# Patient Record
Sex: FEMALE | Race: WHITE | Employment: OTHER | URBAN - METROPOLITAN AREA
[De-identification: names, ages, dates, MRNs, and addresses within clinical notes are randomized per-mention and may not be internally consistent; named-entity substitution may affect disease eponyms.]

---

## 2020-01-01 ENCOUNTER — APPOINTMENT (OUTPATIENT)
Dept: GENERAL RADIOLOGY | Age: 85
DRG: 470 | End: 2020-01-01
Payer: MEDICARE

## 2020-01-01 ENCOUNTER — ANESTHESIA EVENT (OUTPATIENT)
Dept: MEDSURG UNIT | Age: 85
DRG: 470 | End: 2020-01-01
Payer: MEDICARE

## 2020-01-01 ENCOUNTER — ANESTHESIA (OUTPATIENT)
Dept: OPERATING ROOM | Age: 85
DRG: 470 | End: 2020-01-01
Payer: MEDICARE

## 2020-01-01 ENCOUNTER — HOSPITAL ENCOUNTER (INPATIENT)
Age: 85
LOS: 4 days | Discharge: NURSING, REHAB OR LONG-TERM CARE | DRG: 470 | End: 2020-08-02
Attending: EMERGENCY MEDICINE | Admitting: INTERNAL MEDICINE
Payer: MEDICARE

## 2020-01-01 ENCOUNTER — APPOINTMENT (OUTPATIENT)
Dept: CT IMAGING | Age: 85
DRG: 470 | End: 2020-01-01
Payer: MEDICARE

## 2020-01-01 ENCOUNTER — ANESTHESIA (OUTPATIENT)
Dept: MEDSURG UNIT | Age: 85
DRG: 470 | End: 2020-01-01
Payer: MEDICARE

## 2020-01-01 ENCOUNTER — APPOINTMENT (OUTPATIENT)
Dept: CT IMAGING | Age: 85
DRG: 280 | End: 2020-01-01
Payer: MEDICARE

## 2020-01-01 ENCOUNTER — OFFICE VISIT (OUTPATIENT)
Dept: ORTHOPEDIC SURGERY | Age: 85
End: 2020-01-01

## 2020-01-01 ENCOUNTER — TELEPHONE (OUTPATIENT)
Dept: ORTHOPEDIC SURGERY | Age: 85
End: 2020-01-01

## 2020-01-01 ENCOUNTER — ANESTHESIA EVENT (OUTPATIENT)
Dept: OPERATING ROOM | Age: 85
DRG: 470 | End: 2020-01-01
Payer: MEDICARE

## 2020-01-01 ENCOUNTER — HOSPITAL ENCOUNTER (EMERGENCY)
Age: 85
Discharge: HOME | End: 2020-08-03
Payer: MEDICARE

## 2020-01-01 ENCOUNTER — HOSPITAL ENCOUNTER (INPATIENT)
Age: 85
LOS: 5 days | Discharge: NURSING, REHAB OR LONG-TERM CARE | DRG: 280 | End: 2020-12-27
Attending: EMERGENCY MEDICINE | Admitting: INTERNAL MEDICINE
Payer: MEDICARE

## 2020-01-01 ENCOUNTER — APPOINTMENT (OUTPATIENT)
Dept: GENERAL RADIOLOGY | Age: 85
DRG: 280 | End: 2020-01-01
Payer: MEDICARE

## 2020-01-01 VITALS
HEIGHT: 62 IN | WEIGHT: 135 LBS | SYSTOLIC BLOOD PRESSURE: 143 MMHG | RESPIRATION RATE: 16 BRPM | HEART RATE: 72 BPM | BODY MASS INDEX: 24.84 KG/M2 | TEMPERATURE: 98.6 F | DIASTOLIC BLOOD PRESSURE: 56 MMHG | OXYGEN SATURATION: 97 %

## 2020-01-01 VITALS
TEMPERATURE: 98 F | HEIGHT: 62 IN | HEART RATE: 55 BPM | BODY MASS INDEX: 21.18 KG/M2 | OXYGEN SATURATION: 94 % | RESPIRATION RATE: 16 BRPM | DIASTOLIC BLOOD PRESSURE: 55 MMHG | WEIGHT: 115.1 LBS | SYSTOLIC BLOOD PRESSURE: 133 MMHG

## 2020-01-01 VITALS
SYSTOLIC BLOOD PRESSURE: 155 MMHG | WEIGHT: 135 LBS | OXYGEN SATURATION: 96 % | DIASTOLIC BLOOD PRESSURE: 48 MMHG | BODY MASS INDEX: 24.69 KG/M2 | RESPIRATION RATE: 22 BRPM | TEMPERATURE: 98 F | HEART RATE: 73 BPM

## 2020-01-01 VITALS — HEIGHT: 62 IN | WEIGHT: 106 LBS | BODY MASS INDEX: 19.51 KG/M2

## 2020-01-01 VITALS — OXYGEN SATURATION: 98 % | SYSTOLIC BLOOD PRESSURE: 141 MMHG | DIASTOLIC BLOOD PRESSURE: 45 MMHG

## 2020-01-01 VITALS — WEIGHT: 106 LBS | HEIGHT: 62 IN | BODY MASS INDEX: 19.51 KG/M2

## 2020-01-01 LAB
A/G RATIO: 1 (ref 1.1–2.2)
A/G RATIO: 1.4 (ref 1.1–2.2)
A/G RATIO: 1.7 (ref 1.1–2.2)
ABO/RH: NORMAL
ABO/RH: NORMAL
ALBUMIN SERPL-MCNC: 3 G/DL (ref 3.4–5)
ALBUMIN SERPL-MCNC: 3.6 G/DL (ref 3.4–5)
ALBUMIN SERPL-MCNC: 4.2 G/DL (ref 3.4–5)
ALBUMIN SERPL-MCNC: 4.7 G/DL (ref 3.4–5)
ALP BLD-CCNC: 56 U/L (ref 40–129)
ALP BLD-CCNC: 70 U/L (ref 40–129)
ALP BLD-CCNC: 75 U/L (ref 40–129)
ALP BLD-CCNC: 82 U/L (ref 40–129)
ALT SERPL-CCNC: 10 U/L (ref 10–40)
ALT SERPL-CCNC: 7 U/L (ref 10–40)
ALT SERPL-CCNC: 7 U/L (ref 10–40)
ALT SERPL-CCNC: <5 U/L (ref 10–40)
ANION GAP SERPL CALCULATED.3IONS-SCNC: 10 MMOL/L (ref 3–16)
ANION GAP SERPL CALCULATED.3IONS-SCNC: 11 MMOL/L (ref 3–16)
ANION GAP SERPL CALCULATED.3IONS-SCNC: 14 MMOL/L (ref 3–16)
ANION GAP SERPL CALCULATED.3IONS-SCNC: 6 MMOL/L (ref 3–16)
ANION GAP SERPL CALCULATED.3IONS-SCNC: 8 MMOL/L (ref 3–16)
ANION GAP SERPL CALCULATED.3IONS-SCNC: 8 MMOL/L (ref 3–16)
ANION GAP SERPL CALCULATED.3IONS-SCNC: 9 MMOL/L (ref 3–16)
ANION GAP SERPL CALCULATED.3IONS-SCNC: 9 MMOL/L (ref 3–16)
ANTIBODY SCREEN: NORMAL
ANTIBODY SCREEN: NORMAL
APTT: 28.5 SEC (ref 24.2–36.2)
APTT: 36.7 SEC (ref 24.2–36.2)
APTT: 48.4 SEC (ref 24.2–36.2)
AST SERPL-CCNC: 14 U/L (ref 15–37)
AST SERPL-CCNC: 25 U/L (ref 15–37)
AST SERPL-CCNC: 30 U/L (ref 15–37)
AST SERPL-CCNC: 34 U/L (ref 15–37)
BACTERIA: ABNORMAL /HPF
BASOPHILS ABSOLUTE: 0 K/UL (ref 0–0.2)
BASOPHILS ABSOLUTE: 0.1 K/UL (ref 0–0.2)
BASOPHILS RELATIVE PERCENT: 0.2 %
BASOPHILS RELATIVE PERCENT: 0.4 %
BASOPHILS RELATIVE PERCENT: 0.4 %
BASOPHILS RELATIVE PERCENT: 0.5 %
BASOPHILS RELATIVE PERCENT: 0.6 %
BASOPHILS RELATIVE PERCENT: 0.9 %
BASOPHILS RELATIVE PERCENT: 1 %
BILIRUB SERPL-MCNC: 0.4 MG/DL (ref 0–1)
BILIRUB SERPL-MCNC: 0.5 MG/DL (ref 0–1)
BILIRUB SERPL-MCNC: 0.5 MG/DL (ref 0–1)
BILIRUB SERPL-MCNC: 0.6 MG/DL (ref 0–1)
BILIRUBIN DIRECT: <0.2 MG/DL (ref 0–0.3)
BILIRUBIN URINE: NEGATIVE
BILIRUBIN URINE: NEGATIVE
BILIRUBIN, INDIRECT: ABNORMAL MG/DL (ref 0–1)
BLOOD CULTURE, ROUTINE: NORMAL
BLOOD CULTURE, ROUTINE: NORMAL
BLOOD, URINE: ABNORMAL
BLOOD, URINE: ABNORMAL
BUN BLDV-MCNC: 14 MG/DL (ref 7–20)
BUN BLDV-MCNC: 16 MG/DL (ref 7–20)
BUN BLDV-MCNC: 17 MG/DL (ref 7–20)
BUN BLDV-MCNC: 18 MG/DL (ref 7–20)
BUN BLDV-MCNC: 18 MG/DL (ref 7–20)
BUN BLDV-MCNC: 19 MG/DL (ref 7–20)
BUN BLDV-MCNC: 22 MG/DL (ref 7–20)
BUN BLDV-MCNC: 23 MG/DL (ref 7–20)
CALCIUM SERPL-MCNC: 10 MG/DL (ref 8.3–10.6)
CALCIUM SERPL-MCNC: 8 MG/DL (ref 8.3–10.6)
CALCIUM SERPL-MCNC: 8.1 MG/DL (ref 8.3–10.6)
CALCIUM SERPL-MCNC: 8.3 MG/DL (ref 8.3–10.6)
CALCIUM SERPL-MCNC: 8.5 MG/DL (ref 8.3–10.6)
CALCIUM SERPL-MCNC: 8.5 MG/DL (ref 8.3–10.6)
CALCIUM SERPL-MCNC: 8.7 MG/DL (ref 8.3–10.6)
CALCIUM SERPL-MCNC: 8.7 MG/DL (ref 8.3–10.6)
CALCIUM SERPL-MCNC: 8.8 MG/DL (ref 8.3–10.6)
CALCIUM SERPL-MCNC: 9.5 MG/DL (ref 8.3–10.6)
CHLORIDE BLD-SCNC: 100 MMOL/L (ref 99–110)
CHLORIDE BLD-SCNC: 102 MMOL/L (ref 99–110)
CHLORIDE BLD-SCNC: 103 MMOL/L (ref 99–110)
CHLORIDE BLD-SCNC: 103 MMOL/L (ref 99–110)
CHLORIDE BLD-SCNC: 105 MMOL/L (ref 99–110)
CHLORIDE BLD-SCNC: 105 MMOL/L (ref 99–110)
CHLORIDE BLD-SCNC: 106 MMOL/L (ref 99–110)
CHLORIDE BLD-SCNC: 94 MMOL/L (ref 99–110)
CHLORIDE BLD-SCNC: 95 MMOL/L (ref 99–110)
CHLORIDE BLD-SCNC: 98 MMOL/L (ref 99–110)
CLARITY: CLEAR
CLARITY: CLEAR
CO2: 22 MMOL/L (ref 21–32)
CO2: 23 MMOL/L (ref 21–32)
CO2: 24 MMOL/L (ref 21–32)
CO2: 24 MMOL/L (ref 21–32)
CO2: 25 MMOL/L (ref 21–32)
CO2: 25 MMOL/L (ref 21–32)
CO2: 27 MMOL/L (ref 21–32)
CO2: 27 MMOL/L (ref 21–32)
COLOR: YELLOW
COLOR: YELLOW
CREAT SERPL-MCNC: 0.8 MG/DL (ref 0.6–1.2)
CREAT SERPL-MCNC: 0.9 MG/DL (ref 0.6–1.2)
CREAT SERPL-MCNC: 1 MG/DL (ref 0.6–1.2)
CREAT SERPL-MCNC: 1 MG/DL (ref 0.6–1.2)
CREAT SERPL-MCNC: 1.1 MG/DL (ref 0.6–1.2)
CULTURE, BLOOD 2: NORMAL
CULTURE, BLOOD 2: NORMAL
EKG ATRIAL RATE: 104 BPM
EKG ATRIAL RATE: 54 BPM
EKG ATRIAL RATE: 61 BPM
EKG ATRIAL RATE: 77 BPM
EKG ATRIAL RATE: 88 BPM
EKG DIAGNOSIS: NORMAL
EKG P AXIS: 41 DEGREES
EKG P AXIS: 88 DEGREES
EKG P AXIS: 93 DEGREES
EKG P AXIS: 94 DEGREES
EKG P-R INTERVAL: 146 MS
EKG P-R INTERVAL: 150 MS
EKG P-R INTERVAL: 160 MS
EKG P-R INTERVAL: 160 MS
EKG Q-T INTERVAL: 310 MS
EKG Q-T INTERVAL: 342 MS
EKG Q-T INTERVAL: 374 MS
EKG Q-T INTERVAL: 454 MS
EKG Q-T INTERVAL: 510 MS
EKG QRS DURATION: 106 MS
EKG QRS DURATION: 112 MS
EKG QRS DURATION: 118 MS
EKG QRS DURATION: 118 MS
EKG QRS DURATION: 122 MS
EKG QTC CALCULATION (BAZETT): 407 MS
EKG QTC CALCULATION (BAZETT): 452 MS
EKG QTC CALCULATION (BAZETT): 457 MS
EKG QTC CALCULATION (BAZETT): 475 MS
EKG QTC CALCULATION (BAZETT): 483 MS
EKG R AXIS: -50 DEGREES
EKG R AXIS: -51 DEGREES
EKG R AXIS: -55 DEGREES
EKG R AXIS: -56 DEGREES
EKG R AXIS: -61 DEGREES
EKG T AXIS: -61 DEGREES
EKG T AXIS: 114 DEGREES
EKG T AXIS: 42 DEGREES
EKG T AXIS: 84 DEGREES
EKG T AXIS: 84 DEGREES
EKG VENTRICULAR RATE: 104 BPM
EKG VENTRICULAR RATE: 116 BPM
EKG VENTRICULAR RATE: 54 BPM
EKG VENTRICULAR RATE: 61 BPM
EKG VENTRICULAR RATE: 88 BPM
EOSINOPHILS ABSOLUTE: 0 K/UL (ref 0–0.6)
EOSINOPHILS ABSOLUTE: 0.1 K/UL (ref 0–0.6)
EOSINOPHILS RELATIVE PERCENT: 0.1 %
EOSINOPHILS RELATIVE PERCENT: 0.5 %
EOSINOPHILS RELATIVE PERCENT: 0.6 %
EOSINOPHILS RELATIVE PERCENT: 0.7 %
EOSINOPHILS RELATIVE PERCENT: 0.8 %
EOSINOPHILS RELATIVE PERCENT: 1.2 %
EOSINOPHILS RELATIVE PERCENT: 1.4 %
EOSINOPHILS RELATIVE PERCENT: 1.5 %
EOSINOPHILS RELATIVE PERCENT: 1.8 %
EPITHELIAL CELLS, UA: ABNORMAL /HPF (ref 0–5)
ESTIMATED AVERAGE GLUCOSE: 93.9 MG/DL
GFR AFRICAN AMERICAN: 57
GFR AFRICAN AMERICAN: >60
GFR NON-AFRICAN AMERICAN: 47
GFR NON-AFRICAN AMERICAN: 53
GFR NON-AFRICAN AMERICAN: 53
GFR NON-AFRICAN AMERICAN: 59
GFR NON-AFRICAN AMERICAN: >60
GLOBULIN: 2.8 G/DL
GLOBULIN: 3 G/DL
GLOBULIN: 3 G/DL
GLUCOSE BLD-MCNC: 106 MG/DL (ref 70–99)
GLUCOSE BLD-MCNC: 107 MG/DL (ref 70–99)
GLUCOSE BLD-MCNC: 108 MG/DL (ref 70–99)
GLUCOSE BLD-MCNC: 109 MG/DL (ref 70–99)
GLUCOSE BLD-MCNC: 112 MG/DL (ref 70–99)
GLUCOSE BLD-MCNC: 118 MG/DL (ref 70–99)
GLUCOSE BLD-MCNC: 119 MG/DL (ref 70–99)
GLUCOSE BLD-MCNC: 121 MG/DL (ref 70–99)
GLUCOSE BLD-MCNC: 123 MG/DL (ref 70–99)
GLUCOSE BLD-MCNC: 126 MG/DL (ref 70–99)
GLUCOSE BLD-MCNC: 127 MG/DL (ref 70–99)
GLUCOSE BLD-MCNC: 128 MG/DL (ref 70–99)
GLUCOSE BLD-MCNC: 129 MG/DL (ref 70–99)
GLUCOSE BLD-MCNC: 129 MG/DL (ref 70–99)
GLUCOSE BLD-MCNC: 131 MG/DL (ref 70–99)
GLUCOSE BLD-MCNC: 133 MG/DL (ref 70–99)
GLUCOSE BLD-MCNC: 133 MG/DL (ref 70–99)
GLUCOSE BLD-MCNC: 135 MG/DL (ref 70–99)
GLUCOSE BLD-MCNC: 140 MG/DL (ref 70–99)
GLUCOSE BLD-MCNC: 140 MG/DL (ref 70–99)
GLUCOSE BLD-MCNC: 154 MG/DL (ref 70–99)
GLUCOSE BLD-MCNC: 162 MG/DL (ref 70–99)
GLUCOSE BLD-MCNC: 167 MG/DL (ref 70–99)
GLUCOSE BLD-MCNC: 221 MG/DL (ref 70–99)
GLUCOSE BLD-MCNC: 96 MG/DL (ref 70–99)
GLUCOSE BLD-MCNC: 98 MG/DL (ref 70–99)
GLUCOSE BLD-MCNC: 99 MG/DL (ref 70–99)
GLUCOSE BLD-MCNC: 99 MG/DL (ref 70–99)
GLUCOSE URINE: 250 MG/DL
GLUCOSE URINE: NEGATIVE MG/DL
HBA1C MFR BLD: 4.9 %
HCT VFR BLD CALC: 21.4 % (ref 36–48)
HCT VFR BLD CALC: 23.1 % (ref 36–48)
HCT VFR BLD CALC: 23.6 % (ref 36–48)
HCT VFR BLD CALC: 23.7 % (ref 36–48)
HCT VFR BLD CALC: 30.5 % (ref 36–48)
HCT VFR BLD CALC: 30.6 % (ref 36–48)
HCT VFR BLD CALC: 31.4 % (ref 36–48)
HCT VFR BLD CALC: 31.5 % (ref 36–48)
HCT VFR BLD CALC: 32.1 % (ref 36–48)
HCT VFR BLD CALC: 33.3 % (ref 36–48)
HEMOGLOBIN: 10.3 G/DL (ref 12–16)
HEMOGLOBIN: 10.3 G/DL (ref 12–16)
HEMOGLOBIN: 10.4 G/DL (ref 12–16)
HEMOGLOBIN: 10.6 G/DL (ref 12–16)
HEMOGLOBIN: 10.7 G/DL (ref 12–16)
HEMOGLOBIN: 11.1 G/DL (ref 12–16)
HEMOGLOBIN: 7.2 G/DL (ref 12–16)
HEMOGLOBIN: 7.6 G/DL (ref 12–16)
HEMOGLOBIN: 7.9 G/DL (ref 12–16)
HEMOGLOBIN: 7.9 G/DL (ref 12–16)
HYALINE CASTS: ABNORMAL /LPF (ref 0–2)
INR BLD: 1.06 (ref 0.86–1.14)
KETONES, URINE: NEGATIVE MG/DL
KETONES, URINE: NEGATIVE MG/DL
LACTIC ACID, SEPSIS: 2.6 MMOL/L (ref 0.4–1.9)
LACTIC ACID, SEPSIS: 3.1 MMOL/L (ref 0.4–1.9)
LACTIC ACID: 0.8 MMOL/L (ref 0.4–2)
LACTIC ACID: 1 MMOL/L (ref 0.4–2)
LACTIC ACID: 1.5 MMOL/L (ref 0.4–2)
LACTIC ACID: 1.9 MMOL/L (ref 0.4–2)
LEUKOCYTE ESTERASE, URINE: NEGATIVE
LEUKOCYTE ESTERASE, URINE: NEGATIVE
LIPASE: 39 U/L (ref 13–60)
LV EF: 33 %
LVEF MODALITY: NORMAL
LYMPHOCYTES ABSOLUTE: 0.8 K/UL (ref 1–5.1)
LYMPHOCYTES ABSOLUTE: 0.9 K/UL (ref 1–5.1)
LYMPHOCYTES ABSOLUTE: 0.9 K/UL (ref 1–5.1)
LYMPHOCYTES ABSOLUTE: 1 K/UL (ref 1–5.1)
LYMPHOCYTES ABSOLUTE: 1 K/UL (ref 1–5.1)
LYMPHOCYTES ABSOLUTE: 1.1 K/UL (ref 1–5.1)
LYMPHOCYTES ABSOLUTE: 1.2 K/UL (ref 1–5.1)
LYMPHOCYTES ABSOLUTE: 1.3 K/UL (ref 1–5.1)
LYMPHOCYTES ABSOLUTE: 1.6 K/UL (ref 1–5.1)
LYMPHOCYTES RELATIVE PERCENT: 12.2 %
LYMPHOCYTES RELATIVE PERCENT: 12.9 %
LYMPHOCYTES RELATIVE PERCENT: 14.2 %
LYMPHOCYTES RELATIVE PERCENT: 14.8 %
LYMPHOCYTES RELATIVE PERCENT: 15.7 %
LYMPHOCYTES RELATIVE PERCENT: 16.2 %
LYMPHOCYTES RELATIVE PERCENT: 16.6 %
LYMPHOCYTES RELATIVE PERCENT: 20.3 %
LYMPHOCYTES RELATIVE PERCENT: 8.2 %
MCH RBC QN AUTO: 32.2 PG (ref 26–34)
MCH RBC QN AUTO: 32.3 PG (ref 26–34)
MCH RBC QN AUTO: 32.6 PG (ref 26–34)
MCH RBC QN AUTO: 32.9 PG (ref 26–34)
MCH RBC QN AUTO: 32.9 PG (ref 26–34)
MCH RBC QN AUTO: 33.1 PG (ref 26–34)
MCH RBC QN AUTO: 33.1 PG (ref 26–34)
MCH RBC QN AUTO: 33.9 PG (ref 26–34)
MCHC RBC AUTO-ENTMCNC: 32.9 G/DL (ref 31–36)
MCHC RBC AUTO-ENTMCNC: 33.2 G/DL (ref 31–36)
MCHC RBC AUTO-ENTMCNC: 33.3 G/DL (ref 31–36)
MCHC RBC AUTO-ENTMCNC: 33.3 G/DL (ref 31–36)
MCHC RBC AUTO-ENTMCNC: 33.4 G/DL (ref 31–36)
MCHC RBC AUTO-ENTMCNC: 33.4 G/DL (ref 31–36)
MCHC RBC AUTO-ENTMCNC: 33.5 G/DL (ref 31–36)
MCHC RBC AUTO-ENTMCNC: 33.5 G/DL (ref 31–36)
MCHC RBC AUTO-ENTMCNC: 33.6 G/DL (ref 31–36)
MCHC RBC AUTO-ENTMCNC: 33.7 G/DL (ref 31–36)
MCV RBC AUTO: 101.1 FL (ref 80–100)
MCV RBC AUTO: 101.3 FL (ref 80–100)
MCV RBC AUTO: 101.8 FL (ref 80–100)
MCV RBC AUTO: 96.3 FL (ref 80–100)
MCV RBC AUTO: 96.9 FL (ref 80–100)
MCV RBC AUTO: 97.6 FL (ref 80–100)
MCV RBC AUTO: 98.1 FL (ref 80–100)
MCV RBC AUTO: 99.1 FL (ref 80–100)
MCV RBC AUTO: 99.2 FL (ref 80–100)
MCV RBC AUTO: 99.6 FL (ref 80–100)
MICROSCOPIC EXAMINATION: YES
MICROSCOPIC EXAMINATION: YES
MONOCYTES ABSOLUTE: 0.6 K/UL (ref 0–1.3)
MONOCYTES ABSOLUTE: 0.6 K/UL (ref 0–1.3)
MONOCYTES ABSOLUTE: 0.7 K/UL (ref 0–1.3)
MONOCYTES ABSOLUTE: 0.7 K/UL (ref 0–1.3)
MONOCYTES ABSOLUTE: 0.8 K/UL (ref 0–1.3)
MONOCYTES ABSOLUTE: 0.9 K/UL (ref 0–1.3)
MONOCYTES ABSOLUTE: 0.9 K/UL (ref 0–1.3)
MONOCYTES RELATIVE PERCENT: 10.3 %
MONOCYTES RELATIVE PERCENT: 10.7 %
MONOCYTES RELATIVE PERCENT: 11.3 %
MONOCYTES RELATIVE PERCENT: 11.5 %
MONOCYTES RELATIVE PERCENT: 11.7 %
MONOCYTES RELATIVE PERCENT: 11.8 %
MONOCYTES RELATIVE PERCENT: 7.7 %
MONOCYTES RELATIVE PERCENT: 8.3 %
MONOCYTES RELATIVE PERCENT: 8.6 %
MUCUS: ABNORMAL /LPF
NEUTROPHILS ABSOLUTE: 4.1 K/UL (ref 1.7–7.7)
NEUTROPHILS ABSOLUTE: 4.3 K/UL (ref 1.7–7.7)
NEUTROPHILS ABSOLUTE: 4.7 K/UL (ref 1.7–7.7)
NEUTROPHILS ABSOLUTE: 5.1 K/UL (ref 1.7–7.7)
NEUTROPHILS ABSOLUTE: 5.2 K/UL (ref 1.7–7.7)
NEUTROPHILS ABSOLUTE: 5.6 K/UL (ref 1.7–7.7)
NEUTROPHILS ABSOLUTE: 5.7 K/UL (ref 1.7–7.7)
NEUTROPHILS ABSOLUTE: 7.1 K/UL (ref 1.7–7.7)
NEUTROPHILS ABSOLUTE: 9.4 K/UL (ref 1.7–7.7)
NEUTROPHILS RELATIVE PERCENT: 67 %
NEUTROPHILS RELATIVE PERCENT: 70.7 %
NEUTROPHILS RELATIVE PERCENT: 71 %
NEUTROPHILS RELATIVE PERCENT: 72.1 %
NEUTROPHILS RELATIVE PERCENT: 74.2 %
NEUTROPHILS RELATIVE PERCENT: 74.7 %
NEUTROPHILS RELATIVE PERCENT: 75.2 %
NEUTROPHILS RELATIVE PERCENT: 75.5 %
NEUTROPHILS RELATIVE PERCENT: 83 %
NITRITE, URINE: NEGATIVE
NITRITE, URINE: NEGATIVE
PDW BLD-RTO: 13.5 % (ref 12.4–15.4)
PDW BLD-RTO: 13.6 % (ref 12.4–15.4)
PDW BLD-RTO: 13.8 % (ref 12.4–15.4)
PDW BLD-RTO: 13.8 % (ref 12.4–15.4)
PDW BLD-RTO: 14 % (ref 12.4–15.4)
PDW BLD-RTO: 14 % (ref 12.4–15.4)
PDW BLD-RTO: 14.2 % (ref 12.4–15.4)
PDW BLD-RTO: 14.2 % (ref 12.4–15.4)
PERFORMED ON: ABNORMAL
PERFORMED ON: NORMAL
PH UA: 6 (ref 5–8)
PH UA: 8 (ref 5–8)
PLATELET # BLD: 144 K/UL (ref 135–450)
PLATELET # BLD: 154 K/UL (ref 135–450)
PLATELET # BLD: 167 K/UL (ref 135–450)
PLATELET # BLD: 199 K/UL (ref 135–450)
PLATELET # BLD: 213 K/UL (ref 135–450)
PLATELET # BLD: 220 K/UL (ref 135–450)
PLATELET # BLD: 225 K/UL (ref 135–450)
PLATELET # BLD: 260 K/UL (ref 135–450)
PLATELET # BLD: 267 K/UL (ref 135–450)
PLATELET # BLD: 280 K/UL (ref 135–450)
PMV BLD AUTO: 6.7 FL (ref 5–10.5)
PMV BLD AUTO: 6.8 FL (ref 5–10.5)
PMV BLD AUTO: 7 FL (ref 5–10.5)
PMV BLD AUTO: 7 FL (ref 5–10.5)
PMV BLD AUTO: 7.1 FL (ref 5–10.5)
PMV BLD AUTO: 7.1 FL (ref 5–10.5)
PMV BLD AUTO: 7.2 FL (ref 5–10.5)
PMV BLD AUTO: 7.3 FL (ref 5–10.5)
PMV BLD AUTO: 7.5 FL (ref 5–10.5)
PMV BLD AUTO: 7.5 FL (ref 5–10.5)
POTASSIUM REFLEX MAGNESIUM: 3.7 MMOL/L (ref 3.5–5.1)
POTASSIUM REFLEX MAGNESIUM: 3.9 MMOL/L (ref 3.5–5.1)
POTASSIUM REFLEX MAGNESIUM: 4 MMOL/L (ref 3.5–5.1)
POTASSIUM REFLEX MAGNESIUM: 4 MMOL/L (ref 3.5–5.1)
POTASSIUM REFLEX MAGNESIUM: 4.1 MMOL/L (ref 3.5–5.1)
POTASSIUM REFLEX MAGNESIUM: 4.1 MMOL/L (ref 3.5–5.1)
POTASSIUM REFLEX MAGNESIUM: 4.3 MMOL/L (ref 3.5–5.1)
PROCALCITONIN: 0.04 NG/ML (ref 0–0.15)
PROCALCITONIN: 0.06 NG/ML (ref 0–0.15)
PROCALCITONIN: 0.06 NG/ML (ref 0–0.15)
PROTEIN UA: 100 MG/DL
PROTEIN UA: NEGATIVE MG/DL
PROTHROMBIN TIME: 12.3 SEC (ref 10–13.2)
RBC # BLD: 2.12 M/UL (ref 4–5.2)
RBC # BLD: 2.32 M/UL (ref 4–5.2)
RBC # BLD: 2.33 M/UL (ref 4–5.2)
RBC # BLD: 2.39 M/UL (ref 4–5.2)
RBC # BLD: 3.11 M/UL (ref 4–5.2)
RBC # BLD: 3.13 M/UL (ref 4–5.2)
RBC # BLD: 3.16 M/UL (ref 4–5.2)
RBC # BLD: 3.18 M/UL (ref 4–5.2)
RBC # BLD: 3.27 M/UL (ref 4–5.2)
RBC # BLD: 3.44 M/UL (ref 4–5.2)
RBC UA: ABNORMAL /HPF (ref 0–4)
RBC UA: ABNORMAL /HPF (ref 0–4)
SARS-COV-2, NAAT: NOT DETECTED
SARS-COV-2: NOT DETECTED
SODIUM BLD-SCNC: 131 MMOL/L (ref 136–145)
SODIUM BLD-SCNC: 131 MMOL/L (ref 136–145)
SODIUM BLD-SCNC: 133 MMOL/L (ref 136–145)
SODIUM BLD-SCNC: 134 MMOL/L (ref 136–145)
SODIUM BLD-SCNC: 135 MMOL/L (ref 136–145)
SODIUM BLD-SCNC: 137 MMOL/L (ref 136–145)
SODIUM BLD-SCNC: 137 MMOL/L (ref 136–145)
SODIUM BLD-SCNC: 139 MMOL/L (ref 136–145)
SPECIFIC GRAVITY UA: 1.01 (ref 1–1.03)
SPECIFIC GRAVITY UA: 1.02 (ref 1–1.03)
TOTAL CK: 73 U/L (ref 26–192)
TOTAL PROTEIN: 6 G/DL (ref 6.4–8.2)
TOTAL PROTEIN: 6.1 G/DL (ref 6.4–8.2)
TOTAL PROTEIN: 7.2 G/DL (ref 6.4–8.2)
TOTAL PROTEIN: 7.5 G/DL (ref 6.4–8.2)
TROPONIN: 0.31 NG/ML
TROPONIN: 0.41 NG/ML
TROPONIN: 0.49 NG/ML
TROPONIN: 0.52 NG/ML
TROPONIN: <0.01 NG/ML
URINE REFLEX TO CULTURE: ABNORMAL
URINE TYPE: ABNORMAL
URINE TYPE: ABNORMAL
UROBILINOGEN, URINE: 0.2 E.U./DL
UROBILINOGEN, URINE: 0.2 E.U./DL
WBC # BLD: 11.3 K/UL (ref 4–11)
WBC # BLD: 6.1 K/UL (ref 4–11)
WBC # BLD: 6.1 K/UL (ref 4–11)
WBC # BLD: 6.3 K/UL (ref 4–11)
WBC # BLD: 6.8 K/UL (ref 4–11)
WBC # BLD: 7.3 K/UL (ref 4–11)
WBC # BLD: 7.3 K/UL (ref 4–11)
WBC # BLD: 7.5 K/UL (ref 4–11)
WBC # BLD: 7.9 K/UL (ref 4–11)
WBC # BLD: 9.5 K/UL (ref 4–11)
WBC UA: ABNORMAL /HPF (ref 0–5)
WBC UA: ABNORMAL /HPF (ref 0–5)

## 2020-01-01 PROCEDURE — 85025 COMPLETE CBC W/AUTO DIFF WBC: CPT

## 2020-01-01 PROCEDURE — 97161 PT EVAL LOW COMPLEX 20 MIN: CPT

## 2020-01-01 PROCEDURE — 85610 PROTHROMBIN TIME: CPT

## 2020-01-01 PROCEDURE — 99223 1ST HOSP IP/OBS HIGH 75: CPT | Performed by: INTERNAL MEDICINE

## 2020-01-01 PROCEDURE — 6360000002 HC RX W HCPCS: Performed by: ORTHOPAEDIC SURGERY

## 2020-01-01 PROCEDURE — 2580000003 HC RX 258: Performed by: INTERNAL MEDICINE

## 2020-01-01 PROCEDURE — 80048 BASIC METABOLIC PNL TOTAL CA: CPT

## 2020-01-01 PROCEDURE — 2580000003 HC RX 258: Performed by: PHYSICIAN ASSISTANT

## 2020-01-01 PROCEDURE — 86901 BLOOD TYPING SEROLOGIC RH(D): CPT

## 2020-01-01 PROCEDURE — 6370000000 HC RX 637 (ALT 250 FOR IP): Performed by: PHYSICIAN ASSISTANT

## 2020-01-01 PROCEDURE — 99024 POSTOP FOLLOW-UP VISIT: CPT | Performed by: ORTHOPAEDIC SURGERY

## 2020-01-01 PROCEDURE — 6370000000 HC RX 637 (ALT 250 FOR IP): Performed by: INTERNAL MEDICINE

## 2020-01-01 PROCEDURE — 99221 1ST HOSP IP/OBS SF/LOW 40: CPT | Performed by: ORTHOPAEDIC SURGERY

## 2020-01-01 PROCEDURE — 1200000000 HC SEMI PRIVATE

## 2020-01-01 PROCEDURE — 6360000002 HC RX W HCPCS: Performed by: INTERNAL MEDICINE

## 2020-01-01 PROCEDURE — 6360000004 HC RX CONTRAST MEDICATION: Performed by: EMERGENCY MEDICINE

## 2020-01-01 PROCEDURE — 92610 EVALUATE SWALLOWING FUNCTION: CPT

## 2020-01-01 PROCEDURE — 99233 SBSQ HOSP IP/OBS HIGH 50: CPT | Performed by: INTERNAL MEDICINE

## 2020-01-01 PROCEDURE — 99284 EMERGENCY DEPT VISIT MOD MDM: CPT

## 2020-01-01 PROCEDURE — 0SRS0JA REPLACEMENT OF LEFT HIP JOINT, FEMORAL SURFACE WITH SYNTHETIC SUBSTITUTE, UNCEMENTED, OPEN APPROACH: ICD-10-PCS | Performed by: ORTHOPAEDIC SURGERY

## 2020-01-01 PROCEDURE — C9290 INJ, BUPIVACAINE LIPOSOME: HCPCS | Performed by: ORTHOPAEDIC SURGERY

## 2020-01-01 PROCEDURE — 36415 COLL VENOUS BLD VENIPUNCTURE: CPT

## 2020-01-01 PROCEDURE — 82550 ASSAY OF CK (CPK): CPT

## 2020-01-01 PROCEDURE — U0002 COVID-19 LAB TEST NON-CDC: HCPCS

## 2020-01-01 PROCEDURE — 93010 ELECTROCARDIOGRAM REPORT: CPT | Performed by: INTERNAL MEDICINE

## 2020-01-01 PROCEDURE — 3600000005 HC SURGERY LEVEL 5 BASE: Performed by: ORTHOPAEDIC SURGERY

## 2020-01-01 PROCEDURE — 83605 ASSAY OF LACTIC ACID: CPT

## 2020-01-01 PROCEDURE — 96375 TX/PRO/DX INJ NEW DRUG ADDON: CPT

## 2020-01-01 PROCEDURE — 80076 HEPATIC FUNCTION PANEL: CPT

## 2020-01-01 PROCEDURE — 2580000003 HC RX 258: Performed by: EMERGENCY MEDICINE

## 2020-01-01 PROCEDURE — 99285 EMERGENCY DEPT VISIT HI MDM: CPT

## 2020-01-01 PROCEDURE — 2060000000 HC ICU INTERMEDIATE R&B

## 2020-01-01 PROCEDURE — 80053 COMPREHEN METABOLIC PANEL: CPT

## 2020-01-01 PROCEDURE — 6360000002 HC RX W HCPCS: Performed by: HOSPITALIST

## 2020-01-01 PROCEDURE — 97112 NEUROMUSCULAR REEDUCATION: CPT

## 2020-01-01 PROCEDURE — 84145 PROCALCITONIN (PCT): CPT

## 2020-01-01 PROCEDURE — 84484 ASSAY OF TROPONIN QUANT: CPT

## 2020-01-01 PROCEDURE — 97530 THERAPEUTIC ACTIVITIES: CPT

## 2020-01-01 PROCEDURE — 87040 BLOOD CULTURE FOR BACTERIA: CPT

## 2020-01-01 PROCEDURE — 2580000003 HC RX 258: Performed by: ORTHOPAEDIC SURGERY

## 2020-01-01 PROCEDURE — 96376 TX/PRO/DX INJ SAME DRUG ADON: CPT

## 2020-01-01 PROCEDURE — 99232 SBSQ HOSP IP/OBS MODERATE 35: CPT | Performed by: INTERNAL MEDICINE

## 2020-01-01 PROCEDURE — 74176 CT ABD & PELVIS W/O CONTRAST: CPT

## 2020-01-01 PROCEDURE — 2580000003 HC RX 258: Performed by: NURSE ANESTHETIST, CERTIFIED REGISTERED

## 2020-01-01 PROCEDURE — 71045 X-RAY EXAM CHEST 1 VIEW: CPT

## 2020-01-01 PROCEDURE — 81001 URINALYSIS AUTO W/SCOPE: CPT

## 2020-01-01 PROCEDURE — 99238 HOSP IP/OBS DSCHRG MGMT 30/<: CPT | Performed by: NURSE PRACTITIONER

## 2020-01-01 PROCEDURE — 83036 HEMOGLOBIN GLYCOSYLATED A1C: CPT

## 2020-01-01 PROCEDURE — 97116 GAIT TRAINING THERAPY: CPT

## 2020-01-01 PROCEDURE — 3209999900 CT LUMBAR SPINE TRAUMA RECONSTRUCTION

## 2020-01-01 PROCEDURE — 86850 RBC ANTIBODY SCREEN: CPT

## 2020-01-01 PROCEDURE — 86900 BLOOD TYPING SEROLOGIC ABO: CPT

## 2020-01-01 PROCEDURE — 2700000000 HC OXYGEN THERAPY PER DAY

## 2020-01-01 PROCEDURE — 94761 N-INVAS EAR/PLS OXIMETRY MLT: CPT

## 2020-01-01 PROCEDURE — 6360000002 HC RX W HCPCS: Performed by: EMERGENCY MEDICINE

## 2020-01-01 PROCEDURE — 2500000003 HC RX 250 WO HCPCS: Performed by: ORTHOPAEDIC SURGERY

## 2020-01-01 PROCEDURE — 97110 THERAPEUTIC EXERCISES: CPT

## 2020-01-01 PROCEDURE — 96374 THER/PROPH/DIAG INJ IV PUSH: CPT

## 2020-01-01 PROCEDURE — 2580000003 HC RX 258: Performed by: HOSPITALIST

## 2020-01-01 PROCEDURE — 85730 THROMBOPLASTIN TIME PARTIAL: CPT

## 2020-01-01 PROCEDURE — 3600000015 HC SURGERY LEVEL 5 ADDTL 15MIN: Performed by: ORTHOPAEDIC SURGERY

## 2020-01-01 PROCEDURE — 96361 HYDRATE IV INFUSION ADD-ON: CPT

## 2020-01-01 PROCEDURE — U0003 INFECTIOUS AGENT DETECTION BY NUCLEIC ACID (DNA OR RNA); SEVERE ACUTE RESPIRATORY SYNDROME CORONAVIRUS 2 (SARS-COV-2) (CORONAVIRUS DISEASE [COVID-19]), AMPLIFIED PROBE TECHNIQUE, MAKING USE OF HIGH THROUGHPUT TECHNOLOGIES AS DESCRIBED BY CMS-2020-01-R: HCPCS

## 2020-01-01 PROCEDURE — 97535 SELF CARE MNGMENT TRAINING: CPT

## 2020-01-01 PROCEDURE — 70450 CT HEAD/BRAIN W/O DYE: CPT

## 2020-01-01 PROCEDURE — 99232 SBSQ HOSP IP/OBS MODERATE 35: CPT | Performed by: PHYSICIAN ASSISTANT

## 2020-01-01 PROCEDURE — 6360000002 HC RX W HCPCS: Performed by: PHYSICIAN ASSISTANT

## 2020-01-01 PROCEDURE — 97166 OT EVAL MOD COMPLEX 45 MIN: CPT

## 2020-01-01 PROCEDURE — 99238 HOSP IP/OBS DSCHRG MGMT 30/<: CPT | Performed by: INTERNAL MEDICINE

## 2020-01-01 PROCEDURE — 93005 ELECTROCARDIOGRAM TRACING: CPT | Performed by: EMERGENCY MEDICINE

## 2020-01-01 PROCEDURE — 83690 ASSAY OF LIPASE: CPT

## 2020-01-01 PROCEDURE — 70496 CT ANGIOGRAPHY HEAD: CPT

## 2020-01-01 PROCEDURE — 3700000001 HC ADD 15 MINUTES (ANESTHESIA): Performed by: ORTHOPAEDIC SURGERY

## 2020-01-01 PROCEDURE — C1776 JOINT DEVICE (IMPLANTABLE): HCPCS | Performed by: ORTHOPAEDIC SURGERY

## 2020-01-01 PROCEDURE — 93005 ELECTROCARDIOGRAM TRACING: CPT | Performed by: PHYSICIAN ASSISTANT

## 2020-01-01 PROCEDURE — 94150 VITAL CAPACITY TEST: CPT

## 2020-01-01 PROCEDURE — 72125 CT NECK SPINE W/O DYE: CPT

## 2020-01-01 PROCEDURE — 99231 SBSQ HOSP IP/OBS SF/LOW 25: CPT | Performed by: NURSE PRACTITIONER

## 2020-01-01 PROCEDURE — 3209999900 CT THORACIC SPINE TRAUMA RECONSTRUCTION

## 2020-01-01 PROCEDURE — 72170 X-RAY EXAM OF PELVIS: CPT

## 2020-01-01 PROCEDURE — 2500000003 HC RX 250 WO HCPCS: Performed by: NURSE ANESTHETIST, CERTIFIED REGISTERED

## 2020-01-01 PROCEDURE — 97162 PT EVAL MOD COMPLEX 30 MIN: CPT

## 2020-01-01 PROCEDURE — 2709999900 HC NON-CHARGEABLE SUPPLY: Performed by: ORTHOPAEDIC SURGERY

## 2020-01-01 PROCEDURE — 74177 CT ABD & PELVIS W/CONTRAST: CPT

## 2020-01-01 PROCEDURE — 7100000001 HC PACU RECOVERY - ADDTL 15 MIN: Performed by: ORTHOPAEDIC SURGERY

## 2020-01-01 PROCEDURE — 93306 TTE W/DOPPLER COMPLETE: CPT

## 2020-01-01 PROCEDURE — 85027 COMPLETE CBC AUTOMATED: CPT

## 2020-01-01 PROCEDURE — 7100000000 HC PACU RECOVERY - FIRST 15 MIN: Performed by: ORTHOPAEDIC SURGERY

## 2020-01-01 PROCEDURE — 6360000002 HC RX W HCPCS: Performed by: NURSE ANESTHETIST, CERTIFIED REGISTERED

## 2020-01-01 PROCEDURE — 2720000010 HC SURG SUPPLY STERILE: Performed by: ORTHOPAEDIC SURGERY

## 2020-01-01 PROCEDURE — 3700000000 HC ANESTHESIA ATTENDED CARE: Performed by: ORTHOPAEDIC SURGERY

## 2020-01-01 DEVICE — IMPLANTABLE DEVICE: Type: IMPLANTABLE DEVICE | Site: HIP | Status: FUNCTIONAL

## 2020-01-01 DEVICE — BIPOLAR SHELL 45MM OD: Type: IMPLANTABLE DEVICE | Site: HIP | Status: FUNCTIONAL

## 2020-01-01 DEVICE — HEAD FEM DIA28MM -3MM OFFSET FEM HIP CO CHROM TYP 1 PRI MOD: Type: IMPLANTABLE DEVICE | Site: HIP | Status: FUNCTIONAL

## 2020-01-01 DEVICE — BIPOLAR LINER 44/45/46MM OD X 28MM ID: Type: IMPLANTABLE DEVICE | Site: HIP | Status: FUNCTIONAL

## 2020-01-01 RX ORDER — MIDAZOLAM HYDROCHLORIDE 1 MG/ML
INJECTION INTRAMUSCULAR; INTRAVENOUS PRN
Status: DISCONTINUED | OUTPATIENT
Start: 2020-01-01 | End: 2020-01-01 | Stop reason: SDUPTHER

## 2020-01-01 RX ORDER — PROMETHAZINE HYDROCHLORIDE 25 MG/ML
6.25 INJECTION, SOLUTION INTRAMUSCULAR; INTRAVENOUS
Status: DISCONTINUED | OUTPATIENT
Start: 2020-01-01 | End: 2020-01-01 | Stop reason: HOSPADM

## 2020-01-01 RX ORDER — HEPARIN SODIUM 1000 [USP'U]/ML
60 INJECTION, SOLUTION INTRAVENOUS; SUBCUTANEOUS PRN
Status: DISCONTINUED | OUTPATIENT
Start: 2020-01-01 | End: 2020-01-01

## 2020-01-01 RX ORDER — GUAIFENESIN 600 MG/1
600 TABLET, EXTENDED RELEASE ORAL 2 TIMES DAILY
Status: DISCONTINUED | OUTPATIENT
Start: 2020-01-01 | End: 2020-01-01 | Stop reason: HOSPADM

## 2020-01-01 RX ORDER — PROMETHAZINE HYDROCHLORIDE 25 MG/1
12.5 TABLET ORAL EVERY 6 HOURS PRN
Status: DISCONTINUED | OUTPATIENT
Start: 2020-01-01 | End: 2020-01-01 | Stop reason: HOSPADM

## 2020-01-01 RX ORDER — METOPROLOL SUCCINATE 25 MG/1
25 TABLET, EXTENDED RELEASE ORAL DAILY
Status: DISCONTINUED | OUTPATIENT
Start: 2020-01-01 | End: 2020-01-01 | Stop reason: HOSPADM

## 2020-01-01 RX ORDER — METOPROLOL SUCCINATE 25 MG/1
25 TABLET, EXTENDED RELEASE ORAL DAILY
Qty: 30 TABLET | Refills: 3 | Status: ON HOLD | OUTPATIENT
Start: 2020-01-01 | End: 2021-01-01 | Stop reason: HOSPADM

## 2020-01-01 RX ORDER — OMEGA-3-ACID ETHYL ESTERS 1 G/1
2 CAPSULE, LIQUID FILLED ORAL DAILY
Status: DISCONTINUED | OUTPATIENT
Start: 2020-01-01 | End: 2020-01-01 | Stop reason: HOSPADM

## 2020-01-01 RX ORDER — SODIUM CHLORIDE 0.9 % (FLUSH) 0.9 %
10 SYRINGE (ML) INJECTION PRN
Status: DISCONTINUED | OUTPATIENT
Start: 2020-01-01 | End: 2020-01-01 | Stop reason: HOSPADM

## 2020-01-01 RX ORDER — FENTANYL CITRATE 50 UG/ML
50 INJECTION, SOLUTION INTRAMUSCULAR; INTRAVENOUS EVERY 5 MIN PRN
Status: DISCONTINUED | OUTPATIENT
Start: 2020-01-01 | End: 2020-01-01 | Stop reason: HOSPADM

## 2020-01-01 RX ORDER — GUAIFENESIN 600 MG/1
600 TABLET, EXTENDED RELEASE ORAL 2 TIMES DAILY
Qty: 60 TABLET | Refills: 0
Start: 2020-01-01 | End: 2020-01-01

## 2020-01-01 RX ORDER — HYDRALAZINE HYDROCHLORIDE 20 MG/ML
5 INJECTION INTRAMUSCULAR; INTRAVENOUS EVERY 10 MIN PRN
Status: DISCONTINUED | OUTPATIENT
Start: 2020-01-01 | End: 2020-01-01 | Stop reason: HOSPADM

## 2020-01-01 RX ORDER — ONDANSETRON 2 MG/ML
4 INJECTION INTRAMUSCULAR; INTRAVENOUS
Status: DISCONTINUED | OUTPATIENT
Start: 2020-01-01 | End: 2020-01-01 | Stop reason: HOSPADM

## 2020-01-01 RX ORDER — MORPHINE SULFATE 4 MG/ML
4 INJECTION, SOLUTION INTRAMUSCULAR; INTRAVENOUS ONCE
Status: COMPLETED | OUTPATIENT
Start: 2020-01-01 | End: 2020-01-01

## 2020-01-01 RX ORDER — ASPIRIN 81 MG/1
81 TABLET ORAL DAILY
Status: DISCONTINUED | OUTPATIENT
Start: 2020-01-01 | End: 2020-01-01 | Stop reason: HOSPADM

## 2020-01-01 RX ORDER — HYDROCODONE BITARTRATE AND ACETAMINOPHEN 5; 325 MG/1; MG/1
1 TABLET ORAL EVERY 6 HOURS PRN
Qty: 10 TABLET | Refills: 0 | Status: SHIPPED | OUTPATIENT
Start: 2020-01-01 | End: 2020-01-01

## 2020-01-01 RX ORDER — SODIUM CHLORIDE 0.9 % (FLUSH) 0.9 %
10 SYRINGE (ML) INJECTION EVERY 12 HOURS SCHEDULED
Status: DISCONTINUED | OUTPATIENT
Start: 2020-01-01 | End: 2020-01-01 | Stop reason: HOSPADM

## 2020-01-01 RX ORDER — HEPARIN SODIUM 10000 [USP'U]/100ML
12 INJECTION, SOLUTION INTRAVENOUS CONTINUOUS
Status: DISCONTINUED | OUTPATIENT
Start: 2020-01-01 | End: 2020-01-01

## 2020-01-01 RX ORDER — ACETAMINOPHEN 325 MG/1
650 TABLET ORAL EVERY 6 HOURS PRN
Status: DISCONTINUED | OUTPATIENT
Start: 2020-01-01 | End: 2020-01-01 | Stop reason: HOSPADM

## 2020-01-01 RX ORDER — LABETALOL HYDROCHLORIDE 5 MG/ML
5 INJECTION, SOLUTION INTRAVENOUS EVERY 10 MIN PRN
Status: DISCONTINUED | OUTPATIENT
Start: 2020-01-01 | End: 2020-01-01 | Stop reason: HOSPADM

## 2020-01-01 RX ORDER — MAGNESIUM HYDROXIDE 1200 MG/15ML
LIQUID ORAL CONTINUOUS PRN
Status: COMPLETED | OUTPATIENT
Start: 2020-01-01 | End: 2020-01-01

## 2020-01-01 RX ORDER — ACETAMINOPHEN 650 MG/1
650 SUPPOSITORY RECTAL EVERY 6 HOURS PRN
Status: DISCONTINUED | OUTPATIENT
Start: 2020-01-01 | End: 2020-01-01 | Stop reason: HOSPADM

## 2020-01-01 RX ORDER — MORPHINE SULFATE 4 MG/ML
4 INJECTION, SOLUTION INTRAMUSCULAR; INTRAVENOUS EVERY 30 MIN PRN
Status: DISCONTINUED | OUTPATIENT
Start: 2020-01-01 | End: 2020-01-01

## 2020-01-01 RX ORDER — OXYCODONE HYDROCHLORIDE 5 MG/1
10 TABLET ORAL PRN
Status: DISCONTINUED | OUTPATIENT
Start: 2020-01-01 | End: 2020-01-01 | Stop reason: HOSPADM

## 2020-01-01 RX ORDER — POLYETHYLENE GLYCOL 3350 17 G/17G
17 POWDER, FOR SOLUTION ORAL DAILY PRN
Status: DISCONTINUED | OUTPATIENT
Start: 2020-01-01 | End: 2020-01-01 | Stop reason: HOSPADM

## 2020-01-01 RX ORDER — ASPIRIN 81 MG/1
81 TABLET, CHEWABLE ORAL DAILY
Status: DISCONTINUED | OUTPATIENT
Start: 2020-01-01 | End: 2020-01-01 | Stop reason: HOSPADM

## 2020-01-01 RX ORDER — LISINOPRIL 20 MG/1
20 TABLET ORAL DAILY
Status: DISCONTINUED | OUTPATIENT
Start: 2020-01-01 | End: 2020-01-01 | Stop reason: HOSPADM

## 2020-01-01 RX ORDER — HEPARIN SODIUM 10000 [USP'U]/100ML
7.2 INJECTION, SOLUTION INTRAVENOUS CONTINUOUS
Status: DISCONTINUED | OUTPATIENT
Start: 2020-01-01 | End: 2020-01-01

## 2020-01-01 RX ORDER — ATORVASTATIN CALCIUM 40 MG/1
80 TABLET, FILM COATED ORAL NIGHTLY
Status: DISCONTINUED | OUTPATIENT
Start: 2020-01-01 | End: 2020-01-01 | Stop reason: HOSPADM

## 2020-01-01 RX ORDER — SODIUM CHLORIDE 9 MG/ML
INJECTION, SOLUTION INTRAVENOUS CONTINUOUS
Status: DISCONTINUED | OUTPATIENT
Start: 2020-01-01 | End: 2020-01-01

## 2020-01-01 RX ORDER — MORPHINE SULFATE 2 MG/ML
2 INJECTION, SOLUTION INTRAMUSCULAR; INTRAVENOUS EVERY 4 HOURS PRN
Status: DISCONTINUED | OUTPATIENT
Start: 2020-01-01 | End: 2020-01-01 | Stop reason: HOSPADM

## 2020-01-01 RX ORDER — HEPARIN SODIUM 1000 [USP'U]/ML
30 INJECTION, SOLUTION INTRAVENOUS; SUBCUTANEOUS PRN
Status: DISCONTINUED | OUTPATIENT
Start: 2020-01-01 | End: 2020-01-01

## 2020-01-01 RX ORDER — LISINOPRIL 5 MG/1
5 TABLET ORAL DAILY
Qty: 30 TABLET | Refills: 3 | Status: ON HOLD | OUTPATIENT
Start: 2020-01-01 | End: 2021-01-01 | Stop reason: CLARIF

## 2020-01-01 RX ORDER — KETAMINE HYDROCHLORIDE 100 MG/ML
INJECTION, SOLUTION INTRAMUSCULAR; INTRAVENOUS PRN
Status: DISCONTINUED | OUTPATIENT
Start: 2020-01-01 | End: 2020-01-01 | Stop reason: SDUPTHER

## 2020-01-01 RX ORDER — AMOXICILLIN 250 MG
2 CAPSULE ORAL NIGHTLY
Status: ON HOLD | COMMUNITY
End: 2021-01-01 | Stop reason: CLARIF

## 2020-01-01 RX ORDER — ASPIRIN 81 MG/1
81 TABLET, CHEWABLE ORAL DAILY
Qty: 30 TABLET | Refills: 3 | Status: ON HOLD | OUTPATIENT
Start: 2020-01-01 | End: 2021-01-01 | Stop reason: HOSPADM

## 2020-01-01 RX ORDER — ONDANSETRON 2 MG/ML
4 INJECTION INTRAMUSCULAR; INTRAVENOUS EVERY 6 HOURS PRN
Status: DISCONTINUED | OUTPATIENT
Start: 2020-01-01 | End: 2020-01-01 | Stop reason: HOSPADM

## 2020-01-01 RX ORDER — LISINOPRIL 5 MG/1
5 TABLET ORAL DAILY
Status: DISCONTINUED | OUTPATIENT
Start: 2020-01-01 | End: 2020-01-01 | Stop reason: HOSPADM

## 2020-01-01 RX ORDER — HALOPERIDOL 5 MG/ML
2 INJECTION INTRAMUSCULAR ONCE
Status: COMPLETED | OUTPATIENT
Start: 2020-01-01 | End: 2020-01-01

## 2020-01-01 RX ORDER — FENTANYL CITRATE 50 UG/ML
25 INJECTION, SOLUTION INTRAMUSCULAR; INTRAVENOUS EVERY 5 MIN PRN
Status: DISCONTINUED | OUTPATIENT
Start: 2020-01-01 | End: 2020-01-01 | Stop reason: HOSPADM

## 2020-01-01 RX ORDER — M-VIT,TX,IRON,MINS/CALC/FOLIC 27MG-0.4MG
1 TABLET ORAL DAILY
Status: DISCONTINUED | OUTPATIENT
Start: 2020-01-01 | End: 2020-01-01 | Stop reason: HOSPADM

## 2020-01-01 RX ORDER — CLOPIDOGREL BISULFATE 75 MG/1
75 TABLET ORAL DAILY
Status: DISCONTINUED | OUTPATIENT
Start: 2020-01-01 | End: 2020-01-01 | Stop reason: HOSPADM

## 2020-01-01 RX ORDER — 0.9 % SODIUM CHLORIDE 0.9 %
1000 INTRAVENOUS SOLUTION INTRAVENOUS ONCE
Status: COMPLETED | OUTPATIENT
Start: 2020-01-01 | End: 2020-01-01

## 2020-01-01 RX ORDER — ALPRAZOLAM 0.25 MG/1
0.25 TABLET ORAL 3 TIMES DAILY PRN
Status: ON HOLD | COMMUNITY
Start: 2020-01-01 | End: 2020-01-01 | Stop reason: HOSPADM

## 2020-01-01 RX ORDER — NITROGLYCERIN 0.4 MG/1
0.4 TABLET SUBLINGUAL EVERY 5 MIN PRN
Status: DISCONTINUED | OUTPATIENT
Start: 2020-01-01 | End: 2020-01-01 | Stop reason: HOSPADM

## 2020-01-01 RX ORDER — AMLODIPINE BESYLATE 2.5 MG/1
2.5 TABLET ORAL DAILY
Status: DISCONTINUED | OUTPATIENT
Start: 2020-01-01 | End: 2020-01-01 | Stop reason: HOSPADM

## 2020-01-01 RX ORDER — ATORVASTATIN CALCIUM 80 MG/1
80 TABLET, FILM COATED ORAL NIGHTLY
Qty: 30 TABLET | Refills: 3 | Status: SHIPPED | OUTPATIENT
Start: 2020-01-01

## 2020-01-01 RX ORDER — BUPIVACAINE HYDROCHLORIDE 7.5 MG/ML
INJECTION, SOLUTION INTRASPINAL PRN
Status: DISCONTINUED | OUTPATIENT
Start: 2020-01-01 | End: 2020-01-01 | Stop reason: SDUPTHER

## 2020-01-01 RX ORDER — DEXTROSE MONOHYDRATE 25 G/50ML
12.5 INJECTION, SOLUTION INTRAVENOUS PRN
Status: DISCONTINUED | OUTPATIENT
Start: 2020-01-01 | End: 2020-01-01 | Stop reason: HOSPADM

## 2020-01-01 RX ORDER — EZETIMIBE 10 MG/1
10 TABLET ORAL DAILY
Status: DISCONTINUED | OUTPATIENT
Start: 2020-01-01 | End: 2020-01-01 | Stop reason: HOSPADM

## 2020-01-01 RX ORDER — HYDROCODONE BITARTRATE AND ACETAMINOPHEN 5; 325 MG/1; MG/1
1 TABLET ORAL EVERY 6 HOURS PRN
Status: DISCONTINUED | OUTPATIENT
Start: 2020-01-01 | End: 2020-01-01 | Stop reason: HOSPADM

## 2020-01-01 RX ORDER — SODIUM CHLORIDE, SODIUM LACTATE, POTASSIUM CHLORIDE, CALCIUM CHLORIDE 600; 310; 30; 20 MG/100ML; MG/100ML; MG/100ML; MG/100ML
INJECTION, SOLUTION INTRAVENOUS CONTINUOUS PRN
Status: DISCONTINUED | OUTPATIENT
Start: 2020-01-01 | End: 2020-01-01 | Stop reason: SDUPTHER

## 2020-01-01 RX ORDER — NICOTINE POLACRILEX 4 MG
15 LOZENGE BUCCAL PRN
Status: DISCONTINUED | OUTPATIENT
Start: 2020-01-01 | End: 2020-01-01 | Stop reason: HOSPADM

## 2020-01-01 RX ORDER — HEPARIN SODIUM 1000 [USP'U]/ML
60 INJECTION, SOLUTION INTRAVENOUS; SUBCUTANEOUS ONCE
Status: COMPLETED | OUTPATIENT
Start: 2020-01-01 | End: 2020-01-01

## 2020-01-01 RX ORDER — DEXTROSE MONOHYDRATE 50 MG/ML
100 INJECTION, SOLUTION INTRAVENOUS PRN
Status: DISCONTINUED | OUTPATIENT
Start: 2020-01-01 | End: 2020-01-01 | Stop reason: HOSPADM

## 2020-01-01 RX ORDER — MORPHINE SULFATE 4 MG/ML
4 INJECTION, SOLUTION INTRAMUSCULAR; INTRAVENOUS EVERY 30 MIN PRN
Status: COMPLETED | OUTPATIENT
Start: 2020-01-01 | End: 2020-01-01

## 2020-01-01 RX ORDER — MEPERIDINE HYDROCHLORIDE 25 MG/ML
12.5 INJECTION INTRAMUSCULAR; INTRAVENOUS; SUBCUTANEOUS EVERY 5 MIN PRN
Status: DISCONTINUED | OUTPATIENT
Start: 2020-01-01 | End: 2020-01-01 | Stop reason: HOSPADM

## 2020-01-01 RX ORDER — MORPHINE SULFATE 4 MG/ML
4 INJECTION, SOLUTION INTRAMUSCULAR; INTRAVENOUS EVERY 4 HOURS PRN
Status: DISCONTINUED | OUTPATIENT
Start: 2020-01-01 | End: 2020-01-01 | Stop reason: HOSPADM

## 2020-01-01 RX ORDER — PROPOFOL 10 MG/ML
INJECTION, EMULSION INTRAVENOUS PRN
Status: DISCONTINUED | OUTPATIENT
Start: 2020-01-01 | End: 2020-01-01 | Stop reason: SDUPTHER

## 2020-01-01 RX ORDER — OXYCODONE HYDROCHLORIDE 5 MG/1
5 TABLET ORAL PRN
Status: DISCONTINUED | OUTPATIENT
Start: 2020-01-01 | End: 2020-01-01 | Stop reason: HOSPADM

## 2020-01-01 RX ORDER — CLOPIDOGREL BISULFATE 75 MG/1
75 TABLET ORAL DAILY
Qty: 30 TABLET | Refills: 3 | Status: ON HOLD | OUTPATIENT
Start: 2020-01-01 | End: 2021-01-01 | Stop reason: HOSPADM

## 2020-01-01 RX ADMIN — CLOPIDOGREL BISULFATE 75 MG: 75 TABLET ORAL at 09:51

## 2020-01-01 RX ADMIN — IOPAMIDOL 85 ML: 755 INJECTION, SOLUTION INTRAVENOUS at 16:43

## 2020-01-01 RX ADMIN — AMLODIPINE BESYLATE 2.5 MG: 2.5 TABLET ORAL at 21:04

## 2020-01-01 RX ADMIN — METOPROLOL TARTRATE 25 MG: 25 TABLET, FILM COATED ORAL at 20:12

## 2020-01-01 RX ADMIN — VANCOMYCIN HYDROCHLORIDE 750 MG: 1 INJECTION, POWDER, LYOPHILIZED, FOR SOLUTION INTRAVENOUS at 05:53

## 2020-01-01 RX ADMIN — Medication 10 ML: at 08:50

## 2020-01-01 RX ADMIN — Medication 10 ML: at 21:10

## 2020-01-01 RX ADMIN — Medication 10 ML: at 20:53

## 2020-01-01 RX ADMIN — LISINOPRIL 20 MG: 20 TABLET ORAL at 09:26

## 2020-01-01 RX ADMIN — SODIUM CHLORIDE 1000 ML: 9 INJECTION, SOLUTION INTRAVENOUS at 19:49

## 2020-01-01 RX ADMIN — AMLODIPINE BESYLATE 2.5 MG: 2.5 TABLET ORAL at 15:24

## 2020-01-01 RX ADMIN — LISINOPRIL 5 MG: 5 TABLET ORAL at 08:22

## 2020-01-01 RX ADMIN — ENOXAPARIN SODIUM 40 MG: 40 INJECTION SUBCUTANEOUS at 09:53

## 2020-01-01 RX ADMIN — AMLODIPINE BESYLATE 2.5 MG: 2.5 TABLET ORAL at 08:49

## 2020-01-01 RX ADMIN — PROPOFOL 20 MG: 10 INJECTION, EMULSION INTRAVENOUS at 11:36

## 2020-01-01 RX ADMIN — MORPHINE SULFATE 4 MG: 4 INJECTION INTRAVENOUS at 03:41

## 2020-01-01 RX ADMIN — SODIUM CHLORIDE: 9 INJECTION, SOLUTION INTRAVENOUS at 20:52

## 2020-01-01 RX ADMIN — ENOXAPARIN SODIUM 40 MG: 40 INJECTION SUBCUTANEOUS at 08:00

## 2020-01-01 RX ADMIN — METOPROLOL SUCCINATE 25 MG: 25 TABLET, EXTENDED RELEASE ORAL at 08:22

## 2020-01-01 RX ADMIN — MORPHINE SULFATE 4 MG: 4 INJECTION INTRAVENOUS at 10:28

## 2020-01-01 RX ADMIN — Medication 10 ML: at 08:01

## 2020-01-01 RX ADMIN — GUAIFENESIN 600 MG: 600 TABLET, EXTENDED RELEASE ORAL at 09:26

## 2020-01-01 RX ADMIN — CLOPIDOGREL BISULFATE 75 MG: 75 TABLET ORAL at 08:23

## 2020-01-01 RX ADMIN — ASPIRIN 81 MG: 81 TABLET, CHEWABLE ORAL at 08:00

## 2020-01-01 RX ADMIN — METOPROLOL TARTRATE 25 MG: 25 TABLET, FILM COATED ORAL at 09:50

## 2020-01-01 RX ADMIN — KETAMINE HYDROCHLORIDE 50 MG: 100 INJECTION, SOLUTION, CONCENTRATE INTRAMUSCULAR; INTRAVENOUS at 10:32

## 2020-01-01 RX ADMIN — GUAIFENESIN 600 MG: 600 TABLET, EXTENDED RELEASE ORAL at 09:49

## 2020-01-01 RX ADMIN — CEFAZOLIN SODIUM 1 G: 1 INJECTION, POWDER, FOR SOLUTION INTRAMUSCULAR; INTRAVENOUS at 17:38

## 2020-01-01 RX ADMIN — MULTIPLE VITAMINS W/ MINERALS TAB 1 TABLET: TAB at 15:22

## 2020-01-01 RX ADMIN — SODIUM CHLORIDE: 9 INJECTION, SOLUTION INTRAVENOUS at 14:08

## 2020-01-01 RX ADMIN — OMEGA-3-ACID ETHYL ESTERS 2 G: 900 CAPSULE, LIQUID FILLED ORAL at 09:25

## 2020-01-01 RX ADMIN — AMLODIPINE BESYLATE 2.5 MG: 2.5 TABLET ORAL at 09:26

## 2020-01-01 RX ADMIN — ENOXAPARIN SODIUM 40 MG: 40 INJECTION SUBCUTANEOUS at 15:21

## 2020-01-01 RX ADMIN — SODIUM CHLORIDE: 9 INJECTION, SOLUTION INTRAVENOUS at 21:04

## 2020-01-01 RX ADMIN — ACETAMINOPHEN 650 MG: 325 TABLET ORAL at 21:20

## 2020-01-01 RX ADMIN — MORPHINE SULFATE 4 MG: 4 INJECTION INTRAVENOUS at 19:18

## 2020-01-01 RX ADMIN — ASPIRIN 81 MG: 81 TABLET, CHEWABLE ORAL at 09:58

## 2020-01-01 RX ADMIN — CEFAZOLIN SODIUM 1 G: 1 INJECTION, POWDER, FOR SOLUTION INTRAMUSCULAR; INTRAVENOUS at 10:53

## 2020-01-01 RX ADMIN — MULTIPLE VITAMINS W/ MINERALS TAB 1 TABLET: TAB at 09:26

## 2020-01-01 RX ADMIN — Medication 10 ML: at 20:25

## 2020-01-01 RX ADMIN — Medication 10 ML: at 08:23

## 2020-01-01 RX ADMIN — SODIUM CHLORIDE, POTASSIUM CHLORIDE, SODIUM LACTATE AND CALCIUM CHLORIDE: 600; 310; 30; 20 INJECTION, SOLUTION INTRAVENOUS at 12:02

## 2020-01-01 RX ADMIN — ASPIRIN 81 MG: 81 TABLET, CHEWABLE ORAL at 08:22

## 2020-01-01 RX ADMIN — Medication 10 ML: at 21:21

## 2020-01-01 RX ADMIN — ENOXAPARIN SODIUM 40 MG: 40 INJECTION SUBCUTANEOUS at 08:23

## 2020-01-01 RX ADMIN — HALOPERIDOL LACTATE 2 MG: 5 INJECTION, SOLUTION INTRAMUSCULAR at 11:14

## 2020-01-01 RX ADMIN — ATORVASTATIN CALCIUM 80 MG: 40 TABLET, FILM COATED ORAL at 21:10

## 2020-01-01 RX ADMIN — MORPHINE SULFATE 2 MG: 2 INJECTION, SOLUTION INTRAMUSCULAR; INTRAVENOUS at 01:07

## 2020-01-01 RX ADMIN — HEPARIN SODIUM AND DEXTROSE 6.3 ML/HR: 10000; 5 INJECTION INTRAVENOUS at 19:35

## 2020-01-01 RX ADMIN — AMLODIPINE BESYLATE 2.5 MG: 2.5 TABLET ORAL at 09:49

## 2020-01-01 RX ADMIN — OMEGA-3-ACID ETHYL ESTERS 2 G: 900 CAPSULE, LIQUID FILLED ORAL at 08:49

## 2020-01-01 RX ADMIN — PIPERACILLIN SODIUM AND TAZOBACTAM SODIUM 3.38 G: 3; .375 INJECTION, POWDER, LYOPHILIZED, FOR SOLUTION INTRAVENOUS at 01:00

## 2020-01-01 RX ADMIN — GUAIFENESIN 600 MG: 600 TABLET, EXTENDED RELEASE ORAL at 20:52

## 2020-01-01 RX ADMIN — GUAIFENESIN 600 MG: 600 TABLET, EXTENDED RELEASE ORAL at 21:20

## 2020-01-01 RX ADMIN — LISINOPRIL 20 MG: 20 TABLET ORAL at 15:22

## 2020-01-01 RX ADMIN — METOPROLOL TARTRATE 25 MG: 25 TABLET, FILM COATED ORAL at 20:32

## 2020-01-01 RX ADMIN — MORPHINE SULFATE 4 MG: 4 INJECTION INTRAVENOUS at 11:24

## 2020-01-01 RX ADMIN — ACETAMINOPHEN 650 MG: 325 TABLET ORAL at 17:45

## 2020-01-01 RX ADMIN — IOPAMIDOL 75 ML: 755 INJECTION, SOLUTION INTRAVENOUS at 08:37

## 2020-01-01 RX ADMIN — EZETIMIBE 10 MG: 10 TABLET ORAL at 09:49

## 2020-01-01 RX ADMIN — ASPIRIN 81 MG: 81 TABLET, COATED ORAL at 09:49

## 2020-01-01 RX ADMIN — ASPIRIN 81 MG: 81 TABLET, COATED ORAL at 08:49

## 2020-01-01 RX ADMIN — MULTIPLE VITAMINS W/ MINERALS TAB 1 TABLET: TAB at 08:49

## 2020-01-01 RX ADMIN — OMEGA-3-ACID ETHYL ESTERS 2 G: 900 CAPSULE, LIQUID FILLED ORAL at 09:53

## 2020-01-01 RX ADMIN — ENOXAPARIN SODIUM 40 MG: 40 INJECTION SUBCUTANEOUS at 09:58

## 2020-01-01 RX ADMIN — POLYETHYLENE GLYCOL (3350) 17 G: 17 POWDER, FOR SOLUTION ORAL at 09:26

## 2020-01-01 RX ADMIN — Medication 10 ML: at 09:30

## 2020-01-01 RX ADMIN — ACETAMINOPHEN 650 MG: 325 TABLET ORAL at 14:07

## 2020-01-01 RX ADMIN — CLOPIDOGREL BISULFATE 75 MG: 75 TABLET ORAL at 08:00

## 2020-01-01 RX ADMIN — METOPROLOL TARTRATE 25 MG: 25 TABLET, FILM COATED ORAL at 08:00

## 2020-01-01 RX ADMIN — OMEGA-3-ACID ETHYL ESTERS 2 G: 900 CAPSULE, LIQUID FILLED ORAL at 15:23

## 2020-01-01 RX ADMIN — ASPIRIN 81 MG: 81 TABLET, CHEWABLE ORAL at 09:51

## 2020-01-01 RX ADMIN — SODIUM CHLORIDE: 9 INJECTION, SOLUTION INTRAVENOUS at 17:46

## 2020-01-01 RX ADMIN — ATORVASTATIN CALCIUM 80 MG: 40 TABLET, FILM COATED ORAL at 20:24

## 2020-01-01 RX ADMIN — CLOPIDOGREL BISULFATE 75 MG: 75 TABLET ORAL at 09:53

## 2020-01-01 RX ADMIN — Medication 10 ML: at 09:59

## 2020-01-01 RX ADMIN — GUAIFENESIN 600 MG: 600 TABLET, EXTENDED RELEASE ORAL at 08:49

## 2020-01-01 RX ADMIN — ASPIRIN 81 MG: 81 TABLET, COATED ORAL at 09:26

## 2020-01-01 RX ADMIN — SODIUM CHLORIDE 1000 ML: 9 INJECTION, SOLUTION INTRAVENOUS at 07:17

## 2020-01-01 RX ADMIN — ATORVASTATIN CALCIUM 80 MG: 40 TABLET, FILM COATED ORAL at 20:12

## 2020-01-01 RX ADMIN — MULTIPLE VITAMINS W/ MINERALS TAB 1 TABLET: TAB at 09:49

## 2020-01-01 RX ADMIN — MORPHINE SULFATE 4 MG: 4 INJECTION INTRAVENOUS at 07:38

## 2020-01-01 RX ADMIN — METOPROLOL SUCCINATE 25 MG: 25 TABLET, EXTENDED RELEASE ORAL at 09:53

## 2020-01-01 RX ADMIN — PROPOFOL 20 MG: 10 INJECTION, EMULSION INTRAVENOUS at 10:56

## 2020-01-01 RX ADMIN — Medication 10 ML: at 20:32

## 2020-01-01 RX ADMIN — LISINOPRIL 5 MG: 5 TABLET ORAL at 09:53

## 2020-01-01 RX ADMIN — Medication 10 ML: at 21:04

## 2020-01-01 RX ADMIN — LISINOPRIL 5 MG: 5 TABLET ORAL at 15:16

## 2020-01-01 RX ADMIN — ENOXAPARIN SODIUM 40 MG: 40 INJECTION SUBCUTANEOUS at 08:50

## 2020-01-01 RX ADMIN — MORPHINE SULFATE 2 MG: 2 INJECTION, SOLUTION INTRAMUSCULAR; INTRAVENOUS at 12:56

## 2020-01-01 RX ADMIN — METOPROLOL SUCCINATE 25 MG: 25 TABLET, EXTENDED RELEASE ORAL at 09:58

## 2020-01-01 RX ADMIN — MORPHINE SULFATE 4 MG: 4 INJECTION INTRAVENOUS at 08:55

## 2020-01-01 RX ADMIN — SODIUM CHLORIDE: 9 INJECTION, SOLUTION INTRAVENOUS at 05:54

## 2020-01-01 RX ADMIN — CLOPIDOGREL BISULFATE 75 MG: 75 TABLET ORAL at 09:58

## 2020-01-01 RX ADMIN — HEPARIN SODIUM 2710 UNITS: 1000 INJECTION, SOLUTION INTRAVENOUS; SUBCUTANEOUS at 11:46

## 2020-01-01 RX ADMIN — EZETIMIBE 10 MG: 10 TABLET ORAL at 15:22

## 2020-01-01 RX ADMIN — Medication 10 ML: at 09:49

## 2020-01-01 RX ADMIN — ENOXAPARIN SODIUM 40 MG: 40 INJECTION SUBCUTANEOUS at 09:29

## 2020-01-01 RX ADMIN — EZETIMIBE 10 MG: 10 TABLET ORAL at 09:26

## 2020-01-01 RX ADMIN — ASPIRIN 81 MG: 81 TABLET, COATED ORAL at 15:23

## 2020-01-01 RX ADMIN — LISINOPRIL 20 MG: 20 TABLET ORAL at 09:49

## 2020-01-01 RX ADMIN — LISINOPRIL 20 MG: 20 TABLET ORAL at 08:49

## 2020-01-01 RX ADMIN — ATORVASTATIN CALCIUM 80 MG: 40 TABLET, FILM COATED ORAL at 20:05

## 2020-01-01 RX ADMIN — MORPHINE SULFATE 2 MG: 2 INJECTION, SOLUTION INTRAMUSCULAR; INTRAVENOUS at 05:48

## 2020-01-01 RX ADMIN — MIDAZOLAM 2 MG: 1 INJECTION INTRAMUSCULAR; INTRAVENOUS at 10:32

## 2020-01-01 RX ADMIN — Medication 2 G: at 10:25

## 2020-01-01 RX ADMIN — LISINOPRIL 5 MG: 5 TABLET ORAL at 09:58

## 2020-01-01 RX ADMIN — ATORVASTATIN CALCIUM 80 MG: 40 TABLET, FILM COATED ORAL at 20:32

## 2020-01-01 RX ADMIN — SODIUM CHLORIDE: 9 INJECTION, SOLUTION INTRAVENOUS at 18:02

## 2020-01-01 RX ADMIN — CEFAZOLIN SODIUM 1 G: 1 INJECTION, POWDER, FOR SOLUTION INTRAMUSCULAR; INTRAVENOUS at 03:00

## 2020-01-01 RX ADMIN — BUPIVACAINE HYDROCHLORIDE IN DEXTROSE 1.6 ML: 7.5 INJECTION, SOLUTION SUBARACHNOID at 10:44

## 2020-01-01 RX ADMIN — ENOXAPARIN SODIUM 40 MG: 40 INJECTION SUBCUTANEOUS at 09:49

## 2020-01-01 RX ADMIN — HYDROCODONE BITARTRATE AND ACETAMINOPHEN 1 TABLET: 5; 325 TABLET ORAL at 08:49

## 2020-01-01 RX ADMIN — ASPIRIN 81 MG: 81 TABLET, CHEWABLE ORAL at 09:53

## 2020-01-01 RX ADMIN — SODIUM CHLORIDE, POTASSIUM CHLORIDE, SODIUM LACTATE AND CALCIUM CHLORIDE: 600; 310; 30; 20 INJECTION, SOLUTION INTRAVENOUS at 10:32

## 2020-01-01 RX ADMIN — EZETIMIBE 10 MG: 10 TABLET ORAL at 08:49

## 2020-01-01 RX ADMIN — PROPOFOL 20 MG: 10 INJECTION, EMULSION INTRAVENOUS at 11:54

## 2020-01-01 RX ADMIN — Medication 10 ML: at 20:05

## 2020-01-01 RX ADMIN — MORPHINE SULFATE 2 MG: 2 INJECTION, SOLUTION INTRAMUSCULAR; INTRAVENOUS at 20:52

## 2020-01-01 RX ADMIN — PROPOFOL 30 MG: 10 INJECTION, EMULSION INTRAVENOUS at 11:19

## 2020-01-01 RX ADMIN — HEPARIN SODIUM AND DEXTROSE 12 UNITS/KG/HR: 10000; 5 INJECTION INTRAVENOUS at 11:46

## 2020-01-01 ASSESSMENT — PULMONARY FUNCTION TESTS
PIF_VALUE: 0
PIF_VALUE: 1
PIF_VALUE: 0
PIF_VALUE: 2
PIF_VALUE: 0

## 2020-01-01 ASSESSMENT — PAIN DESCRIPTION - ORIENTATION: ORIENTATION: LEFT

## 2020-01-01 ASSESSMENT — PAIN SCALES - GENERAL
PAINLEVEL_OUTOF10: 6
PAINLEVEL_OUTOF10: 10
PAINLEVEL_OUTOF10: 10
PAINLEVEL_OUTOF10: 7
PAINLEVEL_OUTOF10: 0
PAINLEVEL_OUTOF10: 5
PAINLEVEL_OUTOF10: 0
PAINLEVEL_OUTOF10: 5
PAINLEVEL_OUTOF10: 5
PAINLEVEL_OUTOF10: 7
PAINLEVEL_OUTOF10: 3
PAINLEVEL_OUTOF10: 0
PAINLEVEL_OUTOF10: 6
PAINLEVEL_OUTOF10: 5
PAINLEVEL_OUTOF10: 3
PAINLEVEL_OUTOF10: 8
PAINLEVEL_OUTOF10: 5
PAINLEVEL_OUTOF10: 8
PAINLEVEL_OUTOF10: 8
PAINLEVEL_OUTOF10: 0
PAINLEVEL_OUTOF10: 7
PAINLEVEL_OUTOF10: 0

## 2020-01-01 ASSESSMENT — PAIN DESCRIPTION - LOCATION: LOCATION: HIP

## 2020-01-01 ASSESSMENT — ENCOUNTER SYMPTOMS
NAUSEA: 0
WHEEZING: 0
SHORTNESS OF BREATH: 0
DIARRHEA: 0
COUGH: 0
RHINORRHEA: 0
BACK PAIN: 0
PHOTOPHOBIA: 0
ABDOMINAL DISTENTION: 0
VOMITING: 0

## 2020-01-01 ASSESSMENT — PAIN DESCRIPTION - PAIN TYPE
TYPE: ACUTE PAIN
TYPE: ACUTE PAIN

## 2020-01-01 ASSESSMENT — PAIN SCALES - WONG BAKER: WONGBAKER_NUMERICALRESPONSE: 6

## 2020-04-02 ENCOUNTER — HOSPITAL ENCOUNTER (OUTPATIENT)
Age: 85
Setting detail: SPECIMEN
Discharge: HOME | End: 2020-04-02
Payer: MEDICARE

## 2020-04-02 LAB
A/G RATIO: 1.4 (ref 1.1–2.2)
ALBUMIN SERPL-MCNC: 3.7 G/DL (ref 3.4–5)
ALP BLD-CCNC: 66 U/L (ref 40–129)
ALT SERPL-CCNC: 6 U/L (ref 10–40)
ANION GAP SERPL CALCULATED.3IONS-SCNC: 10 MMOL/L (ref 3–16)
AST SERPL-CCNC: 15 U/L (ref 15–37)
BASOPHILS ABSOLUTE: 0 K/UL (ref 0–0.2)
BASOPHILS RELATIVE PERCENT: 0.6 %
BILIRUB SERPL-MCNC: <0.2 MG/DL (ref 0–1)
BUN BLDV-MCNC: 18 MG/DL (ref 7–20)
CALCIUM SERPL-MCNC: 8.7 MG/DL (ref 8.3–10.6)
CHLORIDE BLD-SCNC: 96 MMOL/L (ref 99–110)
CO2: 28 MMOL/L (ref 21–32)
CREAT SERPL-MCNC: 0.9 MG/DL (ref 0.6–1.2)
EOSINOPHILS ABSOLUTE: 0.1 K/UL (ref 0–0.6)
EOSINOPHILS RELATIVE PERCENT: 1 %
FERRITIN: 200 NG/ML (ref 15–150)
GFR AFRICAN AMERICAN: >60
GFR NON-AFRICAN AMERICAN: 59
GLOBULIN: 2.7 G/DL
GLUCOSE BLD-MCNC: 100 MG/DL (ref 70–99)
HCT VFR BLD CALC: 33.2 % (ref 36–48)
HEMOGLOBIN: 11.3 G/DL (ref 12–16)
IMMATURE RETIC FRACT: 0.41 (ref 0.21–0.37)
IRON SATURATION: 26 % (ref 15–50)
IRON: 63 UG/DL (ref 37–145)
LYMPHOCYTES ABSOLUTE: 1.6 K/UL (ref 1–5.1)
LYMPHOCYTES RELATIVE PERCENT: 30.4 %
MCH RBC QN AUTO: 32.2 PG (ref 26–34)
MCHC RBC AUTO-ENTMCNC: 34.1 G/DL (ref 31–36)
MCV RBC AUTO: 94.6 FL (ref 80–100)
MONOCYTES ABSOLUTE: 0.3 K/UL (ref 0–1.3)
MONOCYTES RELATIVE PERCENT: 6.4 %
NEUTROPHILS ABSOLUTE: 3.2 K/UL (ref 1.7–7.7)
NEUTROPHILS RELATIVE PERCENT: 61.6 %
PDW BLD-RTO: 14 % (ref 12.4–15.4)
PLATELET # BLD: 369 K/UL (ref 135–450)
PMV BLD AUTO: 6.9 FL (ref 5–10.5)
POTASSIUM SERPL-SCNC: 4.7 MMOL/L (ref 3.5–5.1)
PREALBUMIN: 19 MG/DL (ref 20–40)
PRO-BNP: 1462 PG/ML (ref 0–449)
RBC # BLD: 3.51 M/UL (ref 4–5.2)
RETICULOCYTE ABSOLUTE COUNT: 0.04 M/UL (ref 0.02–0.1)
RETICULOCYTE COUNT PCT: 0.99 % (ref 0.5–2.18)
SODIUM BLD-SCNC: 134 MMOL/L (ref 136–145)
TOTAL IRON BINDING CAPACITY: 239 UG/DL (ref 260–445)
TOTAL PROTEIN: 6.4 G/DL (ref 6.4–8.2)
WBC # BLD: 5.2 K/UL (ref 4–11)

## 2020-04-02 PROCEDURE — 85025 COMPLETE CBC W/AUTO DIFF WBC: CPT

## 2020-04-02 PROCEDURE — 82728 ASSAY OF FERRITIN: CPT

## 2020-04-02 PROCEDURE — 83880 ASSAY OF NATRIURETIC PEPTIDE: CPT

## 2020-04-02 PROCEDURE — 36415 COLL VENOUS BLD VENIPUNCTURE: CPT

## 2020-04-02 PROCEDURE — 80053 COMPREHEN METABOLIC PANEL: CPT

## 2020-04-02 PROCEDURE — 83550 IRON BINDING TEST: CPT

## 2020-04-02 PROCEDURE — 83540 ASSAY OF IRON: CPT

## 2020-04-02 PROCEDURE — 84134 ASSAY OF PREALBUMIN: CPT

## 2020-04-02 PROCEDURE — 85045 AUTOMATED RETICULOCYTE COUNT: CPT

## 2020-07-29 NOTE — ED NOTES
1801- Perfect serve sent to Dr. Matheus Cordero (Orthopedic)     Call was returned and spoke to Dr. Sabillon Favorite  07/29/20 Micah Johnston  07/29/20 2421

## 2020-07-29 NOTE — ED PROVIDER NOTES
injection Inject 0.4 mLs into the skin daily 8/1/20 9/5/20 Yes RICARDO Lilly   Loratadine (CLARITIN PO) Take by mouth   Yes Historical Provider, MD   ezetimibe (ZETIA) 10 MG tablet Take 10 mg by mouth daily. Yes Historical Provider, MD   amlodipine (NORVASC) 5 MG tablet Take 2.5 mg by mouth daily. Yes Historical Provider, MD   fish oil-omega-3 fatty acids 1000 MG capsule Take 2 g by mouth daily. Yes Historical Provider, MD   Probiotic Product (PROBIOTIC & ACIDOPHILUS EX ST PO) Take by mouth    Historical Provider, MD   aspirin 81 MG tablet Take 81 mg by mouth daily    Historical Provider, MD   Misc Natural Products (OSTEO BI-FLEX JOINT SHIELD PO) Take by mouth    Historical Provider, MD   lisinopril (PRINIVIL;ZESTRIL) 20 MG tablet Take 20 mg by mouth daily. Historical Provider, MD   therapeutic multivitamin-minerals (THERAGRAN-M) tablet Take 1 tablet by mouth daily. Historical Provider, MD       Social history:  reports that she has never smoked. She has never used smokeless tobacco. She reports that she does not drink alcohol or use drugs. Family history:    Family History   Problem Relation Age of Onset    Other Father         \"black Lung\"         ROS  Review of Systems   Constitutional: Negative for chills and fever. HENT: Negative for congestion and rhinorrhea. Eyes: Negative for photophobia and visual disturbance. Respiratory: Negative for cough, shortness of breath and wheezing. Cardiovascular: Negative for chest pain and palpitations. Gastrointestinal: Negative for abdominal distention, diarrhea, nausea and vomiting. Genitourinary: Negative for dysuria and hematuria. Musculoskeletal: Positive for arthralgias. Negative for back pain and neck pain. Skin: Negative for rash and wound. Neurological: Negative for syncope, weakness and headaches. Psychiatric/Behavioral: Negative for agitation and confusion.          Exam  ED Triage Vitals   BP Temp Temp src Pulse Resp SpO2 Height Weight   -- -- -- -- -- -- -- --       Physical Exam  Vitals signs and nursing note reviewed. Constitutional:       General: She is not in acute distress. Appearance: She is well-developed. HENT:      Head: Normocephalic and atraumatic. Comments: No evidence of facial instability or oral pharyngeal trauma. Right Ear: Tympanic membrane normal.      Left Ear: Tympanic membrane normal.      Nose: Nose normal. No congestion. Comments: No septal hematoma  Eyes:      Extraocular Movements: Extraocular movements intact. Pupils: Pupils are equal, round, and reactive to light. Neck:      Musculoskeletal: Normal range of motion and neck supple. Comments: No midline tenderness of the cervical spine. She is placed on cervical collar on arrival.  Cardiovascular:      Rate and Rhythm: Normal rate and regular rhythm. Heart sounds: No murmur. Pulmonary:      Effort: Pulmonary effort is normal.      Breath sounds: Normal breath sounds. Abdominal:      General: There is no distension. Palpations: Abdomen is soft. Tenderness: There is no abdominal tenderness. Musculoskeletal: Normal range of motion. General: No deformity. Comments: No midline tenderness of the spine no step-offs abrasions hematomas or objective evidence of trauma however concern for distracting injury. She has a shortened externally rotated left lower extremity, neurovascularly intact. Decreased range of motion of the left hip secondary to pain. An pelvic binder which is placed by EMS. Skin:     General: Skin is warm. Findings: No rash. Neurological:      Mental Status: She is alert and oriented to person, place, and time. Motor: No abnormal muscle tone. Comments: NIH stroke scale of 1 for expressive aphasia. There is no associated dysarthria.   No cranial nerve deficits appreciated, strength 5 out of 5 all extremities, sensation is intact, normal tablet  DAILY      Last MAR action:  Given - by Ac Casanova on 08/01/20 at [address] [facility]    07/29/20 2045 07/29/20 2022  aspirin EC tablet 81 mg  DAILY      Last MAR action:  Given - by Ac Casanova on 08/01/20 at [address] [facility]    07/29/20 2045 07/29/20 2022  enoxaparin (LOVENOX) injection 40 mg  DAILY      Last MAR action:  Given - by Ac Casanova on 08/01/20 at [address] [place] [address]    07/29/20 2022 07/29/20 2022  acetaminophen (TYLENOL) tablet 650 mg  EVERY 6 HOURS PRN      Last MAR action:  Given - by Mihaela Salmeron on 08/01/20 at [facility]    07/29/20 2022 07/29/20 2022  acetaminophen (TYLENOL) suppository 650 mg  EVERY 6 HOURS PRN      Last MAR action:  See Alternative - by Mihaela Salmeron on 08/01/20 at [address] [facility]    07/29/20 2022 07/29/20 2022  promethazine (PHENERGAN) tablet 12.5 mg  EVERY 6 HOURS PRN      Acknowledged [facility]    07/29/20 2022 07/29/20 2022  ondansetron (ZOFRAN) injection 4 mg  EVERY 6 HOURS PRN      Acknowledged [facility]    07/29/20 2022 07/29/20 2022  sodium chloride flush 0.9 % injection 10 mL  PRN      Acknowledged [facility]    07/29/20 2022 07/29/20 2022  polyethylene glycol (GLYCOLAX) packet 17 g  DAILY PRN      Acknowledged SLA[facility] MCKEON    07/29/20 2022 07/29/20 2022  morphine (PF) injection 2 mg  EVERY 4 HOURS PRN      Last MAR action:  Given - by Ac Casanova on 07/31/20 at [facility]    07/29/20 2022 07/29/20 2022  HYDROcodone-acetaminophen (NORCO) 5-325 MG per tablet 1 tablet  EVERY 6 HOURS PRN      Last MAR action:  Given - by Ac Casanova on 07/31/20 at [address] [place] [address]    07/29/20 1912 07/29/20 1912  morphine sulfate (PF) injection 4 mg  EVERY 4 HOURS PRN      Last MAR action:  Given - by Mihaela Salmeron on 07/30/20 at [address]    07/29/20 1642 07/29/20 1642  iopamidol (ISOVUE-370) 76 % injection 85 mL  IMG ONCE PRN      Last MAR action: Given - by ANGEL CUEVA on 07/29/20 at [address]    07/29/20 1630 07/29/20 1623  0.9 % sodium chloride bolus  ONCE      Last MAR action:  Stopped - by Renea Chaparro on 07/29/20 at [address]          EKG  Normal sinus rhythm rate 60, left axis deviation, left bundle branch block, no diagnostic ischemic changes per scar Bosa criteria, . Somewhat similar to prior EKG 2012 with left axis deviation suspected left-sided fascicular block. Radiology  No results found. Labs  Results for orders placed or performed during the hospital encounter of 07/29/20   Culture, Blood 1    Specimen: Blood   Result Value Ref Range    Blood Culture, Routine       No Growth to date. Any change in status will be called. Culture, Blood 2    Specimen: Blood   Result Value Ref Range    Culture, Blood 2       No Growth to date. Any change in status will be called.    CK   Result Value Ref Range    Total CK 73 26 - 192 U/L   CBC auto differential   Result Value Ref Range    WBC 9.5 4.0 - 11.0 K/uL    RBC 3.27 (L) 4.00 - 5.20 M/uL    Hemoglobin 10.7 (L) 12.0 - 16.0 g/dL    Hematocrit 32.1 (L) 36.0 - 48.0 %    MCV 98.1 80.0 - 100.0 fL    MCH 32.9 26.0 - 34.0 pg    MCHC 33.5 31.0 - 36.0 g/dL    RDW 14.2 12.4 - 15.4 %    Platelets 778 889 - 824 K/uL    MPV 7.0 5.0 - 10.5 fL    Neutrophils % 74.7 %    Lymphocytes % 16.6 %    Monocytes % 7.7 %    Eosinophils % 0.5 %    Basophils % 0.5 %    Neutrophils Absolute 7.1 1.7 - 7.7 K/uL    Lymphocytes Absolute 1.6 1.0 - 5.1 K/uL    Monocytes Absolute 0.7 0.0 - 1.3 K/uL    Eosinophils Absolute 0.0 0.0 - 0.6 K/uL    Basophils Absolute 0.1 0.0 - 0.2 K/uL   Comprehensive Metabolic Panel w/ Reflex to MG   Result Value Ref Range    Sodium 135 (L) 136 - 145 mmol/L    Potassium reflex Magnesium 4.3 3.5 - 5.1 mmol/L    Chloride 94 (L) 99 - 110 mmol/L    CO2 27 21 - 32 mmol/L    Anion Gap 14 3 - 16    Glucose 133 (H) 70 - 99 mg/dL    BUN 23 (H) 7 - 20 mg/dL K/uL    RBC 3.11 (L) 4.00 - 5.20 M/uL    Hemoglobin 10.6 (L) 12.0 - 16.0 g/dL    Hematocrit 31.5 (L) 36.0 - 48.0 %    .1 (H) 80.0 - 100.0 fL    MCH 33.9 26.0 - 34.0 pg    MCHC 33.6 31.0 - 36.0 g/dL    RDW 14.2 12.4 - 15.4 %    Platelets 919 485 - 549 K/uL    MPV 6.8 5.0 - 10.5 fL    Neutrophils % 72.1 %    Lymphocytes % 14.8 %    Monocytes % 11.5 %    Eosinophils % 0.6 %    Basophils % 1.0 %    Neutrophils Absolute 5.2 1.7 - 7.7 K/uL    Lymphocytes Absolute 1.1 1.0 - 5.1 K/uL    Monocytes Absolute 0.8 0.0 - 1.3 K/uL    Eosinophils Absolute 0.0 0.0 - 0.6 K/uL    Basophils Absolute 0.1 0.0 - 0.2 K/uL   Procalcitonin   Result Value Ref Range    Procalcitonin 0.06 0.00 - 0.15 ng/mL   Lactic acid, plasma   Result Value Ref Range    Lactic Acid 1.9 0.4 - 2.0 mmol/L   COVID-19   Result Value Ref Range    SARS-CoV-2, NAAT Not Detected Not Detected   Basic Metabolic Panel w/ Reflex to MG   Result Value Ref Range    Sodium 134 (L) 136 - 145 mmol/L    Potassium reflex Magnesium 3.9 3.5 - 5.1 mmol/L    Chloride 103 99 - 110 mmol/L    CO2 22 21 - 32 mmol/L    Anion Gap 9 3 - 16    Glucose 133 (H) 70 - 99 mg/dL    BUN 14 7 - 20 mg/dL    CREATININE 0.9 0.6 - 1.2 mg/dL    GFR Non- 59 (A) >60    GFR African American >60 >60    Calcium 8.0 (L) 8.3 - 10.6 mg/dL   CBC auto differential   Result Value Ref Range    WBC 6.3 4.0 - 11.0 K/uL    RBC 2.39 (L) 4.00 - 5.20 M/uL    Hemoglobin 7.9 (L) 12.0 - 16.0 g/dL    Hematocrit 23.7 (L) 36.0 - 48.0 %    MCV 99.2 80.0 - 100.0 fL    MCH 33.1 26.0 - 34.0 pg    MCHC 33.4 31.0 - 36.0 g/dL    RDW 13.8 12.4 - 15.4 %    Platelets 566 675 - 846 K/uL    MPV 7.0 5.0 - 10.5 fL    Neutrophils % 74.2 %    Lymphocytes % 12.9 %    Monocytes % 11.8 %    Eosinophils % 0.7 %    Basophils % 0.4 %    Neutrophils Absolute 4.7 1.7 - 7.7 K/uL    Lymphocytes Absolute 0.8 (L) 1.0 - 5.1 K/uL    Monocytes Absolute 0.8 0.0 - 1.3 K/uL    Eosinophils Absolute 0.0 0.0 - 0.6 K/uL    Basophils Absolute 0.0 0.0 - 0.2 K/uL   Lactic acid, plasma   Result Value Ref Range    Lactic Acid 0.8 0.4 - 2.0 mmol/L   Basic Metabolic Panel w/ Reflex to MG   Result Value Ref Range    Sodium 133 (L) 136 - 145 mmol/L    Potassium reflex Magnesium 3.9 3.5 - 5.1 mmol/L    Chloride 105 99 - 110 mmol/L    CO2 22 21 - 32 mmol/L    Anion Gap 6 3 - 16    Glucose 127 (H) 70 - 99 mg/dL    BUN 17 7 - 20 mg/dL    CREATININE 0.8 0.6 - 1.2 mg/dL    GFR Non-African American >60 >60    GFR African American >60 >60    Calcium 8.1 (L) 8.3 - 10.6 mg/dL   CBC auto differential   Result Value Ref Range    WBC 7.5 4.0 - 11.0 K/uL    RBC 2.32 (L) 4.00 - 5.20 M/uL    Hemoglobin 7.9 (L) 12.0 - 16.0 g/dL    Hematocrit 23.6 (L) 36.0 - 48.0 %    .8 (H) 80.0 - 100.0 fL    MCH 33.9 26.0 - 34.0 pg    MCHC 33.3 31.0 - 36.0 g/dL    RDW 13.5 12.4 - 15.4 %    Platelets 255 927 - 591 K/uL    MPV 7.5 5.0 - 10.5 fL    Neutrophils % 75.5 %    Lymphocytes % 12.2 %    Monocytes % 11.3 %    Eosinophils % 0.8 %    Basophils % 0.2 %    Neutrophils Absolute 5.7 1.7 - 7.7 K/uL    Lymphocytes Absolute 0.9 (L) 1.0 - 5.1 K/uL    Monocytes Absolute 0.9 0.0 - 1.3 K/uL    Eosinophils Absolute 0.1 0.0 - 0.6 K/uL    Basophils Absolute 0.0 0.0 - 0.2 K/uL   POCT Glucose   Result Value Ref Range    POC Glucose 129 (H) 70 - 99 mg/dl    Performed on [id number]    EKG 12 Lead   Result Value Ref Range    Ventricular Rate 61 BPM    Atrial Rate 61 BPM    P-R Interval 146 ms    QRS Duration 106 ms    Q-T Interval 454 ms    QTc Calculation (Bazett) 457 ms    P Axis 94 degrees    R Axis -50 degrees    T Axis 84 degrees    Diagnosis       Normal sinus rhythmLeft axis deviationLeft anterior fascicular blockNon-specific intra-ventricular conduction delaySeptal infarct (cited on or before 09-MAY-2008)Nonspecific ST abnormalityAbnormal ECGWhen compared with ECG of 30-JAN-2012 01:24,No significant change was foundConfirmed by RUSSELL SERRATO MD ([id number]) on 7/30/2020 7:50:55 AM   TYPE AND SCREEN Result Value Ref Range    ABO/Rh B POS     Antibody Screen NEG          MDM  22-year-old female who presents for evaluation after a fall. She is a difficult historian. There is concern for possible expressive aphasia on arrival so patient was sent directly to CT scan. She had no other focal deficits on neurological exam.  No acute findings on head CT. Was able to obtain collateral history by patient's grandson who states that she has a typical stutter and will have difficulty speaking if she becomes anxious. This would be consistent with her initial presentation. Given this I am not concerned for CVA. Do not feel that she would be appropriate for TPA at this time. Discussed with  stroke team who agrees with this. Additional history provided by the grandson describes a mechanical fall she stepped in a hole next to the barn. I doubt cardiac etiology. Her imaging is showing that she has a left femoral neck fracture. She is neurovascularly intact. Discussed case with orthopedics will see her tomorrow. Plan to make her n.p.o. otherwise feel she is appropriate for admission at this time. Clinical Impression:  1. Closed displaced fracture of left femoral neck ([address]    2. Left displaced femoral neck fracture (HCC)          Disposition:  Admit to telemetry in stable condition. Blood pressure (!) 123/52, pulse 68, temperature 97.7 °F (36.5 °C), temperature source Oral, resp. rate 16, height 5' 2\" (1.575 m), weight 135 lb (61.2 kg), SpO2 99 %. Patient was given scripts for the following medications. I counseled patient how to take these medications. Current Discharge Medication List      START taking these medications    Details   HYDROcodone-acetaminophen (NORCO) 5-325 MG per tablet Take 1 tablet by mouth every 6 hours as needed for Pain for up to 3 days.   Qty: 10 tablet, Refills: 0    Comments: Reduce doses taken as pain becomes manageable  Associated Diagnoses: Closed displaced fracture of left femoral neck (HCC)      enoxaparin (LOVENOX) 40 MG/0.4ML injection Inject 0.4 mLs into the skin daily  Qty: 14 mL, Refills: 0             Disposition referral (if applicable):  Yael Carroll MD  . [address]  [phone]              Total critical care time is 30-35 minutes, which excludes separately billable procedures and updating family. Time spent is specifically for management of the presenting complaint and symptoms initially, direct bedside care, reevaluation, review of records, and consultation. There was a high probability of clinically significant life-threatening deterioration in the patient's condition, which required my urgent intervention. This chart was generated in part by using Dragon Dictation system and may contain errors related to that system including errors in grammar, punctuation, and spelling, as well as words and phrases that may be inappropriate. If there are any questions or concerns please feel free to contact the dictating provider for clarification.      Mark Santos MD  [address] MD  08/02/20 0393       Mark Santos MD  08/02/20 6801

## 2020-07-29 NOTE — ED NOTES
1806- Perfect serve sent to Dr. Christopher Pyle     Call was returned by Dr. Christopher Pyle and spoke to Dr. Pat Pisano  [facility]29[facility]20 [facility] [facility]  07/29/20 4150

## 2020-07-29 NOTE — ED NOTES
Code stroke called 1627  1627- CT stroke phone   36-  stroke paged  1631-  returned the call and spoke to Dr. Chua [facility]  07/29/20 1095 [address]  07/29/20 9369

## 2020-07-30 PROBLEM — D47.2 MGUS (MONOCLONAL GAMMOPATHY OF UNKNOWN SIGNIFICANCE): Status: ACTIVE | Noted: 2020-01-01

## 2020-07-30 PROBLEM — S72.002A CLOSED DISPLACED FRACTURE OF LEFT FEMORAL NECK (HCC): Status: ACTIVE | Noted: 2020-01-01

## 2020-07-30 PROBLEM — K21.9 GASTROESOPHAGEAL REFLUX DISEASE WITHOUT ESOPHAGITIS: Status: ACTIVE | Noted: 2019-04-09

## 2020-07-30 PROBLEM — F41.1 GAD (GENERALIZED ANXIETY DISORDER): Status: ACTIVE | Noted: 2018-07-02

## 2020-07-30 PROBLEM — E78.2 MIXED HYPERLIPIDEMIA: Status: ACTIVE | Noted: 2020-01-01

## 2020-07-30 PROBLEM — G51.33: Status: ACTIVE | Noted: 2019-07-25

## 2020-07-30 NOTE — ANESTHESIA POSTPROCEDURE EVALUATION
Department of Anesthesiology  Postprocedure Note    Patient: Evy Sparks  MRN: [id number]  YOB: 1934  Date of evaluation: 7/30/2020    Procedure Summary     Date:  07/30/20 Room / Location:  [address] [place] [address] / [facility]    Anesthesia Start:  1032 Anesthesia Stop:  1112    Procedure:  LEFT HIP HEMIARTHROPLASTY (Left Hip) Diagnosis:  (LEFT HIP FRACTURE)    Surgeon:  Krysta Guerra MD Responsible Provider:  Sachi Mckinley MD    Anesthesia Type:  spinal ASA Status:  3 - Emergent        Anesthesia Type: spinal    Ozzie Phase I: Ozzie Score: 10    Ozzie Phase II:      Last vitals: Reviewed and per EMR flowsheets.      Anesthesia Post Evaluation   Anesthetic Problems: no   Cardiovascular System Stable: yes  Respiratory Function: Airway Patent yes  ETT no  Ventilator no  Level of consciousness: awake, alert and oriented  Post-op pain: adequate analgesia  Hydration Adequate: yes  Nausea/Vomiting:no  Other Issues:     Linn Leyden, MD

## 2020-07-30 NOTE — ANESTHESIA PRE PROCEDURE
EC tablet 81 mg  81 mg Oral Daily    acetaminophen (TYLENOL) tablet 650 mg  650 mg Oral Q6H PRN            acetaminophen (TYLENOL) suppository 650 mg  650 mg Rectal Q6H PRN    polyethylene glycol (GLYCOLAX) packet 17 g  17 g Oral Daily PRN    promethazine (PHENERGAN) tablet 12.5 mg  12.5 mg Oral Q6H PRN            ondansetron (ZOFRAN) injection 4 mg  4 mg Intravenous Q6H PRN    enoxaparin (LOVENOX) injection 40 mg  40 mg Subcutaneous Daily    morphine (PF) injection 2 mg  2 mg Intravenous Q4H PRN    HYDROcodone-acetaminophen (NORCO) 5-325 MG   1 tablet Oral Q6H PRN    morphine sulfate (PF) injection 4 mg  4 mg Intravenous Q4H PRN      Allergies:           Quinidine Hives    Aminoglycosides         Pt unsure    Levofloxacin         unknown    Neomycin         unknown    Simvastatin         Unknown to pt      Problem List:     Pneumonia J18.9      Past Medical History:     Facial twitching      Hair loss       wears wig- unknown cause    Hyperlipidemia      Hypertension      Seasonal allergies        Past Surgical History:     COLONOSCOPY        COLONOSCOPY   6/22/15     diverticulosis    OVARY REMOVAL          Social History:    Tobacco Use    Smoking status: Never Smoker    Smokeless tobacco: Never Used   Substance Use Topics    Alcohol use: No      Vital Signs (Current):            BP: 172/52  Pulse: 51    Resp: 18  SpO2: 97    Temp: 98.5 °F (36.9 °C)    Height: 5' 2\" (1.575 m)  (07/29/20)  Weight: 135 lb (61.2 kg)  (07/29/20)    BMI: 24.7                    07/29/20 2015 07/30/20 0430 07/30/20 0810   BP: (!) 185/75 (!) 166/70 (!) 172/52   Pulse: 65 60 51   Resp: 20 18 18   Temp: 97.4 °F (36.3 °C) 97.5 °F (36.4 °C) 98.5 °F (36.9 °C)   TempSrc: Oral Oral Oral   SpO2: 97% 97% 97%               BP Readings from Last 3 Encounters:   07/30/20 (!) 172/52   10/31/16 109/41   06/22/15 (!) 147/39     NPO Status: >8 hrs                         BMI:       Wt Readings from Last 3 Encounters:

## 2020-07-30 NOTE — BRIEF OP NOTE
Brief Postoperative Note      Patient: Latha Lerma  YOB: 1934  MRN: [id number]    Date of Procedure: 7/30/2020    Pre-Op Diagnosis: LEFT HIP FRACTURE    Post-Op Diagnosis: Same       Procedure(s):  LEFT HIP HEMIARTHROPLASTY    Surgeon(s):  Susanna Jacobs MD    Assistant:  Surgical Assistant: Gilbert Dobbins    Anesthesia: General    Estimated Blood Loss (mL): 030     Complications: None    Specimens:   * No specimens in log *    Implants:  Implant Name Type Inv. Item Serial No.  Lot No. LRB No. Used Action   DUP USE [id number] IMPL HIP 28MM HENRRY COCR MOD HD   3MM NK Hip DUP USE [id number] IMPL HIP 28MM HENRRY COCR MOD HD   3MM NK  [employer] [id number] Left 1 Implanted   IMPL HIP TAPERLOC MICROP LAT FMRL 11.0M Hip IMPL HIP TAPERLOC MICROP LAT FMRL 11.0M  [employer] [id number] Left 1 Implanted   IMPL HIP FEM SHLL MULTIPOLAR BIPLR 45MM Hip [address] 45MM  Mario High [id number] Left 1 Implanted   IMPL HIP FEM LINER BIPLR [id number] Hip IMPL HIP FEM LINER BIPLR [id number]  [employer] [id number] Left 1 Implanted         Drains: * No LDAs found *    Findings: successful hemiarthroplasty of displaced femoral neck fracture. Excellent stability throughout full ROM.      Electronically signed by Susanna Jacobs MD on 7/30/2020 at 12:24 PM

## 2020-07-30 NOTE — PLAN OF CARE
Department of Orthopedic Surgery  Physician Assistant   Pre- Rounding Consult Note/Plan of Care Note      Reason for Consult:  Left hip pain  Date of Service: 7/30/2020 9:35 AM    HISTORY OF PRESENT ILLNESS:                The patient is a 80 y.o. female who presents with above chief complaint. Pt fell last night and landed on her left side. Had immediate hip pain and unable to get up or walk after. No prior hip injuries. No n/t in the leg. No other complaints. Past Medical History:        Diagnosis Date    Facial twitching     Hair loss     wears wig- unknown cause    Hyperlipidemia     Hypertension     Seasonal allergies      Past Surgical History:        Procedure Laterality Date    COLONOSCOPY      COLONOSCOPY  6/22/15    diverticulosis    OVARY REMOVAL           Medications Prior to Admission:   Prior to Admission medications    Medication Sig Start Date End Date Taking? Authorizing Provider   ALPRAZolam (XANAX) 0.25 MG tablet Take 0.25 mg by mouth 3 times daily as needed. 6/30/20 7/30/20 Yes Historical Provider, MD   Loratadine (CLARITIN PO) Take by mouth   Yes Historical Provider, MD   ezetimibe (ZETIA) 10 MG tablet Take 10 mg by mouth daily. Yes Historical Provider, MD   amlodipine (NORVASC) 5 MG tablet Take 2.5 mg by mouth daily. Yes Historical Provider, MD   fish oil-omega-3 fatty acids 1000 MG capsule Take 2 g by mouth daily. Yes Historical Provider, MD   Probiotic Product (PROBIOTIC & ACIDOPHILUS EX ST PO) Take by mouth    Historical Provider, MD   aspirin 81 MG tablet Take 81 mg by mouth daily    Historical Provider, MD   Misc Natural Products (OSTEO BI-FLEX JOINT SHIELD PO) Take by mouth    Historical Provider, MD   lisinopril (PRINIVIL;ZESTRIL) 20 MG tablet Take 20 mg by mouth daily. Historical Provider, MD   therapeutic multivitamin-minerals (THERAGRAN-M) tablet Take 1 tablet by mouth daily.       Historical Provider, MD       Allergies:  Quinidine; Aminoglycosides; Levofloxacin; Neomycin; and Simvastatin    Social History:    Tobacco:  reports that she has never smoked. She has never used smokeless tobacco.   Alcohol:  reports no history of alcohol use. Family History:       Problem Relation Age of Onset    Other Father         \"black Lung\"       PHYSICAL EXAM:    MUSCULOSKELETAL:  LEFT HIP:  redness absent  warmth absent  swelling present  tenderness present and noted lateral hip. Leg shortened and externally rotated. Sensation intact to touch. Good distal pulses. No open wounds. range of motion limited due to pain. Moving foot and ankle. DATA:    Admission weight: 135 lb (61.2 kg)  5' 2\" (157.5 cm)  VITALS:  BP (!) 172/52   Pulse 51   Temp 98.5 °F (36.9 °C) (Oral)   Resp 18   Ht 5' 2\" (1.575 m)   Wt 135 lb (61.2 kg)   SpO2 97%   BMI 24.69 kg/m²   CBC:   Recent Labs     07/29/20  1622 07/30/20  0530   WBC 9.5 7.3   HGB 10.7* 10.6*    199     BMP:    Recent Labs     07/29/20  1622 07/30/20  0530   * 137   K 4.3 4.1   CL 94* 102   CO2 27 25   BUN 23* 16   CREATININE 0.9 0.9   GLUCOSE 133* 126*     INR: No results for input(s): INR in the last 72 hours. Radiology:   CT THORACIC SPINE TRAUMA RECONSTRUCTION   Final Result   No evidence of traumatic injury to the thoracic spine. Osteopenia with mild   osteoarthritic changes. CT LUMBAR SPINE TRAUMA RECONSTRUCTION   Final Result   No acute fracture or subluxation of the lumbar spine. Scoliosis and moderate multilevel spondylosis. CT CHEST ABDOMEN PELVIS WO CONTRAST   Final Result   Chest-      Mild subsegmental atelectasis is noted. Small zone of airway nodules in the right lower lobe. This may represent   bronchopneumonia or chronic aspiration pneumonitis. Abdomen pelvis-      No acute intra-abdominal injury is identified. Mild prominence of the intrahepatic biliary ducts, of uncertain significance.    Correlation with liver function tests is suggested. Acute fracture of the left femoral neck. XR PELVIS (1-2 VIEWS)   Final Result   Impacted fracture involving the subcapital portion of the left femoral neck. CT Cervical Spine WO Contrast   Final Result   1. No acute arterial abnormality or hemodynamically significant arterial   stenosis in the head or neck. 2. No acute fracture or traumatic malalignment of the cervical spine. CTA HEAD NECK W CONTRAST   Final Result   1. No acute arterial abnormality or hemodynamically significant arterial   stenosis in the head or neck. 2. No acute fracture or traumatic malalignment of the cervical spine. CT Head WO Contrast   Final Result   Addendum 1 of 1   ADDENDUM:   Findings were discussed with Yuliana Ortiz NP at 4:51 pm on 7/29/2020. Final      XR CHEST PORTABLE   Final Result   No acute cardiopulmonary disease. Calcification projecting over the inferior aspect of the right glenohumeral   joint. This may be related to remote (osseous Bankart) injury or calcium   deposition. Acute injury is another possibility. If there is focal   tenderness, shoulder x-ray series is suggested. IMPRESSION/RECOMMENDATIONS:    Assessment: left hip femoral neck fracture    Plan:  1) Plan for surgical fixation today. Will need left hemiarthroplasty. Will obtain consent. Has been medically optimized and cleared for surgery. Continue pain control. Keep NPO. Ancef ordered for preop ABX.       Full consult to follow    Laury Clifton

## 2020-07-30 NOTE — PROGRESS NOTES
Admission questions and assessment complete; see flow sheet. Scheduled medications administered; See MAR. IV infusing without difficulty. Pt c/o Left hip pain 7/10 PRN morphine given in ER. Pt denies any needs at this time. Pt educated on use of call light and to call out with needs, verbalized understanding, bed in low locked position for pt safety. Bed alarm on.

## 2020-07-30 NOTE — ANESTHESIA PROCEDURE NOTES
Spinal Block    Patient location during procedure: OR  Reason for block: procedure for pain, primary anesthetic and at surgeon's request  Staffing  Anesthesiologist: Shyam Terry MD  Resident/CRNA: Lilian Payne, APRN - CRNA  Preanesthetic Checklist  Completed: patient identified, site marked, surgical consent, pre-op evaluation, timeout performed, IV checked, risks and benefits discussed, monitors and equipment checked, anesthesia consent given, oxygen available and patient being monitored  Spinal Block  Patient position: left lateral decubitus  Prep: ChloraPrep  Patient monitoring: cardiac monitor, continuous pulse ox, continuous capnometry and frequent blood pressure checks  Approach: right paramedian  Location: L3/L4  Provider prep: mask and sterile gloves  Local infiltration: lidocaine  Agent: bupivacaine  Dose: 1.6  Dose: 1.6  Needle  Needle type: Quincke   Needle gauge: 22 G  Needle length: 3.5 in  Assessment  Swirl obtained: Yes  CSF: clear  Attempts: 2  Hemodynamics: stable  Additional Notes  Unable to place midline. Placed paramedian without difficulty. Pt tolerated well.

## 2020-07-30 NOTE — CONSULTS
Department of Orthopedic Surgery  Attending   Consult Note        Reason for Consult:  L fem neck fx  Requesting Physician: Amena Fair MD  Date of Service: 7/30/2020 10:30 AM    CHIEF COMPLAINT:  As Above    History Obtained From:  patient    HISTORY OF PRESENT ILLNESS:                The patient is a 80 y.o. female who presents with above chief complaint. L  Displaced femoral neck fracture after fall. Endorses L leg pain, exquisite pain with log roll. Unable to bear weight lle. Past Medical History:        Diagnosis Date    Facial twitching     Hair loss     wears wig- unknown cause    Hyperlipidemia     Hypertension     Seasonal allergies      Past Surgical History:        Procedure Laterality Date    COLONOSCOPY      COLONOSCOPY  6/22/15    diverticulosis    OVARY REMOVAL           Medications Prior to Admission:   Prior to Admission medications    Medication Sig Start Date End Date Taking? Authorizing Provider   ALPRAZolam (XANAX) 0.25 MG tablet Take 0.25 mg by mouth 3 times daily as needed. 6/30/20 7/30/20 Yes Historical Provider, MD   Loratadine (CLARITIN PO) Take by mouth   Yes Historical Provider, MD   ezetimibe (ZETIA) 10 MG tablet Take 10 mg by mouth daily. Yes Historical Provider, MD   amlodipine (NORVASC) 5 MG tablet Take 2.5 mg by mouth daily. Yes Historical Provider, MD   fish oil-omega-3 fatty acids 1000 MG capsule Take 2 g by mouth daily. Yes Historical Provider, MD   Probiotic Product (PROBIOTIC & ACIDOPHILUS EX ST PO) Take by mouth    Historical Provider, MD   aspirin 81 MG tablet Take 81 mg by mouth daily    Historical Provider, MD   Misc Natural Products (OSTEO BI-FLEX JOINT SHIELD PO) Take by mouth    Historical Provider, MD   lisinopril (PRINIVIL;ZESTRIL) 20 MG tablet Take 20 mg by mouth daily. Historical Provider, MD   therapeutic multivitamin-minerals (THERAGRAN-M) tablet Take 1 tablet by mouth daily.       Historical Provider, MD       Allergies: Quinidine; Aminoglycosides; Levofloxacin; Neomycin; and Simvastatin    Social History:    Tobacco:  reports that she has never smoked. She has never used smokeless tobacco.   Alcohol:  reports no history of alcohol use. Illicit Drug: No  Family History:       Problem Relation Age of Onset    Other Father         \"black Lung\"       REVIEW OF SYSTEMS:    CONSTITUTIONAL:  negative  MUSCULOSKELETAL:  positive for  pain  All other systems reviewed and negative    PHYSICAL EXAM:    awake, alert, cooperative, no apparent distress, and appears stated age  MUSCULOSKELETAL:      LLE warm,. Well perfused. No gross sensory deficits. Shortened ex rotated. Foot wwp. DATA:    CBC:   Recent Labs     07/29/20  1622 07/30/20  0530   WBC 9.5 7.3   HGB 10.7* 10.6*    199     BMP:    Recent Labs     07/29/20  1622 07/30/20  0530   * 137   K 4.3 4.1   CL 94* 102   CO2 27 25   BUN 23* 16   CREATININE 0.9 0.9   GLUCOSE 133* 126*     INR: No results for input(s): INR in the last 72 hours. Radiology:   CT THORACIC SPINE TRAUMA RECONSTRUCTION   Final Result   No evidence of traumatic injury to the thoracic spine. Osteopenia with mild   osteoarthritic changes. CT LUMBAR SPINE TRAUMA RECONSTRUCTION   Final Result   No acute fracture or subluxation of the lumbar spine. Scoliosis and moderate multilevel spondylosis. CT CHEST ABDOMEN PELVIS WO CONTRAST   Final Result   Chest-      Mild subsegmental atelectasis is noted. Small zone of airway nodules in the right lower lobe. This may represent   bronchopneumonia or chronic aspiration pneumonitis. Abdomen pelvis-      No acute intra-abdominal injury is identified. Mild prominence of the intrahepatic biliary ducts, of uncertain significance. Correlation with liver function tests is suggested. Acute fracture of the left femoral neck.          XR PELVIS (1-2 VIEWS)   Final Result   Impacted fracture involving the subcapital portion of the left femoral neck. CT Cervical Spine WO Contrast   Final Result   1. No acute arterial abnormality or hemodynamically significant arterial   stenosis in the head or neck. 2. No acute fracture or traumatic malalignment of the cervical spine. CTA HEAD NECK W CONTRAST   Final Result   1. No acute arterial abnormality or hemodynamically significant arterial   stenosis in the head or neck. 2. No acute fracture or traumatic malalignment of the cervical spine. CT Head WO Contrast   Final Result   Addendum 1 of 1   ADDENDUM:   Findings were discussed with Osvaldo Choi NP at 4:51 pm on 7/29/2020. Final      XR CHEST PORTABLE   Final Result   No acute cardiopulmonary disease. Calcification projecting over the inferior aspect of the right glenohumeral   joint. This may be related to remote (osseous Bankart) injury or calcium   deposition. Acute injury is another possibility. If there is focal   tenderness, shoulder x-ray series is suggested. IMPRESSION/RECOMMENDATIONS:    Assessment: 81 yo F with L femoral neck fx    Plan:  1) medical clearance obtained  2) plan for L hip hemiarthroplasty  3) will need 30 days dvt ppx post op    RBA discussed with patient, including risk of post-op dislocation, infection, foot drop, leg length discrepancy, need for further procedures, failure to relieve all pain. Patient understood these risks and voiced understanding. Elderly hip fractures pose a near 100 percent mortality rate when treated non-operatively, so I strongly recommend above operative treatment and patient agrees despite there being a signficant mir-operative mortality rate with surgical treatment as well. Plan for WBAT with posterior hip precautions post-operatively. Thank you for the opportunity to consult on this patient.     Pablo

## 2020-07-30 NOTE — PLAN OF CARE
Problem: Falls - Risk of:  Goal: Will remain free from falls  Description: Will remain free from falls  7/30/2020 1401 by Norbert Cobb RN  Outcome: Ongoing     Problem: Falls - Risk of:  Goal: Absence of physical injury  Description: Absence of physical injury  7/30/2020 1401 by Norbert Cobb RN  Outcome: Ongoing     Problem: Infection:  Goal: Will remain free from infection  Description: Will remain free from infection  7/30/2020 1401 by Norbert Cobb RN  Outcome: Ongoing     Problem: Safety:  Goal: Free from accidental physical injury  Description: Free from accidental physical injury  7/30/2020 1401 by Norbert Cobb RN  Outcome: Ongoing     Problem: Safety:  Goal: Free from intentional harm  Description: Free from intentional harm  7/30/2020 1401 by Norbert Cobb RN  Outcome: Ongoing     Problem: Safety:  Goal: Ability to remain free from injury will improve  Description: Ability to remain free from injury will improve  7/30/2020 1401 by Norbert Cobb RN  Outcome: Ongoing     Problem: Daily Care:  Goal: Daily care needs are met  Description: Daily care needs are met  7/30/2020 1401 by Norbert Cobb RN  Outcome: Ongoing     Problem: Pain:  Goal: Patient's pain/discomfort is manageable  Description: Patient's pain/discomfort is manageable  7/30/2020 1401 by Norbert Cobb RN  Outcome: Ongoing     Problem: Skin Integrity:  Goal: Skin integrity will stabilize  Description: Skin integrity will stabilize  7/30/2020 1401 by Norbert Cobb RN  Outcome: Ongoing     Problem: Discharge Planning:  Goal: Patients continuum of care needs are met  Description: Patients continuum of care needs are met  7/30/2020 1401 by Norbert Cobb RN  Outcome: Ongoing     Problem:  Activity:  Goal: Ability to ambulate will improve  Description: Ability to ambulate will improve  7/30/2020 1401 by Norbert Cobb RN  Outcome: Ongoing     Problem: Health Behavior:  Goal: Identification of resources available to assist in meeting health care needs will improve  Description: Identification of resources available to assist in meeting health care needs will improve  7/30/2020 1401 by Juan C Obando RN  Outcome: Ongoing     Problem: Nutritional:  Goal: Ability to attain and maintain optimal nutritional status will improve  Description: Ability to attain and maintain optimal nutritional status will improve  7/30/2020 1401 by Juan C Obando RN  Outcome: Ongoing     Problem:  Activity:  Goal: Ability to perform activities at highest level will improve  Description: Ability to perform activities at highest level will improve  7/30/2020 1401 by Juan C Obando RN  Outcome: Ongoing

## 2020-07-30 NOTE — CARE COORDINATION
Case Management Assessment  Initial Evaluation    Date/Time of Evaluation: 7/30/2020 11:42 AM  Assessment Completed by: Nae Stiles    Patient Name: Du Mcgill  YOB: 1934  Diagnosis: Left displaced femoral neck fracture ([address] Oskar Hernandez  Left displaced femoral neck fracture ([address] Oskar Hernandez  Date / Time: 7/29/2020  4:03 PM  Admission status/Date: IP 07/29/2020  Chart Reviewed: Yes      Patient Interviewed: Yes   Family Interviewed:  No      Hospitalization in the last 30 days:  No    Current PCP: Delilah Banegas MD    Financial  Medicare  Precert required for SNF : NO       3 night stay required - WAIVED    ADLS  Support Systems: Family Members  Transportation: grandson    Meal Preparation: self    Housing  Home Environment: ranch w/ adult grandson  Steps: 2 steps  Plans to Return to Present Housing: Yes  Other Identified Issues: FARIHA    Antonio Vitale 78  Currently active with [address] : No  Type of Home Care Services: Nursing Services, PT, OT  Passport/Waiver : No  :                      Phone Number:    Passport/Waiver Services: NA          Durable Medical Equipment   DME Provider:   Equipment: Walker_X__Cane___RTS___ BSC___Shower Chair___  02__ at ____Liter(s)---Uses________HHN___ CPAP___ BiPap___ Hospital Bed___W/C____Other________      Has Home O2 in place on admit:  No - 100% RA      Community Service Affiliation  Dialysis:  No    · Name:  · Location  · Dialysis Schedule:  · Phone:   · Fax: Outpatient PT/OT: No    Cancer Center: No     CHF Clinic: No     Pulmonary Rehab: No  Pain Clinic: No  Community Mental Health: No    Wound Clinic: No     COVID SCREENING: Not Detected - rapid done 7/30  Chart reviewed. Met with pt at bedside and explained the role of the CM. The Plan for Transition of Care is related to the following treatment goals: to return home. The Patient was provided with a choice of provider and agrees   with the discharge plan.  [x] Yes [] No    Explained Case Management role/services.

## 2020-07-30 NOTE — PROGRESS NOTES
Progress Note    Admit Date:  7/29/2020    Subjective:  Ms. Laisha Webster seen up in bed reports pain controlled. Frequent TICs with neck spasms and facial spasms noted      Objective:   Patient Vitals for the past 4 hrs:   BP Temp Temp src Pulse Resp SpO2   07/30/20 0810 (!) 172/52 98.5 °F (36.9 °C) Oral 51 18 97 %            Intake/Output Summary (Last 24 hours) at 7/30/2020 0952  Last data filed at 7/30/2020 0947  Gross per 24 hour   Intake --   Output 1200 ml   Net -1200 ml       Physical Exam:        General: eldelry thin female,  Awake, alert and oriented. Appears to be not in any distress  Mucous Membranes:  Pink , anicteric  Neck: No JVD, no carotid bruit, no thyromegaly  Chest:  Clear to auscultation bilaterally, no added sounds  Cardiovascular:  RRR S1S2 heard, no murmurs or gallops  Abdomen:  Soft, undistended, non tender, no organomegaly, BS present  Extremities: left hip pain with palpation . frequent facial spasms noted  No edema or cyanosis.  Distal pulses well felt  Neurological : grossly normal        Scheduled Meds:   guaiFENesin  600 mg Oral BID    ceFAZolin  2 g Intravenous On Call to OR    ezetimibe  10 mg Oral Daily    lisinopril  20 mg Oral Daily    amLODIPine  2.5 mg Oral Daily    omega-3 acid ethyl esters  2 g Oral Daily    therapeutic multivitamin-minerals  1 tablet Oral Daily    aspirin  81 mg Oral Daily    sodium chloride flush  10 mL Intravenous 2 times per day    enoxaparin  40 mg Subcutaneous Daily       Continuous Infusions:   sodium chloride 125 mL/hr at 07/30/20 0554       PRN Meds:  sodium chloride flush, acetaminophen **OR** acetaminophen, polyethylene glycol, promethazine **OR** ondansetron, morphine, HYDROcodone 5 mg - acetaminophen, morphine      Data:  CBC:   Recent Labs     07/29/20  1622 07/30/20  0530   WBC 9.5 7.3   HGB 10.7* 10.6*   HCT 32.1* 31.5*   MCV 98.1 101.1*    199     BMP:   Recent Labs     07/29/20  1622 07/30/20  0530   * 137   K 4.3 4.1 CL 94* 102   CO2 27 25   BUN 23* 16   CREATININE 0.9 0.9     LIVER PROFILE:   Recent Labs     07/29/20  1622   AST 25   ALT 10   BILITOT 0.5   ALKPHOS 75     CULTURES    Blood cx x2: pending     RADIOLOGY    CT THORACIC SPINE TRAUMA RECONSTRUCTION   Final Result   No evidence of traumatic injury to the thoracic spine. Osteopenia with mild   osteoarthritic changes. CT LUMBAR SPINE TRAUMA RECONSTRUCTION   Final Result   No acute fracture or subluxation of the lumbar spine. Scoliosis and moderate multilevel spondylosis. CT CHEST ABDOMEN PELVIS WO CONTRAST   Final Result   Chest-      Mild subsegmental atelectasis is noted. Small zone of airway nodules in the right lower lobe. This may represent   bronchopneumonia or chronic aspiration pneumonitis. Abdomen pelvis-      No acute intra-abdominal injury is identified. Mild prominence of the intrahepatic biliary ducts, of uncertain significance. Correlation with liver function tests is suggested. Acute fracture of the left femoral neck. XR PELVIS (1-2 VIEWS)   Final Result   Impacted fracture involving the subcapital portion of the left femoral neck. CT Cervical Spine WO Contrast   Final Result   1. No acute arterial abnormality or hemodynamically significant arterial   stenosis in the head or neck. 2. No acute fracture or traumatic malalignment of the cervical spine. CTA HEAD NECK W CONTRAST   Final Result   1. No acute arterial abnormality or hemodynamically significant arterial   stenosis in the head or neck. 2. No acute fracture or traumatic malalignment of the cervical spine. CT Head WO Contrast   Final Result   Addendum 1 of 1   ADDENDUM:   Findings were discussed with Marcelino Houser NP at 4:51 pm on 7/29/2020. Final      XR CHEST PORTABLE   Final Result   No acute cardiopulmonary disease. Calcification projecting over the inferior aspect of the right glenohumeral   joint. This may be related to remote (osseous Bankart) injury or calcium   deposition. Acute injury is another possibility. If there is focal   tenderness, shoulder x-ray series is suggested.            Assessment/Plan:    Fall  Left Femoral Neck Fracture  - CT head/lumbar/thoracic/cervical spine: neg  - CTA head/neck: non-acute  - XR pelvis: impacted fx involving subcapital portion of left femoral neck  - Admit to Med Surg  - NPO, IVF, PRN pain meds, IS  - ortho consult - OR today for surgery    Abnormal CT chest  - small zone of airway nodules in RLL representing bronchopneumonia or chronic aspiration pneumonitis  - PCT normal , recently treated at [address] for pna  - no abx for now, add IS and mucinex     Lactic Acidosis - Resolved  - 3.1  - trend, repeat 1.9    Recently treated for Head Lice  - completed treatment last week  - environmental precautions    CKD Stage III  - Cr 0.9  - stable    COPD  - no AE  - cont Mucinex    HTN  - uncontrolled  - cont Norvasc, Lisinopril  - monitor     Aortic Insufficiency  Mitral Regurgitation  - f/w Dr. Alex Mulligan    MGUS  - f/w Oncology at [facility]    Normocytic Anemia  - Hgb 10.7  - baseline: ~10.5-11  - stable    HLD  - cont Zetia, Lovaza    DVT Prophylaxis: Lovenox  Diet: Diet NPO, After Midnight  Code Status: Full Code     Yocasta Blake MD 7/30/2020 1:57 PM;

## 2020-07-30 NOTE — PLAN OF CARE
Problem: Falls - Risk of:  Goal: Will remain free from falls  Description: Will remain free from falls  Outcome: Ongoing  Goal: Absence of physical injury  Description: Absence of physical injury  Outcome: Ongoing     Problem: Infection:  Goal: Will remain free from infection  Description: Will remain free from infection  Outcome: Ongoing     Problem: Safety:  Goal: Free from accidental physical injury  Description: Free from accidental physical injury  Outcome: Ongoing  Goal: Free from intentional harm  Description: Free from intentional harm  Outcome: Ongoing  Goal: Ability to remain free from injury will improve  Description: Ability to remain free from injury will improve  Outcome: Ongoing     Problem: Daily Care:  Goal: Daily care needs are met  Description: Daily care needs are met  Outcome: Ongoing     Problem: Pain:  Goal: Patient's pain/discomfort is manageable  Description: Patient's pain/discomfort is manageable  Outcome: Ongoing     Problem: Skin Integrity:  Goal: Skin integrity will stabilize  Description: Skin integrity will stabilize  Outcome: Ongoing     Problem: Discharge Planning:  Goal: Patients continuum of care needs are met  Description: Patients continuum of care needs are met  Outcome: Ongoing     Problem:  Activity:  Goal: Ability to ambulate will improve  Description: Ability to ambulate will improve  Outcome: Ongoing  Goal: Ability to perform activities at highest level will improve  Description: Ability to perform activities at highest level will improve  Outcome: Ongoing     Problem: Health Behavior:  Goal: Identification of resources available to assist in meeting health care needs will improve  Description: Identification of resources available to assist in meeting health care needs will improve  Outcome: Ongoing     Problem: Nutritional:  Goal: Ability to attain and maintain optimal nutritional status will improve  Description: Ability to attain and maintain optimal nutritional

## 2020-07-30 NOTE — CONSULTS
Pharmacy Note  Vancomycin Consult    Diana Adams is a 80 y.o. female started on Vancomycin for HCAP; consult received from Dr. John Cintron to manage therapy. Also receiving the following antibiotics: zosyn. Patient Active Problem List   Diagnosis    Pneumonia       Allergies:  Quinidine; Aminoglycosides; Levofloxacin; Neomycin; and Simvastatin     Temp max:     Recent Labs     07/29/20  1622   BUN 23*       Recent Labs     07/29/20  1622   CREATININE 0.9       Recent Labs     07/29/20  1622   WBC 9.5       No intake or output data in the 24 hours ending 07/30/20 0110    Culture Date      Source                       Results      Ht Readings from Last 1 Encounters:   07/29/20 5' 2\" (1.575 m)        Wt Readings from Last 1 Encounters:   07/29/20 135 lb (61.2 kg)         Body mass index is 24.69 kg/m². Estimated Creatinine Clearance: 39 mL/min (based on SCr of 0.9 mg/dL). Goal Trough Level: 15-20 mcg/mL    Assessment/Plan:  Please start vancomycin 750 mg ivpb q24h at 0200 on 7-30-20. Please check vancomycin trough 7-2-20 at 0130. Thank you for the consult. Will continue to follow. [address]. Ph.  7/30/2020 1:12 AM

## 2020-07-30 NOTE — PROGRESS NOTES
Handoff report and transfer of care given at bedside to [facility]. Patient in stable condition, denies needs/concerns at this time. Call light within reach.

## 2020-07-30 NOTE — H&P
Historical Provider, MD   fish oil-omega-3 fatty acids 1000 MG capsule Take 2 g by mouth daily. Historical Provider, MD   therapeutic multivitamin-minerals (THERAGRAN-M) tablet Take 1 tablet by mouth daily. Historical Provider, MD       Allergies:  Quinidine; Aminoglycosides; Levofloxacin; Neomycin; and Simvastatin    Social History:           TOBACCO:   reports that she has never smoked. She has never used smokeless tobacco.  ETOH:   reports no history of alcohol use. Family History:               Problem Relation Age of Onset    Other Father         \"black Lung\"       REVIEW OF SYSTEMS:   Pertinent positives as noted in the HPI. All other systems reviewed and negative. PHYSICAL EXAM PERFORMED:    BP (!) 185/75   Pulse 65   Temp 97.4 °F (36.3 °C) (Oral)   Resp 20   Ht 5' 2\" (1.575 m)   Wt 135 lb (61.2 kg)   SpO2 97%   BMI 24.69 kg/m²     General appearance:  No apparent distress, appears stated age and cooperative. HEENT:  Normal cephalic, atraumatic without obvious deformity. Pupils equal, round, and reactive to light. Extra ocular muscles intact. Conjunctivae/corneas clear. Neck: Supple, with full range of motion. No jugular venous distention. Trachea midline. Respiratory:  Normal respiratory effort. Clear to auscultation, bilaterally without Rales/Wheezes/Rhonchi. Cardiovascular:  Regular rate and rhythm with normal S1/S2 without murmurs, rubs or gallops. Abdomen: Soft, non-tender, non-distended with normal bowel sounds. Musculoskeletal:  No clubbing, cyanosis or edema bilaterally. Full range of motion without deformity. Skin: Skin color, texture, turgor normal.  No rashes or lesions. Neurologic:  Neurovascularly intact without any focal sensory/motor deficits.  Cranial nerves: II-XII intact, grossly non-focal.  Psychiatric:  Alert   Capillary Refill: Brisk,< 3 seconds   Peripheral Pulses: +2 palpable, equal bilaterally       Labs:     Recent Labs     07/29/20  1622   WBC 9.5 HGB 10.7*   HCT 32.1*        Recent Labs     07/29/20  1622   *   K 4.3   CL 94*   CO2 27   BUN 23*   CREATININE 0.9   CALCIUM 10.0     Recent Labs     07/29/20  1622   AST 25   ALT 10   BILITOT 0.5   ALKPHOS 75     No results for input(s): INR in the last 72 hours. Recent Labs     07/29/20  1622   CKTOTAL 73   TROPONINI <0.01       Urinalysis:      Lab Results   Component Value Date    NITRU Negative 07/29/2020    WBCUA None seen 07/29/2020    RBCUA 11-20 07/29/2020    BLOODU TRACE-INTACT 07/29/2020    SPECGRAV 1.010 07/29/2020    GLUCOSEU Negative 07/29/2020       Radiology:          CT THORACIC SPINE TRAUMA RECONSTRUCTION   Final Result   No evidence of traumatic injury to the thoracic spine. Osteopenia with mild   osteoarthritic changes. CT LUMBAR SPINE TRAUMA RECONSTRUCTION   Final Result   No acute fracture or subluxation of the lumbar spine. Scoliosis and moderate multilevel spondylosis. CT CHEST ABDOMEN PELVIS WO CONTRAST   Final Result   Chest-      Mild subsegmental atelectasis is noted. Small zone of airway nodules in the right lower lobe. This may represent   bronchopneumonia or chronic aspiration pneumonitis. Abdomen pelvis-      No acute intra-abdominal injury is identified. Mild prominence of the intrahepatic biliary ducts, of uncertain significance. Correlation with liver function tests is suggested. Acute fracture of the left femoral neck. XR PELVIS (1-2 VIEWS)   Final Result   Impacted fracture involving the subcapital portion of the left femoral neck. CT Cervical Spine WO Contrast   Final Result   1. No acute arterial abnormality or hemodynamically significant arterial   stenosis in the head or neck. 2. No acute fracture or traumatic malalignment of the cervical spine. CTA HEAD NECK W CONTRAST   Final Result   1.  No acute arterial abnormality or hemodynamically significant arterial   stenosis in the head or neck.   2. No acute fracture or traumatic malalignment of the cervical spine. CT Head WO Contrast   Final Result   Addendum 1 of 1   ADDENDUM:   Findings were discussed with Renuka Lay NP at 4:51 pm on 7/29/2020. Final      XR CHEST PORTABLE   Final Result   No acute cardiopulmonary disease. Calcification projecting over the inferior aspect of the right glenohumeral   joint. This may be related to remote (osseous Bankart) injury or calcium   deposition. Acute injury is another possibility. If there is focal   tenderness, shoulder x-ray series is suggested. ASSESSMENT:    There are no active hospital problems to display for this patient. PLAN:    1) HCAP  - IV abx coverage  - lactic acid trending up, check procalcitonin  - blood cultures    2) Hip fracture  - Ortho consulted, managing infxn prior to OR    3) AMS  - poss toxic vs secondary to pain/rx      DVT Prophylaxis: lovenox  Diet: Diet NPO, After Midnight  Code Status: Full Thien Montoya MD    Thank you Antonio Chakraborty MD for the opportunity to be involved in this patient's care. If you have any questions or concerns please feel free to contact me at [phone].

## 2020-07-30 NOTE — PROGRESS NOTES
Spoke with grandson about prescriptions that pt is taking. Updated med list. And advised about visiting policies.

## 2020-07-31 NOTE — PROGRESS NOTES
4 Eyes Skin Assessment     The patient is being assess for                    Xavier score <18    I agree that 2 RN's have performed a thorough Head to Toe Skin Assessment on the patient. ALL assessment sites listed below have been assessed. Areas assessed by both nurses:   [x]   Head, Face, and Ears   [x]   Shoulders, Back, and Chest, Abdomen  [x]   Arms, Elbows, and Hands   [x]   Coccyx, Sacrum, and Ischium  []   Legs, Feet, and Heels        Left hip surgical dressing, scattered bruising. **SHARE this note so that the co-signing nurse is able to place an eSignature**    Co-signer eSignature: {Esignature:[id number]}    Does the Patient have Skin Breakdown?   No          Xavier Prevention initiated:  Yes   Wound Care Orders initiated:  No      WOC nurse consulted for Pressure Injury (Stage 3,4, Unstageable, DTI, NWPT, Complex wounds)and New or Established Ostomies:  No      Primary Nurse eSignature: Electronically signed by Rafael Hammans, RN on 7/30/20 at 9:29 PM EDT

## 2020-07-31 NOTE — PROGRESS NOTES
Inpatient Occupational Therapy  Evaluation and Treatment    Unit: [facility]  Date:  7/31/2020  Patient Name:    Jeri Aldrich  Admitting diagnosis:  Left displaced femoral neck fracture ([facility] [address] [S72.002A]  Left displaced femoral neck fracture ([facility] [address] Gino Haddad  Admit Date:  7/29/2020  Precautions/Restrictions/WB Status/ Lines/ Wounds/ Oxygen: Fall risk, Bed/chair alarm, Lines -IV and Hamilton catheter and WB Restrictions (WBAT), posterior hip precautions  S/p L hip hemiarthroplasty for femoral neck fx (730/20). Posterior hip precautions (no flexion >90 deg, no IR, no hip adduction past midline). WBAT. Adduction pillow at all times when in bed. Treatment Time:  5316-8082  Treatment Number: 1     Billable Treatment Time: 65 minutes   Total Treatment Time:   75  minutes    Patient Goals for Therapy:  \" Go to rehab. \"      Discharge Recommendations: SNF  DME needs for discharge: defer to facility       Therapy recommendations for staff:   Assist of 1 with use of rolling walker (RW) for all transfers to/from BSC/chair    History of Present Illness: 80 y. o. female presents from home where she apparently fell earlier today. Patient relates a history of tripping on uneven ground when walking through grass, an account different from documented per ER. She is currently disoriented, awake, alert, responsive verbally and able to follow commands. Concern initially was for poss CVA, with neg work up in [place] [address]. Patient does complain of sob, cough, denies chest pain, fever, chills. Denies difficulty swallowing, n/v/abd pain.  States she's been isolating at home since recent admission to [facility] for pna, sepsis. Pt has Pneumonia; Closed displaced fracture of left femoral neck (HCC); MGUS (monoclonal gammopathy of unknown significance); Benign essential hypertension; Aortic insufficiency; Clonic hemifacial spasm, bilateral; GEORGES (generalized anxiety disorder);  Gastroesophageal reflux disease without esophagitis    Home Health S4 Level Recommendation:  NA  AM-PAC Score:  14    Preadmission Environment    Pt. Lives with family (40 yo grandson - works part-time)              Has additional family that might be able to help  Home environment:        one story home with basement  Steps to enter first floor: 2 steps to enter and hand rail unilateral  Steps to basement:  Full flight of 12-13 and hand rail: bilateral  Bathroom: tub/shower unit, grab bars and standard height toilet              Sponges bathes at sink, sits in shower chair  Equipment owned: rollator and reacher   Pt sleeps in flat bed     Preadmission Status:  Pt. Able to drive: No - nieces drives  Pt Fully independent with ADLs: Yes  Pt. Required assistance from family for: Cleaning and Cooking               Heats up frozen meals              Laundry is down the 2 steps (with railing) to enter the house, pt does her own laundry  Pt. independent for transfers and gait and walked with No Device - uses Rollator intermittently in home  History of falls          Yes - reason for admission, additional falls prior     Pain   No (at rest, \"just nausea\")  Location: L hip/LE during transfer  Rating: moderate /10  Pain Medicine Status: No request made     Cognition    A&O x4   Able to follow 2 step commands     Subjective  Patient lying supine in bed with no family present. Pt agreeable to this OT eval & tx. Upper Extremity ROM:    WFL,  pt able to perform all bed mobility, transfers, and gait without ROM limitation.     Upper Extremity Strength:    BUE strength impaired but not formally assessed w/ MMT    Balance  Sitting:                      Good - ; SBA  Comments: 10 minutes at EOB with unilateral and bilateral UE support, pt nauseous which did not resolve with seated rest     Standing:       Fair +; CGA  Comments: 2 minutes with B UE support on RW, mild sway which improved with time    Bed mobility:    Supine to Sit:                      Max A  and 2 persons with HOB elevated  Sit to Supine:                      Not Tested  Rolling:                     Not Tested  Scooting in sitting: Max A at EOB and in chair  Scooting in supine:  Not Tested     Transfer Training                Sit to stand: Mod A with cues for posterior hip precautions  Stand to sit:              CGA with cues fo  Bed to Chair:                  Bed to [facility]:  Not Tested    Dressing:      UE:   Not Tested  LE:    Not Tested    Bathing:    UE:  Not Tested  LE:  Not Tested    Eating:   Not Tested    Toileting:  Not Tested    Activity Tolerance   Pt completed therapy session with Pain noted with WB during transfer  Supine (at rest) SpO2: 97% on RA  HR: 70 bpm  BP: 124/54  Sitting EOB SpO2: 96%  HR:  71 bpm  BP: 130/54    Positioning Needs:   Up in chair, call light and needs in reach. Exercise / Activities Initiated:   N/A    Patient/Family Education:   Role of OT  Recommendations for DC  Safe RW use/hand placement    Assessment of Deficits: Pt seen for Occupational therapy evaluation in acute care setting. Pt demonstrated decreased Activity tolerance, ADLs, Balance , Bed mobility, Safety Awareness, Strength, Transfers and Cognition. Pt functioning below baseline and will likely benefit from skilled occupational therapy services to maximize safety and independence. Goal(s) : To be met in 3 Visits:  1). Bed to toilet/BSC: Min A with RW    To be met in 5 Visits:  1). Supine to/from Sit:  Min A  2). Upper Body Bathing:   Independent  3). Lower Body Bathing: Mod A  4). Upper Body Dressing:  Independent  5). Lower Body Dressing:  Min A  With AE  6). Pt to demonstrate UE exs x 15 reps with minimal cues    Rehabilitation Potential:  Good for goals listed above. Strengths for achieving goals include: Pt motivated, PLOF and Pt cooperative  Barriers to achieving goals include:  Pain and Weakness     Plan:   To be seen 3-5 x/wk while in acute care setting for therapeutic exercises, bed mobility, transfers, dressing, bathing, family/patient education, ADL/IADL retraining, energy conservation training.      Odalis Oconnor MS, OTR/L  #[id number]            If patient discharges from this facility prior to next visit, this note will serve as the Discharge Summary

## 2020-07-31 NOTE — CARE COORDINATION
INTERDISCIPLINARY PLAN OF CARE CONFERENCE    Date/Time: 7/31/2020 2:57 PM  Completed by: Nae Jones, Case Management      Patient Name:  Rudolph Rutherford  YOB: 1934  Admitting Diagnosis: Left displaced femoral neck fracture ([address] Yarelis Gupta  Left displaced femoral neck fracture ([address] Yarelis Gupta     Admit Date/Time:  7/29/2020  4:03 PM    Chart reviewed. Interdisciplinary team met to discuss patient progress and discharge plans. Disciplines included Case Management, Nursing, and Dietitian. Current Status:ongoing    PT/OT recommendation:n/a    Anticipated Discharge Date: tbd  Expected D/C Disposition:  Home  Confirmed plan with patient and/or family Yes  Discharge Plan Comments: Reviewed chart. Role of discharge planner explained and patient verbalized understanding. Pt states that he lives with his grandson. Pt is aware that PT/OT recommends SNF. Pt was given a SNF list and pt chose [facility]. CM called Jalyn Sears at [facility] and faxed her the referral. She will let CM know if able to accept. Covid neg as of 7/30/2020. +HENS, +eCOC    3 Medicare Midnight rule is not valid during Covid times    Addendum 7/31/2020 1515: Per Mitzi Lakisha at [facility], she can accept pt at [facility], +bed. The Plan for Transition of Care is related to the following treatment goals: home    The Patient and/or patient representative pt was provided with a choice of provider and agrees   with the discharge plan. [x] Yes [] No    Freedom of choice list was provided with basic dialogue that supports the patient's individualized plan of care/goals, treatment preferences and shares the quality data associated with the providers.  [x] Yes [] No    Home O2 in place on admit: No  Pt informed of need to bring portable home O2 tank on day of discharge for nursing to connect prior to leaving:  Not Indicated  Verbalized agreement/Understanding:  Not Indicated

## 2020-07-31 NOTE — PROGRESS NOTES
Handoff report and transfer of care given at bedside to [facility]. Patient in stable condition, denies needs/concerns at this time. Call light within reach.

## 2020-07-31 NOTE — PROGRESS NOTES
Inpatient Physical Therapy Evaluation and Treatment    Unit: [facility]  Date:  7/31/2020  Patient Name:    Rian Johnson  Admitting diagnosis:  Left displaced femoral neck fracture ([facility] [address] [S72.002A]  Left displaced femoral neck fracture ([facility] [address] Delano Samson  Admit Date:  7/29/2020  Precautions/Restrictions/WB Status/ Lines/ Wounds/ Oxygen: Fall risk, Bed/chair alarm, Lines -IV and Hamilton catheter and WB Restrictions (WBAT), posterior hip precautions    S/p L hip hemiarthroplasty for femoral neck fx (730/20). Posterior hip precautions (no flexion >90 deg, no IR, no hip adduction past midline). WBAT. Adduction pillow at all times when in bed. Treatment Time:  13:25-14:40  Treatment Number:  1   Timed Code Treatment Minutes: 65 minutes  Total Treatment Minutes:  75  minutes    Patient Goals for Therapy: none stated          Discharge Recommendations: SNF  DME needs for discharge: defer to facility       Therapy recommendation for EMS Transport: can transport by wheelchair    Therapy recommendations for staff:   Assist of 1 with use of rolling walker (RW) and gait belt for all transfers to/from BSC/chair with posterior hip precautions    History of Present Illness: 80 y.o. female presents from home where she apparently fell earlier today. Patient relates a history of tripping on uneven ground when walking through grass, an account different from documented per ER. She is currently disoriented, awake, alert, responsive verbally and able to follow commands. Concern initially was for poss CVA, with neg work up in [place] [id number]. Patient does complain of sob, cough, denies chest pain, fever, chills. Denies difficulty swallowing, n/v/abd pain. States she's been isolating at home since recent admission to [facility] for pna, sepsis. PMH: facial twitching, HTN, HDL    Home Health S4 Level Recommendation:  NA  AM-PAC Mobility Score    AM-PAC Inpatient Mobility Raw Score : 14       Preadmission Environment    Pt.  Lives with family (40 yo grandson - works part-time)    Has additional family that might be able to help  Home environment:  one story home with basement  Steps to enter first floor: 2 steps to enter and hand rail unilateral  Steps to basement: Full flight of 12-13 and hand rail: bilateral  Bathroom: tub/shower unit, grab bars and standard height toilet    Sponges bathes at sink, sits in shower chair  Equipment owned: rollator and reacher   Pt sleeps in flat bed    Preadmission Status:  Pt. Able to drive: No - nieces drives  Pt Fully independent with ADLs: Yes  Pt. Required assistance from family for: Cleaning and Cooking    Heats up frozen meals   Laundry is down the 2 steps (with railing) to enter the house, pt does her own laundry  Pt. independent for transfers and gait and walked with No Device - uses Rollator intermittently in home  History of falls Yes - reason for admission, additional falls prior    Pain   No (at rest, \"just nausea\"), yes with mobility  Location: L LE  Rating: moderate /10  Pain Medicine Status: No request made    Cognition    A&O x4   Able to follow 2 step commands    Subjective  Patient lying supine in bed with no family present. Pt agreeable to this PT eval & tx. Upper Extremity ROM/Strength  Please see OT evaluation.       Lower Extremity ROM / Strength   AROM WFL: No  ROM limitations: lacking ~8 deg knee extension bilaterally, knee flexion ~ 60 deg on L LE (limited by pain)    Strength Assessment (measured on a 0-5 scale):  R LE   Quad   4   Ant Tib  4+   Hamstring 4   Iliopsoas Not tested  L LE  Quad   Deferred    Ant Tib  Deferred    Hamstring Deferred    Iliopsoas Deferred     Lower Extremity Sensation    WFL    Lower Extremity Proprioception:   WFL    Coordination and Tone  WFL    Balance  Sitting:  Good - ; SBA  Comments: 10 minutes at EOB with unilateral and bilateral UE support, pt nauseous which did not resolve with seated rest    Standing: Fair +; CGA  Comments: 2 minutes with B UE support on RW, mild sway which improved with time    Bed Mobility   Supine to Sit:    Max A  and 2 persons with HOB elevated  Sit to Supine:   Not Tested  Rolling:   Not Tested  Scooting in sitting: Max A at EOB and in chair  Scooting in supine:  Not Tested    Transfer Training     Sit to stand: Mod A with cues for posterior hip precautions  Stand to sit:   CGA with cues for hand placement and posterior hip precautions  Bed to Chair:   Min A  with use of gait belt and rolling walker (RW), cues for sequencing    Gait gait completed as indicated below  Distance:      3 ft  Deviations (firm surface/linoleum):  decreased tasneem, step to pattern, antalgic pattern, decreased step length on right and decreased stance time on left  Assistive Device Used:    gait belt and rolling walker (RW)  Level of Assist:    Min A   Comment: cues for walker management, posterior hip precautions with turning, and sequencing of step-to pattern    Stair Training deferred, pt unsafe/ not appropriate to complete stairs at this time    Activity Tolerance   Pt completed therapy session with Nausea noted with all activity and at rest. Aromatherapy patch given but this made it worse. Pt has emesis bag and wash cloth  Pain noted with L LE weight bearing. Supine (at rest) SpO2: 97% on RA  HR: 70 bpm  BP: 124/54  Sitting EOB SpO2: 96%  HR:  71 bpm  BP: 130/54    Positioning Needs   Pt up in chair, alarm set, positioned in proper neutral alignment and pressure relief provided. Call light provided and all needs within reach   Pt declined ice stating it would make her too cold    Exercises Initiated  all completed bilaterally unless indicated  Ankle Pumps x 10 reps  Glut sets x 10 reps  LAQ x 10 reps   Pt given HEP handout and went through it on her own again at end of session. Other  None.      Patient/Family Education   Pt educated on role of inpatient PT, POC, importance of continued activity, DC recommendations, safety awareness, transfer techniques, pacing activity, HEP and calling for assist with mobility. Educated on posterior hip precautions - handout given. Assessment  Pt seen for Physical Therapy evaluation in acute care setting. Pt demonstrated decreased Activity tolerance, Balance, ROM, Safety and Strength as well as decreased independence with Ambulation, Bed Mobility  and Transfers. Recommending SNF upon discharge as patient functioning well below baseline, demonstrates good rehab potential and unable to return home due to limited or no family support, inability to negotiate stairs to enter home/bedroom/bathroom, burden of care beyond caregiver ability and home environment not conducive to patient recovery. Goals : To be met in 3 visits:  1). Independent with LE Ex x 10 reps    To be met in 6 visits:  1). Supine to/from sit: CGA  2). Sit to/from stand: CGA  3). Bed to chair: CGA without cues for posterior hip precautions  4). Gait: Ambulate 50 ft.  with  CGA and use of rolling walker (RW)  5). Tolerate B LE exercises 3 sets of 10-15 reps  6). Ascend/descend 2 steps with Min A  with use of hand rail unilateral and rolling walker (RW)    Rehabilitation Potential: Good  Strengths for achieving goals include:   Pt motivated, PLOF, Family Support and Pt cooperative   Barriers to achieving goals include:    Pain and Weakness    Plan    To be seen 7x / week  while in acute care setting for therapeutic exercises, bed mobility, transfers, progressive gait training, balance training, and family/patient education. Signature: Rossy Moreno PT, DPT #[id number]    If patient discharges from this facility prior to next visit, this note will serve as the Discharge Summary.

## 2020-07-31 NOTE — PROGRESS NOTES
Department of Orthopedic Surgery  Attending   Progress Note    Subjective:       Systemic or Specific Complaints:No Complaints    Objective:     Patient Vitals for the past 24 hrs:   BP Temp Temp src Pulse Resp SpO2   07/31/20 0816 139/63 98.7 °F (37.1 °C) Oral 87 18 97 %   07/31/20 0330 (!) 146/60 98.5 °F (36.9 °C) Oral 68 16 96 %   07/30/20 2030 (!) 140/58 97.1 °F (36.2 °C) Oral 65 16 96 %   07/30/20 1830 -- 98.5 °F (36.9 °C) Oral -- -- --   07/30/20 1709 (!) 140/56 100.3 °F (37.9 °C) Oral 87 16 93 %   07/30/20 1623 (!) 167/63 98.7 °F (37.1 °C) Oral 73 16 94 %   07/30/20 1514 (!) 146/63 99.4 °F (37.4 °C) Oral 69 18 95 %   07/30/20 1338 (!) 124/52 97.9 °F (36.6 °C) Oral 65 18 91 %   07/30/20 1250 (!) 140/42 -- -- 53 19 98 %   07/30/20 1245 (!) 142/38 -- -- 53 22 97 %   07/30/20 1240 (!) 158/141 -- -- 54 24 94 %   07/30/20 1237 (!) 147/79 97.5 °F (36.4 °C) Temporal 57 16 96 %       General: alert, appears stated age and cooperative   Wound: Dressing CDI, spot shadowing proxmially   Motion: Painless Range of Motion in affected extremity   DVT Exam: No evidence of DVT seen on physical exam.     Additional exam: NVI to foot. Data Review  CBC:   Lab Results   Component Value Date    WBC 6.3 07/31/2020    RBC 2.39 07/31/2020    HGB 7.9 07/31/2020    HCT 23.7 07/31/2020     07/31/2020       Renal:   Lab Results   Component Value Date     07/31/2020    K 3.9 07/31/2020     07/31/2020    CO2 22 07/31/2020    BUN 14 07/31/2020    CREATININE 0.9 07/31/2020    GLUCOSE 133 07/31/2020    CALCIUM 8.0 07/31/2020            Assessment:      left hemiarthroplasty 7/30 for displaced femoral neck fracture. Plan:      1:  WBAT, PT/OT needs to mobilize today  2:  Continue Deep venous thrombosis prophylaxis for 5 weeks  3:  Continue Pain Control  4:  DC roberts prior to completion of post-operative antibiotics  5:  Reinforce posterior hip precautions  6:  Abduction pillow at all times when in bed  7:   Follow up 2 weeks  8: Post-op abx    [facility]

## 2020-07-31 NOTE — PLAN OF CARE
Problem: Falls - Risk of:  Goal: Will remain free from falls  Description: Will remain free from falls  7/31/2020 0410 by Azra Martines RN  Outcome: Ongoing  7/30/2020 1504 by Shamar Hendricks RN  Outcome: Ongoing  Goal: Absence of physical injury  Description: Absence of physical injury  7/31/2020 0410 by Azra Martines RN  Outcome: Ongoing  7/30/2020 1504 by Shamar Hendricks RN  Outcome: Ongoing     Problem: Infection:  Goal: Will remain free from infection  Description: Will remain free from infection  7/31/2020 0410 by Azra Martines RN  Outcome: Ongoing  7/30/2020 1504 by Shamar Hendricks RN  Outcome: Ongoing     Problem: Safety:  Goal: Free from accidental physical injury  Description: Free from accidental physical injury  7/31/2020 0410 by Azra Martines RN  Outcome: Ongoing  7/30/2020 1504 by Shamar Hendricks RN  Outcome: Ongoing  Goal: Free from intentional harm  Description: Free from intentional harm  7/31/2020 0410 by Azra Martines RN  Outcome: Ongoing  7/30/2020 1504 by Shamar Hendricks RN  Outcome: Ongoing  Goal: Ability to remain free from injury will improve  Description: Ability to remain free from injury will improve  7/31/2020 0410 by Azra Martines RN  Outcome: Ongoing  7/30/2020 1504 by Shamar Hendricks RN  Outcome: Ongoing     Problem: Daily Care:  Goal: Daily care needs are met  Description: Daily care needs are met  Outcome: Ongoing     Problem: Pain:  Goal: Patient's pain/discomfort is manageable  Description: Patient's pain/discomfort is manageable  Outcome: Ongoing     Problem: Skin Integrity:  Goal: Skin integrity will stabilize  Description: Skin integrity will stabilize  Outcome: Ongoing     Problem: Discharge Planning:  Goal: Patients continuum of care needs are met  Description: Patients continuum of care needs are met  Outcome: Ongoing     Problem:  Activity:  Goal: Ability to ambulate will improve  Description: Ability to ambulate will improve  Outcome: Ongoing  Goal: Ability to perform activities at highest level will improve  Description: Ability to perform activities at highest level will improve  Outcome: Ongoing     Problem: Health Behavior:  Goal: Identification of resources available to assist in meeting health care needs will improve  Description: Identification of resources available to assist in meeting health care needs will improve  Outcome: Ongoing     Problem: Nutritional:  Goal: Ability to attain and maintain optimal nutritional status will improve  Description: Ability to attain and maintain optimal nutritional status will improve  Outcome: Ongoing     Problem: Physical Regulation:  Goal: Will remain free from infection  Description: Will remain free from infection  7/31/2020 0410 by Stephan Solis RN  Outcome: Ongoing  7/30/2020 1504 by Ross Mcintosh RN  Outcome: Ongoing  Goal: Postoperative complications will be avoided or minimized  Description: Postoperative complications will be avoided or minimized  Outcome: Ongoing  Goal: Diagnostic test results will improve  Description: Diagnostic test results will improve  Outcome: Ongoing     Problem: Respiratory:  Goal: Ability to maintain adequate ventilation will improve  Description: Ability to maintain adequate ventilation will improve  Outcome: Ongoing     Problem: Self-Care:  Goal: Ability to meet self-care needs will improve  Description: Ability to meet self-care needs will improve  Outcome: Ongoing     Problem: Self-Concept:  Goal: Ability to maintain and perform role responsibilities to the fullest extent possible will improve  Description: Ability to maintain and perform role responsibilities to the fullest extent possible will improve  Outcome: Ongoing  Goal: Verbalizations of decreased anxiety will increase  Description: Verbalizations of decreased anxiety will increase  Outcome: Ongoing     Problem: Sensory:  Goal: Pain level will decrease  Description: Pain level will decrease  Outcome: Ongoing     Problem: Skin Integrity:  Goal: Demonstration of wound healing without infection will improve  Description: Demonstration of wound healing without infection will improve  Outcome: Ongoing  Goal: Risk for impaired skin integrity will decrease  Description: Risk for impaired skin integrity will decrease  Outcome: Ongoing  Goal: Will show no infection signs and symptoms  Description: Will show no infection signs and symptoms  Outcome: Ongoing  Goal: Absence of new skin breakdown  Description: Absence of new skin breakdown  Outcome: Ongoing     Problem: Tissue Perfusion:  Goal: Peripheral tissue perfusion will improve  Description: Peripheral tissue perfusion will improve  Outcome: Ongoing  Goal: Risk of venous thrombosis will decrease  Description: Risk of venous thrombosis will decrease  Outcome: Ongoing     Problem: Urinary Elimination:  Goal: Ability to reestablish a normal urinary elimination pattern will improve - after catheter removal  Description: Ability to reestablish a normal urinary elimination pattern will improve  Outcome: Ongoing

## 2020-07-31 NOTE — PROGRESS NOTES
swallow function, identify signs and symptoms of aspiration and make recommendations regarding safe dietary consistencies, effective compensatory strategies, and safe eating environment. Impression  RN approved entry to room. Pt was pleasant and upright in bed upon entry to room with c/o of pain. RR 28, O2 97-95%. RR dropped to 24 after PO trials, O2 remained consistent across PO trials. Pt accepted all PO trials. Minor throat clear was noted with jello. No s/s of penetration/aspiration noted with other PO trials. Prolonged mastication of regular solid but efficient. Pt tolerated thin liquids via cup and straw. Minor audible gulp was observed with thin liquids via straw, suspect rapid spillover BOT. Recommend regular/thin liquids, pt instructed to choose softer foods. ST follow-up recommended for diet tolerance monitoring. Due to CT of chest findings if aspiration is a concern, consider MBS. Dysphagia Diagnosis: Swallow function appears grossly intact  Dysphagia Outcome Severity Scale: Level 6: Within functional limits/Modified independence     Treatment Plan  Requires SLP Intervention: Yes  Duration/Frequency of Treatment: 1-2x total  D/C Recommendations: To be determined     Recommended Diet and Intervention  Diet Solids Recommendation: Regular(Pt instructed to choose softer foods when needed)  Liquid Consistency Recommendation: Thin  Recommended Form of Meds: Whole with water     Therapeutic Interventions: Therapeutic PO trials with SLP; Diet tolerance monitoring;Patient/Family education     Compensatory Swallowing Strategies  Compensatory Swallowing Strategies: Eat/Feed slowly;Upright as possible for all oral intake;Remain upright for 30-45 minutes after meals;Small bites/sips    Treatment/Goals  Short-term Goals  Timeframe for Short-term Goals: 2 days  Goal 1: Pt will recall education with 80% accuracy, no cues  Long-term Goals  Timeframe for Long-term Goals: 3 days  Goal 1: Pt will tolerate LRD w/o s/s of penetration/aspiration. Dysphagia Goals: The patient will tolerate recommended diet without observed clinical signs of aspiration; The patient will tolerate thin liquids without signs and symptoms of aspiration 10/10 via cup. ;The patient will tolerate regular consistency solids 10/10. General  Chart Reviewed: Yes  Comments: Admitted with lt hip fracture. Subjective  Subjective: Pt was upright in bed upon entry to room. Pt was pleasant and had just finished breakfast. No difficulty was reported with eating during breakfast  Behavior/Cognition: Alert; Cooperative;Pleasant mood  Respiratory Status: Room air  O2 Device: None (Room air)    Pain Level: 5    Vision/Hearing     Oral Motor Deficits  Oral/Motor  Oral Motor: Within functional limits    Oral Phase Dysfunction  Oral Phase  Oral Phase: Exceptions  Oral Phase Dysfunction  Impaired Mastication: Reg Solid(Slowed mastication with willi cracker but efficient)     Indicators of Pharyngeal Phase Dysfunction   Pharyngeal Phase  Pharyngeal Phase: WNL    Prognosis  Prognosis  Prognosis for safe diet advancement: excellent  Individuals consulted  Consulted and agree with results and recommendations: Patient;RN    Education  Patient Education: Pt educated to remain upright during meal times and 30 minutes after. Pt instructed to choose softer foods annd take small bites/sips.   Patient Education Response: Verbalizes understanding;Demonstrated understanding     Therapy Time  SLP Individual Minutes  Time In: 0840  Time Out: 0900  Minutes: 20; dysphagia jayleen Walton, Speech Therapy     MARLEY Gresham  Speech-language pathologist  [phone]      Demi Walton  7/31/2020 9:51 AM

## 2020-07-31 NOTE — OP NOTE
Operative Note      Patient: Althea Levine  YOB: 1934  MRN: [id number]    Date of Procedure: 7/30/2020    Pre-Op Diagnosis: LEFT HIP FRACTURE    Post-Op Diagnosis: Left displaced femoral neck fracture  Displaced subcapital femoral neck fracture left       Procedure(s):  LEFT HIP HEMIARTHROPLASTY -open treatment left femoral neck fracture CPT 41748      Surgeon(s):  Khurram Price MD    Assistant:   Surgical Assistant: Posey Eisenmenger    Antibiotics: Cefazolin    Anesthesia: Spinal    Estimated Blood Loss (mL): 457     Complications: None    Specimens:   * No specimens in log *    Implants:  Implant Name Type Inv. Item Serial No.  Lot No. LRB No. Used Action   DUP USE [id number] IMPL HIP 28MM HENRRY COCR MOD HD   3MM NK Hip DUP USE [id number] IMPL HIP 28MM HENRRY COCR MOD HD   3MM NK  [employer] [id number] Left 1 Implanted   IMPL HIP TAPERLOC MICROP LAT FMRL 11.0M Hip IMPL HIP TAPERLOC MICROP LAT FMRL 11.0M  [employer] [id number] Left 1 Implanted   IMPL HIP FEM SHLL MULTIPOLAR BIPLR 45MM Hip IMPL HIP FEM SHLL MULTIPOLAR BIPLR 45MM  [employer] [id number] Left 1 Implanted   IMPL HIP FEM LINER BIPLR [id number] Hip IMPL HIP FEM LINER BIPLR [id number]  [employer] [id number] Left 1 Implanted         Drains:   Urethral Catheter Non-latex (Active)   Output (mL) 350 mL 07/31/20 0453       Findings: Successful hemiarthroplasty for left displaced subcapital femoral neck fracture    Indications for procedure: This is an 70-year-old female who sustained a left displaced subcapital femoral neck fracture from a ground-level fall. This fracture has high rate of failure with open reduction internal fixations arthroplasty was indicated for treatment. Even the patient's age and activity level she was appropriate for hemiarthroplasty as opposed to total hip arthroplasty. Risk benefits and alternatives to these were discussed with the patient and she elected to proceed.   Understands that nonoperative management of this has a which was identified. Irrigation and suction was used to clean the fracture hematoma. I then measured and made an appropriate femoral neck cut for the prosthesis and for better visualization of the femoral head to remove it. This cut was completed the femoral head was removed in total using a pointed Hohmann. Femoral head was measured and was found to be 45 mm in diameter. 44-45 and 46 mm femoral heads were trialed. The 45 mm head was selected. I then turned my attention back to the femoral side and placing femoral elevator under the neck cut used a  and canal finder to start my preparation of the femur. I then sequentially broached the femur making sure to lateralize appropriately. I sequentially broached up to an 11 size stem which was nice and stable. The positioning of it matched her native version. I then trialed with a -3 offset bipolar head. This had excellent stability appropriate tension and clinically had the correct length. Satisfied with the stability of this I then removed all of the trial components. I then copiously irrigated the wound. I then placed a size 11 Taperloc press-fit stem having judged that the bone quality was appropriate for a press-fit prosthesis. Once this was appropriately seated and I was satisfied that the version matched her native version I placed a 45 mm trial head onto this. It was carefully reduced and the stability and motion was found to once again be excellent allowing for nearly 80 degrees of internal rotation with the knee flexed to 90 degrees. It was also stable to external rotation. Leg lengths were appropriate. I decided to use this prosthesis is my final.  Remove the trial prosthesis head and once again copiously irrigated and dried the Evlyn Mihaela taper thoroughly. I then placed the final head component of it which was a 28 inner diameter 28-3 head and 45 shell.   Once I had used an impactor to appropriately seat the head and tested it to make sure it would not pull off I reduced it once again and found the stability to be excellent like with the trial.  I then again copiously irrigated and turn my attention to closure. The short external rotators as well as the posterior hip capsule was reapproximated to the greater trochanter using a #5 Ethibond. This was passed through drill holes through the posterior greater trochanter and tied tightly with excellent reapproximation of the posterior capsule sure external rotators. I once again irrigated. The knee was then was placed on a Wiseman stand with the hip in abduction. The tensor fascia floresita was closed with #5 Ethibond interrupted figure-of-eight sutures distally and proximally with a running Locking suture. I irrigated 1 last time and then turned my attention to closure skin. Gloria's fascia was closed with buried interrupted 0 Vicryl sutures. And was closed appearing up to 2-0 Vicryl and the skin was closed with staples. A sterile dressing was applied and the patient was transferred carefully transported back to her hospital bed with an abduction pillow between her legs. Patient was transported back to PACU in stable condition. Postoperative plan: The patient will be weightbearing as tolerated but with posterior hip precautions. She should wear an abduction pillow at all times on bed. this needs to be done for at least the first 6 weeks. She will get 24 hours of antibiotics and 5 weeks of DVT prophylaxis. She will be mobilized with PT and OT. She will be discharged when deemed appropriate.   I will see her back in follow-up for 2 weeks as well as follow her in the hospital.      Electronically signed by Sunny Holguin MD on 7/31/2020 at 8:45 AM

## 2020-07-31 NOTE — DISCHARGE INSTR - COC
Continuity of Care Form    Patient Name: Светлана Dobbins   :  1934  MRN:  [id number]    Admit date:  2020  Discharge date:  2020    Code Status Order: Full Code   Advance Directives:   [facility] Documentation     Date/Time Healthcare Directive Type of Healthcare Directive Copy in [facility] [address] Agent's Name Healthcare Agent's Phone Number    20 1000  No, patient does not have an advance directive for healthcare treatment -- -- -- -- --    20  No, patient does not have an advance directive for healthcare treatment -- -- -- -- --          Admitting Physician:  Riley Denson MD  PCP: Craig Flynn MD    Discharging Nurse:  [facility] Unit/Room#: 0366/3651-61  [address] [facility] Unit Phone Number: [phone]    Emergency Contact:   Extended Emergency Contact Information  Primary Emergency Contact: Dionna Choi  Address: [address] [place] [address] Phone: [phone]  Mobile Phone: [phone]  Relation: Grandchild  Secondary Emergency Contact: [facility] Phone: [phone]  Work Phone: [phone]  Mobile Phone: [phone]  Relation: Child    Past Surgical History:  Past Surgical History:   Procedure Laterality Date    COLONOSCOPY      COLONOSCOPY  6/22/15    diverticulosis    HIP FRACTURE SURGERY Left     LEFT HIP HEMIARTHROPLASTY     HIP SURGERY Left 2020    LEFT HIP HEMIARTHROPLASTY performed by Boni Obando MD at [address] [place] [address]         Immunization History:   Immunization History   Administered Date(s) Administered    Pneumococcal Polysaccharide ([id number]) 2012       Active Problems:  Patient Active Problem List   Diagnosis Code    Pneumonia J18.9    Closed displaced fracture of left femoral neck ([address] S72.002A    MGUS (monoclonal gammopathy of unknown significance) D47.2    Benign essential hypertension I10    Aortic insufficiency I35.1    Clonic hemifacial spasm, 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    GEORGES (generalized anxiety disorder) F41.1    Gastroesophageal reflux disease without esophagitis K21.9    Mixed hyperlipidemia E78.2       Isolation/Infection:   Isolation          No Isolation        Patient Infection Status     Infection Onset Added Last Indicated Last Indicated By Review Planned Expiration Resolved Resolved By    None active    Resolved    COVID-19 Rule Out 07/30/20 07/30/20 07/30/20 COVID-19 (Ordered)   07/30/20 Rule-Out Test Resulted          Nurse Assessment:  Last Vital Signs: /63   Pulse 87   Temp 98.7 °F (37.1 °C) (Oral)   Resp 18   Ht 5' 2\" (1.575 m)   Wt 135 lb (61.2 kg)   SpO2 97%   BMI 24.69 kg/m²     Last documented pain score (0-10 scale): Pain Level: 3  Last Weight:   Wt Readings from Last 1 Encounters:   07/29/20 135 lb (61.2 kg)     Mental Status:  oriented, just slow to respond to questions    IV Access:  - None    Nursing Mobility/ADLs:  Walking   Assisted  Transfer  Assisted  Bathing  Assisted  Dressing  Assisted  Toileting  Assisted  Feeding  Assisted  Med Admin  Assisted  Med Delivery   whole    Wound Care Documentation and Therapy:        Elimination:  Continence:   · Bowel: Yes  · Bladder: Yes  Urinary Catheter: None   Colostomy/Ileostomy/Ileal Conduit: No       Date of Last BM: prior to hospitalization, patient with active bowel sounds, soft abdomen, passing gas and was provided with prune juice and glycolax    Intake/Output Summary (Last 24 hours) at 7/31/2020 1512  Last data filed at 7/31/2020 1348  Gross per 24 hour   Intake 720 ml   Output 1500 ml   Net -780 ml     I/O last 3 completed shifts: In: 5 [P.O.:720]  Out: 1500 [Urine:1500]    Safety Concerns:      At Risk for Falls, posterior hip precautions    Impairments/Disabilities:      StatusPost Left Hemiarthroplasty    Nutrition Therapy:  Current Nutrition Therapy:   - Oral Diet: General    Routes of Feeding: Oral  Liquids: Thin Liquids  Daily Fluid Restriction: no  Last Modified Barium Swallow with Video (Video Swallowing Test): not done    Treatments at the Time of Hospital Discharge:   Respiratory Treatments: none  Oxygen Therapy:  is not on home oxygen therapy. Ventilator:    - No ventilator support    Rehab Therapies: Physical Therapy, Occupational Therapy and SN  Weight Bearing Status/Restrictions: No weight bearing restirctions  Other Medical Equipment (for information only, NOT a DME order):  walker  Other Treatments: abductor pillow when in bed    Patient's personal belongings (please select all that are sent with patient):  Glasses    RN SIGNATURE:  Electronically signed by Baldemar Jordan RN on 8/2/20 at 12:10 PM EDT    CASE MANAGEMENT/SOCIAL WORK SECTION    Inpatient Status Date: 7/29/2020    Readmission Risk Assessment Score:  Readmission Risk              Risk of Unplanned Readmission:        13           Discharging to Facility/ Agency   Name: [facility]  Address: [address] [place] [address]  Phone: [phone]  Fax: [phone]        / signature: Electronically signed by Rosenda Ramirez RN on 8/2/20 at 1:06 PM EDT    PHYSICIAN SECTION    Prognosis: Good    Condition at Discharge: Stable    Rehab Potential (if transferring to Rehab): Good    Recommended Labs or Other Treatments After Discharge:  Cbc in 2 weeks       Physician Certification: I certify the above information and transfer of Allegra Mullen  is necessary for the continuing treatment of the diagnosis listed and that she requires [facility] for less 30 days.      Update Admission H&P: No change in H&P    PHYSICIAN SIGNATURE:  Electronically signed by Betty Castanon MD on 8/2/20 at 11:53 AM EDT

## 2020-07-31 NOTE — PLAN OF CARE
Problem: Nutrition  Intervention: Swallowing evaluation  Note: Bedside swallow evaluation completed this date. Shyam Abbasi M.S. [id number] [facility]  Speech-language pathologist  [id number]      Intervention: Aspiration precautions  Note: Bedside swallow evaluation completed this date.     Shyam Abbasi M.S. [id number] [facility]  Speech-language pathologist  [id number]

## 2020-07-31 NOTE — PLAN OF CARE
Problem: Falls - Risk of:  Goal: Will remain free from falls  Description: Will remain free from falls  7/31/2020 1303 by Luann Uribe RN  Outcome: Ongoing     Problem: Falls - Risk of:  Goal: Absence of physical injury  Description: Absence of physical injury  7/31/2020 1303 by Luann Uribe RN  Outcome: Ongoing     Problem: Infection:  Goal: Will remain free from infection  Description: Will remain free from infection  7/31/2020 1303 by Luann Uribe RN  Outcome: Ongoing     Problem: Safety:  Goal: Free from accidental physical injury  Description: Free from accidental physical injury  7/31/2020 1303 by Luann Uribe RN  Outcome: Ongoing     Problem: Safety:  Goal: Free from intentional harm  Description: Free from intentional harm  7/31/2020 1303 by Luann Uribe RN  Outcome: Ongoing     Problem: Safety:  Goal: Ability to remain free from injury will improve  Description: Ability to remain free from injury will improve  7/31/2020 1303 by Luann Uribe RN  Outcome: Ongoing     Problem: Daily Care:  Goal: Daily care needs are met  Description: Daily care needs are met  7/31/2020 1303 by Luann Uribe RN  Outcome: Ongoing     Problem: Pain:  Goal: Patient's pain/discomfort is manageable  Description: Patient's pain/discomfort is manageable  7/31/2020 1303 by Luann Uribe RN  Outcome: Ongoing     Problem: Pain:  Goal: Pain level will decrease  Description: Pain level will decrease  7/31/2020 1303 by Luann Uribe RN  Outcome: Ongoing     Problem: Pain:  Goal: Control of acute pain  Description: Control of acute pain  Outcome: Ongoing     Problem: Skin Integrity:  Goal: Skin integrity will stabilize  Description: Skin integrity will stabilize  7/31/2020 1303 by Luann Uribe RN  Outcome: Ongoing

## 2020-07-31 NOTE — PROGRESS NOTES
Progress Note    Admit Date:  7/29/2020    Fall with left femur fracture   POD left hemiarthroplasty    Subjective:  Ms. Mine Franklin seen up in bed , feels much better today  Pain better     Objective:   No data found. Intake/Output Summary (Last 24 hours) at 7/31/2020 1348  Last data filed at 7/31/2020 1027  Gross per 24 hour   Intake 480 ml   Output 1150 ml   Net -670 ml       Physical Exam:        General: eldelry thin female,  Awake, alert and oriented. Appears to be not in any distress  Mucous Membranes:  Pink , anicteric  Neck: No JVD, no carotid bruit, no thyromegaly  Chest:  Clear to auscultation bilaterally, no added sounds  Cardiovascular:  RRR S1S2 heard, no murmurs or gallops  Abdomen:  Soft, undistended, non tender, no organomegaly, BS present  Extremities: left hip pain noted with dressing dry  . frequent facial spasms noted  No edema or cyanosis.  Distal pulses well felt  Neurological : grossly normal        Scheduled Meds:   guaiFENesin  600 mg Oral BID    ezetimibe  10 mg Oral Daily    lisinopril  20 mg Oral Daily    amLODIPine  2.5 mg Oral Daily    omega-3 acid ethyl esters  2 g Oral Daily    therapeutic multivitamin-minerals  1 tablet Oral Daily    aspirin  81 mg Oral Daily    sodium chloride flush  10 mL Intravenous 2 times per day    enoxaparin  40 mg Subcutaneous Daily       Continuous Infusions:   sodium chloride 50 mL/hr at 07/31/20 0848       PRN Meds:  sodium chloride flush, acetaminophen **OR** acetaminophen, polyethylene glycol, promethazine **OR** ondansetron, morphine, HYDROcodone 5 mg - acetaminophen, morphine      Data:  CBC:   Recent Labs     07/29/20  1622 07/30/20  0530 07/31/20  0530   WBC 9.5 7.3 6.3   HGB 10.7* 10.6* 7.9*   HCT 32.1* 31.5* 23.7*   MCV 98.1 101.1* 99.2    199 154     BMP:   Recent Labs     07/29/20  1622 07/30/20  0530 07/31/20  0530   * 137 134*   K 4.3 4.1 3.9   CL 94* 102 103   CO2 27 25 22   BUN 23* 16 14   CREATININE 0.9 0.9 0.9 LIVER PROFILE:   Recent Labs     07/29/20  1622   AST 25   ALT 10   BILITOT 0.5   ALKPHOS 75     CULTURES    Blood cx x2: pending  covid neg     RADIOLOGY    XR PELVIS (1-2 VIEWS)   Final Result   Status post hemiarthroplasty of left hip as described above. CT THORACIC SPINE TRAUMA RECONSTRUCTION   Final Result   No evidence of traumatic injury to the thoracic spine. Osteopenia with mild   osteoarthritic changes. CT LUMBAR SPINE TRAUMA RECONSTRUCTION   Final Result   No acute fracture or subluxation of the lumbar spine. Scoliosis and moderate multilevel spondylosis. CT CHEST ABDOMEN PELVIS WO CONTRAST   Final Result   Chest-      Mild subsegmental atelectasis is noted. Small zone of airway nodules in the right lower lobe. This may represent   bronchopneumonia or chronic aspiration pneumonitis. Abdomen pelvis-      No acute intra-abdominal injury is identified. Mild prominence of the intrahepatic biliary ducts, of uncertain significance. Correlation with liver function tests is suggested. Acute fracture of the left femoral neck. XR PELVIS (1-2 VIEWS)   Final Result   Impacted fracture involving the subcapital portion of the left femoral neck. CT Cervical Spine WO Contrast   Final Result   1. No acute arterial abnormality or hemodynamically significant arterial   stenosis in the head or neck. 2. No acute fracture or traumatic malalignment of the cervical spine. CTA HEAD NECK W CONTRAST   Final Result   1. No acute arterial abnormality or hemodynamically significant arterial   stenosis in the head or neck. 2. No acute fracture or traumatic malalignment of the cervical spine. CT Head WO Contrast   Final Result   Addendum 1 of 1   ADDENDUM:   Findings were discussed with Kay Krishnamurthy NP at 4:51 pm on 7/29/2020. Final      XR CHEST PORTABLE   Final Result   No acute cardiopulmonary disease.       Calcification projecting

## 2020-08-01 NOTE — PROGRESS NOTES
Progress Note    Admit Date:  7/29/2020    Fall with left femur fracture   POD 2 left hemiarthroplasty    Subjective:  Ms. Mcclendon Certain seen up in bed, feels ok today . Worked with PT. Pain controlled      Objective:   Patient Vitals for the past 4 hrs:   BP Temp Temp src Pulse Resp SpO2   08/01/20 0800 (!) 128/59 100.5 °F (38.1 °C) Oral 69 16 96 %            Intake/Output Summary (Last 24 hours) at 8/1/2020 1037  Last data filed at 8/1/2020 3978  Gross per 24 hour   Intake 420 ml   Output 650 ml   Net -230 ml       Physical Exam:    General: eldelry thin female,  Awake, alert and oriented. Appears to be not in any distress  Mucous Membranes:  Pink , anicteric  Neck: No JVD, no carotid bruit, no thyromegaly  Chest:  Clear to auscultation bilaterally, no added sounds  Cardiovascular:  RRR S1S2 heard, no murmurs or gallops  Abdomen:  Soft, undistended, non tender, no organomegaly, BS present  Extremities: left hip pain noted with dressing dry  . frequent facial spasms noted  No edema or cyanosis.  Distal pulses well felt  Neurological : grossly normal    Scheduled Meds:   guaiFENesin  600 mg Oral BID    ezetimibe  10 mg Oral Daily    lisinopril  20 mg Oral Daily    amLODIPine  2.5 mg Oral Daily    omega-3 acid ethyl esters  2 g Oral Daily    therapeutic multivitamin-minerals  1 tablet Oral Daily    aspirin  81 mg Oral Daily    sodium chloride flush  10 mL Intravenous 2 times per day    enoxaparin  40 mg Subcutaneous Daily       Continuous Infusions:   sodium chloride 50 mL/hr at 07/31/20 0848       PRN Meds:  sodium chloride flush, acetaminophen **OR** acetaminophen, polyethylene glycol, promethazine **OR** ondansetron, morphine, HYDROcodone 5 mg - acetaminophen, morphine      Data:  CBC:   Recent Labs     07/30/20  0530 07/31/20  0530 08/01/20  0607   WBC 7.3 6.3 7.5   HGB 10.6* 7.9* 7.9*   HCT 31.5* 23.7* 23.6*   .1* 99.2 101.8*    154 144     BMP:   Recent Labs     07/30/20  0530 07/31/20  0530 08/01/20  0607    134* 133*   K 4.1 3.9 3.9    103 105   CO2 25 22 22   BUN 16 14 17   CREATININE 0.9 0.9 0.8     LIVER PROFILE:   Recent Labs     07/29/20  1622   AST 25   ALT 10   BILITOT 0.5   ALKPHOS 75     CULTURES    Blood cx x2: pending  covid neg     RADIOLOGY    XR PELVIS (1-2 VIEWS)   Final Result   Status post hemiarthroplasty of left hip as described above. CT THORACIC SPINE TRAUMA RECONSTRUCTION   Final Result   No evidence of traumatic injury to the thoracic spine. Osteopenia with mild   osteoarthritic changes. CT LUMBAR SPINE TRAUMA RECONSTRUCTION   Final Result   No acute fracture or subluxation of the lumbar spine. Scoliosis and moderate multilevel spondylosis. CT CHEST ABDOMEN PELVIS WO CONTRAST   Final Result   Chest-      Mild subsegmental atelectasis is noted. Small zone of airway nodules in the right lower lobe. This may represent   bronchopneumonia or chronic aspiration pneumonitis. Abdomen pelvis-      No acute intra-abdominal injury is identified. Mild prominence of the intrahepatic biliary ducts, of uncertain significance. Correlation with liver function tests is suggested. Acute fracture of the left femoral neck. XR PELVIS (1-2 VIEWS)   Final Result   Impacted fracture involving the subcapital portion of the left femoral neck. CT Cervical Spine WO Contrast   Final Result   1. No acute arterial abnormality or hemodynamically significant arterial   stenosis in the head or neck. 2. No acute fracture or traumatic malalignment of the cervical spine. CTA HEAD NECK W CONTRAST   Final Result   1. No acute arterial abnormality or hemodynamically significant arterial   stenosis in the head or neck. 2. No acute fracture or traumatic malalignment of the cervical spine.          CT Head WO Contrast   Final Result   Addendum 1 of 1   ADDENDUM:   Findings were discussed with Caleb Turner NP at 4:51 pm on 7/29/2020. Final      XR CHEST PORTABLE   Final Result   No acute cardiopulmonary disease. Calcification projecting over the inferior aspect of the right glenohumeral   joint. This may be related to remote (osseous Bankart) injury or calcium   deposition. Acute injury is another possibility. If there is focal   tenderness, shoulder x-ray series is suggested.            Assessment/Plan:-    Fall  Left Femoral Neck Fracture  - CT head/lumbar/thoracic/cervical spine: neg  - CTA head/neck: non-acute  - XR pelvis: impacted fx involving subcapital portion of left femoral neck  - Admitted to Med Surg  - POD 2 left chloé arthoplasty   - pain control with morphine and prn oxycodone  - dvt prophylaxis     Fevers  - Tmax 100.5 x 2, likely 2/2 surgery  - blood cx x2: NGTD, UA neg  - IS as below, PRN tylenol    Abnormal CT chest  - small zone of airway nodules in RLL representing bronchopneumonia or chronic aspiration pneumonitis  - PCT normal , recently treated at [address] for pna  - no abx for now, added IS and mucinex     Lactic Acidosis - Resolved  - 3.1  - trend, repeat 1.9    Recently treated for Head Lice  - completed treatment last week  - environmental precautions    COPD  - no AE  - cont Mucinex    HTN  - controlled  - cont Norvasc, Lisinopril  - monitor     Aortic Insufficiency  Mitral Regurgitation  - f/w Dr. Izabela Lovell    MGUS  - f/w Oncology at [facility]    Normocytic Anemia, with acute drop   - Hgb 10.7  - baseline: ~10.5-11  - acute blood loss anemia with long bone fracture and surgery expected -> 7.9 today - stable    HLD  - cont Zetia, Lovaza    Myofacial spasms   - chronic , more prominent when anxious     DVT Prophylaxis: Lovenox  Diet: DIET GENERAL;  Code Status: Full Code    Plan for EGS    Dispo: likely d/c home tomorrow    Abril Douglas MD 8/1/2020 12:45 PM

## 2020-08-01 NOTE — PLAN OF CARE
Problem: Falls - Risk of:  Goal: Will remain free from falls  Description: Will remain free from falls  8/1/2020 0454 by Rafael Hammans, RN  Outcome: Ongoing  8/1/2020 0454 by Rafael Hammans, RN  Outcome: Ongoing  Goal: Absence of physical injury  Description: Absence of physical injury  8/1/2020 0454 by Rafael Hammans, RN  Outcome: Ongoing  8/1/2020 0454 by Rafael Hammans, RN  Outcome: Ongoing     Problem: Infection:  Goal: Will remain free from infection  Description: Will remain free from infection  8/1/2020 0454 by Rafael Hammans, RN  Outcome: Ongoing  8/1/2020 0454 by Rafael Hammans, RN  Outcome: Ongoing     Problem: Safety:  Goal: Free from accidental physical injury  Description: Free from accidental physical injury  8/1/2020 0454 by Rafael Hammans, RN  Outcome: Ongoing  8/1/2020 0454 by Rafael Hammans, RN  Outcome: Ongoing  Goal: Free from intentional harm  Description: Free from intentional harm  8/1/2020 0454 by Rafael Hammans, RN  Outcome: Ongoing  8/1/2020 0454 by Rafael Hammans, RN  Outcome: Ongoing  Goal: Ability to remain free from injury will improve  Description: Ability to remain free from injury will improve  8/1/2020 0454 by Rafael Hammans, RN  Outcome: Ongoing  8/1/2020 0454 by Rafael Hammans, RN  Outcome: Ongoing     Problem: Daily Care:  Goal: Daily care needs are met  Description: Daily care needs are met  8/1/2020 0454 by Rafael Hammans, RN  Outcome: Ongoing  8/1/2020 0454 by Rafael Hammans, RN  Outcome: Ongoing     Problem: Pain:  Description: Pain management should include both nonpharmacologic and pharmacologic interventions.   Goal: Patient's pain/discomfort is manageable  Description: Patient's pain/discomfort is manageable  8/1/2020 0454 by Rafael Hammans, RN  Outcome: Ongoing  8/1/2020 0454 by Rafael Hammans, RN  Outcome: Ongoing  Goal: Pain level will decrease  Description: Pain level will decrease  8/1/2020 0454 by Rafael Hammans, RN  Outcome: Ongoing  8/1/2020 0454 by Rafael Hammans, RN  Outcome: Ongoing  Goal: from infection  Description: Will remain free from infection  8/1/2020 0454 by Mey Zheng RN  Outcome: Ongoing  8/1/2020 0454 by Mey Zheng RN  Outcome: Ongoing  Goal: Postoperative complications will be avoided or minimized  Description: Postoperative complications will be avoided or minimized  8/1/2020 0454 by Mey Zheng RN  Outcome: Ongoing  8/1/2020 0454 by Mey Zheng RN  Outcome: Ongoing  Goal: Diagnostic test results will improve  Description: Diagnostic test results will improve  8/1/2020 0454 by Mey Zheng RN  Outcome: Ongoing  8/1/2020 0454 by Mey Zheng RN  Outcome: Ongoing     Problem: Respiratory:  Goal: Ability to maintain adequate ventilation will improve  Description: Ability to maintain adequate ventilation will improve  8/1/2020 0454 by Mey Zheng RN  Outcome: Ongoing  8/1/2020 0454 by Mey Zheng RN  Outcome: Ongoing     Problem: Self-Care:  Goal: Ability to meet self-care needs will improve  Description: Ability to meet self-care needs will improve  8/1/2020 0454 by Mey Zheng RN  Outcome: Ongoing  8/1/2020 0454 by Mey Zheng RN  Outcome: Ongoing     Problem: Self-Concept:  Goal: Ability to maintain and perform role responsibilities to the fullest extent possible will improve  Description: Ability to maintain and perform role responsibilities to the fullest extent possible will improve  8/1/2020 0454 by Mey Zheng RN  Outcome: Ongoing  8/1/2020 0454 by Mey Zheng RN  Outcome: Ongoing  Goal: Verbalizations of decreased anxiety will increase  Description: Verbalizations of decreased anxiety will increase  8/1/2020 0454 by Mey Zheng RN  Outcome: Ongoing  8/1/2020 0454 by Mey Zheng RN  Outcome: Ongoing     Problem: Sensory:  Goal: Pain level will decrease  Description: Pain level will decrease  8/1/2020 0454 by Mey Zheng RN  Outcome: Ongoing  8/1/2020 0454 by Mey Zheng RN  Outcome: Ongoing     Problem: Skin Integrity:  Goal: Demonstration of wound healing without infection will improve  Description: Demonstration of wound healing without infection will improve  8/1/2020 0454 by Zbigniew Yeh RN  Outcome: Ongoing  8/1/2020 0454 by Zbigniew Yeh RN  Outcome: Ongoing  Goal: Risk for impaired skin integrity will decrease  Description: Risk for impaired skin integrity will decrease  8/1/2020 0454 by Zbigniew Yeh RN  Outcome: Ongoing  8/1/2020 0454 by Zbigniew Yeh RN  Outcome: Ongoing  Goal: Will show no infection signs and symptoms  Description: Will show no infection signs and symptoms  8/1/2020 0454 by Zbigniew Yeh RN  Outcome: Ongoing  8/1/2020 0454 by Zbigniew Yeh RN  Outcome: Ongoing  Goal: Absence of new skin breakdown  Description: Absence of new skin breakdown  8/1/2020 0454 by Zbigniew Yeh RN  Outcome: Ongoing  8/1/2020 0454 by Zbigniew Yeh RN  Outcome: Ongoing     Problem: Tissue Perfusion:  Goal: Peripheral tissue perfusion will improve  Description: Peripheral tissue perfusion will improve  8/1/2020 0454 by Zbigniew Yeh RN  Outcome: Ongoing  8/1/2020 0454 by Zbigniew Yeh RN  Outcome: Ongoing  Goal: Risk of venous thrombosis will decrease  Description: Risk of venous thrombosis will decrease  8/1/2020 0454 by Zbigniew Yeh RN  Outcome: Ongoing  8/1/2020 0454 by Zbigniew Yeh RN  Outcome: Ongoing     Problem: Urinary Elimination:  Goal: Ability to reestablish a normal urinary elimination pattern will improve - after catheter removal  Description: Ability to reestablish a normal urinary elimination pattern will improve  8/1/2020 0454 by Zbigniew Yeh RN  Outcome: Ongoing  8/1/2020 0454 by Zbigniew Yeh RN  Outcome: Ongoing

## 2020-08-01 NOTE — PROGRESS NOTES
Orthopedic Surgery Progress Note    Santi Gentle  8/1/2020    Subjective:     Post-Operative Day # 2 s/p left  hip hemiarthroplasty    Systemic or Specific Complaints: No Complaints and resting in bed. Was up to chair yesterday    Objective:     BP (!) 128/59   Pulse 69   Temp 100.5 °F (38.1 °C) (Oral)   Resp 16   Ht 5' 2\" (1.575 m)   Wt 135 lb (61.2 kg)   SpO2 96%   BMI 24.69 kg/m²     Intake/Output Summary (Last 24 hours) at 8/1/2020 1135  Last data filed at 8/1/2020 8480  Gross per 24 hour   Intake 420 ml   Output 650 ml   Net -230 ml         General: alert, appears stated age and cooperative   Wound: Wound clean and dry no evidence of infection. Extremity: Distal NVI   DVT Exam: No evidence of DVT seen on physical exam.  Negative Carlyle's sign.   No significant calf/ankle edema.     + dp palp, +SILT L4-S1, thigh and calf compartments soft and compressible, motor function intact ehl, df, pf    Data Review  CBC:   Lab Results   Component Value Date    WBC 7.5 08/01/2020    RBC 2.32 08/01/2020    HGB 7.9 08/01/2020    HCT 23.6 08/01/2020     08/01/2020       Assessment:     Post-Operative Day # 2 s/p left hip hemiarthroplasty, doing well     Plan:      1: Continues current post-op course   2:  Continue pain control  3:  Physical therapy as per protocol - WBAT, psoterior hip precautions  4:  Medical management per hospitalist  5: Social work for discharge planning   6: DVT prophylaxis  7: ortho stable for D/C and follow up with Dr. Ladi Solares when pain well controlled and progressing with physical therapy    Guero Griffith

## 2020-08-01 NOTE — PLAN OF CARE
Problem: Falls - Risk of:  Goal: Will remain free from falls  Description: Will remain free from falls  8/1/2020 1243 by Dakota Pena RN  Outcome: Ongoing     Problem: Falls - Risk of:  Goal: Absence of physical injury  Description: Absence of physical injury  8/1/2020 1243 by Dakota Pena RN  Outcome: Ongoing     Problem: Infection:  Goal: Will remain free from infection  Description: Will remain free from infection  8/1/2020 1243 by Dakota Pena RN  Outcome: Ongoing     Problem: Safety:  Goal: Free from accidental physical injury  Description: Free from accidental physical injury  8/1/2020 1243 by Dakota Pena RN  Outcome: Ongoing     Problem: Safety:  Goal: Free from intentional harm  Description: Free from intentional harm  8/1/2020 1243 by Dakota Pena RN  Outcome: Ongoing     Problem: Safety:  Goal: Ability to remain free from injury will improve  Description: Ability to remain free from injury will improve  8/1/2020 1243 by Dakota Pena RN  Outcome: Ongoing     Problem: Daily Care:  Goal: Daily care needs are met  Description: Daily care needs are met  8/1/2020 1243 by Dakota Pena RN  Outcome: Ongoing     Problem: Pain:  Goal: Patient's pain/discomfort is manageable  Description: Patient's pain/discomfort is manageable  8/1/2020 1243 by Dakota Pena RN  Outcome: Ongoing     Problem: Pain:  Goal: Pain level will decrease  Description: Pain level will decrease  8/1/2020 1243 by Dakota Pena RN  Outcome: Ongoing     Problem: Pain:  Goal: Control of acute pain  Description: Control of acute pain  8/1/2020 1243 by Dakota Pena RN  Outcome: Ongoing

## 2020-08-01 NOTE — PROGRESS NOTES
Occupational Therapy Daily Treatment Note    Unit: 2 [place]  Date:  8/1/2020  Patient Name:    Hina Luz  Admitting diagnosis:  Left displaced femoral neck fracture ([facility] [address] [S72.002A]  Left displaced femoral neck fracture ([facility] [address] Karen Burkitt  Admit Date:  7/29/2020  Precautions/Restrictions:  Fall risk, Bed/chair alarm, Lines -IV and Purewick catheter and WB Restrictions (WBAT LLE)  S/p L hip hemiarthroplasty for femoral neck fx (730/20). Posterior hip precautions (no flexion >90 deg, no IR, no hip adduction past midline). WBAT.   Abduction pillow at all times when in bed.       Discharge Recommendations: SNF  DME needs for discharge: defer to facility       Therapy recommendations for staff:   Assist of 1 with use of rolling walker (RW) for all transfers to/from [facility]  to/from chair    AM-PAC Score: AM-PAC Inpatient Daily Activity Raw Score: 15  Home Health S4 Level: NA       Treatment Time:  8525-3494  Treatment number:  2    Timed code treatment minutes:  55 minutes  Total treatment minutes:   55 minutes    History of Present Illness: 80 y. o. female presents from home where she apparently fell earlier today. Patient relates a history of tripping on uneven ground when walking through grass, an account different from documented per ER. She is currently disoriented, awake, alert, responsive verbally and able to follow commands. Concern initially was for poss CVA, with neg work up in [place] [id number]. Patient does complain of sob, cough, denies chest pain, fever, chills. Denies difficulty swallowing, n/v/abd pain.  States she's been isolating at home since recent admission to [facility] for pna, sepsis.   Pt has Pneumonia; Closed displaced fracture of left femoral neck ([facility] [address] MGUS (monoclonal gammopathy of unknown significance); Benign essential hypertension; Aortic insufficiency; Clonic hemifacial spasm, bilateral; GEORGES (generalized anxiety disorder);  Gastroesophageal reflux disease without esophagitis     Subjective: Patient resting in bed and agreeable to therapy. Able to verbalize hip precautions with min cues. Alert and oriented x 4 but confused at times. Reports memory difficulties limit daily activities like cooking. Pain   Yes  Rating: mild  Location:L hip   Pain Medicine Status: No request made      Noted fluid filled area/blister? On L inner thigh, RN notified and in to assess. Bed Mobility:   Supine to Sit:  Mod A   Sit to Supine:  Not Tested  Rolling:           Not Tested  Scooting: Mod A     Transfer Training:   Sit to stand: Mod A  and cues for hand placement  Stand to sit:  Min A  and cues for hand placement  Bed to Chair:  Mod A  and with use of RW, additional time allotted and max cues (verbal and tactile) to use walker safely and sequence through transfer  Bed to [facility]:   Not Tested  Standard toilet:   Not Tested    Activity Tolerance   Pt completed therapy session with Pain noted with LLE movement   Easily fatigued. ADL Training:   Upper body dressing: Not Tested  Upper body bathing:  Not Tested  Lower body dressing:  Not Tested  Lower body bathing:  Not Tested  Toileting:   Not Tested-purewick in place  Grooming/Hygiene:  Not Tested    Therapeutic Exercise: all completed bilaterally unless indicated, reclined in chair  Shoulder flex/ext:  x10   Ankle pumps x20  Bottom squeezes x10    Patient Education:   Role of OT  Recommendations for DC  Energy conservation techniques  Safe RW use/hand placement   Hip precautions, extensive reeducation    Positioning Needs:   Pt reclined in chair, alarm set, positioned in proper neutral alignment and pressure relief provided. Call light provided and all needs within reach    Family Present:  No    Assessment: Making gradual progress toward goals, continue as tolerated. GOALS  To be met in 3 Visits:  1). Bed to toilet/BSC: Min A with RW     To be met in 5 Visits:  1). Supine to/from Sit:             Min A  2).  Upper Body Bathing:

## 2020-08-01 NOTE — PROGRESS NOTES
Shift assessment complete; see flow sheet. Scheduled medications administered; See MAR. IV infusing without difficulty. O2 2 LPM nc in place. Pt denies pain at this time. Pt denies any needs at this time.  Pt educated on use of call light and to call out with needs, verbalized understanding, bed in low locked position for pt safety

## 2020-08-01 NOTE — PROGRESS NOTES
Patient transferred back to bed from chair at this time. Patient tolerated well, no complications. Purewick and brief changed.

## 2020-08-02 NOTE — PROGRESS NOTES
4 Eyes Skin Assessment     The patient is being assess for   Discharge to a Facility    I agree that 2 RN's have performed a thorough Head to Toe Skin Assessment on the patient. ALL assessment sites listed below have been assessed. Areas assessed by both nurses:   [x]   Head, Face, and Ears   [x]   Shoulders, Back, and Chest, Abdomen  [x]   Arms, Elbows, and Hands   [x]   Coccyx, Sacrum, and Ischium  [x]   Legs, Feet, and Heels        Patient has blanchable redness to coccyx, mepilex to bilateral pink blanchable heels. Patient has surgical incision to left hip from hemiarthroplasty. Patient has a fluid-filled blister to the inner left thigh that is open to air. Scattered scabs and bruises to trunk and upper extremities. **SHARE this note so that the co-signing nurse is able to place an eSignature**    Co-signer eSignature: Electronically signed by Maria Eugenia Leija RN on 8/2/20 at 1:42 PM EDT    Does the Patient have Skin Breakdown?   No          Xavier Prevention initiated:  No   Wound Care Orders initiated:  No      [facility] nurse consulted for Pressure Injury (Stage 3,4, Unstageable, DTI, NWPT, Complex wounds)and New or Established Ostomies:  No      Primary Nurse eSignature: Electronically signed by Yobani Mcgill RN on 8/2/20 at 11:56 AM EDT

## 2020-08-02 NOTE — CARE COORDINATION
DISCHARGE ORDER  Date/Time 2020 1:00 PM  Completed by: Ricky Melvin, Case Management    Patient Name: Blanca Collazo    : 1934  Admitting Diagnosis: Left displaced femoral neck fracture ([facility] [address] [S72.002A]  Left displaced femoral neck fracture ([facility] [address] Selam Human      Admit order Date and Status:2020 inpt  (verify MD's last order for status of admission)      Noted discharge order. Confirmed discharge plan  (pt): Yes  with whom_______________  If pt confirmed DC plan does family need to be contacted by CM Yes if yes who__Attempted to call grandson, Meghana Romero, but no answer ([phone])____  Discharge Plan: Reviewed chart. Role of discharge planner explained and patient verbalized understanding. CM attempted to contact pt's grandson, Richie Maldonado ([phone]), but there was not answer. CM informed pt of this,a s well, and she stated that she also tried to call him, without success. Pt does not want CM to call her son, Alex Stout, because he is in the hospital sick. Pt states that there is no one else she wants me to call. Discharge order is noted. Pt is being d/c'd to [facility] today via [place] [id number] ambulance at 1400. Pt states that she does not have a preference in ambulance transport. Pt's O2 sats are 97% on RA. CM faxed MICHA/AVS/HENS to S to fax #: [phone]. CM infomred Sakina with EGS and pt's RN, Mady, when pt was leaving. No further discharge needs needed or noted. Covid neg as of 2020    Reviewed chart. Role of discharge planner explained and patient verbalized understanding. Discharge order is noted. Has Home O2 in place on admit:  No  Informed of need to bring portable home O2 tank on day of discharge for nursing to connect prior to leaving:   Not Indicated  Verbalized agreement/Understanding:   Not Indicated    Discharge timeout done with Lionel Grajeda RN. All discharge needs and concerns addressed.

## 2020-08-02 NOTE — PROGRESS NOTES
08/02/20 0745   Vital Signs   Temp 98.6 °F (37 °C)   Temp Source Oral   Pulse 72   Heart Rate Source Monitor   Resp 16   BP (!) 143/56   BP Location Right upper arm   BP Upper/Lower Upper   Patient Position Semi fowlers   Level of Consciousness 0   MEWS Score 1   Patient Currently in Pain Denies   Pain Assessment   Pain Assessment 0-10   Pain Level 0   Oxygen Therapy   SpO2 97 %   O2 Device None (Room air)     Assessment completed. No signs or symptoms of distress noted. Patient has good pulse, color, and temperature to the left lower extremity. Patient denies needs or pain at this time. Call light within reach, bed alarm on.

## 2020-08-02 NOTE — PLAN OF CARE
Problem: Falls - Risk of:  Goal: Will remain free from falls  Description: Will remain free from falls  Outcome: Ongoing  Goal: Absence of physical injury  Description: Absence of physical injury  Outcome: Ongoing     Problem: Infection:  Goal: Will remain free from infection  Description: Will remain free from infection  Outcome: Ongoing     Problem: Safety:  Goal: Free from accidental physical injury  Description: Free from accidental physical injury  Outcome: Ongoing  Goal: Free from intentional harm  Description: Free from intentional harm  Outcome: Ongoing  Goal: Ability to remain free from injury will improve  Description: Ability to remain free from injury will improve  Outcome: Ongoing     Problem: Daily Care:  Goal: Daily care needs are met  Description: Daily care needs are met  Outcome: Ongoing     Problem: Pain:  Description: Pain management should include both nonpharmacologic and pharmacologic interventions. Goal: Patient's pain/discomfort is manageable  Description: Patient's pain/discomfort is manageable  Outcome: Ongoing  Goal: Pain level will decrease  Description: Pain level will decrease  Outcome: Ongoing  Goal: Control of acute pain  Description: Control of acute pain  Outcome: Ongoing  Goal: Control of chronic pain  Description: Control of chronic pain  Outcome: Ongoing     Problem: Skin Integrity:  Goal: Skin integrity will stabilize  Description: Skin integrity will stabilize  Outcome: Ongoing     Problem: Discharge Planning:  Goal: Patients continuum of care needs are met  Description: Patients continuum of care needs are met  Outcome: Ongoing     Problem:  Activity:  Goal: Ability to ambulate will improve  Description: Ability to ambulate will improve  Outcome: Ongoing  Goal: Ability to perform activities at highest level will improve  Description: Ability to perform activities at highest level will improve  Outcome: Ongoing     Problem: Health Behavior:  Goal: Identification of resources available to assist in meeting health care needs will improve  Description: Identification of resources available to assist in meeting health care needs will improve  Outcome: Ongoing     Problem: Nutritional:  Goal: Ability to attain and maintain optimal nutritional status will improve  Description: Ability to attain and maintain optimal nutritional status will improve  Outcome: Ongoing     Problem: Physical Regulation:  Goal: Will remain free from infection  Description: Will remain free from infection  Outcome: Ongoing  Goal: Postoperative complications will be avoided or minimized  Description: Postoperative complications will be avoided or minimized  Outcome: Ongoing  Goal: Diagnostic test results will improve  Description: Diagnostic test results will improve  Outcome: Ongoing     Problem: Respiratory:  Goal: Ability to maintain adequate ventilation will improve  Description: Ability to maintain adequate ventilation will improve  Outcome: Ongoing     Problem: Self-Care:  Goal: Ability to meet self-care needs will improve  Description: Ability to meet self-care needs will improve  Outcome: Ongoing     Problem: Self-Concept:  Goal: Ability to maintain and perform role responsibilities to the fullest extent possible will improve  Description: Ability to maintain and perform role responsibilities to the fullest extent possible will improve  Outcome: Ongoing  Goal: Verbalizations of decreased anxiety will increase  Description: Verbalizations of decreased anxiety will increase  Outcome: Ongoing     Problem: Sensory:  Goal: Pain level will decrease  Description: Pain level will decrease  Outcome: Ongoing     Problem: Skin Integrity:  Goal: Demonstration of wound healing without infection will improve  Description: Demonstration of wound healing without infection will improve  Outcome: Ongoing  Goal: Risk for impaired skin integrity will decrease  Description: Risk for impaired skin integrity will decrease  Outcome: Ongoing  Goal: Will show no infection signs and symptoms  Description: Will show no infection signs and symptoms  Outcome: Ongoing  Goal: Absence of new skin breakdown  Description: Absence of new skin breakdown  Outcome: Ongoing     Problem: Tissue Perfusion:  Goal: Peripheral tissue perfusion will improve  Description: Peripheral tissue perfusion will improve  Outcome: Ongoing  Goal: Risk of venous thrombosis will decrease  Description: Risk of venous thrombosis will decrease  Outcome: Ongoing     Problem: Urinary Elimination:  Goal: Ability to reestablish a normal urinary elimination pattern will improve - after catheter removal  Description: Ability to reestablish a normal urinary elimination pattern will improve  Outcome: Ongoing

## 2020-08-02 NOTE — PROGRESS NOTES
Patient transported to [facility] in stable condition. Patient went with all of her belongings with ambulance transport. Patient denied needs at time of discharge. IV removed prior to discharge.

## 2020-08-02 NOTE — PROGRESS NOTES
Handoff report and transfer of care given at bedside to [facility]. Patient in stable condition, denies needs/concerns at this time. Call light within reach.

## 2020-08-02 NOTE — PROGRESS NOTES
Orthopedic Surgery Progress Note    Marissa Hess  8/2/2020    Subjective:     Post-Operative Day # 3 s/p left  hip hemiarthroplasty    Systemic or Specific Complaints: No Complaints and resting in bed. Objective:     BP (!) 143/56   Pulse 72   Temp 98.6 °F (37 °C) (Oral)   Resp 16   Ht 5' 2\" (1.575 m)   Wt 135 lb (61.2 kg)   SpO2 97%   BMI 24.69 kg/m²     Intake/Output Summary (Last 24 hours) at 8/2/2020 1104  Last data filed at 8/2/2020 0910  Gross per 24 hour   Intake 360 ml   Output 600 ml   Net -240 ml         General: alert, appears stated age and cooperative   Wound: Wound clean and dry no evidence of infection. Extremity: Distal NVI   DVT Exam: No evidence of DVT seen on physical exam.  Negative Carlyle's sign.   No significant calf/ankle edema.     + dp palp, +SILT L4-S1, thigh and calf compartments soft and compressible, motor function intact ehl, df, pf    Data Review  CBC:   Lab Results   Component Value Date    WBC 6.8 08/02/2020    RBC 2.12 08/02/2020    HGB 7.2 08/02/2020    HCT 21.4 08/02/2020     08/02/2020       Assessment:     Post-Operative Day # 3 s/p left hip hemiarthroplasty, doing well     Plan:      1: Continues current post-op course   2:  Continue pain control  3:  Physical therapy as per protocol - WBAT, psoterior hip precautions  4:  Medical management per hospitalist  5: Social work for discharge planning   6: DVT prophylaxis  7: ortho stable for D/C and follow up with Dr. Shilpa Avalos when pain well controlled and progressing with physical therapy    Bonifacio Cifuentes

## 2020-08-02 NOTE — PROGRESS NOTES
Shift assessment complete; see flow sheet. Scheduled medications administered; See MAR. IV flushed without difficulty. Pt c/o headache pain 3/10 PRN tylenol given at this time. Pt denies any needs at this time.  Pt educated on use of call light and to call out with needs, verbalized understanding, bed in low locked position for pt safety

## 2020-08-02 NOTE — PROGRESS NOTES
Patient provided with prune juice to help with bowel movement. Patient passed gas when writer was at bedside with patient.

## 2020-08-02 NOTE — PLAN OF CARE
Problem: Falls - Risk of:  Goal: Will remain free from falls  Description: Will remain free from falls  8/2/2020 1045 by Yobani Mcgill RN  Outcome: Ongoing  8/2/2020 0400 by Lei Coon RN  Outcome: Ongoing  Goal: Absence of physical injury  Description: Absence of physical injury  8/2/2020 1045 by Yobani Mcgill RN  Outcome: Ongoing  8/2/2020 0400 by Lei Coon RN  Outcome: Ongoing     Problem: Infection:  Goal: Will remain free from infection  Description: Will remain free from infection  8/2/2020 1045 by Yobani Mcgill RN  Outcome: Ongoing  8/2/2020 0400 by Lei Coon RN  Outcome: Ongoing     Problem: Safety:  Goal: Free from accidental physical injury  Description: Free from accidental physical injury  8/2/2020 1045 by Yobani Mcgill RN  Outcome: Ongoing  8/2/2020 0400 by Lei Coon RN  Outcome: Ongoing  Goal: Free from intentional harm  Description: Free from intentional harm  8/2/2020 1045 by Yobani Mcgill RN  Outcome: Ongoing  8/2/2020 0400 by Lei Coon RN  Outcome: Ongoing  Goal: Ability to remain free from injury will improve  Description: Ability to remain free from injury will improve  8/2/2020 1045 by Yobani Mcgill RN  Outcome: Ongoing  8/2/2020 0400 by Lei Coon RN  Outcome: Ongoing     Problem: Daily Care:  Goal: Daily care needs are met  Description: Daily care needs are met  8/2/2020 1045 by Yobani Mcgill RN  Outcome: Ongoing  8/2/2020 0400 by Lei Coon RN  Outcome: Ongoing     Problem: Pain:  Goal: Patient's pain/discomfort is manageable  Description: Patient's pain/discomfort is manageable  8/2/2020 1045 by Yobani Mcgill RN  Outcome: Ongoing  8/2/2020 0400 by Lei Coon RN  Outcome: Ongoing  Goal: Pain level will decrease  Description: Pain level will decrease  8/2/2020 1045 by Yobani Mcgill RN  Outcome: Ongoing  8/2/2020 0400 by Lei Coon RN  Outcome: Ongoing  Goal: Control of acute pain  Description: Regulation:  Goal: Will remain free from infection  Description: Will remain free from infection  8/2/2020 1045 by Iris Sexton RN  Outcome: Ongoing  8/2/2020 0400 by Emilie Patel RN  Outcome: Ongoing  Goal: Postoperative complications will be avoided or minimized  Description: Postoperative complications will be avoided or minimized  8/2/2020 1045 by Iris Sexton RN  Outcome: Ongoing  8/2/2020 0400 by Emilie Patel RN  Outcome: Ongoing  Goal: Diagnostic test results will improve  Description: Diagnostic test results will improve  8/2/2020 1045 by Iris Sexton RN  Outcome: Ongoing  8/2/2020 0400 by Emilie Patel RN  Outcome: Ongoing     Problem: Respiratory:  Goal: Ability to maintain adequate ventilation will improve  Description: Ability to maintain adequate ventilation will improve  8/2/2020 1045 by Iris Sexton RN  Outcome: Ongoing  8/2/2020 0400 by Emilie Patel RN  Outcome: Ongoing     Problem: Self-Care:  Goal: Ability to meet self-care needs will improve  Description: Ability to meet self-care needs will improve  8/2/2020 1045 by Iris Sexton RN  Outcome: Ongoing  8/2/2020 0400 by Emilie Patel RN  Outcome: Ongoing     Problem: Self-Concept:  Goal: Ability to maintain and perform role responsibilities to the fullest extent possible will improve  Description: Ability to maintain and perform role responsibilities to the fullest extent possible will improve  8/2/2020 1045 by Iris Sexton RN  Outcome: Ongoing  8/2/2020 0400 by Emilie Patel RN  Outcome: Ongoing  Goal: Verbalizations of decreased anxiety will increase  Description: Verbalizations of decreased anxiety will increase  8/2/2020 1045 by Iris Sexton RN  Outcome: Ongoing  8/2/2020 0400 by Emilie Patel RN  Outcome: Ongoing     Problem: Sensory:  Goal: Pain level will decrease  Description: Pain level will decrease  8/2/2020 1045 by Iris Sexton RN  Outcome: Ongoing  8/2/2020 0400 by Emanuel Casarez Renetta Miranda RN  Outcome: Ongoing     Problem: Skin Integrity:  Goal: Demonstration of wound healing without infection will improve  Description: Demonstration of wound healing without infection will improve  8/2/2020 1045 by Rolm Fothergill, RN  Outcome: Ongoing  8/2/2020 0400 by Germain Combs RN  Outcome: Ongoing  Goal: Risk for impaired skin integrity will decrease  Description: Risk for impaired skin integrity will decrease  8/2/2020 1045 by Rolm Fothergill, RN  Outcome: Ongoing  8/2/2020 0400 by Germain Combs RN  Outcome: Ongoing  Goal: Will show no infection signs and symptoms  Description: Will show no infection signs and symptoms  8/2/2020 1045 by Rolm Fothergill, RN  Outcome: Ongoing  8/2/2020 0400 by Germain Combs RN  Outcome: Ongoing  Goal: Absence of new skin breakdown  Description: Absence of new skin breakdown  8/2/2020 1045 by Rolm Fothergill, RN  Outcome: Ongoing  8/2/2020 0400 by Germain Combs RN  Outcome: Ongoing     Problem: Tissue Perfusion:  Goal: Peripheral tissue perfusion will improve  Description: Peripheral tissue perfusion will improve  8/2/2020 1045 by Rolm Fothergill, RN  Outcome: Ongoing  8/2/2020 0400 by Germain Combs RN  Outcome: Ongoing  Goal: Risk of venous thrombosis will decrease  Description: Risk of venous thrombosis will decrease  8/2/2020 1045 by Rolm Fothergill, RN  Outcome: Ongoing  8/2/2020 0400 by Germain Combs RN  Outcome: Ongoing     Problem: Urinary Elimination:  Goal: Ability to reestablish a normal urinary elimination pattern will improve - after catheter removal  Description: Ability to reestablish a normal urinary elimination pattern will improve  8/2/2020 1045 by Rolm Fothergill, RN  Outcome: Ongoing  8/2/2020 0400 by Germain Combs RN  Outcome: Ongoing

## 2020-08-02 NOTE — PROGRESS NOTES
Glycolax given at this time due to the patient not having a bowel movement since being here. Patient has active bowel sounds and states she is passing gas, and \"I just feel like I need a little help to get it going\".

## 2020-08-02 NOTE — DISCHARGE SUMMARY
Name:  Anthony Bush  Room:  4437/6652-77  MRN:    [id number]    IM Discharge Summary    Discharging Physician:  Chanel Nunn MD    Admit: 7/29/2020    Discharge:      PCP      Tom Branham MD    Diagnoses and hospital course  this Admission      Fall  Left Femoral Neck Fracture  - CT head/lumbar/thoracic/cervical spine: neg  - CTA head/neck: non-acute  - XR pelvis: impacted fx involving subcapital portion of left femoral neck  - Admitted to Med Surg  - POD 3  left chloé arthoplasty   - pain control with morphine and prn oxycodone  - dvt prophylaxis - doing well   - dc home      Fevers  - Tmax 100.5 x 2, likely 2/2 surgery  - blood cx x2: NGTD, UA neg  - IS as below, PRN tylenol- resolved with no abx     Abnormal CT chest  - small zone of airway nodules in RLL representing bronchopneumonia or chronic aspiration pneumonitis  - PCT normal , recently treated at [address] for pna  - no abx for now, added IS and mucinex      Lactic Acidosis - Resolved  - 3.1  - trend, repeat 1.9     Recently treated for Head Lice  - completed treatment last week  - environmental precautions     COPD  - no AE  - cont Mucinex     HTN  - controlled  - cont Norvasc, Lisinopril  - monitor      Aortic Insufficiency  Mitral Regurgitation  - f/w Dr. Mira Brown     MGUS  - f/w Oncology at [facility]     Normocytic Anemia, with acute drop   - Hgb 10.7  - baseline: ~10.5-11  - acute blood loss anemia with long bone fracture and surgery expected -> 7.92 today - stable   - repeat cbc in 2 weeks to follow for improvement    HLD  - cont Zetia, Lovaza     Myofacial spasms   - chronic , more prominent when anxious      DVT Prophylaxis: Lovenox  Diet: DIET GENERAL;  Code Status: Full Code     Plan for EGS      HPI   80 y.o. female presents from home where she apparently fell earlier today. Patient relates a history of tripping on uneven ground when walking through grass, an account different from documented per ER.  She is currently disoriented, awake, alert, responsive verbally and able to follow commands. Concern initially was for poss CVA, with neg work up in [place] [id number]. Patient does complain of sob, cough, denies chest pain, fever, chills. Denies difficulty swallowing, n/v/abd pain. States she's been isolating at home since recent admission to [facility] for pna, sepsis.        Physical Exam at Discharge:      General: eldelry thin female,  Awake, alert and oriented. Appears to be not in any distress  Mucous Membranes:  Pink , anicteric  Neck: No JVD, no carotid bruit, no thyromegaly  Chest:  Clear to auscultation bilaterally, no added sounds  Cardiovascular:  RRR S1S2 heard, no murmurs or gallops  Abdomen:  Soft, undistended, non tender, no organomegaly, BS present  Extremities: left hip pain noted with dressing dry  . frequent facial spasms on admission but now resolved  Fluid filled blister on left inner thigh noted  No edema or cyanosis. Distal pulses well felt  Neurological : grossly normal       Radiology (Please Review Full Report for Details)       CULTURES     Blood cx x2: pending  covid neg      RADIOLOGY     XR PELVIS (1-2 VIEWS)   Final Result   Status post hemiarthroplasty of left hip as described above.           CT THORACIC SPINE TRAUMA RECONSTRUCTION   Final Result   No evidence of traumatic injury to the thoracic spine. Osteopenia with mild   osteoarthritic changes.           CT LUMBAR SPINE TRAUMA RECONSTRUCTION   Final Result   No acute fracture or subluxation of the lumbar spine.       Scoliosis and moderate multilevel spondylosis.         CT CHEST ABDOMEN PELVIS WO CONTRAST   Final Result   Chest-       Mild subsegmental atelectasis is noted.       Small zone of airway nodules in the right lower lobe. This may represent   bronchopneumonia or chronic aspiration pneumonitis.       Abdomen pelvis-       No acute intra-abdominal injury is identified.       Mild prominence of the intrahepatic biliary ducts, of uncertain significance. Correlation with liver function tests is suggested.       Acute fracture of the left femoral neck.           XR PELVIS (1-2 VIEWS)   Final Result   Impacted fracture involving the subcapital portion of the left femoral neck.           CT Cervical Spine WO Contrast   Final Result   1. No acute arterial abnormality or hemodynamically significant arterial   stenosis in the head or neck. 2. No acute fracture or traumatic malalignment of the cervical spine.           CTA HEAD NECK W CONTRAST   Final Result   1. No acute arterial abnormality or hemodynamically significant arterial   stenosis in the head or neck. 2. No acute fracture or traumatic malalignment of the cervical spine.           CT Head WO Contrast   Final Result   Addendum 1 of 1   ADDENDUM:   Findings were discussed with Osvaldo Choi NP at 4:51 pm on 7/29/2020.           Final       XR CHEST PORTABLE   Final Result   No acute cardiopulmonary disease.       Calcification projecting over the inferior aspect of the right glenohumeral   joint. This may be related to remote (osseous Bankart) injury or calcium   deposition. Acute injury is another possibility. If there is focal   tenderness, shoulder x-ray series is suggested.               Consults:    1. Orthosurgery  2.  PT             Recent Labs     07/31/20  0530 08/01/20  0607 08/02/20  0621   WBC 6.3 7.5 6.8   HGB 7.9* 7.9* 7.2*   HCT 23.7* 23.6* 21.4*   MCV 99.2 101.8* 101.3*    144 167       Recent Labs     07/31/20  0530 08/01/20  0607 08/02/20  0621   * 133* 135*   K 3.9 3.9 4.0    105 103   CO2 22 22 24   BUN 14 17 16   CREATININE 0.9 0.8 0.8       CBC:  Lab Results   Component Value Date    WBC 6.8 08/02/2020    HGB 7.2 08/02/2020    HCT 21.4 08/02/2020    .3 08/02/2020     08/02/2020    NEUTOPHILPCT 75.2 08/02/2020    LYMPHOPCT 14.2 08/02/2020    MONOPCT 8.6 08/02/2020    EOSPCT 1.5 01/28/2012    BASOPCT 0.5 08/02/2020    NEUTROABS 5.1 08/02/2020    LYMPHSABS 1.0 08/02/2020    MONOSABS 0.6 08/02/2020    EOSABS 0.1 08/02/2020    BASOSABS 0.0 08/02/2020     BNP:   Lab Results   Component Value Date     08/02/2020    K 4.0 08/02/2020    CO2 24 08/02/2020    BUN 16 08/02/2020    CREATININE 0.8 08/02/2020    CALCIUM 8.3 08/02/2020                Medication List      START taking these medications    enoxaparin 40 MG/0.4ML injection  Commonly known as:  LOVENOX  Inject 0.4 mLs into the skin daily  Notes to patient:  Enoxaparin (Lovenox®)  Use: prevent blood clots, treat blood clots, to prevent a stroke. Side effects: bruising and irritation around the injection site. guaiFENesin 600 MG extended release tablet  Commonly known as:  MUCINEX  Take 1 tablet by mouth 2 times daily     HYDROcodone-acetaminophen 5-325 MG per tablet  Commonly known as:  NORCO  Take 1 tablet by mouth every 6 hours as needed for Pain for up to 3 days.         CONTINUE taking these medications    amLODIPine 5 MG tablet  Commonly known as:  NORVASC     aspirin 81 MG tablet     CLARITIN PO     ezetimibe 10 MG tablet  Commonly known as:  ZETIA     fish oil-omega-3 fatty acids 1000 MG capsule     lisinopril 20 MG tablet  Commonly known as:  PRINIVIL;ZESTRIL     OSTEO BI-FLEX JOINT SHIELD PO     PROBIOTIC & ACIDOPHILUS EX ST PO     therapeutic multivitamin-minerals tablet        STOP taking these medications    ALPRAZolam 0.25 MG tablet  Commonly known as:  Ilan French           Where to Get Your Medications      You can get these medications from any pharmacy    Bring a paper prescription for each of these medications  · HYDROcodone-acetaminophen 5-325 MG per tablet     Information about where to get these medications is not yet available    Ask your nurse or doctor about these medications  · enoxaparin 40 MG/0.4ML injection  · guaiFENesin 600 MG extended release tablet           Discharge Condition/Location: stable/SNF     Follow Up:    Cbc in 2 weeks    PCP in 1 weeks      Time Spent on discharge is more than 28 minutes in the examination, evaluation, counseling and review of medications and discharge plan.       Betty Castanon MD 8/2/2020 12:55 PM

## 2020-08-03 NOTE — ED NOTES
Report called back to [facility] at [phone]. Report given to pt nurse Yadira Pearce.      Kellee Hopper RN  08/03/20 [id number]

## 2020-08-03 NOTE — ED NOTES
[address] called for transport back to [facility] basic unit     Lisa Pa  08/03/20 1901

## 2020-08-04 NOTE — ED PROVIDER NOTES
[address]  EMERGENCY DEPARTMENT ENCOUNTER        Pt Name: Jeri Aldrich  MRN: [id number]  [place] 1934  Date of evaluation: 8/3/2020  Provider: RICARDO Marquez  PCP: Donna Joseph MD    Evaluation by ROGER. My supervising physician was available for consultation. CHIEF COMPLAINT       Chief Complaint   Patient presents with    Abnormal Lab     Sent from [facility] for abnormal hgb 6.4. Pt is 1 week post-op from hip surgery. Hgb at hospital was 7. No active bleeding. HISTORY OF PRESENT ILLNESS   (Location, Timing/Onset, Context/Setting, Quality, Duration, Modifying Factors, Severity, Associated Signs and Symptoms)  Note limiting factors. Jeri Aldrich is a 80 y.o. female with recent left hip surgery following factor fracture presents the emergency department with concern for anemia. Patient was sent from nursing home with concern for hemoglobin of 6.4. Patient states that other than the pain of her left hip, she does not know any new complaints including weakness, lightheadedness, black or bloody stool, vomiting, chest pain, shortness of breath, fever. States that her stool looks tan. Nursing Notes were all reviewed and agreed with or any disagreements were addressed in the HPI. REVIEW OF SYSTEMS    (2-9 systems for level 4, 10 or more for level 5)     Review of Systems    Positives and Pertinent negatives as per HPI. Except as noted above in the ROS, all other systems were reviewed and negative.        PAST MEDICAL HISTORY     Past Medical History:   Diagnosis Date    Facial twitching     Hair loss     wears wig- unknown cause    Hyperlipidemia     Hypertension     Seasonal allergies          SURGICAL HISTORY     Past Surgical History:   Procedure Laterality Date    COLONOSCOPY      COLONOSCOPY  6/22/15    diverticulosis    HIP FRACTURE SURGERY Left     LEFT HIP HEMIARTHROPLASTY     HIP SURGERY Left 7/30/2020    LEFT HIP HEMIARTHROPLASTY performed by Krysta Guerra MD at [address]       Discharge Medication List as of 8/3/2020  7:16 PM      CONTINUE these medications which have NOT CHANGED    Details   senna-docusate (PERICOLACE) 8.6-50 MG per tablet Take 2 tablets by mouth nightlyHistorical Med      vitamin D (CHOLECALCIFEROL) 25 MCG (1000 UT) TABS tablet Take 1,000 Units by mouth dailyHistorical Med      guaiFENesin (MUCINEX) 600 MG extended release tablet Take 1 tablet by mouth 2 times daily, Disp-60 tablet,R-0NO PRINT      HYDROcodone-acetaminophen (NORCO) 5-325 MG per tablet Take 1 tablet by mouth every 6 hours as needed for Pain for up to 3 days. , Disp-10 tablet,R-0Print      enoxaparin (LOVENOX) 40 MG/0.4ML injection Inject 0.4 mLs into the skin daily, Disp-14 mL,R-0NO PRINT      Loratadine (CLARITIN PO) Take 10 mg by mouth daily Historical Med      ezetimibe (ZETIA) 10 MG tablet Take 10 mg by mouth daily. lisinopril (PRINIVIL;ZESTRIL) 20 MG tablet Take 20 mg by mouth daily. ALLERGIES     Quinidine; Aminoglycosides; Levofloxacin; Neomycin; and Simvastatin    FAMILYHISTORY       Family History   Problem Relation Age of Onset    Other Father         \"black Lung\"          SOCIAL HISTORY       Social History     Tobacco Use    Smoking status: Never Smoker    Smokeless tobacco: Never Used   Substance Use Topics    Alcohol use: No    Drug use: No       SCREENINGS    Shoaib Coma Scale  Eye Opening: Spontaneous  Best Verbal Response: Oriented  Best Motor Response: Obeys commands  Shoaib Coma Scale Score: 15        PHYSICAL EXAM    (up to 7 for level 4, 8 or more for level 5)     ED Triage Vitals [08/03/20 1541]   BP Temp Temp src Pulse Resp SpO2 Height Weight   (!) 153/63 98 °F (36.7 °C) -- 72 14 97 % -- 135 lb (61.2 kg)       Physical Exam  Vitals signs reviewed. Constitutional:       Appearance: She is not diaphoretic.    HENT:      Nose: No congestion or the procedure. Status post hemiarthroplasty of left hip as described above. Xr Pelvis (1-2 Views)    Result Date: 7/29/2020  EXAMINATION: ONE XRAY VIEW OF THE PELVIS 7/29/2020 4:32 pm COMPARISON: None. HISTORY: ORDERING SYSTEM PROVIDED HISTORY: fall TECHNOLOGIST PROVIDED HISTORY: Reason for exam:->fall Reason for Exam: expressive aphasia, fall FINDINGS: The bony pelvis is intact. There is an impacted fracture involving the subcapital portion of the left femoral neck. There is degenerative change of the SI joints and hip joints. The surrounding soft tissues are unremarkable. Impacted fracture involving the subcapital portion of the left femoral neck. Ct Head Wo Contrast    Addendum Date: 7/29/2020    ADDENDUM: Findings were discussed with Mary Morse NP at 4:51 pm on 7/29/2020. Result Date: 7/29/2020  EXAMINATION: CT OF THE HEAD WITHOUT CONTRAST  7/29/2020 4:33 pm TECHNIQUE: CT of the head was performed without the administration of intravenous contrast. Dose modulation, iterative reconstruction, and/or weight based adjustment of the mA/kV was utilized to reduce the radiation dose to as low as reasonably achievable. COMPARISON: 09/12/2009. HISTORY: ORDERING SYSTEM PROVIDED HISTORY: expressive aphasia TECHNOLOGIST PROVIDED HISTORY: Reason for exam:->expressive aphasia Has a \"code stroke\" or \"stroke alert\" been called? ->Yes Reason for Exam: expressive aphasia FINDINGS: BRAIN/VENTRICLES: There is no acute intracranial hemorrhage, mass effect or midline shift. No abnormal extra-axial fluid collection. The gray-white differentiation is maintained without evidence of an acute infarct. There is no evidence of hydrocephalus. Mild decreased attenuation in the periventricular and subcortical white matter consistent with small vessel ischemic change. Intracranial atherosclerotic vascular calcification. ORBITS: The visualized portion of the orbits demonstrate no acute abnormality.  SINUSES: The visualized paranasal sinuses and mastoid air cells demonstrate no acute abnormality. SOFT TISSUES/SKULL:  No acute abnormality of the visualized skull or soft tissues. No acute intracranial abnormality. Ct Cervical Spine Wo Contrast    Result Date: 7/29/2020  EXAMINATION: CTA OF THE HEAD AND NECK WITH CONTRAST; CT OF THE CERVICAL SPINE WITHOUT CONTRAST 7/29/2020 4:34 pm; 7/29/2020 4:35 pm: TECHNIQUE: CTA of the head and neck was performed with the administration of intravenous contrast. Multiplanar reformatted images are provided for review. MIP images are provided for review. Stenosis of the internal carotid arteries measured using NASCET criteria. Dose modulation, iterative reconstruction, and/or weight based adjustment of the mA/kV was utilized to reduce the radiation dose to as low as reasonably achievable.; CT of the cervical spine was performed without the administration of intravenous contrast. Multiplanar reformatted images are provided for review. Dose modulation, iterative reconstruction, and/or weight based adjustment of the mA/kV was utilized to reduce the radiation dose to as low as reasonably achievable. COMPARISON: None. HISTORY: ORDERING SYSTEM PROVIDED HISTORY: expressive aphasia TECHNOLOGIST PROVIDED HISTORY: Reason for exam:->expressive aphasia Reason for Exam: expressive aphasia ; ORDERING SYSTEM PROVIDED HISTORY: fall TECHNOLOGIST PROVIDED HISTORY: Reason for exam:->fall Reason for Exam: expressive aphasia FINDINGS: CTA NECK: AORTIC ARCH/ARCH VESSELS: No dissection or arterial injury. No significant stenosis of the brachiocephalic or subclavian arteries. CAROTID ARTERIES: No dissection, arterial injury, or hemodynamically significant stenosis by NASCET criteria. Less than 25% stenosis of the proximal internal carotid artery secondary to eccentric fibrocalcific plaque. VERTEBRAL ARTERIES: Mild stenosis of the left vertebral artery origin. No right vertebral artery stenosis.   Left vertebral artery dominant. No dissection or pseudoaneurysm. SOFT TISSUES: The lung apices are clear. No cervical or superior mediastinal lymphadenopathy. The larynx and pharynx are unremarkable. No acute abnormality of the salivary and thyroid glands. BONES: No acute osseous abnormality. CTA HEAD: ANTERIOR CIRCULATION: No significant stenosis of the intracranial internal carotid, anterior cerebral, or middle cerebral arteries. No aneurysm. POSTERIOR CIRCULATION: Mild stenosis of the P2 segment right posterior cerebral artery. No significant stenosis of the vertebral, basilar, or left posterior cerebral arteries. No aneurysm. OTHER: No dural venous sinus thrombosis on this non-dedicated study. BRAIN: No mass effect or midline shift. No extra-axial fluid collection. The gray-white differentiation is maintained. CERVICAL SPINE: BONES/ALIGNMENT: There is no acute fracture or suspect osseous lesion. Vertebral body heights are maintained. Alignment is normal. DEGENERATIVE CHANGES: Mild C4-5 and C5-6 and moderate C6-7 degenerative disc disease with posterior disc osteophyte complexes causing mild C4-5 and C5-6 and moderate C6-7 spinal canal stenosis. 1. No acute arterial abnormality or hemodynamically significant arterial stenosis in the head or neck. 2. No acute fracture or traumatic malalignment of the cervical spine. Xr Chest Portable    Result Date: 7/29/2020  EXAMINATION: ONE XRAY VIEW OF THE CHEST 7/29/2020 4:32 pm COMPARISON: 10/04/2014 HISTORY: ORDERING SYSTEM PROVIDED HISTORY: fall TECHNOLOGIST PROVIDED HISTORY: Reason for exam:->fall Reason for Exam: fall FINDINGS: Patient is rotated. The cardiac silhouette is considered within normal limits. Thoracic aorta is tortuous. Pulmonary vessels appear normal in caliber. No pulmonary contusion, rib fracture or pneumothorax is seen. There is chronic elevation of the right hemidiaphragm.  There is a 6 mm calcification projecting over the right shoulder axillary recess. No acute cardiopulmonary disease. Calcification projecting over the inferior aspect of the right glenohumeral joint. This may be related to remote (osseous Bankart) injury or calcium deposition. Acute injury is another possibility. If there is focal tenderness, shoulder x-ray series is suggested. Cta Head Neck W Contrast    Result Date: 7/29/2020  EXAMINATION: CTA OF THE HEAD AND NECK WITH CONTRAST; CT OF THE CERVICAL SPINE WITHOUT CONTRAST 7/29/2020 4:34 pm; 7/29/2020 4:35 pm: TECHNIQUE: CTA of the head and neck was performed with the administration of intravenous contrast. Multiplanar reformatted images are provided for review. MIP images are provided for review. Stenosis of the internal carotid arteries measured using NASCET criteria. Dose modulation, iterative reconstruction, and/or weight based adjustment of the mA/kV was utilized to reduce the radiation dose to as low as reasonably achievable.; CT of the cervical spine was performed without the administration of intravenous contrast. Multiplanar reformatted images are provided for review. Dose modulation, iterative reconstruction, and/or weight based adjustment of the mA/kV was utilized to reduce the radiation dose to as low as reasonably achievable. COMPARISON: None. HISTORY: ORDERING SYSTEM PROVIDED HISTORY: expressive aphasia TECHNOLOGIST PROVIDED HISTORY: Reason for exam:->expressive aphasia Reason for Exam: expressive aphasia ; ORDERING SYSTEM PROVIDED HISTORY: fall TECHNOLOGIST PROVIDED HISTORY: Reason for exam:->fall Reason for Exam: expressive aphasia FINDINGS: CTA NECK: AORTIC ARCH/ARCH VESSELS: No dissection or arterial injury. No significant stenosis of the brachiocephalic or subclavian arteries. CAROTID ARTERIES: No dissection, arterial injury, or hemodynamically significant stenosis by NASCET criteria. Less than 25% stenosis of the proximal internal carotid artery secondary to eccentric fibrocalcific plaque. VERTEBRAL ARTERIES: Mild stenosis of the left vertebral artery origin. No right vertebral artery stenosis. Left vertebral artery dominant. No dissection or pseudoaneurysm. SOFT TISSUES: The lung apices are clear. No cervical or superior mediastinal lymphadenopathy. The larynx and pharynx are unremarkable. No acute abnormality of the salivary and thyroid glands. BONES: No acute osseous abnormality. CTA HEAD: ANTERIOR CIRCULATION: No significant stenosis of the intracranial internal carotid, anterior cerebral, or middle cerebral arteries. No aneurysm. POSTERIOR CIRCULATION: Mild stenosis of the P2 segment right posterior cerebral artery. No significant stenosis of the vertebral, basilar, or left posterior cerebral arteries. No aneurysm. OTHER: No dural venous sinus thrombosis on this non-dedicated study. BRAIN: No mass effect or midline shift. No extra-axial fluid collection. The gray-white differentiation is maintained. CERVICAL SPINE: BONES/ALIGNMENT: There is no acute fracture or suspect osseous lesion. Vertebral body heights are maintained. Alignment is normal. DEGENERATIVE CHANGES: Mild C4-5 and C5-6 and moderate C6-7 degenerative disc disease with posterior disc osteophyte complexes causing mild C4-5 and C5-6 and moderate C6-7 spinal canal stenosis. 1. No acute arterial abnormality or hemodynamically significant arterial stenosis in the head or neck. 2. No acute fracture or traumatic malalignment of the cervical spine. Ct Chest Abdomen Pelvis Wo Contrast    Result Date: 7/29/2020  EXAMINATION: CT OF THE CHEST, ABDOMEN, AND PELVIS WITHOUT CONTRAST 7/29/2020 4:35 pm TECHNIQUE: CT of the chest, abdomen and pelvis was performed without the administration of intravenous contrast. Multiplanar reformatted images are provided for review. Dose modulation, iterative reconstruction, and/or weight based adjustment of the mA/kV was utilized to reduce the radiation dose to as low as reasonably achievable. COMPARISON: None HISTORY: ORDERING SYSTEM PROVIDED HISTORY: fall TECHNOLOGIST PROVIDED HISTORY: Reason for exam:->fall Additional Contrast?->None Reason for Exam: expressive aphasia Acuity: Acute Type of Exam: Initial FINDINGS: Chest: The intravenous contrast is diluted, equilibrium phase. This is a limitation. Mediastinum: Cardiomegaly is noted. There is no pericardial effusion or mediastinal hematoma. Thoracic aorta is mildly tortuous. There is no mediastinal or hilar adenopathy. Lungs/pleura: There is no pneumothorax or pleural effusion identified. Bands of subsegmental atelectasis are noted in the right upper and middle lobes. A small cluster of airway nodules is noted in the right lower lobe. Soft Tissues/Bones: No acute osseous injury is identified. There is moderate arthropathy of the shoulders. A corticated calcification is noted along the inferior humeral head, likely fragmented osteophyte. Abdomen/Pelvis: Organs: The liver, spleen and pancreas enhance in normal fashion. The intrahepatic biliary ducts are mildly prominent. There is no prominence of the common duct. Gallbladder is unremarkable. The adrenal glands are unremarkable. The kidneys enhance symmetrically. There is a 1 cm left renal cyst.  No follow-up is recommended. GI/Bowel: The stomach and small bowel are unremarkable. No focal mural thickening of the colon is appreciated. Pelvis: Urinary bladder is unremarkable. Peritoneum/Retroperitoneum: No free fluid is appreciated. There is no adenopathy. There is moderate aortoiliac calcification, without aneurysm. Bones/Soft Tissues: Multilevel spondylosis of the lumbar spine is noted, associated with mild scoliotic curvature. There is no acute fracture of the left femoral neck. The superficial soft tissues are unremarkable. Chest- Mild subsegmental atelectasis is noted. Small zone of airway nodules in the right lower lobe.   This may represent bronchopneumonia or chronic aspiration pneumonitis. Abdomen pelvis- No acute intra-abdominal injury is identified. Mild prominence of the intrahepatic biliary ducts, of uncertain significance. Correlation with liver function tests is suggested. Acute fracture of the left femoral neck. Ct Lumbar Spine Trauma Reconstruction    Result Date: 7/29/2020  EXAMINATION: CT OF THE LUMBAR SPINE WITHOUT CONTRAST  7/29/2020 TECHNIQUE: CT of the lumbar spine was performed without the administration of intravenous contrast. Multiplanar reformatted images are provided for review. Dose modulation, iterative reconstruction, and/or weight based adjustment of the mA/kV was utilized to reduce the radiation dose to as low as reasonably achievable. COMPARISON: MRI lumbar spine, 09/08/2007 HISTORY: ORDERING SYSTEM PROVIDED HISTORY: FALLING TECHNOLOGIST PROVIDED HISTORY: Reason for exam:->trauma Reason for Exam: expressive aphasia, fall FINDINGS: BONES/ALIGNMENT: The S1 vertebral body is transitional.  Vertebral body height is preserved. There is scoliotic curvature of the lumbar spine, dextroconvexity. DEGENERATIVE CHANGES: Anterolateral spur formation is noted at multiple levels. There is disc space narrowing of L1-2, L2-3 and L5-S1. mild facet arthropathy is noted at multiple levels. There are mild posterior disc bulges, including at L4-5. SOFT TISSUES/RETROPERITONEUM: No hematoma or mass is seen. CT of the abdomen and pelvis is discussed on separate report. No acute fracture or subluxation of the lumbar spine. Scoliosis and moderate multilevel spondylosis. Ct Thoracic Spine Trauma Reconstruction    Result Date: 7/29/2020  EXAMINATION: CT OF THE THORACIC SPINE WITHOUT CONTRAST  7/29/2020 4:47 pm: TECHNIQUE: CT of the thoracic spine was performed without the administration of intravenous contrast. Multiplanar reformatted images are provided for review.  Dose modulation, iterative reconstruction, and/or weight based adjustment of the mA/kV was utilized to reduce the radiation dose to as low as reasonably achievable. COMPARISON: None. HISTORY: ORDERING SYSTEM PROVIDED HISTORY: fall TECHNOLOGIST PROVIDED HISTORY: Reason for exam:->fall Reason for Exam: expressive aphasia, fall FINDINGS: BONES/ALIGNMENT: There is normal alignment of the spine. The vertebral body heights are maintained. No osseous destructive lesion is seen. There is no evidence of acute fracture. DEGENERATIVE CHANGES: Bones are mildly osteopenic. Mild multilevel osteoarthritic changes. Mild S-shaped scoliotic curvature. SOFT TISSUES: No paraspinal mass is seen. No evidence of traumatic injury to the thoracic spine. Osteopenia with mild osteoarthritic changes. PROCEDURES   Unless otherwise noted below, none     Procedures    CRITICAL CARE TIME   N/A    CONSULTS:  None      EMERGENCY DEPARTMENT COURSE and DIFFERENTIAL DIAGNOSIS/MDM:   Vitals:    Vitals:    08/03/20 1803 08/03/20 1833 08/03/20 1903 08/03/20 1933   BP: (!) 162/46 (!) 156/45 (!) 155/57 (!) 155/48   Pulse: 67 72 73 73   Resp: 26 24 24 22   Temp:       SpO2: 97% 98% 95% 96%   Weight:           Patient was given the following medications:  Medications - No data to display        27-year-old female presents emergency on referral from nursing home due to concern for anemia. Labs notable for hemoglobin of 7.6, this is above the transfusion threshold. Patient denies any sources of bleeding, rectal exam does not exhibit melanic stool. I have low concern for GI bleed or other ongoing blood loss. Appropriate for discharge with outpatient follow-up. Instructed to follow-up with primary care provider within 2 days. Instructed to return the emergency room immediately for new or worsening symptoms including but not limited to lightheadedness, numbness, weakness, chest pain, shortness of breath, abdominal pain, fever, nausea, vomiting, black or bloody stool, any other symptoms she is concerned about.   Verbal and written discharge

## 2020-09-25 NOTE — PROGRESS NOTES
Subjective: Patient is here for 6-week post-op after left hip hemiarthroplasty. She is doing well. Continues to work with therapy. Is actually even started to walk without a walker a little bit. There was a little bit of mild groin pain after particularly vigorous therapy but overall is continuing to improve and very satisfied. Objective: Physical exam shows incision continues to be well-healed and she has painless hip range of motion. Imaging: AP pelvis and left hip redemonstrate appropriate postoperative changes consistent with left hip hemiarthroplasty no evidence of dislocation or subsidence of the stem with near symmetric leg lengths. Assessment and plan: 72-year-old female who is 6-week status post left hip hemiarthroplasty. She is doing quite well. She continue to work with therapy does not need further DVT prophylaxis from hip standpoint I like to see her back in 6 weeks time for some repeat x-rays to make sure that she is continue to progress. At that point I will likely just follow her yearly. She will first call with any questions or concerns in the meantime.

## 2020-11-04 NOTE — TELEPHONE ENCOUNTER
I called and tried the patients cell number first that is not in service. Then I called her home number and left a message asking her to check in so we now how she is doing.

## 2020-12-22 PROBLEM — I21.4 NSTEMI (NON-ST ELEVATED MYOCARDIAL INFARCTION) (HCC): Status: ACTIVE | Noted: 2020-01-01

## 2020-12-22 PROBLEM — F10.27 DEMENTIA ASSOCIATED WITH ALCOHOLISM WITH BEHAVIORAL DISTURBANCE (HCC): Status: ACTIVE | Noted: 2020-01-01

## 2020-12-22 NOTE — ED NOTES
[id number]- Call placed to [address] [place] [address] for interventionist.       [id number]- Call was returned by Dr. Laureen Gagnon     Fax sent to Dr. Laureen Gagnon on EKG     Dr. Laureen Gagnon returned the call and stated to send pt to [facility] but not activing a stemi alert    Rolo Patel  12/22/20 [facility]  12/22/20 [facility]  12/22/20 [facility]  12/22/20 1001

## 2020-12-22 NOTE — ED NOTES
Patient removing clothing and cardiac monitor leads, trying to climb out of bed. Dr. Sarah Christianson aware.       Isis Magana, ABHISHEK  12/22/20 [id number]

## 2020-12-22 NOTE — PROGRESS NOTES
Consult called to cardiology spoke to RN [facility]  12/22/2020 @  16:12  Smitha Kelly

## 2020-12-22 NOTE — ED NOTES
Call placed to [facility] for cards again Dr. Mitul Le spoke with Nurse at [facility] [phone]     [phone]- Call placed to transfer center for [facility] per Cards per Dr. Lilliam Liriano     1055- Dr. Mitul Le states to put transfer  On hold.      56- Dr. Lexine Peabody called and spoke to Dr. Mitul Le     1100- Per Dr. Mitul Le that the pt can stay at our hospital. To page Dr. Cole Benavidez for admission at [address]  12/22/20 [address] [facility],[address]  12/22/20 [address] [facility] [address]  12/22/20 1101

## 2020-12-22 NOTE — PROGRESS NOTES
Pharmacy to Manage Heparin Infusion per [facility]    Dx: NSTEMI  Low dose weight based heparin ordered by Dr Maria Eugenia Milan in the ED. HT 62 in Wt 45.2 kg IBW 50.1 kg  Use wt = 45.2 kg  Baseline APTT drawn at 1115 today = 28.5 seconds. Low Dose Heparin Infusion  Per protocol, heparin 2710 units IV bolus followed by heparin drip at 5.4 ml/hr. Draw APTT 6 hours after bolus dose given and drip started. Will adjust to goal APTT = 49-76 seconds. Pharmacy will continue to monitor and adjust as necessary.

## 2020-12-22 NOTE — CARE COORDINATION
Case Management Assessment  Initial Evaluation      Patient Name: Grady Kothari  YOB: 1934  Diagnosis: NSTEMI (non-ST elevated myocardial infarction) [facility]) [I21.4]  Date / Time: 12/22/2020  7:01 AM    Admission status/Date: 12/22/2020 Inpatient   Chart Reviewed: Yes      Patient Interviewed: No pt did not answer bedside phone. ER staff answered and stated pt not able to talk at the moment as she was in restraints.    Family Interviewed:  Yes - Pt's grandson Teofilo Mario at [phone]     Hospitalization in the last 30 days:  No      Health Care Decision Maker :     (CM - must 1st enter selection under Navigator - emergency contact- Health Care Decision Maker Relationship and pick relationship)   Who do you trust or have selected to make healthcare decisions for you      Met with: pt's grandson  Cristofer Godfrey conducted  (bedside/phone): phone call    Current PCP: Dr. Katty Chan with [facility]     Financial  Medicare and [address] required for SNF : N          3 night stay required - Unless waived due to covid-19    ADLS  Support Systems/Care Needs:  family  Transportation: family    Meal Preparation: family    Housing  Living Arrangements: pt lives with her grandson  Steps: 2  Intent for return to present living arrangements: Yes per grandson  Identified Issues: none    Home Care Information  Active with [address] : No Agency:(Services)     Passport/Waiver : No  :                      Phone Number:    Passport/Waiver Services: n/a          Durable Medical Equiptment   DME Provider:   Equipment:   Walker__x_Cane___RTS___ BSC___Shower Chair___Hospital Bed___W/C____Other________  02 at ____Liter(s)---wears(frequency)_______ HHN ___ CPAP___ BiPap___   N/A____      Home O2 Use :  No    If No for home O2---if presently on O2 during hospitalization:  Yes  if yes CM to follow for potential DC O2 need  Informed of need for care provider to bring portable home O2 tank on day of discharge for nursing to connect prior to leaving:   Not Indicated  Verbalized agreement/Understanding:   Not Indicated    Community Service Affiliation  Dialysis:  No    · Agency:  · Location:  · Dialysis Schedule:  · Phone:   · Fax: Other Community Services: n/a    DISCHARGE PLAN: Explained Case Management role/services. Chart review completed. Called and spoke with pt's grandson Cruz Esquivel who completed the assessment. He stated pt is usually independent at home and the plan is for her to return home with him on discharge. She has no services in the community. He stated she had [address] in the past when she was discharged from a SNF and he thinks it was through [facility]. He denied questions or concerns from CM at this time. CM will follow for discharge needs. Please notify CM if needs or concerns arise.

## 2020-12-22 NOTE — PROGRESS NOTES
hospitalist in to see/evaluate patient. Telesitter placed in patient room d/t patient confusion. Patient high fall risk, frequently pulling at lines, tubes, oxygen, clothes. Bilateral soft restraints placed for patient safety. Family informed/updated. Nasopharyngeal COVID swab obtained as patient running a fever. Down to 99.4 axillary.

## 2020-12-22 NOTE — ED NOTES
Spoke with Amol in Pharmacy-waiting on APTT to complete heparin order.       Vera Josue, ABHISHEK  12/22/20 [id number]

## 2020-12-22 NOTE — PLAN OF CARE
NSTEMI- discussed with cardio sunil Hastings for admit to [facility]   Full H&P to follow     Riley Rocha PA-C  12/22/2020 12:03 PM

## 2020-12-22 NOTE — H&P
Hospital Medicine History & Physical      PCP: Francisco Houser MD    Date of Admission: 12/22/2020    Date of Service: Pt seen/examined on 12/22/2020     Chief Complaint:    Chief Complaint   Patient presents with    Altered Mental Status     EMS states pt from home for AMS, lives with family, pt resistive to care and unable to answer questions, hx of dementia, unsure of baseline, 83% on room air, 97% on NRB per EMS         History Of Present Illness: The patient is a 80 y.o. female with hypertension, mitral valve regurgitation, aortic valve insufficiency, hyperlipidemia, MGUS, dementia, iron def anemia who presented to [facility] ED via EMS for AMS. Patient cannot provide any history. I spoke to the patient's grandson who is with the patient prior to being taken to the ER. He states he lives with the patient. He states earlier this morning he found the patient lying on the ground. She was unable to tell him if she fell or hit her head. She was able to stand any helped her walk to the bathroom. She states after she went to the bathroom she was not acting quite \"right\". He states she seemed cold and she was shivering. He states that she was sitting on the couch and staring off into space, not responding to him, and he was concerned that she had stopped breathing. He states he was about to start CPR when she was clearly breathing and responded to him but was confused. He called 911 for transport to the ER. In the emergency department she was confused and could not provide any history. Her ER work-up revealed an elevated troponin to 0.31 and an abnormal EKG with ST changes. Case discussed with cardiology both interventionalist as well as rounding [facility] cardiologist- no plan for emergent cath- treat medically for NSTEMI     initially afebrile, repeat temp 101.7F. Chest x-ray negative, CT abdomen negative, urinalysis not consistent with infection.   Rapid COVID neg     ADmitted for further care     Past Medical History:        Diagnosis Date    Facial twitching     Hair loss     wears wig- unknown cause    Hyperlipidemia     Hypertension     Seasonal allergies        Past Surgical History:        Procedure Laterality Date    COLONOSCOPY      COLONOSCOPY  6/22/15    diverticulosis    HIP FRACTURE SURGERY Left     LEFT HIP HEMIARTHROPLASTY     HIP SURGERY Left 7/30/2020    LEFT HIP HEMIARTHROPLASTY performed by Kristin Ferreira MD at [address] [place] [address]         Medications Prior to Admission:    Prior to Admission medications    Medication Sig Start Date End Date Taking? Authorizing Provider   senna-docusate (PERICOLACE) 8.6-50 MG per tablet Take 2 tablets by mouth nightly    Historical Provider, MD   vitamin D (CHOLECALCIFEROL) 25 MCG (1000 UT) TABS tablet Take 1,000 Units by mouth daily    Historical Provider, MD   Loratadine (CLARITIN PO) Take 10 mg by mouth daily     Historical Provider, MD   ezetimibe (ZETIA) 10 MG tablet Take 10 mg by mouth daily. Historical Provider, MD   lisinopril (PRINIVIL;ZESTRIL) 20 MG tablet Take 20 mg by mouth daily. Historical Provider, MD       Allergies:  Quinidine, Aminoglycosides, Levofloxacin, Neomycin, and Simvastatin    Social History:  The patient currently lives *with her grandson     TOBACCO:   reports that she has never smoked. She has never used smokeless tobacco.  ETOH:   reports no history of alcohol use. Family History:   Positive as follows:        Problem Relation Age of Onset    Other Father         \"black Lung\"       REVIEW OF SYSTEMS:     Could not obtain 2/2 AMS     PHYSICAL EXAM:    BP (!) 147/64   Pulse 88   Temp 101.7 °F (38.7 °C) (Rectal)   Resp 19   Ht 5' 2\" (1.575 m) Comment: Ht 5' 2\" (1.575 m) from previous encounter  Wt 99 lb 10.6 oz (45.2 kg)   SpO2 97%   BMI 18.23 kg/m²     Gen: Elderly female. Awake and alert, confused. Not in distress. Eyes: PERRL. No sclera icterus. No conjunctival injection.    ENT: No discharge. Pharynx clear. Neck: No JVD. Trachea midline. Resp: No accessory muscle use. No crackles. No wheezes. No rhonchi. CV: Regular rate. Regular rhythm. No murmur. No rub. No edema. GI: Non-tender. Non-distended. No masses. No organomegaly. Normal bowel sounds. No hernia. Skin: Warm and dry. No nodule on exposed extremities. No rash on exposed extremities. M/S: No cyanosis. No joint deformity. No clubbing. Neuro: Awake. Moving all extremities   Psych: Oriented to self only. Mildly agitated. CBC:   Recent Labs     12/22/20  0716   WBC 11.3*   HGB 11.1*   HCT 33.3*   MCV 96.9        BMP:   Recent Labs     12/22/20  0716   *   K 3.9   CL 95*   CO2 25   BUN 18   CREATININE 1.0     LIVER PROFILE:   Recent Labs     12/22/20  0716   AST 30   ALT 7*   LIPASE 39.0   BILITOT 0.5   ALKPHOS 82     PT/INR: No results for input(s): PROTIME, INR in the last 72 hours. APTT:   Recent Labs     12/22/20  1115   APTT 28.5     UA:  Recent Labs     12/22/20  0733   COLORU Yellow   PHUR 6.0   WBCUA 0-2   RBCUA 11-20*   MUCUS Rare*   BACTERIA 1+*   CLARITYU Clear   SPECGRAV 1.025   LEUKOCYTESUR Negative   UROBILINOGEN 0.2   BILIRUBINUR Negative   BLOODU MODERATE*   GLUCOSEU 250*          CARDIAC ENZYMES  Recent Labs     12/22/20  0716 12/22/20  0910   TROPONINI 0.31* 0.41*       U/A:    Lab Results   Component Value Date    COLORU Yellow 12/22/2020    WBCUA 0-2 12/22/2020    RBCUA 11-20 12/22/2020    MUCUS Rare 12/22/2020    BACTERIA 1+ 12/22/2020    CLARITYU Clear 12/22/2020    SPECGRAV 1.025 12/22/2020    LEUKOCYTESUR Negative 12/22/2020    BLOODU MODERATE 12/22/2020    GLUCOSEU 250 12/22/2020       ABG  No results found for: NIA8HII, BEART, E0WIGHWT, PHART, THGBART, KWG9RZS, PO2ART, CJE5PHB    CULTURES  Blood: ordered  Rapid covid: neg  COVID PCR: ordered    EKG:     ST seg elevation present.  Concerning for STEMI. NSR with PVC    RADIOLOGY  CT HEAD WO CONTRAST   Final Result   Head:      No acute intracranial abnormality. Abdomen pelvis:      No acute intra-abdominal abnormality         CT ABDOMEN PELVIS W IV CONTRAST Additional Contrast? None   Final Result   Head:      No acute intracranial abnormality. Abdomen pelvis:      No acute intra-abdominal abnormality         XR CHEST PORTABLE   Final Result   No acute process. Pertinent previous results reviewed   Echo 3/2020  Left ventricular systolic function is normal. (LVEF>/=55%)  There is mild concentric left ventricular hypertrophy. Mild - moderate tricuspid regurgitation is present. The Aortic Valve leaflets appear sclerotic. The mitral valve leaflets are mildly thickened in appearance. There is moderate mitral regurgitation. The mitral regurgitant jet is posteriorly directed, which is consistent with  anterior leaflet pathology. MR may be underesimated due to the eccentricity. Consider ARCADIO if clinically  indicated. Overall left ventricular ejection fraction is estimated to be 55-60%. The left ventricular wall motion is normal.  Moderate to severe aortic regurgitation. Would consider a transesophageal echocardiogram if clinically indicated. Diamond Disla have reviewed the chart on [place] and personally interviewed and examined patient, reviewed the data (labs and imaging) and after discussion with my PA formulated the plan. Agree with note with the following edits. HPI:     Patient is an 28-year-old female with history of hypertension mitral valve regurgitation, mild aortic insufficiency, dementia, hyperlipidemia, anemia came to the ED with altered mental status. Unfortunately much history could not be provided for the patient. My physician assistant spoke to the patient's grandson. He apparently lives with the patient. He apparently found the patient lying on the ground. She is unable to tell if she fell or hit her head. She is able to stand and he helped her walk to the bathroom.   She is medically manage with heparin gtt, asa, statin, lopressor BID    #Febrile illness   - unclear etiology  - CXR, CT abd, UA unrevealing  - rapid covid neg, check PCR and keep in droplet +  - check LA, PCT, blood cx  - hold off on abx for now     #Hyperglycemia  - low dose SSI    #Mild dementia  - typically awake, alert, oriented to self and situation    #MGUS  - f/b hem onc     #Mod MR   #Mod-severe AR  - f/b Dr Marizol Vasquez, echo 2020 reviewed above    #HTN  -lopressor started  - home meds need to be reconciled    #HLD  #Chronic iron def anemia     DVT Prophylaxis: Heparin   Diet: DIET CLEAR LIQUID; No Caffeine  Code Status: Limited    Discussed presentation and plan with her son, who stated understanding.   We also discussed code status, she is a limited code  Intubation: YES  Defib/cardioversion: YES  Chest compressions: NO  Resuscitative meds: YES    Yahir Pinzon PA-C  12/22/2020 3:23 PM        [place] 12/22/2020 3:52 PM

## 2020-12-22 NOTE — CONSULTS
Pharmacy - Heparin  Continue low dose weight based heparin drip at 5.4 ml/hr as ordered in the ED. Draw APTT at 1800 tonight. Will adjust to goal APTT = 49-76 seconds. Pharmacy will continue to monitor and adjust as necessary.

## 2020-12-22 NOTE — ED PROVIDER NOTES
Emergency Physician Note  [address] 11 [facility] ED  288 [facility] [place]. [address]  Dept: [phone]  Loc: [phone]  Open Note Time:  7:54 AM EST    Chief Complaint  Altered Mental Status (EMS states pt from home for AMS, lives with family, pt resistive to care and unable to answer questions, hx of dementia, unsure of baseline, 83% on room air, 97% on NRB per EMS)       History of Present Illness  Gypsy Serra is a 80 y.o. female  has a past medical history of Facial twitching, Hair loss, Hyperlipidemia, Hypertension, and Seasonal allergies. who presents to the ED for AMS. Patient has a history of dementia and is a poor historian. However she is able to communicate to me with yes and no answers and specifically denied chest pain however reports abdominal pain, reports lower back pain. She states she has a cough. Unable to assess any further review of systems secondary to dementia. Unless otherwise stated in this report or unable to obtain because of the patient's clinical or mental status as evidenced by the medical record, this patient's positive and negative responses for review of systems, constitutional, psych, eyes, ENT, cardiovascular, respiratory, gastrointestinal, neurological, genitourinary, musculoskeletal, integument systems and systems related to the presenting problem are either stated in the preceding paragraph or were not pertinent or were negative for the symptoms and/or complaints related to the medical problem. I have reviewed the following from the nursing documentation:      Prior to Admission medications    Medication Sig Start Date End Date Taking?  Authorizing Provider   senna-docusate (PERICOLACE) 8.6-50 MG per tablet Take 2 tablets by mouth nightly    Historical Provider, MD   vitamin D (CHOLECALCIFEROL) 25 MCG (1000 UT) TABS tablet Take 1,000 Units by mouth daily    Historical Provider, MD   Loratadine (CLARITIN PO) Take 10 mg by mouth daily     Historical Provider, MD   ezetimibe (ZETIA) 10 MG tablet Take 10 mg by mouth daily. Historical Provider, MD   lisinopril (PRINIVIL;ZESTRIL) 20 MG tablet Take 20 mg by mouth daily. Historical Provider, MD       Allergies as of 12/22/2020 - Review Complete 09/25/2020   Allergen Reaction Noted    Quinidine Hives 01/27/2012    Aminoglycosides  10/31/2016    Levofloxacin  01/27/2012    Neomycin  01/27/2012    Simvastatin  06/18/2015       Past Medical History:   Diagnosis Date    Facial twitching     Hair loss     wears wig- unknown cause    Hyperlipidemia     Hypertension     Seasonal allergies         Surgical History:   Past Surgical History:   Procedure Laterality Date    COLONOSCOPY      COLONOSCOPY  6/22/15    diverticulosis    HIP FRACTURE SURGERY Left     LEFT HIP HEMIARTHROPLASTY     HIP SURGERY Left 7/30/2020    LEFT HIP HEMIARTHROPLASTY performed by Bunny Macdonald MD at [address] [place] [address]          Family History:    Family History   Problem Relation Age of Onset    Other Father         \"black Lung\"       Social History     Socioeconomic History    Marital status:       Spouse name: Not on file    Number of children: Not on file    Years of education: Not on file    Highest education level: Not on file   Occupational History    Not on file   Social Needs    Financial resource strain: Not on file    Food insecurity     Worry: Not on file     Inability: Not on file    Transportation needs     Medical: Not on file     Non-medical: Not on file   Tobacco Use    Smoking status: Never Smoker    Smokeless tobacco: Never Used   Substance and Sexual Activity    Alcohol use: No    Drug use: No    Sexual activity: Not on file   Lifestyle    Physical activity     Days per week: Not on file     Minutes per session: Not on file    Stress: Not on file   Relationships    Social connections     Talks on phone: Not on file     Gets together: Not on file Attends Yarsani service: Not on file     Active member of club or organization: Not on file     Attends meetings of clubs or organizations: Not on file     Relationship status: Not on file    Intimate partner violence     Fear of current or ex partner: Not on file     Emotionally abused: Not on file     Physically abused: Not on file     Forced sexual activity: Not on file   Other Topics Concern    Not on file   Social History Narrative    Not on file       Nursing notes reviewed. ED Triage Vitals   Enc Vitals Group      BP 12/22/20 0713 (!) 136/45      Pulse 12/22/20 0709 114      Resp 12/22/20 0709 19      Temp 12/22/20 0709 97.9 °F (36.6 °C)      Temp Source 12/22/20 0709 Oral      SpO2 12/22/20 0709 98 %      Weight --       Height --       Head Circumference --       Peak Flow --       Pain Score --       Pain Loc --       Pain Edu? --       Excl. in [address]? --        GENERAL:   There is no height or weight on file to calculate BMI. Awake, alert. Appears older than stated age  Well developed, cachectic with no apparent distress. Nontoxic-appearing, ill-appearing. HENT:   Normocephalic, Atraumatic, no lacerations. No ENT exam due to PPE. EYES:   Conjunctiva normal,   Pupils equal round and reactive to light,   Extraocular movements normal.  NECK:  Trachea is midline. No stridor. CHEST:  Regular rate and regular rhythm, no murmurs/rubs/gallops,   normal S1/S2, chest wall non-tender. LUNGS:  No respiratory distress. No abdominal retractions, no sternal retractions. Clear to auscultation bilaterally, no wheezing, no rhochi, no rales  Speaking comfortably in full sentences  ABDOMEN:  Soft, patient flinches when I pressed her right lower quadrant, non-distended. No guarding. No rebound. No costovertebral angle tenderness to palpation. Normal BS, no organomegaly, no abdominal masses  EXTREMITIES:  Moves extremities x4 with purpose.     Normal range of motion, no edema,   no deformity, distal pulses present and equal bilaterally. BACK:  Unable to elicit any specific midline tenderness in the cervical, thoracic, and lumbar spine. No deformities, no step-off. No appreciable ulcer in the sacral region  SKIN:  Warm, dry and intact. Decreased turgor  NEUROLOGIC:  Pleasantly demented. Moving all extremities to command. Alert and oriented to self only  without focal motor deficit or gross sensory deficit. PSYCHIATRIC:  anxious,   Not responding to internal stimuli,  responds appropriately to questions    LABS and DIAGNOSTIC RESULTS  EKG  The Ekg interpreted by me shows  normal sinus rhythm with a rate of 104 and frequent PVCs  Axis is   Normal  QTc is  normal  Intervals and Durations are unremarkable. ST Segments: nonspecific changes and elevation in  v2 and v3  Delta waves, Brugada Syndrome, and Short CT are not present. Prior EKG to compare with was available. No significant changes compared to prior EKG from July 29, 2020, of note patient now has frequent PVCs compared to prior EKG, there does appear to be ST elevations in V2 and V3 but no other continuous leads are indicating a STEMI. ST elevations could also be related to frequent PVCs. EKG2  The Ekg interpreted by me shows  normal sinus rhythm and Frequent PVCs with a rate of 116  Axis is   Left axis deviation  QTc is  within an acceptable range  Intervals and Durations are unremarkable. ST Segments: no acute change  Delta waves, Brugada Syndrome, and Short CT are not present. Prior EKG to compare with was available. No significant changes compared to prior EKG from today    EKG3  The Ekg interpreted by me shows  normal sinus rhythm with a rate of 88, without PVCs  Axis is   Normal  QTc is  within an acceptable range  Intervals and Durations are unremarkable. ST Segments: elevation in  anterior leads  Delta waves, Brugada Syndrome, and Short CT are not present.   Prior EKG to compare with Today, she no longer has PVCs, it appears that the external ST elevations are more pronounced than EKGs from prior to today's visit. RADIOLOGY  X-RAYS:  I have reviewed radiologic plain film image(s). ALL OTHER NON-PLAIN FILM IMAGES SUCH AS CT, ULTRASOUND AND MRI HAVE BEEN READ BY THE RADIOLOGIST. CT HEAD WO CONTRAST   Final Result   Head:      No acute intracranial abnormality. Abdomen pelvis:      No acute intra-abdominal abnormality         CT ABDOMEN PELVIS W IV CONTRAST Additional Contrast? None   Final Result   Head:      No acute intracranial abnormality. Abdomen pelvis:      No acute intra-abdominal abnormality         XR CHEST PORTABLE   Final Result   No acute process.                 LABS  Results for orders placed or performed during the hospital encounter of 12/22/20   CBC Auto Differential   Result Value Ref Range    WBC 11.3 (H) 4.0 - 11.0 K/uL    RBC 3.44 (L) 4.00 - 5.20 M/uL    Hemoglobin 11.1 (L) 12.0 - 16.0 g/dL    Hematocrit 33.3 (L) 36.0 - 48.0 %    MCV 96.9 80.0 - 100.0 fL    MCH 32.3 26.0 - 34.0 pg    MCHC 33.3 31.0 - 36.0 g/dL    RDW 13.6 12.4 - 15.4 %    Platelets 970 888 - 177 K/uL    MPV 6.7 5.0 - 10.5 fL    Neutrophils % 83.0 %    Lymphocytes % 8.2 %    Monocytes % 8.3 %    Eosinophils % 0.1 %    Basophils % 0.4 %    Neutrophils Absolute 9.4 (H) 1.7 - 7.7 K/uL    Lymphocytes Absolute 0.9 (L) 1.0 - 5.1 K/uL    Monocytes Absolute 0.9 0.0 - 1.3 K/uL    Eosinophils Absolute 0.0 0.0 - 0.6 K/uL    Basophils Absolute 0.0 0.0 - 0.2 K/uL   Comprehensive Metabolic Panel w/ Reflex to MG   Result Value Ref Range    Sodium 131 (L) 136 - 145 mmol/L    Potassium reflex Magnesium 3.9 3.5 - 5.1 mmol/L    Chloride 95 (L) 99 - 110 mmol/L    CO2 25 21 - 32 mmol/L    Anion Gap 11 3 - 16    Glucose 221 (H) 70 - 99 mg/dL    BUN 18 7 - 20 mg/dL    CREATININE 1.0 0.6 - 1.2 mg/dL    GFR Non- 53 (A) >60    GFR African American >60 >60    Calcium 9.5 8.3 - 10.6 mg/dL    Total Protein 7.2 6.4 - 8.2 g/dL Alb 4.2 3.4 - 5.0 g/dL    Albumin/Globulin Ratio 1.4 1.1 - 2.2    Total Bilirubin 0.5 0.0 - 1.0 mg/dL    Alkaline Phosphatase 82 40 - 129 U/L    ALT 7 (L) 10 - 40 U/L    AST 30 15 - 37 U/L    Globulin 3.0 g/dL   Lipase   Result Value Ref Range    Lipase 39.0 13.0 - 60.0 U/L   Urinalysis, reflex to microscopic   Result Value Ref Range    Color, UA Yellow Straw/Yellow    Clarity, UA Clear Clear    Glucose, Ur 250 (A) Negative mg/dL    Bilirubin Urine Negative Negative    Ketones, Urine Negative Negative mg/dL    Specific Gravity, UA 1.025 1.005 - 1.030    Blood, Urine MODERATE (A) Negative    pH, UA 6.0 5.0 - 8.0    Protein,  (A) Negative mg/dL    Urobilinogen, Urine 0.2 <2.0 E.U./dL    Nitrite, Urine Negative Negative    Leukocyte Esterase, Urine Negative Negative    Microscopic Examination YES     Urine Type NotGiven    Troponin   Result Value Ref Range    Troponin 0.31 (H) <0.01 ng/mL   Microscopic Urinalysis   Result Value Ref Range    Hyaline Casts, UA 0-2 0 - 2 /LPF    Mucus, UA Rare (A) None Seen /LPF    WBC, UA 0-2 0 - 5 /HPF    RBC, UA 11-20 (A) 0 - 4 /HPF    Epithelial Cells, UA 0-1 0 - 5 /HPF    Bacteria, UA 1+ (A) None Seen /HPF   Troponin   Result Value Ref Range    Troponin 0.41 (H) <0.01 ng/mL   COVID-19   Result Value Ref Range    SARS-CoV-2, NAAT Not Detected Not Detected   APTT   Result Value Ref Range    aPTT 28.5 24.2 - 36.2 sec   EKG 12 Lead   Result Value Ref Range    Ventricular Rate 104 BPM    Atrial Rate 104 BPM    P-R Interval 160 ms    QRS Duration 118 ms    Q-T Interval 310 ms    QTc Calculation (Bazett) 407 ms    P Axis 93 degrees    R Axis -51 degrees    T Axis 42 degrees    Diagnosis       Sinus tachycardia with frequent and consecutive Premature ventricular complexesLeft anterior fascicular blockAnterolateral infarct , possibly acute** ** ACUTE MI / STEMI ** **Abnormal ECGNo previous ECGs available   EKG 12 Lead   Result Value Ref Range    Ventricular Rate 116 BPM    Atrial Rate gave me permission to do so if it is recommended by cardiology. [SG]   [id number] I was able to get a hold of Dr. Brian Laird, cardiology, as both interventionalists are currently in the catheterization labs. He home he had access to his EKG from 837 this morning. I faxed him the new his EKG from 937 to him. Awaiting a phone call. He was not concerned about a STEMI from the EKG that he currently has access to    [SG]   1059 I was able to reach Dr. Saintclair Murdoch, interventional cardiology. We discussed the EKGs and lab work. He feels that the patient not likely to be a catheterization candidate. He then spoke to Dr. Brian Laird and then Dr. Saintclair Murdoch called me back and we have agreed that at this time the patient to be started on a heparin drip and kept here and do medical management.    [SG]      ED Course User Index  [SG] Gerhardt Belling, MD       I wore N95 Envo mask with filter protection, facial shield and gloves when I evaluated the patient. I evaluated the patient in room 08/08    I spoke to the patient's son, indicated that he was the decision making for his mother. At this time he reports that she is DNR however she is not DNI. He was not sure as to what happened this morning because his mother was with his son. He does however say that she \"not right\" for the last several days but he could not give me anything specific. He reports that the patient does have chronic back pain and that she is always complaining of back pain, he reports that the patient does have a stutter and that that is not new for her.]    I then spoke to the patient's grandson who was with the patient this morning. He says that the patient woke herself up this morning and she got up to go the bathroom he believes that she fell, it was not witnessed, the patient was able to tell her grandson that she did hit her head. He states that he got her up and after the toilet. And she \"did a business\".   And then she had an episode in which it looks like the first was second she was not breathing and she was not responding to him. He states that right after that she kept repeating the same word over and over again. I spoke with Rhiannon Srivastava NP. We thoroughly discussed the history, physical exam, laboratory and imaging studies, as well as, emergency department course. Based upon that discussion, we've decided to admit Debbie Dalton for further observation and evaluation of [place] altered mental status and NSTEMI.   As I have deemed necessary from their history, physical, and studies, I have considered and evaluated Debbie Dalton for the following diagnoses:  Differential Diagnosis: Acute Coronary Syndrome, Congestive Heart Failure, Thoracic Dissection, Pericarditis, Pericardial Effusion, Pulmonary Embolism, Pneumonia, Pneumothorax, Ischemic Bowel, Bowel Obstruction, PUD, GERD, Acute Cholecystitis, Pancreatitis, Hepatitis, Colitis,     Hypoxemia/ischemic encephalopathy, hepatic encephalopathy   Seizure or postictal state   Alterations of glucose such as hypoglycemia and hyperglycemia    Alterations in perfusions such as hypotension and hypoperfusion    Alterations in electrolytes such as disturbances in sodium or calcium   Infectious processes such as sepsis from a pneumonia or urinary tract infection    Substance use or withdrawal, especially alcohol and drugs    Medication adverse event or interaction    Vitamin deficiencies such as Wernicke's encephalopathy    CNS lesion, injury, infection (CVA, subdural hematoma, meningitis, encephalitis)    Alterations in hormones such as thyroid or adrenal abnormalities    Alterations in cardiac functioning such as arrhythmia, MI or CHF    Alteration in temperature such as hyperthermia or hypothermia    Dehydration, sleep deprivation   Change in medical regimen    Alteration in lifestyle, environment, or personal relationships        FINAL IMPRESSION  1. NSTEMI (non-ST elevated myocardial infarction) ([facility] [address] 2. Confusion    3. Elevated troponin    4. Chronic low back pain, unspecified back pain laterality, unspecified whether sciatica present        Vitals:  Blood pressure (!) 140/68, pulse 87, temperature 99.4 °F (37.4 °C), temperature source Axillary, resp. rate 24, height 5' 2\" (1.575 m), weight 99 lb 10.6 oz (45.2 kg), SpO2 97 %. Disposition  Please note, critical care time was at least 45 minutes, obtaining history, conducting a physical exam, performing and monitoring interventions, ordering, collecting and interpreting tests, and establishing medical decision-making and discussion with the patient and/or family, specifically for management of the presenting complaint and symptoms initially, direct bedside care, reevaluation, review of records, and consultation. There was a high probability of clinically significant life-threatening deterioration in the patient's condition, which required my urgent intervention. This time does not include separately billable procedures. Pt is in stable condition upon Admit to telemetry. The note was completed using Dragon voice recognition transcription. Every effort was made to ensure accuracy; however, inadvertent transcription errors may be present despite my best efforts to edit errors.     Rebecca Schmidt MD  [address] [place] [address]        Rebecca Schmidt MD  12/22/20 2646

## 2020-12-22 NOTE — PROGRESS NOTES
Patient admitted to room 326 from ED RM 8. Patient oriented to room, call light, bed rails, phone, lights and bathroom. Patient instructed about the schedule of the day including: vital sign frequency, lab draws, possible tests, frequency of MD and staff rounds, daily weights, I &O's and prescribed diet. Bed alarm in place, patient aware of placement and reason but confused to place time and situation. #22 Telemetry box in place, patient aware of placement and reason. Bed locked, in lowest position, side rails up 2/4, call light within reach. Recliner Assessment  Patient is not able to demonstrate the ability to move from a reclining position to an upright position within the recliner due to altered mental status. 4 Eyes Skin Assessment     The patient is being assess for   Admission    I agree that 2 RN's  Fortunato Pop and Rossi) have performed a thorough Head to Toe Skin Assessment on the patient. ALL assessment sites listed below have been assessed. Areas assessed by both nurses:   [x]   Head, Face, and Ears   [x]   Shoulders, Back, and Chest, Abdomen  [x]   Arms, Elbows, and Hands   [x]   Coccyx, Sacrum, and Ischium  [x]   Legs, Feet, and Heels        All skin warm, very dry, intact. Slight mir area redness. Scattered redness/bruising to bilateral wrists. **SHARE this note so that the co-signing nurse is able to place an eSignature**    Co-signer eSignature: {Esignature:[id number]}    Does the Patient have Skin Breakdown?   No          Xavier Prevention initiated:  Yes   Wound Care Orders initiated:  Yes      [address]  nurse consulted for Pressure Injury (Stage 3,4, Unstageable, DTI, NWPT, Complex wounds)and New or Established Ostomies:  NA      Primary Nurse eSignature: {Esignature:[id number]}

## 2020-12-22 NOTE — ED TRIAGE NOTES
Chief Complaint   Patient presents with    Altered Mental Status     EMS states pt from home for AMS, lives with family, pt resistive to care and unable to answer questions, hx of dementia, unsure of baseline, 83% on room air, 97% on NRB per EMS

## 2020-12-23 NOTE — PROGRESS NOTES
Bedside shift report given to [address] transferred. PTT 48. Benedetta Ou Heparin gtt increased to 6.3ml/hr and next PTT ordered for 2am.  Restraints remain secured loosely. Call light in patient's reach. telesitter in place for patient safety.

## 2020-12-23 NOTE — PROGRESS NOTES
Low dose heparin GTT:  APTT at 1831 is 48.4 seconds. Therapeutic range is 49-76 seconds. Will increase the infusion to 6.3. Hold the bolus dose. Repeat the aPTT at 0200 12/23.   Taylor Sexton PharmD 12/22/2020 7:18 PM

## 2020-12-23 NOTE — PROGRESS NOTES
12/23/20  0544   WBC 11.3* 7.3   HGB 11.1* 10.4*   HCT 33.3* 31.4*   MCV 96.9 99.6    213     BMP:   Recent Labs     12/22/20  0716 12/23/20  0544   * 131*   K 3.9 4.0   CL 95* 100   CO2 25 23   BUN 18 18   CREATININE 1.0 0.9     LIVER PROFILE:   Recent Labs     12/22/20  0716 12/23/20  0544   AST 30 34   ALT 7* 7*   LIPASE 39.0  --    BILIDIR  --  <0.2   BILITOT 0.5 0.6   ALKPHOS 82 70   Results for Cora Ulloa (MRN [id number]) as of 12/23/2020 08:40   Ref. Range 12/22/2020 15:42   Procalcitonin Latest Ref Range: 0.00 - 0.15 ng/mL 0.06     Results for Cora Ulloa (MRN [id number]) as of 12/23/2020 08:40   Ref. Range 12/22/2020 07:16 12/22/2020 09:10 12/22/2020 15:42 12/22/2020 18:28   Troponin Latest Ref Range: <0.01 ng/mL 0.31 (H) 0.41 (H) 0.52 (HH) 0.49 (H)   CULTURES  Blood: ordered  Rapid covid: neg  COVID PCR: ordered  ECG   14       Sinus bradycardiaLeft axis deviationAnteroseptal infarct (cited on or before 09-MAY-2008)T wave abnormality, consider inferolateral ischemiaAbnormal ECGWhen compared with ECG of 22-DEC-2020 09:38,Vent. rate has decreased BY  34 BPMT wave inversion now evident in Inferior leadsT wave inversion more evident in Anterolateral leadsConfirmed by Tiffany Gupta MD, [address] (1986) on 12/23/2020          CT HEAD WO CONTRAST   Final Result   Head:      No acute intracranial abnormality. Abdomen pelvis:      No acute intra-abdominal abnormality         CT ABDOMEN PELVIS W IV CONTRAST Additional Contrast? None   Final Result   Head:      No acute intracranial abnormality. Abdomen pelvis:      No acute intra-abdominal abnormality         XR CHEST PORTABLE   Final Result   No acute process. Pertinent previous results reviewed   Echo 3/2020  Left ventricular systolic function is normal. (LVEF>/=55%)  There is mild concentric left ventricular hypertrophy. Mild - moderate tricuspid regurgitation is present. The Aortic Valve leaflets appear sclerotic.   The low dose SSI     #Mild dementia  - typically awake, alert, oriented to self and situation     #MGUS  - f/b hem onc      #Mod MR   #Mod-severe AR  - f/b Dr Bhavna Ibrahim, echo 2020 reviewed above     #HTN  -lopressor started  - home meds need to be reconciled     #HLD  #Chronic iron def anemia      DVT Prophylaxis: Heparin   Diet: cardiac diet. Code Status: Limited     Discussed presentation and plan with her son, who stated understanding.   We also discussed code status, she is a limited code  Intubation: YES  Defib/cardioversion: YES  Chest compressions: NO  Resuscitative meds: YES       Elen Proctor MD 12/23/2020 8:48 AM

## 2020-12-23 NOTE — PROGRESS NOTES
Assisted pt up to [facility]. Pt tolerated very well.  telesitter remains at bedside.

## 2020-12-23 NOTE — CARE COORDINATION
INTERDISCIPLINARY PLAN OF CARE CONFERENCE    Date/Time: 12/23/2020 2:30 PM  Completed by: Anthony MADDEN, ELIGIOW. Case Management      Patient Name:  Sena Monzon  YOB: 1934  Admitting Diagnosis: NSTEMI (non-ST elevated myocardial infarction) [facility]) [I21.4]     Admit Date/Time:  12/22/2020  7:01 AM    Chart reviewed. Interdisciplinary team contacted or reviewed plan related to patient progress and discharge plans. Disciplines included Case Management, Nursing, and Dietitian. Current Status:ongoing. C19+  PT/OT recommendation for discharge plan of care: n/a    Expected D/C Disposition:  Home  Confirmed plan with patient and/or family Yes confirmed with: pt via phone call    Discharge Plan Comments: Chart review completed. Spoke with pt via phone call due to covid-19 isolation. She confirmed the plan is for her to return home on discharge. Inquired about possible HHC when discharge. She stated that she wants to think about [place] [phone] prior to making a decision. Explained CM would follow up with her prior to discharge and she stated understanding. Home O2 in place on admit: No    CM will follow for discharge needs. Please notify CM if needs or concerns arise.

## 2020-12-23 NOTE — CONSULTS
[address] [facility]  [phone]        Reason for Consultation/Chief Complaint: \"I have been asked to see her for abnormal EKG and increased troponin\"  She is now admitted with  NSTEMI  History of Present Illness:  Mahogany Bentley is a 80 y.o. patient who presented to the hospital with complaints of not doing well at home. She lives at home and care giver is her grand son. Patient was found on floor and had a blank look on her face. She was able to stand with his assistance. He helped her to the bath room. She was cold and shivering. She has dementia. No chest pain was reported. She was brought to ED and found to have elevated troponin,  Q waves in anteroseptal leads with some ST elevation. she was ruled out COVID with rapid test but PCR pending. I have been asked to provide consultation regarding further management and testing. Past Medical History:  She follows [facility] cardiology Dr. Sharan Farmer LOV 7/13/20. PMH: Dementia, iron deficiency anemia, HLD, HTN, AR, MR. Most recent ECHO 7/13/20  Normal EF moderate mitral regurgitation. Moderate to severe aortic regurgitation. has a past medical history of Facial twitching, Hair loss, Hyperlipidemia, Hypertension, and Seasonal allergies. Surgical History:   has a past surgical history that includes Ovary removal; Colonoscopy; Colonoscopy (6/22/15); Hip fracture surgery (Left); and hip surgery (Left, 7/30/2020). Social History:   reports that she has never smoked. She has never used smokeless tobacco. She reports that she does not drink alcohol or use drugs. Family History:  No cardiac disease reported in her family      Home Medications:  Were reviewed and are listed in nursing record. and/or listed below  Prior to Admission medications    Medication Sig Start Date End Date Taking?  Authorizing Provider   senna-docusate (PERICOLACE) 8.6-50 MG per tablet Take 2 tablets by mouth nightly    Historical Provider, MD   vitamin D (CHOLECALCIFEROL) 25 MCG (1000 UT) TABS tablet Take 1,000 Units by mouth daily    Historical Provider, MD   Loratadine (CLARITIN PO) Take 10 mg by mouth daily     Historical Provider, MD   ezetimibe (ZETIA) 10 MG tablet Take 10 mg by mouth daily. Historical Provider, MD   lisinopril (PRINIVIL;ZESTRIL) 20 MG tablet Take 20 mg by mouth daily. Historical Provider, MD        Allergies:  Quinidine, Aminoglycosides, Levofloxacin, Neomycin, and Simvastatin     Review of Systems: Due to the current efforts to prevent transmission of COVID-19 and also the need to preserve PPE for other caregivers, a face-to-face encounter with the patient was not performed. That being said, all relevant records and diagnostic tests were reviewed, including laboratory results and imaging. Please reference any relevant documentation elsewhere. Care will be coordinated with the primary service.   Physical Examination:    Vitals:    12/23/20 0251   BP: 124/61   Pulse: 62   Resp: 16   Temp: 98.4 °F (36.9 °C)   SpO2: 97%    Weight: 115 lb 3.2 oz (52.3 kg)         General Appearance:     Head:     Eyes:         Nose:    Throat:    Neck:        Lungs:      Chest Wall:     Heart:     Abdomen:              Extremities:    Pulses:    Skin:    Pysch:    Neurologic:         Labs  CBC:   Lab Results   Component Value Date    WBC 11.3 12/22/2020    RBC 3.44 12/22/2020    HGB 11.1 12/22/2020    HCT 33.3 12/22/2020    MCV 96.9 12/22/2020    RDW 13.6 12/22/2020     12/22/2020     CMP:    Lab Results   Component Value Date     12/22/2020    K 3.9 12/22/2020    CL 95 12/22/2020    CO2 25 12/22/2020    BUN 18 12/22/2020    CREATININE 1.0 12/22/2020    GFRAA >60 12/22/2020    GFRAA >60 01/29/2012    AGRATIO 1.4 12/22/2020    LABGLOM 53 12/22/2020    GLUCOSE 221 12/22/2020    PROT 7.2 12/22/2020    PROT 7.0 01/29/2012    CALCIUM 9.5 12/22/2020    BILITOT 0.5 12/22/2020    ALKPHOS 82 12/22/2020    AST 30 12/22/2020    ALT 7 12/22/2020 PT/INR:  No results found for: PTINR  Lab Results   Component Value Date    CKTOTAL 73 07/29/2020    TROPONINI 0.49 (H) 12/22/2020       I have reviewed the following tests and documented in this encounter as follows:     EKG 12/22/20   Sinus rhythm with marked sinus arrhythmiaLeft axis deviation Intra-ventricular conduction delayAnteroseptal infarct (cited on or before 22-DEC-2020)age undeterminedAbnormal ECGWhen compared with ECG of 22-DEC-2020 09:37, (unconfirmed)Premature ventricular complexes are no longer PresentConfirmed by Nhi Schmid MD, [address] [employer] (1986) on 12/22/2020 5:09:28 PM    EKG 12/22/20   afib controlled ventricular response Left axis deviationLeft bundle branch blockAbnormal ECGWhen compared with ECG of 22-DEC-2020 08:48, (unconfirmed)afib and LBBB   is now PresentCriteria for Anteroseptal infarct are no longer PresentConfirmed by Nhi Schmid MD, [address] [employer] (1986) on 12/22/2020 5:07:58 PM    EKG 12/22/20  Sinus tachycardia with frequent and consecutive Premature ventricular complexesLeft anterior fascicular block Intra-ventricular conduction delayAnterolateral infarct , possibly acuteage undetermined. Abnormal ECGprevious EKG of 7.29.20 had Q waves in V1 V2 and Poor R wave progression. Confirmed by Nhi Schmid MD, [address] (1986) on 12/22/2020 5:04:43 PM    CT head 12/22/20  No acute intracranial abnormality. CT abdomen 12/22/20   No acute intracranial abnormality. CXR 12/23/20   No acute process. ECHO 3/25/20  Summary:  Left ventricular systolic function is normal. (LVEF>/=55%)  There is mild concentric left ventricular hypertrophy. Mild - moderate tricuspid regurgitation is present. The Aortic Valve leaflets appear sclerotic. The mitral valve leaflets are mildly thickened in appearance. There is moderate mitral regurgitation. The mitral regurgitant jet is posteriorly directed, which is consistent with  anterior leaflet pathology. MR may be underesimated due to the eccentricity.  Consider ARCADIO if clinically  indicated. Overall left ventricular ejection fraction is estimated to be 55-60%. The left ventricular wall motion is normal.  Moderate to severe aortic regurgitation. Would consider a transesophageal echocardiogram if clinically indicated. ECHO 1/8/19  Summary:  Overall left ventricular ejection fraction is estimated to be 55-60%. The left ventricular wall motion is normal.  There is mild mitral regurgitation. Mild tricuspid regurgitation is present. The left atrium is dilated. Moderate aortic regurgitation. There is mild concentric left ventricular hypertrophy. ECHO 7/9/18  Summary:  Overall left ventricular ejection fraction is estimated to be 70-75%. The left atrium is mildly dilated. Mild tricuspid regurgitation is present. The estimated right ventricular systolic pressure is consistent with  mild pulmonary hypertension. There is borderline concentric left ventricular hypertrophy. Trace pericardial effusion. The jet is centrally directed. The pulmonary artery is not well visualized, but is probably normal  size. There is mild to moderate mitral regurgitation. Moderate to severe aortic regurgitation. The left ventricular function is hyperdynamic. The left ventricular wall motion is normal.    ECHO 4/13/18  Summary:  The left ventricular function is normal.  Overall left ventricular ejection fraction is estimated to be 55-60%. Septal motion is consistent with conduction abnormality. The Aortic Valve is mildly calcified. Mild aortic regurgitation. There is mild mitral regurgitation. The diastolic function is impaired and classified as Grade 1  (impaired relaxation).     ECHO 5/27/11  Summary:     The left atrium is mildly dilated.     There is mild mitral regurgitation.     Ejection Fraction 50-55%.     Wall thickness at upper limits of normal.     The Aortic Valve leaflets appear sclerotic.     Moderate aortic regurgitation.     No hemodynamically significant valvular errors may be present.

## 2020-12-23 NOTE — PROGRESS NOTES
PM assessment completed, see flow sheet. Pt is alert and oriented to name and birthday only. Respirations are even & easy at rest. Removed wrist restraints and performed ROM, pt started to pull at wires while restraints were off for a small amount of time. Small bruise noted to wrist. No complaints voiced. Pt denies needs at this time. SR up x 2, and bed in low position. Call light is within reach.

## 2020-12-23 NOTE — PROGRESS NOTES
Discontinued wrist restraints at this time. Pt cooperative and calm. Pt is following direction. Will monitor closely. telesitter remains in place.

## 2020-12-23 NOTE — PLAN OF CARE
Problem: Restraint Use - Nonviolent/Non-Self-Destructive Behavior:  Goal: Absence of restraint indications  Description: Absence of restraint indications  Outcome: Ongoing  Note: Continues to need restraints as pulling at devices  Goal: Absence of restraint-related injury  Description: Absence of restraint-related injury  Outcome: Ongoing     Problem: Falls - Risk of:  Goal: Will remain free from falls  Description: Will remain free from falls  Outcome: Ongoing  Note: Free of falls this shift  Goal: Absence of physical injury  Description: Absence of physical injury  Outcome: Ongoing

## 2020-12-24 NOTE — PROGRESS NOTES
11.1* 10.4* 10.3*   HCT 33.3* 31.4* 30.6*   MCV 96.9 99.6 96.3    213 225     BMP:   Recent Labs     12/22/20  0716 12/23/20  0544 12/24/20  0953   * 131* 139   K 3.9 4.0 4.1   CL 95* 100 106   CO2 25 23 24   BUN 18 18 22*   CREATININE 1.0 0.9 1.1     LIVER PROFILE:   Recent Labs     12/22/20  0716 12/23/20  0544   AST 30 34   ALT 7* 7*   LIPASE 39.0  --    BILIDIR  --  <0.2   BILITOT 0.5 0.6   ALKPHOS 82 70   Results for Gonzalez Webb (MRN [id number]) as of 12/23/2020 08:40   Ref. Range 12/22/2020 15:42   Procalcitonin Latest Ref Range: 0.00 - 0.15 ng/mL 0.06     Results for Gonzalez Webb (MRN [id number]) as of 12/23/2020 08:40   Ref. Range 12/22/2020 07:16 12/22/2020 09:10 12/22/2020 15:42 12/22/2020 18:28   Troponin Latest Ref Range: <0.01 ng/mL 0.31 (H) 0.41 (H) 0.52 (HH) 0.49 (H)     CULTURES  Blood: NG  Rapid covid: neg  COVID PCR: NEG    CT HEAD WO CONTRAST   Final Result   Head:      No acute intracranial abnormality. Abdomen pelvis:      No acute intra-abdominal abnormality         CT ABDOMEN PELVIS W IV CONTRAST Additional Contrast? None   Final Result   Head:      No acute intracranial abnormality. Abdomen pelvis:      No acute intra-abdominal abnormality         XR CHEST PORTABLE   Final Result   No acute process. Pertinent previous results reviewed   Echo 3/2020  Left ventricular systolic function is normal. (LVEF>/=55%)  There is mild concentric left ventricular hypertrophy. Mild - moderate tricuspid regurgitation is present. The Aortic Valve leaflets appear sclerotic. The mitral valve leaflets are mildly thickened in appearance. There is moderate mitral regurgitation. The mitral regurgitant jet is posteriorly directed, which is consistent with  anterior leaflet pathology. MR may be underesimated due to the eccentricity. Consider ARCADIO if clinically  indicated. Overall left ventricular ejection fraction is estimated to be 55-60%.   The left ventricular wall motion is normal.  Moderate to severe aortic regurgitation. Would consider a transesophageal echocardiogram if clinically indicated.       Assessment/Plan:    #Acute metabolic encephalopathy - resolved. - pt with underlying dementia, typically oriented to self, place, situation. Currently oriented x 1- mildly agitated, required soft wrist restraints in ER  - CT head neg, nonfocal exam  - Suspect cardiac issues and fever playing a role  - monitor for improvement with treatment as below      #Elevated troponin  #Abnormal EKG with ST changes concerning for STEMI versus NSTEMI. Cardiology feels it is NSTEMI  #Cardiomyopathy- suspect ischemic  - pt presents with AMS, elevated trop, abnormal EKG  - Trop consistent with MI  - ECHO reviewed- low EF- appreciate cardio recs   - no pain  - Discussed with cardiology- not a candidate for immediate cardiac catheterization  - sp heparin now off. Med manage: ASA, plavix, statin, lisinopril, toprol XL    #Febrile illness   - unclear etiology  - CXR, CT abd, UA unrevealing  - COVID PCR neg   - checked LA, which is negative, PCT-normal, blood cx-NG  - hold off on abx for now      #Hyperglycemia  - low dose SSI     #Mild dementia  - typically awake, alert, oriented to self and situation- back to baseline now      #MGUS  - f/b hem onc      #Mod MR   #Mod-severe AR  - f/b Dr Marizol Vasquez, echo 2020 reviewed above     #HTN  -lopressor started  - home meds need to be reconciled     #HLD  #Chronic iron def anemia      #DEBILITY  - needs SNF, family cannot provide 24/7 care. CM following for DC to SNF    DVT Prophylaxis: Heparin   Diet: cardiac diet. Code Status: Limited     Discussed presentation and plan with her son, who stated understanding.   We also discussed code status, she is a limited code  Intubation: YES  Defib/cardioversion: YES  Chest compressions: NO  Resuscitative meds: YES     DISPO: DC planning to SNF, CM following     Yahir Pinzon PA-C  12/24/2020 4:46 PM

## 2020-12-24 NOTE — PROGRESS NOTES
Inpatient Occupational Therapy  Evaluation and Treatment    Unit: PCU  Date:  12/24/2020  Patient Name:    Jose David Yoon  Admitting diagnosis:  NSTEMI (non-ST elevated myocardial infarction) [facility]) [I21.4]  Admit Date:  12/22/2020  Precautions/Restrictions/WB Status/ Lines/ Wounds/ Oxygen: Fall risk, Bed/chair alarm, Confusion and Telemetry, tele sitter     Treatment Time:  8457-5514   Treatment Number: 1   Timed code treatment minutes 32 minutes   Total Treatment minutes:   42   minutes    Patient Goals for Therapy:  \" go home  \"      Discharge Recommendations: Home 24 hr supervision  and Home OT  DME needs for discharge: Needs Met       Therapy recommendations for staff:   Supervision with use of rolling walker (RW) for all ambulation to/from chair  to/from bathroom    History of Present Illness:   80 y.o. female with hypertension, mitral valve regurgitation, aortic valve insufficiency, hyperlipidemia, MGUS, dementia, iron def anemia who presented to [facility] ED via EMS for AMS. Patient cannot provide any history. I spoke to the patient's grandson who is with the patient prior to being taken to the ER. He states he lives with the patient. He states earlier this morning he found the patient lying on the ground. She was unable to tell him if she fell or hit her head. She was able to stand any helped her walk to the bathroom. She states after she went to the bathroom she was not acting quite \"right\". He states she seemed cold and she was shivering. He states that she was sitting on the couch and staring off into space, not responding to him, and he was concerned that she had stopped breathing. He states he was about to start CPR when she was clearly breathing and responded to him but was confused. He called 911 for transport to the ER.     Home Health S4 Level Recommendation:  Level 1 Standard  AM-PAC Score: 21    Preadmission Environment    Pt is questionable historian, information is from pt and CM note on Supervision with RW   Standard toilet: Not Tested  Bed to [facility]:  Supervision    Dressing:      UE:   Not Tested  LE:    Supervision with instruction for safety awareness to sit to take underwear and pants off feet     Bathing:    UE:  IND   LE:  Not Tested    Eating:   Not Tested    Toileting:  Supervision and SBA    Activity Tolerance   Pt completed therapy session with decreased safety awareness , decreased balance   Spo2 in the nineties , HR    Positioning Needs:   Pt up in chair, alarm set, positioned in proper neutral alignment and pressure relief provided. Exercise / Activities Initiated:   N/A    Patient/Family Education:   Role of OT    Assessment of Deficits: Pt seen for Occupational therapy evaluation in acute care setting. Pt demonstrated decreased Activity tolerance, ADLs, Balance , Safety Awareness and Transfers. Pt functioning below baseline and will likely benefit from skilled occupational therapy services to maximize safety and independence. Goal(s) : To be met in 3 Visits:  1). Bed to toilet/BSC: Modified Independent    To be met in 5 Visits:  1). Supine to/from Sit:  Independent  2). Upper Body Bathing:   Independent  3). Lower Body Bathing:   Supervision  4). Upper Body Dressing:  Modified Independent  5). Lower Body Dressing:  Modified Independent  6). Pt to demonstrate UE exs x 15 reps with minimal cues    Rehabilitation Potential:  Good for goals listed above. Strengths for achieving goals include: Pt cooperative  Barriers to achieving goals include:  Complexity of condition     Plan: To be seen 2-3 x/wk  while in acute care setting for therapeutic exercises, bed mobility, transfers, dressing, bathing, family/patient education, ADL/IADL retraining, energy conservation training.      Gerard Tiwari OTR/L [id number]          If patient discharges from this facility prior to next visit, this note will serve as the Discharge Summary

## 2020-12-24 NOTE — PROGRESS NOTES
Patient assessment as per flowsheet. Assisted OOB to [facility]. Voiding qs. No BM. IVF continue at 50ml/hr.

## 2020-12-24 NOTE — FLOWSHEET NOTE
12/23/20 2015   Vital Signs   Temp 100.2 °F (37.9 °C)   Temp Source Oral   Pulse 83   Heart Rate Source Monitor   Resp 16   BP (!) 145/72   Level of Consciousness Alert (0)   MEWS Score 1   Pain Assessment   Pain Assessment 0-10   Pain Level 0   Oxygen Therapy   SpO2 93 %   O2 Device None (Room air)   Pt. Resting in bed. Call light in reach. Shift assessment completed see flow sheet. Denies any needs at this time. Will continue to monitor.

## 2020-12-24 NOTE — CARE COORDINATION
CM spoke with pt's son Naheed Brown r/t possible d/c today and need for 24/7 assistance at home. Naheed Brown states he will contact his son that lives with the pt in order to confirm 24/7 care and let CM know. 1502 CIARRA unable to reach pt's grandson after multiple attempts via TC.     26 Writer spoke with Andrea,pt's son and he states would like EGS for STR as pt's grandson works part-time and family cannot provide 24/7 care. Referral called to [facility]. Follow.

## 2020-12-24 NOTE — PROGRESS NOTES
breathing. He states he was about to start CPR when she was clearly breathing and responded to him but was confused. He called 911 for transport to the ER. In the emergency department she was confused and could not provide any history. Her ER work-up revealed an elevated troponin to 0.31 and an abnormal EKG with ST changes. Case discussed with cardiology both interventionalist as well as [facility] cardiologist- no plan for emergent cath- treat medically for NSTEMI  Initially afebrile, repeat temp 101.7F. Chest x-ray negative, CT abdomen negative, urinalysis not consistent with infection. Rapid COVID negative  COVID PCR negative 12/23/20    Home Health S4 Level Recommendation:  NA  AM-PAC Mobility Score    AM-PAC Inpatient Mobility Raw Score : 21       Preadmission Environment  Pt is questionable historian, information is from pt and CM note on 12/22/20 and PT/OT evaluation on 7/31/20. Pt. Lives with family (grandson - works part time)  Home environment:  one story home with basement   Laundry on first floor  Steps to enter first floor: 2 steps to enter and hand rail unilateral  Steps to basement: Full flight of 12-13  Bathroom: tub/shower unit and standard height commode (able to push up from sink and tub)   Pt does sponge baths at sink (sits in shower chair)  Equipment owned: walker (per CM note), per pt - RW, wheelchair, reacher  Pt sleeps in flat bed     Preadmission Status:  Pt. Able to drive: No - family drives  Pt Fully independent with ADLs: Yes  Pt.  Required assistance from family for: Cleaning, Cooking and Laundry     Sometimes requires assistance for donning socks/shoes   Shares cooking, cleaning, laundry with grandson  Pt. independent for transfers and gait and walked holding onto furniture   States she ambulates with walker when feeling tired  History of falls Yes  - grandson found pt down on ground prior to admission, pt reports several other falls     Pain   No    Cognition    A&O Person (off on her birth year, \"[birth year]\"), \"Chritmas\" able to get the correct year with cues, \"5358\" then \"2021\"  Pt pleasantly confused  Able to follow 1 step commands    Subjective  Patient lying supine in bed with no family present. Pt agreeable to this PT eval & tx. Upper Extremity ROM/Strength  Please see OT evaluation. Lower Extremity ROM / Strength   AROM WFL: Yes    Formal strength testing deferred due to pt request to use BSC.  and BLE strength WFL, but not formally assessed with MMT. B LEs grossly at least 3/5 as observed through functional mobility. Lower Extremity Sensation    WNL    Lower Extremity Proprioception:   WNL    Coordination and Tone  WNL    Balance  Sitting:  Good ; Independent  Comments: for static and dynamic sitting tasks at EOB without UE support    Standing: Good ; Supervision  Comments: 5 minutes at sink with intermittent UE support for bushing teeth, washing face, etc.    Bed Mobility   Supine to Sit:    Modified Independent  Sit to Supine:   Independent  Rolling:   Not Tested  Scooting in sitting: Independent  Scooting in supine:  Not Tested    Transfer Training     Sit to stand:   Independent  Stand to sit:    Independent  Bed to Chair:   SBA with use of gait belt    Gait gait completed as indicated below  Distance:      200 ft (added RW after 20 ft)  Deviations (firm surface/linoleum):  narrow MARLEE, staggers, decreased step length bilaterally and fast tasneem  Assistive Device Used:    gait belt + RW  Level of Assist:    CGA-Min A without AD; supervision with RW  Comment: pt's balance was improved with use of RW, pt does continue to have decreased safety awareness and runs into objects occasionally with the walker but she is able to then navigate around the object    Stair Training deferred, pt unsafe/ not appropriate to complete stairs at this time    Activity Tolerance   Pt completed therapy session with SOB noted with ambulation   SpO2: >90% on RA throughout session  HR: 90-120s throughout session    Positioning Needs   Pt up in chair, alarm set, positioned in proper neutral alignment and pressure relief provided. Call light provided and all needs within reach   Telesitter in room. Exercises Initiated  Balwinder deferred secondary to treatment focus on functional mobility  NA    Other  See OT note for assist with lower body dressing and hygiene. Patient/Family Education   Pt educated on role of inpatient PT, POC, importance of continued activity, DC recommendations, safety awareness, transfer techniques, pacing activity and calling for assist with mobility. Assessment  Pt seen for Physical Therapy evaluation in acute care setting. Pt demonstrated decreased Balance as well as decreased independence with Ambulation and Transfers. Pt's balance with transfers and ambulation was improved with use of RW. Pt's primary limitation appears to be difficulty with memory recall and poor safety awareness. Pt will need 24/7 supervision at discharge for safety cues (such as reminding her to use her walker). Recommending Home 24 hr supervision upon discharge as patient functioning close to baseline level and would benefit from continued therapy services    Goals : To be met in 3 visits:  1). Independent with LE Ex x 10 reps    To be met in 6 visits:  3). Bed to chair: Modified Independent with RW  4). Gait: Ambulate 150 ft.  with  Modified Independent and use of rolling walker (RW)  5). Tolerate B LE exercises 3 sets of 10-15 reps  6).   Ascend/descend 2 steps with SBA with use of hand rail unilateral and LRAD (least restrictive assistive device)    Rehabilitation Potential: Good  Strengths for achieving goals include:   Pt motivated, PLOF, Family Support and Pt cooperative   Barriers to achieving goals include:    Difficulty with memory recall    Plan    To be seen for 1-2 visits  while in acute care setting for therapeutic exercises, bed mobility, transfers, progressive gait

## 2020-12-24 NOTE — PLAN OF CARE
Problem: Restraint Use - Nonviolent/Non-Self-Destructive Behavior:  Goal: Absence of restraint indications  Description: Absence of restraint indications  Outcome: Ongoing  Goal: Absence of restraint-related injury  Description: Absence of restraint-related injury  Outcome: Ongoing     Problem: Falls - Risk of:  Goal: Will remain free from falls  Description: Will remain free from falls  Outcome: Ongoing  Goal: Absence of physical injury  Description: Absence of physical injury  Outcome: Ongoing     Problem: Skin Integrity:  Goal: Will show no infection signs and symptoms  Description: Will show no infection signs and symptoms  Outcome: Ongoing  Goal: Absence of new skin breakdown  Description: Absence of new skin breakdown  Outcome: Ongoing

## 2020-12-24 NOTE — PROGRESS NOTES
Patient remains confused to time and situation, oriented to person and place. Fairly cooperative w/ care. telesitter remains in place for patient safety. Call light in patient's reach.

## 2020-12-24 NOTE — PROGRESS NOTES
[facility] Daily Progress Note      Admit Date:  12/22/2020    Subjective:  Ms. Brooklyn Marr is being seen today for f/u anterior infarct. She feels fine today and denies any specific complaints.  Tele shows NSR    Objective:   BP (!) 141/62   Pulse 62   Temp 99.6 °F (37.6 °C) (Oral)   Resp 18   Ht 5' 2\" (1.575 m) Comment: Ht 5' 2\" (1.575 m) from previous encounter  Wt 116 lb 12.8 oz (53 kg)   SpO2 92%   BMI 21.36 kg/m²       Intake/Output Summary (Last 24 hours) at 12/24/2020 0736  Last data filed at 12/24/2020 0342  Gross per 24 hour   Intake 1558.96 ml   Output 1025 ml   Net 533.96 ml       TELEMETRY: Sinus     Physical Exam:  General:  Awake, alert, NAD  Skin:  Warm and dry  Neck:  JVD none noted  Chest: +soft crackles left base  Cardiovascular:  RRR S1S2  Abdomen:  Soft NT  Extremities:  no edema    Medications:    clopidogrel  75 mg Oral Daily    enoxaparin  40 mg Subcutaneous Daily    sodium chloride flush  10 mL Intravenous 2 times per day    aspirin  81 mg Oral Daily    metoprolol tartrate  25 mg Oral BID    insulin lispro  0-6 Units Subcutaneous TID WC    insulin lispro  0-3 Units Subcutaneous Nightly    atorvastatin  80 mg Oral Nightly      sodium chloride 50 mL/hr at 12/23/20 1408    dextrose       sodium chloride flush, promethazine **OR** ondansetron, acetaminophen **OR** acetaminophen, polyethylene glycol, nitroGLYCERIN, glucose, dextrose, glucagon (rDNA), dextrose    Lab Data:  CBC:   Recent Labs     12/22/20  0716 12/23/20  0544   WBC 11.3* 7.3   HGB 11.1* 10.4*   HCT 33.3* 31.4*   MCV 96.9 99.6    213     BMP:   Recent Labs     12/22/20  0716 12/23/20  0544   * 131*   K 3.9 4.0   CL 95* 100   CO2 25 23   BUN 18 18   CREATININE 1.0 0.9     LIVER PROFILE:   Recent Labs     12/22/20  0716 12/23/20  0544   AST 30 34   ALT 7* 7*   LIPASE 39.0  --    BILIDIR  --  <0.2   BILITOT 0.5 0.6   ALKPHOS 82 70     PT/INR: No results for input(s): PROTIME, INR in the last 72 hours. APTT:   Recent Labs     12/22/20  1115 12/22/20  1831 12/23/20  0207   APTT 28.5 48.4* 36.7*     CT abdomen 12/22/20   No acute intracranial abnormality.      CXR 12/23/20   No acute process.      ECHO 3/25/20 Summary:  Left ventricular systolic function is normal. (LVEF>/=55%)  There is mild concentric left ventricular hypertrophy. Mild - moderate tricuspid regurgitation is present. The Aortic Valve leaflets appear sclerotic. The mitral valve leaflets are mildly thickened in appearance. There is moderate mitral regurgitation. The mitral regurgitant jet is posteriorly directed, which is consistent with  anterior leaflet pathology. MR may be underesimated due to the eccentricity. Consider ARCADIO if clinically indicated. Overall left ventricular ejection fraction is estimated to be 55-60%. The left ventricular wall motion is normal.  Moderate to severe aortic regurgitation. Would consider a transesophageal echocardiogram if clinically indicated. ECHO 12/24/20 Summary   Left ventricular systolic function is moderately reduced with a visually   estimated ejection fraction of 30-35%. There is moderate hypokinesis of the apical and mid anterolateral,   inferoseptal, apical anterior and inferior, anteroseptal, apical lateral walls. Grade II diastolic dysfunction with elevated LV pressure. The left atrium is moderately dilated. Mild to moderate mitral regurgitation. Moderate to severe aortic regurgitation. Mild pulmonic regurgitation. Mild to moderate tricuspid regurgitation. Systolic pulmonary artery pressure (SPAP) is elevated and estimated at 52   mmHg (right atrial pressure 3 mmHg) consistent with moderate pulmonary hypertension. Assessment:    1. Anterior infarct:   -EKG changes showing deep Q waves and ST elevation   -?  Recent STEMI vs old infarct prior and now with NSTEMI; Peak Kendra=0.52   -found down at home and dementia makes history difficult   -clinically stable with no arrhythmias on tele and no evidence of angina or CHF   -conservative med mgt given elderly age, dementia, and clinical stability   -continue baby aspirin qd, plavix 75mg qd, lipitor 80mg qd    2. Ischemic CM:   -ECHO 12/24/20 with EF=35%; moderate HK in LAD territory; grade II DD with elevated filling  pressure; mod-severe AI; mild-mod MR/TR; SPAP=52mmHg   -findings c/w ischemic CM given wall motion abnl and EF depressed in setting of elevated Kendra/EKG   -change to Toprol XL 25mg daily   -start lisinopril 5mg daily for LV dysfunction and CAD/MI history    OK for d/c from cardiac standpoint when felt OK per IM team. PT/OT to evaluate for safety at home. Will arrange f/u in office and signing off. Please call if further issues or side effects of cardiac meds.        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

## 2020-12-24 NOTE — PROGRESS NOTES
Patient assessment as per flowsheet    -  Alert, oriented to self only. Aware she is in hospital but disoriented to time and situation. States her brother is behind her in the room and won't talk to her. Cooperative w/ care. IVF continue at 50ml/hr. Call light in patient's reach.

## 2020-12-25 NOTE — CARE COORDINATION
Per [facility] at [facility] can accept pt for rehab on sat . Will need rapid covid prior to admit.  Done on  12/25

## 2020-12-25 NOTE — PROGRESS NOTES
Progress Note    Admit Date:  12/22/2020    Subjective:  Ms. Sunday Fothergill is pleasantly confused. Denies any concerns at present. Objective:   /65   Pulse 80   Temp 98.6 °F (37 °C) (Oral)   Resp 18   Ht 5' 2\" (1.575 m) Comment: Ht 5' 2\" (1.575 m) from previous encounter  Wt 117 lb 1.6 oz (53.1 kg)   SpO2 98%   BMI 21.42 kg/m²          Intake/Output Summary (Last 24 hours) at 12/25/2020 1052  Last data filed at 12/25/2020 0735  Gross per 24 hour   Intake 860 ml   Output 1500 ml   Net -640 ml       Physical Exam:  General appearance: alert, appears stated age and cooperative  Head: Normocephalic, without obvious abnormality, atraumatic  Eyes: conjunctivae/corneas clear.   Lungs: clear to auscultation bilaterally  Heart: regular rate and rhythm, S1, S2 normal, + murmur, click, rub or gallop  Abdomen: soft, non-tender; bowel sounds normal  Extremities: extremities normal, atraumatic, no cyanosis or edema  Pulses: 2+ and symmetric  Skin: Skin color, texture, turgor normal. No rashes or lesions  Neurologic: Grossly normal    Scheduled Meds:   metoprolol succinate  25 mg Oral Daily    lisinopril  5 mg Oral Daily    clopidogrel  75 mg Oral Daily    enoxaparin  40 mg Subcutaneous Daily    sodium chloride flush  10 mL Intravenous 2 times per day    aspirin  81 mg Oral Daily    insulin lispro  0-6 Units Subcutaneous TID WC    insulin lispro  0-3 Units Subcutaneous Nightly    atorvastatin  80 mg Oral Nightly       Continuous Infusions:   dextrose         PRN Meds:  sodium chloride flush, promethazine **OR** ondansetron, acetaminophen **OR** acetaminophen, polyethylene glycol, nitroGLYCERIN, glucose, dextrose, glucagon (rDNA), dextrose      Data:  CBC:   Recent Labs     12/23/20  0544 12/24/20  0953 12/25/20  0520   WBC 7.3 7.9 6.1   HGB 10.4* 10.3* 10.3*   HCT 31.4* 30.6* 30.5*   MCV 99.6 96.3 97.6    225 220     BMP:   Recent Labs     12/23/20  0544 12/24/20  0953 12/25/20  0520   * 139 137 K 4.0 4.1 3.9    106 105   CO2 23 24 22   BUN 18 22* 19   CREATININE 0.9 1.1 1.0     LIVER PROFILE:   Recent Labs     12/23/20  0544   AST 34   ALT 7*   BILIDIR <0.2   BILITOT 0.6   ALKPHOS 70   Results for Santi Moreira (MRN [id number]) as of 12/23/2020 08:40   Ref. Range 12/22/2020 15:42   Procalcitonin Latest Ref Range: 0.00 - 0.15 ng/mL 0.06     Results for Santi Moreira (MRN [id number]) as of 12/23/2020 08:40   Ref. Range 12/22/2020 07:16 12/22/2020 09:10 12/22/2020 15:42 12/22/2020 18:28   Troponin Latest Ref Range: <0.01 ng/mL 0.31 (H) 0.41 (H) 0.52 (HH) 0.49 (H)     CULTURES  Blood: NG  Rapid covid: neg  COVID PCR: NEG    CT HEAD WO CONTRAST   Final Result   Head:      No acute intracranial abnormality. Abdomen pelvis:      No acute intra-abdominal abnormality         CT ABDOMEN PELVIS W IV CONTRAST Additional Contrast? None   Final Result   Head:      No acute intracranial abnormality. Abdomen pelvis:      No acute intra-abdominal abnormality         XR CHEST PORTABLE   Final Result   No acute process. Pertinent previous results reviewed   Echo 3/2020  Left ventricular systolic function is normal. (LVEF>/=55%)  There is mild concentric left ventricular hypertrophy. Mild - moderate tricuspid regurgitation is present. The Aortic Valve leaflets appear sclerotic. The mitral valve leaflets are mildly thickened in appearance. There is moderate mitral regurgitation. The mitral regurgitant jet is posteriorly directed, which is consistent with  anterior leaflet pathology. MR may be underesimated due to the eccentricity. Consider ARCADIO if clinically  indicated. Overall left ventricular ejection fraction is estimated to be 55-60%. The left ventricular wall motion is normal.  Moderate to severe aortic regurgitation.   Would consider a transesophageal echocardiogram if clinically indicated.       Assessment/Plan:    #Acute metabolic encephalopathy - resolved  - pt with underlying dementia, typically oriented to self, place, situation. Currently oriented x 1- mildly agitated, required soft wrist restraints in ER  - CT head neg, nonfocal exam  - Suspected cardiac issues and fever playing a role  - monitor for improvement with treatment as below   - at baseline, oriented to place, self and time with intermittent confusion     #Elevated troponin  #Abnormal EKG with ST changes concerning for STEMI versus NSTEMI. Cardiology feels it is NSTEMI  #Cardiomyopathy- suspect ischemic  - presented w/ AMS, elevated trop, abnormal EKG  - Trop consistent with MI  - ECHO reviewed- low EF- appreciate cardio recs   - no pain  - Discussed with cardiology- not a candidate for immediate cardiac catheterization  - sp heparin > now off  -  Med manage: ASA, plavix, statin, lisinopril, toprol XL    #Febrile illness - Resolved  - unclear etiology  - CXR, CT abd, UA unrevealing  - COVID PCR neg   - checked LA, which is negative, PCT-normal, blood cx-NG  - held off on abx, no recurrent fevers on 12/24 or 12/25     #Hyperglycemia - Resolved  - low dose SSI     #Mild dementia  - typically awake, alert, oriented to self and situation - back to baseline      #MGUS  - f/b hem onc      #Mod MR   #Mod-severe AR  - f/b Dr Preeti Payne, echo 2020 reviewed above     #HTN  - BP well controlled  - lopressor started  - home meds need to be reconciled     #HLD  #Chronic iron def anemia      #DEBILITY  - needs SNF, family cannot provide 24/7 care. - CM following for DC to SNF  - discharge planning    DVT Prophylaxis: Heparin   Diet: cardiac diet. Code Status: Limited     Discussed presentation and plan with her son, who stated understanding.   We also discussed code status, she is a limited code  Intubation: YES  Defib/cardioversion: YES  Chest compressions: NO  Resuscitative meds: YES    Labs stable from 12/25, will hold off on ordering labs for tomorrow     DISPO: DC planning to SNF, CM following - with plans to call [facility] today    Lucy Spencer PA-C 10:56 AM 12/25/2020     Addendum: [facility] able to accept patient tomorrow, will need rapid COVID completed prior - rapid COVID ordered - plan for discharge tomorrow    Lucy Spencer PA-C 11:53 AM 12/25/2020

## 2020-12-25 NOTE — PROGRESS NOTES
IVF discontinued per order. Patient conversing appropriately but still maintains confusion. Cooperative w/ care. Call light in patient's reach.

## 2020-12-25 NOTE — FLOWSHEET NOTE
12/24/20 2008   Vital Signs   Temp 98.2 °F (36.8 °C)   Temp Source Oral   Pulse 74   Heart Rate Source Monitor   Resp 16   /61   BP Location Left upper arm   Patient Position High fowlers   Level of Consciousness Alert (0)   MEWS Score 1   Oxygen Therapy   SpO2 95 %   O2 Device None (Room air)   Pt. Resting in bed. Call light in reach. Shift assessment completed see flow sheet. Denies any needs at this time. Will continue to monitor.

## 2020-12-26 NOTE — PROGRESS NOTES
Patient resting in bed, AM assessment completed. Patient with confused conversation at times. Lungs clear. BS+. INT intact. VSS. Patient on RA. No acute distress noted. Call light in reach. Will continue to monitor.

## 2020-12-26 NOTE — PROGRESS NOTES
Patient resting in bed, lunch set up at this time. No distress noted. Patient denies any needs. Call light in reach. Will continue to monitor.

## 2020-12-26 NOTE — PROGRESS NOTES
Progress Note    Admit Date:  12/22/2020    Subjective:  Ms. Brodie Madrid is pleasantly confused. Denies any concerns at present. Objective:   BP (!) 152/71   Pulse 69   Temp 97.3 °F (36.3 °C) (Oral)   Resp 18   Ht 5' 2\" (1.575 m) Comment: Ht 5' 2\" (1.575 m) from previous encounter  Wt 114 lb 1.6 oz (51.8 kg)   SpO2 96%   BMI 20.87 kg/m²          Intake/Output Summary (Last 24 hours) at 12/26/2020 1149  Last data filed at 12/26/2020 7947  Gross per 24 hour   Intake 300 ml   Output 500 ml   Net -200 ml       Physical Exam:  General appearance: alert, appears stated age and cooperative  Head: Normocephalic, without obvious abnormality, atraumatic  Eyes: conjunctivae/corneas clear.   Lungs: clear to auscultation bilaterally  Heart: regular rate and rhythm, S1, S2 normal, + murmur, click, rub or gallop  Abdomen: soft, non-tender; bowel sounds normal  Extremities: extremities normal, atraumatic, no cyanosis or edema  Pulses: 2+ and symmetric  Skin: Skin color, texture, turgor normal. No rashes or lesions  Neurologic: Grossly normal    Scheduled Meds:   metoprolol succinate  25 mg Oral Daily    lisinopril  5 mg Oral Daily    clopidogrel  75 mg Oral Daily    enoxaparin  40 mg Subcutaneous Daily    sodium chloride flush  10 mL Intravenous 2 times per day    aspirin  81 mg Oral Daily    insulin lispro  0-6 Units Subcutaneous TID WC    insulin lispro  0-3 Units Subcutaneous Nightly    atorvastatin  80 mg Oral Nightly       Continuous Infusions:   dextrose         PRN Meds:  sodium chloride flush, promethazine **OR** ondansetron, acetaminophen **OR** acetaminophen, polyethylene glycol, nitroGLYCERIN, glucose, dextrose, glucagon (rDNA), dextrose      Data:  CBC:   Recent Labs     12/24/20  0953 12/25/20  0520   WBC 7.9 6.1   HGB 10.3* 10.3*   HCT 30.6* 30.5*   MCV 96.3 97.6    220     BMP:   Recent Labs     12/24/20  0953 12/25/20  0520    137   K 4.1 3.9    105   CO2 24 22   BUN 22* 19 CREATININE 1.1 1.0     LIVER PROFILE:   No results for input(s): AST, ALT, LIPASE, BILIDIR, BILITOT, ALKPHOS in the last 72 hours. Invalid input(s): AMYLASE,  ALBResults for Carlos Echeverria (MRN [id number]) as of 12/23/2020 08:40   Ref. Range 12/22/2020 15:42   Procalcitonin Latest Ref Range: 0.00 - 0.15 ng/mL 0.06     Results for Carlos Echeverria (MRN [id number]) as of 12/23/2020 08:40   Ref. Range 12/22/2020 07:16 12/22/2020 09:10 12/22/2020 15:42 12/22/2020 18:28   Troponin Latest Ref Range: <0.01 ng/mL 0.31 (H) 0.41 (H) 0.52 (HH) 0.49 (H)     CULTURES  Blood: NG  Rapid covid: neg  COVID PCR: NEG    CT HEAD WO CONTRAST   Final Result   Head:      No acute intracranial abnormality. Abdomen pelvis:      No acute intra-abdominal abnormality         CT ABDOMEN PELVIS W IV CONTRAST Additional Contrast? None   Final Result   Head:      No acute intracranial abnormality. Abdomen pelvis:      No acute intra-abdominal abnormality         XR CHEST PORTABLE   Final Result   No acute process. Echo 12/24/2020   Summary   Left ventricular systolic function is moderately reduced with a visually   estimated ejection fraction of 30-35%. There is moderate hypokinesis of the apical and mid anterolateral,   inferoseptal, apical anterior and inferior, anteroseptal, apical lateral   walls. Grade II diastolic dysfunction with elevated LV pressure. The left atrium is moderately dilated. Mild to moderate mitral regurgitation. Moderate to severe aortic regurgitation. Mild pulmonic regurgitation. Mild to moderate tricuspid regurgitation. Systolic pulmonary artery pressure (SPAP) is elevated and estimated at 52   mmHg (right atrial pressure 3 mmHg) consistent with moderate pulmonary   hypertension. Pertinent previous results reviewed   Echo 3/2020  Left ventricular systolic function is normal. (LVEF>/=55%)  There is mild concentric left ventricular hypertrophy.   Mild - moderate reviewed above     #HTN  - BP well controlled  - lopressor started  - home meds need to be reconciled     #HLD  #Chronic iron def anemia      #DEBILITY  - needs SNF, family cannot provide 24/7 care. - CM following for DC to SNF  - discharge planning    DVT Prophylaxis: Heparin   Diet: cardiac diet. Code Status: Limited     She is a limited code  Intubation: YES  Defib/cardioversion: YES  Chest compressions: NO  Resuscitative meds: YES    Labs stable from 12/25, will hold off on ordering labs for tomorrow     DISPO: DC planning to SNF, CM following - patient will have a bed tomorrow.     Addendum: [facility] able to accept patient tomorrow, will need rapid COVID completed prior - rapid COVID negative - plan for discharge tomorrow    Verner Balboa FNP-MOO  12/26/2020

## 2020-12-27 NOTE — DISCHARGE INSTR - COC
Continuity of Care Form    Patient Name: Ellie Champion   :  1934  MRN:  [id number]    Admit date:  2020  Discharge date:  2020    Code Status Order: Limited   Advance Directives:      Admitting Physician:  Valdo Iraheta MD  PCP: Gordon Cummings MD    Discharging Nurse: Kenyatta Cole Unit/Room#: /9352-33  Discharging Unit Phone Number: [phone]    Emergency Contact:   Extended Emergency Contact Information  Primary Emergency Contact: Sue Payton II  Address: [address] [facility] Phone: [phone]  Relation: Grandchild  Secondary Emergency Contact: [address] [place] [address] Phone: [phone]  Work Phone: [phone]  Mobile Phone: [phone]  Relation: Child    Past Surgical History:  Past Surgical History:   Procedure Laterality Date    COLONOSCOPY      COLONOSCOPY  6/22/15    diverticulosis    HIP FRACTURE SURGERY Left     LEFT HIP HEMIARTHROPLASTY     HIP SURGERY Left 2020    LEFT HIP HEMIARTHROPLASTY performed by Jo Ann Lee MD at [address] [place] [address]         Immunization History:   Immunization History   Administered Date(s) Administered    Pneumococcal Polysaccharide (Fxptapnsm17) 2012       Active Problems:  Patient Active Problem List   Diagnosis Code    Pneumonia J18.9    Closed displaced fracture of left femoral neck (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 ([address] F10.27    Confusion R41.0    Elevated troponin R77.8    Ischemic cardiomyopathy I25.5       Isolation/Infection:   Isolation            No Isolation          Patient Infection Status       Infection Onset Added Last Indicated Last Indicated By Review Planned Expiration Resolved Resolved By    None active    Resolved    COVID-19 Rule Out 12/25/20 12/25/20 12/25/20 COVID-19 (Ordered)   12/25/20 Rule-Out Test Resulted    COVID-19 Rule Out 12/22/20 12/22/20 12/22/20 COVID-19 (Ordered)   12/23/20 Rule-Out Test Resulted    COVID-19 Rule Out 12/22/20 12/22/20 12/22/20 COVID-19 (Ordered)   12/22/20 Rule-Out Test Resulted    COVID-19 Rule Out 07/30/20 07/30/20 07/30/20 COVID-19 (Ordered)   07/30/20 Rule-Out Test Resulted            Nurse Assessment:  Last Vital Signs: BP (!) 133/55   Pulse 55   Temp 98 °F (36.7 °C) (Oral)   Resp 16   Ht 5' 2\" (1.575 m) Comment: Ht 5' 2\" (1.575 m) from previous encounter  Wt 115 lb 1.6 oz (52.2 kg)   SpO2 94%   BMI 21.05 kg/m²     Last documented pain score (0-10 scale): Pain Level: 0  Last Weight:   Wt Readings from Last 1 Encounters:   12/27/20 115 lb 1.6 oz (52.2 kg)     Mental Status:  disoriented and alert    IV Access:  - None    Nursing Mobility/ADLs:  Walking   Assisted  Transfer  Assisted  Bathing  Assisted  Dressing  Assisted  Toileting  Assisted  Feeding  Independent  Med Admin  Independent  Med Delivery   whole    Wound Care Documentation and Therapy:        Elimination:  Continence:   · Bowel: Yes  · Bladder: Yes  Urinary Catheter: None   Colostomy/Ileostomy/Ileal Conduit: No       Date of Last BM: 12/27/2020    Intake/Output Summary (Last 24 hours) at 12/27/2020 0955  Last data filed at 12/27/2020 0952  Gross per 24 hour   Intake 360 ml   Output 400 ml   Net -40 ml     I/O last 3 completed shifts: In: 360 [P.O.:360]  Out: 150 [Urine:150]    Safety Concerns:      At Risk for Falls    Impairments/Disabilities:      Hearing    Nutrition Therapy:  Current Nutrition Therapy:   - Oral Diet:  Cardiac    Routes of Feeding: Oral  Liquids: Thin Liquids  Daily Fluid Restriction: no  Last Modified Barium Swallow with Video (Video Swallowing Test): not done    Treatments at the Time of Hospital Discharge:   Respiratory Treatments: see MAR  Oxygen Therapy:  is not on home oxygen therapy. Ventilator:    - No ventilator support    Rehab Therapies: Physical Therapy, Occupational Therapy and SN  Weight Bearing Status/Restrictions: No weight bearing restirctions  Other Medical Equipment (for information only, NOT a DME order):  bedside commode    Patient's personal belongings (please select all that are sent with patient):  None    RN SIGNATURE:  Electronically signed by Ashlie Mancera RN on 12/27/20 at 11:22 AM EST    CASE MANAGEMENT/SOCIAL WORK SECTION    Inpatient Status Date: 12/22/2020    Readmission Risk Assessment Score:  Readmission Risk              Risk of Unplanned Readmission:        17           Discharging to Facility/ Agency   Name: [facility]  Address: [address] [place] [address]  Phone: [phone]  Fax: [phone]          / signature: Electronically signed by James Sr RN on 12/27/20 at 10:44 AM EST    PHYSICIAN SECTION    Prognosis: Good    Condition at Discharge: Stable    Rehab Potential (if transferring to Rehab): Good    Recommended Labs or Other Treatments After Discharge: N/A    Physician Certification: I certify the above information and transfer of Debbie Dalton  is necessary for the continuing treatment of the diagnosis listed and that she requires [facility] for less 30 days.      Update Admission H&P: No change in H&P    PHYSICIAN SIGNATURE:  Electronically signed by DEBRA Lara CNP on 12/27/20 at 9:57 AM EST

## 2020-12-27 NOTE — PROGRESS NOTES
Occupational Therapy Daily Treatment Note    Unit: PCU  Date:  12/27/2020  Patient Name:    Sony Witt  Admitting diagnosis:  NSTEMI (non-ST elevated myocardial infarction) [facility]) [I21.4]  Admit Date:  12/22/2020  Precautions/Restrictions:  fall risk, IV, bed/chair alarm, telemetry, telesitter and continuous pulse ox        Discharge Recommendations: SNF - pt does not have access to needed level of assistance at home   DME needs for discharge: defer to facility       Therapy recommendations for staff:   Assist of 1 (minimal assist) with use of rolling walker (RW) and gait belt for all ambulation within room    AM-PAC Score: AM-PAC Inpatient Daily Activity Raw Score: [address] S4 Level: NA       Treatment Time:  810-855  Treatment number:  2    Total Treatment Time:   45 minutes    History of Present Illness:  86 y.o. female with hypertension, mitral valve regurgitation, aortic valve insufficiency, hyperlipidemia, MGUS, dementia, iron def anemia who presented to [facility] via EMS for AMS.   Patient cannot provide any history.  I spoke to the patient's grandson who is with the patient prior to being taken to the ER. [facility] states he lives with the patient. [facility] states earlier this morning he found the patient lying on the ground.  She was unable to tell him if she fell or hit her head.  She was able to stand any helped her walk to the bathroom.  She states after she went to the bathroom she was not acting quite \"right\".  He states she seemed cold and she was shivering.  He states that she was sitting on the couch and staring off into space, not responding to him, and he was concerned that she had stopped breathing.  He states he was about to start [address] [facility] she was clearly breathing and responded to him but was confused.  He called 911 for transport to the ER.      Subjective:  Pt supine in bed upon therapist arrival. Pt agreeable to work with therapy this date.  Pt unable to report how she is feeling throughout session. Pt reports \"these feelings are new to me and I don't know how to describe them\". Pt questionable historian throughout. Pain   None reported throughout session     Bed Mobility:   Supine to Sit:  SBA  Sit to Supine:  Min A - Pt with difficulty lifting legs into the bed. Attempted independently initial trial.   Rolling:           Not Tested  Scooting:        Min A at [employer]     Transfer Training:   Sit to stand:   CGA  Stand to sit:  CGA  Bed to Chair:  Not Tested  Bed to [facility]:   Min A with no AD for stand-step transfer. VC for hand placement. [place] toilet:   Not Tested    Activity Tolerance   Pt completed therapy session with SOB noted with activity. Unable to read pulse ox during session. ADL Training:   Upper body dressing:  Not Tested  Upper body bathing:  Not Tested  Lower body dressing: Mod A to don/doff depends. Pt with difficulty reaching feet. Lower body bathing:  Not Tested  Toileting:   CGA  Grooming/Hygiene:  SBA to complete oral hygiene seated at EOB. Pt noticeably SOB with this activity. Pt with difficulty expressing wants during the session. Therapeutic Exercise:   N/A    Patient Education:   Role of OT  Recommendations for DC    Positioning Needs: In bed, call light and needs in reach. Alarm Set    Family Present:  No    Assessment: Pt limited by fatigue and decreased expression of wants/needs. May be related to time of day. Therapy initially recommended Home with 24/7 supervision at time of d/c. Pt does not have access to this level of care. Pt would benefit from rehab stay in order to progress pt to baseline level of independence. Pt agreeable and interested in rehab stay at this time. GOALS  To be met in 3 Visits:  1). Bed to toilet/BSC: Modified Independent     To be met in 5 Visits:  1). Supine to/from Sit:             Independent  2). Upper Body Bathing:         Independent  3). Lower Body Bathing:         Supervision  4).  Upper Body Dressing:       Modified

## 2020-12-27 NOTE — CARE COORDINATION
Discharge to Facility: [facility] [address] [facility] Final phone number for staff giving report: Name: [facility]  Address: [address]  Phone: [phone]  Fax: [phone]       Pre-certification completed: waived d/t [facility] Exemption Notification (HENS) completed: yes   Discharge orders and Continuity of Care faxed to facility:  yes to [phone]      Transportation:               Medical Transport explained with choice list offered to pt/family. Choice:Yes(no preference)  Agency used: [address] [place] [address] Rd up time:   [id number]      Pt/family/Nursing/Facility aware of  time: yes  Yes Names: pt, pt's son Sugar Caceres RN, [place] [address] with [facility]  Ambulance form completed:  yes:      Comments:MICHA/AVS/HENS faxed to [phone], as Sakina with [facility] stated that she could accept pt today. Covid neg as of 12/25/2020. Pt is being d/c'd to [facility] today. Pt's O2 sats are 94% on RA. Discharge timeout done with Mesfin Yu RN. All discharge needs and concerns addressed. Discharging nurse to complete MICHA, reconcile AVS, and place final copy with patient's discharge packet. Discharging RN to ensure that written prescriptions for  Level II medications are sent with patient to the facility as per protocol.

## 2020-12-27 NOTE — DISCHARGE SUMMARY
Name:  Jing Anderson  Room:  /3104-88  MRN:    [id number]    Discharge Summary      This discharge summary is in conjunction with a complete physical exam done on the day of discharge. Attending Physician: Dr. Kaylyn Hadley  Discharging Provider: Uriel SHEIKH      Admit: 12/22/2020  Discharge:   12/27/2020    HPI:  The patient is a 80 y.o. female with hypertension, mitral valve regurgitation, aortic valve insufficiency, hyperlipidemia, MGUS, dementia, iron def anemia who presented to [facility] ED via EMS for AMS. Patient cannot provide any history. I spoke to the patient's grandson who is with the patient prior to being taken to the ER. He states he lives with the patient. He states earlier this morning he found the patient lying on the ground. She was unable to tell him if she fell or hit her head. She was able to stand any helped her walk to the bathroom. She states after she went to the bathroom she was not acting quite \"right\". He states she seemed cold and she was shivering. He states that she was sitting on the couch and staring off into space, not responding to him, and he was concerned that she had stopped breathing. He states he was about to start CPR when she was clearly breathing and responded to him but was confused. He called 911 for transport to the ER.       In the emergency department she was confused and could not provide any history. Her ER work-up revealed an elevated troponin to 0.31 and an abnormal EKG with ST changes. Case discussed with cardiology both interventionalist as well as rounding [facility] cardiologist- no plan for emergent cath- treat medically for NSTEMI      initially afebrile, repeat temp 101.7F. Chest x-ray negative, CT abdomen negative, urinalysis not consistent with infection.   Rapid COVID neg      ADmitted for further care        Diagnoses this Admission and Hospital Course   #Acute metabolic encephalopathy - resolved  - pt with underlying dementia, typically oriented to self, place, situation.  Currently oriented x 1- mildly agitated, required soft wrist restraints in ER  - CT head neg, nonfocal exam  - Suspected cardiac issues and fever playing a role  - monitored for improvement with treatment as below   - at baseline, oriented to place, self and time with intermittent confusion     #Elevated troponin  #Abnormal EKG with ST changes concerning for STEMI versus NSTEMI. Cardiology feels it is NSTEMI  #Cardiomyopathy- suspect ischemic  - presented w/ AMS, elevated trop, abnormal EKG  - Trop consistent with MI  - ECHO reviewed- low EF- appreciated cardio recs   - no pain  - Discussed with cardiology- not a candidate for immediate cardiac catheterization  - sp heparin > now off  -  Med manage: ASA, plavix, statin, lisinopril, toprol XL     #Febrile illness - Resolved  - unclear etiology  - CXR, CT abd, UA unrevealing  - COVID PCR neg   - checked LA, which is negative, PCT-normal, blood cx-NG  - held off on abx, no recurrent fevers on 12/24 or 12/25     #Hyperglycemia - Resolved  - low dose SSI     #Mild dementia  - typically awake, alert, oriented to self and situation - back to baseline      #MGUS  - f/w hem onc      #Mod MR   #Mod-severe AR  - f/w Dr Milli Blanco, echo 2020 reviewed above     #HTN  - BP well controlled  - Toprol-XL started     #HLD    #Chronic iron def anemia      #DEBILITY  - needs SNF, family cannot provide 24/7 care. - CM following for DC to SNF     DVT Prophylaxis: Heparin   Diet: cardiac diet.   Code Status: Limited     She is a limited code  Intubation: YES  Defib/cardioversion: YES  Chest compressions: NO  Resuscitative meds: YES     Procedures (Please Review Full Report for Details)  N/A    Consults    Cardiology      Physical Exam at Discharge:    BP (!) 133/55   Pulse 55   Temp 98 °F (36.7 °C) (Oral)   Resp 16   Ht 5' 2\" (1.575 m) Comment: Ht 5' 2\" (1.575 m) from previous encounter  Wt 115 lb 1.6 oz (52.2 kg)   SpO2 94%   BMI 21.05 kg/m²     General appearance: alert, appears stated age and cooperative  Head: Normocephalic, without obvious abnormality, atraumatic  Eyes: conjunctivae/corneas clear. Lungs: clear to auscultation bilaterally  Heart: regular rate and rhythm, S1, S2 normal, + murmur, click, rub or gallop  Abdomen: soft, non-tender; bowel sounds normal  Extremities: extremities normal, atraumatic, no cyanosis or edema  Pulses: 2+ and symmetric  Skin: Skin color, texture, turgor normal. No rashes or lesions  Neurologic: Grossly normal    CBC:   Recent Labs     12/24/20  0953 12/25/20  0520   WBC 7.9 6.1   HGB 10.3* 10.3*   HCT 30.6* 30.5*   MCV 96.3 97.6    220     BMP:   Recent Labs     12/24/20  0953 12/25/20  0520    137   K 4.1 3.9    105   CO2 24 22   BUN 22* 19   CREATININE 1.1 1.0       U/A:    Lab Results   Component Value Date    COLORU Yellow 12/22/2020    WBCUA 0-2 12/22/2020    RBCUA 11-20 12/22/2020    MUCUS Rare 12/22/2020    BACTERIA 1+ 12/22/2020    CLARITYU Clear 12/22/2020    SPECGRAV 1.025 12/22/2020    LEUKOCYTESUR Negative 12/22/2020    BLOODU MODERATE 12/22/2020    GLUCOSEU 250 12/22/2020       CULTURES  Blood: NG  Rapid covid: neg  COVID PCR: NEG    RADIOLOGY  CT HEAD WO CONTRAST   Final Result   Head:      No acute intracranial abnormality. Abdomen pelvis:      No acute intra-abdominal abnormality         CT ABDOMEN PELVIS W IV CONTRAST Additional Contrast? None   Final Result   Head:      No acute intracranial abnormality. Abdomen pelvis:      No acute intra-abdominal abnormality         XR CHEST PORTABLE   Final Result   No acute process.              Echo 12/24/2020   Summary   Left ventricular systolic function is moderately reduced with a visually   estimated ejection fraction of 30-35%.   There is moderate hypokinesis of the apical and mid anterolateral,   inferoseptal, apical anterior and inferior, anteroseptal, apical lateral   walls.   Grade II diastolic dysfunction with elevated LV pressure.   The left atrium is moderately dilated.   Mild to moderate mitral regurgitation.   Moderate to severe aortic regurgitation.   Mild pulmonic regurgitation.   Mild to moderate tricuspid regurgitation.   Systolic pulmonary artery pressure (SPAP) is elevated and estimated at 52   mmHg (right atrial pressure 3 mmHg) consistent with moderate pulmonary   hypertension.        Pertinent previous results reviewed   Echo 3/2020  Left ventricular systolic function is normal. (LVEF>/=55%)  There is mild concentric left ventricular hypertrophy. Mild - moderate tricuspid regurgitation is present. The Aortic Valve leaflets appear sclerotic. The mitral valve leaflets are mildly thickened in appearance. There is moderate mitral regurgitation. The mitral regurgitant jet is posteriorly directed, which is consistent with  anterior leaflet pathology. MR may be underesimated due to the eccentricity. Consider ARCADIO if clinically  indicated. Overall left ventricular ejection fraction is estimated to be 55-60%. The left ventricular wall motion is normal.  Moderate to severe aortic regurgitation.   Would consider a transesophageal echocardiogram if clinically indicated.         Discharge Medications     Medication List      START taking these medications    aspirin 81 MG chewable tablet  Take 1 tablet by mouth daily     atorvastatin 80 MG tablet  Commonly known as: LIPITOR  Take 1 tablet by mouth nightly     clopidogrel 75 MG tablet  Commonly known as: PLAVIX  Take 1 tablet by mouth daily     metoprolol succinate 25 MG extended release tablet  Commonly known as: TOPROL XL  Take 1 tablet by mouth daily        CHANGE how you take these medications    lisinopril 5 MG tablet  Commonly known as: PRINIVIL;ZESTRIL  Take 1 tablet by mouth daily  What changed:   · medication strength  · how much to take        CONTINUE taking these medications    CLARITIN PO     ezetimibe 10 MG tablet  Commonly known as: Sanya Packer

## 2020-12-27 NOTE — PROGRESS NOTES
Resting quietly with eyes closed & respirations easy/even on RA. Bed alarm on & telesitter in place for patient's safety. Call in easy reach. Continue to monitor closely.   Estephanie De La Fuente RN

## 2021-01-01 ENCOUNTER — APPOINTMENT (OUTPATIENT)
Dept: GENERAL RADIOLOGY | Age: 86
DRG: 025 | End: 2021-01-01
Attending: INTERNAL MEDICINE
Payer: MEDICARE

## 2021-01-01 ENCOUNTER — HOSPITAL ENCOUNTER (INPATIENT)
Age: 86
LOS: 3 days | Discharge: HOME | DRG: 085 | End: 2021-03-15
Attending: INTERNAL MEDICINE | Admitting: INTERNAL MEDICINE
Payer: MEDICARE

## 2021-01-01 ENCOUNTER — NURSE ONLY (OUTPATIENT)
Dept: CARDIOLOGY CLINIC | Age: 86
End: 2021-01-01

## 2021-01-01 ENCOUNTER — ANESTHESIA (OUTPATIENT)
Dept: ENDOSCOPY | Age: 86
End: 2021-01-01

## 2021-01-01 ENCOUNTER — HOSPITAL ENCOUNTER (EMERGENCY)
Age: 86
Discharge: ANOTHER HOSPITAL | End: 2021-03-12
Attending: EMERGENCY MEDICINE
Payer: MEDICARE

## 2021-01-01 ENCOUNTER — APPOINTMENT (OUTPATIENT)
Dept: GENERAL RADIOLOGY | Age: 86
End: 2021-01-01
Payer: MEDICARE

## 2021-01-01 ENCOUNTER — APPOINTMENT (OUTPATIENT)
Dept: CT IMAGING | Age: 86
End: 2021-01-01
Payer: MEDICARE

## 2021-01-01 ENCOUNTER — APPOINTMENT (OUTPATIENT)
Dept: CT IMAGING | Age: 86
DRG: 025 | End: 2021-01-01
Attending: INTERNAL MEDICINE
Payer: MEDICARE

## 2021-01-01 ENCOUNTER — HOSPITAL ENCOUNTER (INPATIENT)
Age: 86
LOS: 2 days | DRG: 064 | End: 2021-04-10
Attending: EMERGENCY MEDICINE | Admitting: INTERNAL MEDICINE
Payer: MEDICARE

## 2021-01-01 ENCOUNTER — HOSPITAL ENCOUNTER (EMERGENCY)
Age: 86
Discharge: ANOTHER HOSPITAL | End: 2021-03-20
Attending: EMERGENCY MEDICINE
Payer: MEDICARE

## 2021-01-01 ENCOUNTER — APPOINTMENT (OUTPATIENT)
Dept: CT IMAGING | Age: 86
DRG: 085 | End: 2021-01-01
Attending: INTERNAL MEDICINE
Payer: MEDICARE

## 2021-01-01 ENCOUNTER — APPOINTMENT (OUTPATIENT)
Dept: VASCULAR LAB | Age: 86
DRG: 025 | End: 2021-01-01
Attending: INTERNAL MEDICINE
Payer: MEDICARE

## 2021-01-01 ENCOUNTER — HOSPITAL ENCOUNTER (EMERGENCY)
Age: 86
Discharge: HOME | End: 2021-03-04
Attending: EMERGENCY MEDICINE
Payer: MEDICARE

## 2021-01-01 ENCOUNTER — ANESTHESIA EVENT (OUTPATIENT)
Dept: ENDOSCOPY | Age: 86
End: 2021-01-01

## 2021-01-01 ENCOUNTER — APPOINTMENT (OUTPATIENT)
Dept: MRI IMAGING | Age: 86
DRG: 025 | End: 2021-01-01
Attending: INTERNAL MEDICINE
Payer: MEDICARE

## 2021-01-01 ENCOUNTER — APPOINTMENT (OUTPATIENT)
Dept: CT IMAGING | Age: 86
DRG: 064 | End: 2021-01-01
Payer: MEDICARE

## 2021-01-01 ENCOUNTER — ANESTHESIA (OUTPATIENT)
Dept: OPERATING ROOM | Age: 86
DRG: 025 | End: 2021-01-01
Payer: MEDICARE

## 2021-01-01 ENCOUNTER — ANESTHESIA (OUTPATIENT)
Dept: ENDOSCOPY | Age: 86
DRG: 025 | End: 2021-01-01
Payer: MEDICARE

## 2021-01-01 ENCOUNTER — APPOINTMENT (OUTPATIENT)
Dept: GENERAL RADIOLOGY | Age: 86
DRG: 064 | End: 2021-01-01
Payer: MEDICARE

## 2021-01-01 ENCOUNTER — PROCEDURE VISIT (OUTPATIENT)
Dept: CARDIOLOGY CLINIC | Age: 86
End: 2021-01-01
Payer: MEDICARE

## 2021-01-01 ENCOUNTER — ANESTHESIA EVENT (OUTPATIENT)
Dept: ENDOSCOPY | Age: 86
DRG: 025 | End: 2021-01-01
Payer: MEDICARE

## 2021-01-01 ENCOUNTER — ANESTHESIA EVENT (OUTPATIENT)
Dept: OPERATING ROOM | Age: 86
DRG: 025 | End: 2021-01-01
Payer: MEDICARE

## 2021-01-01 ENCOUNTER — HOSPITAL ENCOUNTER (INPATIENT)
Age: 86
LOS: 18 days | Discharge: NURSING, REHAB OR LONG-TERM CARE | DRG: 025 | End: 2021-04-07
Attending: INTERNAL MEDICINE | Admitting: INTERNAL MEDICINE
Payer: MEDICARE

## 2021-01-01 VITALS
DIASTOLIC BLOOD PRESSURE: 43 MMHG | OXYGEN SATURATION: 95 % | TEMPERATURE: 98.3 F | HEART RATE: 67 BPM | WEIGHT: 110 LBS | RESPIRATION RATE: 24 BRPM | SYSTOLIC BLOOD PRESSURE: 158 MMHG | BODY MASS INDEX: 19.49 KG/M2

## 2021-01-01 VITALS — OXYGEN SATURATION: 99 % | DIASTOLIC BLOOD PRESSURE: 70 MMHG | TEMPERATURE: 97.5 F | SYSTOLIC BLOOD PRESSURE: 157 MMHG

## 2021-01-01 VITALS
SYSTOLIC BLOOD PRESSURE: 123 MMHG | RESPIRATION RATE: 31 BRPM | DIASTOLIC BLOOD PRESSURE: 58 MMHG | OXYGEN SATURATION: 98 %

## 2021-01-01 VITALS
DIASTOLIC BLOOD PRESSURE: 42 MMHG | HEIGHT: 66 IN | RESPIRATION RATE: 18 BRPM | WEIGHT: 139.11 LBS | BODY MASS INDEX: 22.36 KG/M2 | HEART RATE: 61 BPM | TEMPERATURE: 98 F | OXYGEN SATURATION: 95 % | SYSTOLIC BLOOD PRESSURE: 125 MMHG

## 2021-01-01 VITALS
BODY MASS INDEX: 19.49 KG/M2 | SYSTOLIC BLOOD PRESSURE: 150 MMHG | HEIGHT: 63 IN | WEIGHT: 110 LBS | DIASTOLIC BLOOD PRESSURE: 54 MMHG | TEMPERATURE: 96.4 F | HEART RATE: 55 BPM | RESPIRATION RATE: 17 BRPM | OXYGEN SATURATION: 98 %

## 2021-01-01 VITALS
BODY MASS INDEX: 16.51 KG/M2 | TEMPERATURE: 98 F | HEART RATE: 60 BPM | RESPIRATION RATE: 30 BRPM | OXYGEN SATURATION: 90 % | DIASTOLIC BLOOD PRESSURE: 38 MMHG | SYSTOLIC BLOOD PRESSURE: 67 MMHG | WEIGHT: 102.29 LBS

## 2021-01-01 VITALS
BODY MASS INDEX: 17.68 KG/M2 | SYSTOLIC BLOOD PRESSURE: 173 MMHG | WEIGHT: 110 LBS | DIASTOLIC BLOOD PRESSURE: 52 MMHG | RESPIRATION RATE: 8 BRPM | OXYGEN SATURATION: 97 % | HEIGHT: 66 IN | TEMPERATURE: 97.8 F | HEART RATE: 51 BPM

## 2021-01-01 VITALS
HEART RATE: 55 BPM | RESPIRATION RATE: 16 BRPM | BODY MASS INDEX: 20.12 KG/M2 | OXYGEN SATURATION: 96 % | SYSTOLIC BLOOD PRESSURE: 146 MMHG | TEMPERATURE: 99 F | HEIGHT: 62 IN | DIASTOLIC BLOOD PRESSURE: 54 MMHG

## 2021-01-01 DIAGNOSIS — Z95.0 PACEMAKER: ICD-10-CM

## 2021-01-01 DIAGNOSIS — N39.0 URINARY TRACT INFECTION WITHOUT HEMATURIA, SITE UNSPECIFIED: ICD-10-CM

## 2021-01-01 DIAGNOSIS — F41.1 GAD (GENERALIZED ANXIETY DISORDER): ICD-10-CM

## 2021-01-01 DIAGNOSIS — Z95.0 PACEMAKER: Primary | ICD-10-CM

## 2021-01-01 DIAGNOSIS — S06.5XAA SUBDURAL HEMATOMA: Primary | ICD-10-CM

## 2021-01-01 DIAGNOSIS — R41.82 ALTERED MENTAL STATUS, UNSPECIFIED ALTERED MENTAL STATUS TYPE: ICD-10-CM

## 2021-01-01 DIAGNOSIS — R41.82 ALTERED MENTAL STATUS, UNSPECIFIED ALTERED MENTAL STATUS TYPE: Primary | ICD-10-CM

## 2021-01-01 DIAGNOSIS — I62.00 SUBDURAL HEMORRHAGE (HCC): ICD-10-CM

## 2021-01-01 DIAGNOSIS — J96.01 ACUTE RESPIRATORY FAILURE WITH HYPOXIA (HCC): Primary | ICD-10-CM

## 2021-01-01 DIAGNOSIS — J18.9 PNEUMONIA DUE TO ORGANISM: Primary | ICD-10-CM

## 2021-01-01 DIAGNOSIS — R56.9 SEIZURE (HCC): ICD-10-CM

## 2021-01-01 DIAGNOSIS — G40.901 STATUS EPILEPTICUS (HCC): Primary | ICD-10-CM

## 2021-01-01 DIAGNOSIS — I49.5 SICK SINUS SYNDROME (HCC): ICD-10-CM

## 2021-01-01 DIAGNOSIS — I63.9 CEREBRAL INFARCTION, UNSPECIFIED MECHANISM (HCC): ICD-10-CM

## 2021-01-01 LAB
A/G RATIO: 1 (ref 1.1–2.2)
A/G RATIO: 1.2 (ref 1.1–2.2)
A/G RATIO: 1.2 (ref 1.1–2.2)
A/G RATIO: 1.3 (ref 1.1–2.2)
A/G RATIO: 1.3 (ref 1.1–2.2)
ALBUMIN SERPL-MCNC: 3.2 G/DL (ref 3.4–5)
ALBUMIN SERPL-MCNC: 3.2 G/DL (ref 3.4–5)
ALBUMIN SERPL-MCNC: 3.5 G/DL (ref 3.4–5)
ALBUMIN SERPL-MCNC: 3.7 G/DL (ref 3.4–5)
ALBUMIN SERPL-MCNC: 3.9 G/DL (ref 3.4–5)
ALBUMIN SERPL-MCNC: 4 G/DL (ref 3.4–5)
ALBUMIN SERPL-MCNC: 4.2 G/DL (ref 3.4–5)
ALP BLD-CCNC: 100 U/L (ref 40–129)
ALP BLD-CCNC: 69 U/L (ref 40–129)
ALP BLD-CCNC: 72 U/L (ref 40–129)
ALP BLD-CCNC: 80 U/L (ref 40–129)
ALP BLD-CCNC: 85 U/L (ref 40–129)
ALT SERPL-CCNC: 7 U/L (ref 10–40)
ALT SERPL-CCNC: 8 U/L (ref 10–40)
ALT SERPL-CCNC: <5 U/L (ref 10–40)
ANION GAP SERPL CALCULATED.3IONS-SCNC: 10 MMOL/L (ref 3–16)
ANION GAP SERPL CALCULATED.3IONS-SCNC: 11 MMOL/L (ref 3–16)
ANION GAP SERPL CALCULATED.3IONS-SCNC: 12 MMOL/L (ref 3–16)
ANION GAP SERPL CALCULATED.3IONS-SCNC: 13 MMOL/L (ref 3–16)
ANION GAP SERPL CALCULATED.3IONS-SCNC: 15 MMOL/L (ref 3–16)
ANION GAP SERPL CALCULATED.3IONS-SCNC: 7 MMOL/L (ref 3–16)
ANION GAP SERPL CALCULATED.3IONS-SCNC: 8 MMOL/L (ref 3–16)
ANION GAP SERPL CALCULATED.3IONS-SCNC: 9 MMOL/L (ref 3–16)
APTT: 27.8 SEC (ref 24.2–36.2)
APTT: 28.7 SEC (ref 24.2–36.2)
AST SERPL-CCNC: 13 U/L (ref 15–37)
AST SERPL-CCNC: 15 U/L (ref 15–37)
AST SERPL-CCNC: 15 U/L (ref 15–37)
AST SERPL-CCNC: 19 U/L (ref 15–37)
AST SERPL-CCNC: 21 U/L (ref 15–37)
BACTERIA: ABNORMAL /HPF
BASE EXCESS ARTERIAL: -0.1 MMOL/L (ref -3–3)
BASE EXCESS ARTERIAL: -2.1 MMOL/L (ref -3–3)
BASE EXCESS ARTERIAL: -5 (ref -3–3)
BASE EXCESS VENOUS: 0.6 MMOL/L (ref -3–3)
BASOPHILS ABSOLUTE: 0 K/UL (ref 0–0.2)
BASOPHILS ABSOLUTE: 0.1 K/UL (ref 0–0.2)
BASOPHILS RELATIVE PERCENT: 0.1 %
BASOPHILS RELATIVE PERCENT: 0.2 %
BASOPHILS RELATIVE PERCENT: 0.4 %
BASOPHILS RELATIVE PERCENT: 0.5 %
BASOPHILS RELATIVE PERCENT: 0.9 %
BASOPHILS RELATIVE PERCENT: 1.5 %
BILIRUB SERPL-MCNC: 0.3 MG/DL (ref 0–1)
BILIRUB SERPL-MCNC: 0.4 MG/DL (ref 0–1)
BILIRUB SERPL-MCNC: 0.5 MG/DL (ref 0–1)
BILIRUB SERPL-MCNC: 0.6 MG/DL (ref 0–1)
BILIRUB SERPL-MCNC: 0.7 MG/DL (ref 0–1)
BILIRUBIN URINE: NEGATIVE
BLOOD CULTURE, ROUTINE: NORMAL
BLOOD, URINE: ABNORMAL
BLOOD, URINE: NEGATIVE
BUN BLDV-MCNC: 12 MG/DL (ref 7–20)
BUN BLDV-MCNC: 14 MG/DL (ref 7–20)
BUN BLDV-MCNC: 14 MG/DL (ref 7–20)
BUN BLDV-MCNC: 15 MG/DL (ref 7–20)
BUN BLDV-MCNC: 16 MG/DL (ref 7–20)
BUN BLDV-MCNC: 18 MG/DL (ref 7–20)
BUN BLDV-MCNC: 19 MG/DL (ref 7–20)
BUN BLDV-MCNC: 20 MG/DL (ref 7–20)
BUN BLDV-MCNC: 20 MG/DL (ref 7–20)
BUN BLDV-MCNC: 21 MG/DL (ref 7–20)
BUN BLDV-MCNC: 21 MG/DL (ref 7–20)
BUN BLDV-MCNC: 22 MG/DL (ref 7–20)
BUN BLDV-MCNC: 23 MG/DL (ref 7–20)
BUN BLDV-MCNC: 24 MG/DL (ref 7–20)
BUN BLDV-MCNC: 24 MG/DL (ref 7–20)
BUN BLDV-MCNC: 25 MG/DL (ref 7–20)
BUN BLDV-MCNC: 27 MG/DL (ref 7–20)
BUN BLDV-MCNC: 30 MG/DL (ref 7–20)
BUN BLDV-MCNC: 33 MG/DL (ref 7–20)
BUN BLDV-MCNC: 50 MG/DL (ref 7–20)
BUN BLDV-MCNC: 57 MG/DL (ref 7–20)
BUN BLDV-MCNC: 65 MG/DL (ref 7–20)
BUN BLDV-MCNC: 74 MG/DL (ref 7–20)
CALCIUM IONIZED: 1.21 MMOL/L (ref 1.12–1.32)
CALCIUM SERPL-MCNC: 10.1 MG/DL (ref 8.3–10.6)
CALCIUM SERPL-MCNC: 8.1 MG/DL (ref 8.3–10.6)
CALCIUM SERPL-MCNC: 8.1 MG/DL (ref 8.3–10.6)
CALCIUM SERPL-MCNC: 8.3 MG/DL (ref 8.3–10.6)
CALCIUM SERPL-MCNC: 8.4 MG/DL (ref 8.3–10.6)
CALCIUM SERPL-MCNC: 8.5 MG/DL (ref 8.3–10.6)
CALCIUM SERPL-MCNC: 8.6 MG/DL (ref 8.3–10.6)
CALCIUM SERPL-MCNC: 8.7 MG/DL (ref 8.3–10.6)
CALCIUM SERPL-MCNC: 8.8 MG/DL (ref 8.3–10.6)
CALCIUM SERPL-MCNC: 8.9 MG/DL (ref 8.3–10.6)
CALCIUM SERPL-MCNC: 9 MG/DL (ref 8.3–10.6)
CALCIUM SERPL-MCNC: 9.1 MG/DL (ref 8.3–10.6)
CALCIUM SERPL-MCNC: 9.3 MG/DL (ref 8.3–10.6)
CALCIUM SERPL-MCNC: 9.3 MG/DL (ref 8.3–10.6)
CALCIUM SERPL-MCNC: 9.5 MG/DL (ref 8.3–10.6)
CALCIUM SERPL-MCNC: 9.5 MG/DL (ref 8.3–10.6)
CALCIUM SERPL-MCNC: 9.6 MG/DL (ref 8.3–10.6)
CARBOXYHEMOGLOBIN ARTERIAL: 0.6 % (ref 0–1.5)
CARBOXYHEMOGLOBIN ARTERIAL: 1.2 % (ref 0–1.5)
CARBOXYHEMOGLOBIN: 1.4 % (ref 0–1.5)
CHLORIDE BLD-SCNC: 100 MMOL/L (ref 99–110)
CHLORIDE BLD-SCNC: 100 MMOL/L (ref 99–110)
CHLORIDE BLD-SCNC: 102 MMOL/L (ref 99–110)
CHLORIDE BLD-SCNC: 102 MMOL/L (ref 99–110)
CHLORIDE BLD-SCNC: 103 MMOL/L (ref 99–110)
CHLORIDE BLD-SCNC: 91 MMOL/L (ref 99–110)
CHLORIDE BLD-SCNC: 92 MMOL/L (ref 99–110)
CHLORIDE BLD-SCNC: 93 MMOL/L (ref 99–110)
CHLORIDE BLD-SCNC: 94 MMOL/L (ref 99–110)
CHLORIDE BLD-SCNC: 95 MMOL/L (ref 99–110)
CHLORIDE BLD-SCNC: 95 MMOL/L (ref 99–110)
CHLORIDE BLD-SCNC: 96 MMOL/L (ref 99–110)
CHLORIDE BLD-SCNC: 96 MMOL/L (ref 99–110)
CHLORIDE BLD-SCNC: 97 MMOL/L (ref 99–110)
CHLORIDE BLD-SCNC: 98 MMOL/L (ref 99–110)
CHLORIDE BLD-SCNC: 99 MMOL/L (ref 99–110)
CHOLESTEROL, TOTAL: 98 MG/DL (ref 0–199)
CLARITY: ABNORMAL
CLARITY: CLEAR
CO2: 19 MMOL/L (ref 21–32)
CO2: 21 MMOL/L (ref 21–32)
CO2: 22 MMOL/L (ref 21–32)
CO2: 22 MMOL/L (ref 21–32)
CO2: 23 MMOL/L (ref 21–32)
CO2: 23 MMOL/L (ref 21–32)
CO2: 24 MMOL/L (ref 21–32)
CO2: 25 MMOL/L (ref 21–32)
CO2: 26 MMOL/L (ref 21–32)
CO2: 27 MMOL/L (ref 21–32)
CO2: 29 MMOL/L (ref 21–32)
COLOR: YELLOW
COMMENT UA: NORMAL
CORTISOL TOTAL: 31.8 UG/DL
CREAT SERPL-MCNC: 0.6 MG/DL (ref 0.6–1.2)
CREAT SERPL-MCNC: 0.7 MG/DL (ref 0.6–1.2)
CREAT SERPL-MCNC: 0.8 MG/DL (ref 0.6–1.2)
CREAT SERPL-MCNC: 0.9 MG/DL (ref 0.6–1.2)
CREAT SERPL-MCNC: 1 MG/DL (ref 0.6–1.2)
CREAT SERPL-MCNC: 1.1 MG/DL (ref 0.6–1.2)
CREAT SERPL-MCNC: 1.1 MG/DL (ref 0.6–1.2)
CULTURE, BLOOD 2: NORMAL
EKG ATRIAL RATE: 113 BPM
EKG ATRIAL RATE: 52 BPM
EKG ATRIAL RATE: 52 BPM
EKG ATRIAL RATE: 57 BPM
EKG ATRIAL RATE: 59 BPM
EKG ATRIAL RATE: 62 BPM
EKG ATRIAL RATE: 62 BPM
EKG ATRIAL RATE: 86 BPM
EKG DIAGNOSIS: NORMAL
EKG P AXIS: 27 DEGREES
EKG P AXIS: 41 DEGREES
EKG P AXIS: 55 DEGREES
EKG P AXIS: 77 DEGREES
EKG P AXIS: 80 DEGREES
EKG P AXIS: 93 DEGREES
EKG P-R INTERVAL: 146 MS
EKG P-R INTERVAL: 156 MS
EKG P-R INTERVAL: 162 MS
EKG P-R INTERVAL: 162 MS
EKG P-R INTERVAL: 164 MS
EKG P-R INTERVAL: 166 MS
EKG Q-T INTERVAL: 372 MS
EKG Q-T INTERVAL: 444 MS
EKG Q-T INTERVAL: 450 MS
EKG Q-T INTERVAL: 462 MS
EKG Q-T INTERVAL: 462 MS
EKG Q-T INTERVAL: 484 MS
EKG Q-T INTERVAL: 488 MS
EKG Q-T INTERVAL: 500 MS
EKG QRS DURATION: 104 MS
EKG QRS DURATION: 110 MS
EKG QRS DURATION: 110 MS
EKG QRS DURATION: 114 MS
EKG QRS DURATION: 128 MS
EKG QRS DURATION: 138 MS
EKG QRS DURATION: 138 MS
EKG QRS DURATION: 150 MS
EKG QTC CALCULATION (BAZETT): 432 MS
EKG QTC CALCULATION (BAZETT): 453 MS
EKG QTC CALCULATION (BAZETT): 465 MS
EKG QTC CALCULATION (BAZETT): 468 MS
EKG QTC CALCULATION (BAZETT): 468 MS
EKG QTC CALCULATION (BAZETT): 479 MS
EKG QTC CALCULATION (BAZETT): 506 MS
EKG QTC CALCULATION (BAZETT): 507 MS
EKG R AXIS: -47 DEGREES
EKG R AXIS: -55 DEGREES
EKG R AXIS: -58 DEGREES
EKG R AXIS: -62 DEGREES
EKG R AXIS: -64 DEGREES
EKG R AXIS: -73 DEGREES
EKG R AXIS: -76 DEGREES
EKG R AXIS: -82 DEGREES
EKG T AXIS: 106 DEGREES
EKG T AXIS: 109 DEGREES
EKG T AXIS: 117 DEGREES
EKG T AXIS: 120 DEGREES
EKG T AXIS: 170 DEGREES
EKG T AXIS: 88 DEGREES
EKG T AXIS: 90 DEGREES
EKG T AXIS: 95 DEGREES
EKG VENTRICULAR RATE: 112 BPM
EKG VENTRICULAR RATE: 52 BPM
EKG VENTRICULAR RATE: 52 BPM
EKG VENTRICULAR RATE: 57 BPM
EKG VENTRICULAR RATE: 59 BPM
EKG VENTRICULAR RATE: 62 BPM
EKG VENTRICULAR RATE: 62 BPM
EKG VENTRICULAR RATE: 76 BPM
EOSINOPHILS ABSOLUTE: 0 K/UL (ref 0–0.6)
EOSINOPHILS ABSOLUTE: 0.1 K/UL (ref 0–0.6)
EOSINOPHILS ABSOLUTE: 0.1 K/UL (ref 0–0.6)
EOSINOPHILS ABSOLUTE: 0.2 K/UL (ref 0–0.6)
EOSINOPHILS RELATIVE PERCENT: 0.1 %
EOSINOPHILS RELATIVE PERCENT: 0.2 %
EOSINOPHILS RELATIVE PERCENT: 0.3 %
EOSINOPHILS RELATIVE PERCENT: 0.3 %
EOSINOPHILS RELATIVE PERCENT: 0.9 %
EOSINOPHILS RELATIVE PERCENT: 0.9 %
EOSINOPHILS RELATIVE PERCENT: 1.2 %
EOSINOPHILS RELATIVE PERCENT: 1.3 %
EOSINOPHILS RELATIVE PERCENT: 2.7 %
EOSINOPHILS RELATIVE PERCENT: 2.7 %
EOSINOPHILS RELATIVE PERCENT: 3.5 %
EPITHELIAL CELLS, UA: ABNORMAL /HPF (ref 0–5)
EPITHELIAL CELLS, UA: NORMAL /HPF (ref 0–5)
ESTIMATED AVERAGE GLUCOSE: 105.4 MG/DL
GFR AFRICAN AMERICAN: 57
GFR AFRICAN AMERICAN: 57
GFR AFRICAN AMERICAN: >60
GFR NON-AFRICAN AMERICAN: 47
GFR NON-AFRICAN AMERICAN: 47
GFR NON-AFRICAN AMERICAN: 53
GFR NON-AFRICAN AMERICAN: 59
GFR NON-AFRICAN AMERICAN: >60
GLOBULIN: 2.9 G/DL
GLOBULIN: 3 G/DL
GLOBULIN: 3.2 G/DL
GLOBULIN: 3.3 G/DL
GLOBULIN: 3.7 G/DL
GLUCOSE BLD-MCNC: 114 MG/DL (ref 70–99)
GLUCOSE BLD-MCNC: 114 MG/DL (ref 70–99)
GLUCOSE BLD-MCNC: 116 MG/DL (ref 70–99)
GLUCOSE BLD-MCNC: 118 MG/DL (ref 70–99)
GLUCOSE BLD-MCNC: 119 MG/DL (ref 70–99)
GLUCOSE BLD-MCNC: 130 MG/DL (ref 70–99)
GLUCOSE BLD-MCNC: 133 MG/DL (ref 70–99)
GLUCOSE BLD-MCNC: 134 MG/DL (ref 70–99)
GLUCOSE BLD-MCNC: 134 MG/DL (ref 70–99)
GLUCOSE BLD-MCNC: 136 MG/DL (ref 70–99)
GLUCOSE BLD-MCNC: 138 MG/DL (ref 70–99)
GLUCOSE BLD-MCNC: 141 MG/DL (ref 70–99)
GLUCOSE BLD-MCNC: 147 MG/DL (ref 70–99)
GLUCOSE BLD-MCNC: 149 MG/DL (ref 70–99)
GLUCOSE BLD-MCNC: 150 MG/DL (ref 70–99)
GLUCOSE BLD-MCNC: 154 MG/DL (ref 70–99)
GLUCOSE BLD-MCNC: 156 MG/DL (ref 70–99)
GLUCOSE BLD-MCNC: 157 MG/DL (ref 70–99)
GLUCOSE BLD-MCNC: 162 MG/DL (ref 70–99)
GLUCOSE BLD-MCNC: 163 MG/DL (ref 70–99)
GLUCOSE BLD-MCNC: 164 MG/DL (ref 70–99)
GLUCOSE BLD-MCNC: 168 MG/DL (ref 70–99)
GLUCOSE BLD-MCNC: 174 MG/DL (ref 70–99)
GLUCOSE BLD-MCNC: 176 MG/DL (ref 70–99)
GLUCOSE BLD-MCNC: 180 MG/DL (ref 70–99)
GLUCOSE BLD-MCNC: 182 MG/DL (ref 70–99)
GLUCOSE BLD-MCNC: 182 MG/DL (ref 70–99)
GLUCOSE BLD-MCNC: 282 MG/DL (ref 70–99)
GLUCOSE BLD-MCNC: 334 MG/DL (ref 70–99)
GLUCOSE BLD-MCNC: 94 MG/DL (ref 70–99)
GLUCOSE BLD-MCNC: 98 MG/DL (ref 70–99)
GLUCOSE URINE: NEGATIVE MG/DL
HBA1C MFR BLD: 5.3 %
HCO3 ARTERIAL: 22 MMOL/L (ref 21–29)
HCO3 ARTERIAL: 23 MMOL/L (ref 21–29)
HCO3 ARTERIAL: 24 MMOL/L (ref 21–29)
HCO3 VENOUS: 25 MMOL/L (ref 23–29)
HCT VFR BLD CALC: 21.4 % (ref 36–48)
HCT VFR BLD CALC: 25 % (ref 36–48)
HCT VFR BLD CALC: 25.3 % (ref 36–48)
HCT VFR BLD CALC: 25.6 % (ref 36–48)
HCT VFR BLD CALC: 25.7 % (ref 36–48)
HCT VFR BLD CALC: 26.3 % (ref 36–48)
HCT VFR BLD CALC: 27.1 % (ref 36–48)
HCT VFR BLD CALC: 27.5 % (ref 36–48)
HCT VFR BLD CALC: 27.5 % (ref 36–48)
HCT VFR BLD CALC: 27.6 % (ref 36–48)
HCT VFR BLD CALC: 27.8 % (ref 36–48)
HCT VFR BLD CALC: 29.2 % (ref 36–48)
HCT VFR BLD CALC: 30 % (ref 36–48)
HCT VFR BLD CALC: 31.8 % (ref 36–48)
HCT VFR BLD CALC: 32.6 % (ref 36–48)
HDLC SERPL-MCNC: 27 MG/DL (ref 40–60)
HEMOGLOBIN, ART, EXTENDED: 10.5 G/DL
HEMOGLOBIN, ART, EXTENDED: 8.3 G/DL
HEMOGLOBIN: 10.1 G/DL (ref 12–16)
HEMOGLOBIN: 10.5 G/DL (ref 12–16)
HEMOGLOBIN: 7.1 G/DL (ref 12–16)
HEMOGLOBIN: 8.3 G/DL (ref 12–16)
HEMOGLOBIN: 8.5 G/DL (ref 12–16)
HEMOGLOBIN: 8.7 G/DL (ref 12–16)
HEMOGLOBIN: 8.8 G/DL (ref 12–16)
HEMOGLOBIN: 9 G/DL (ref 12–16)
HEMOGLOBIN: 9 G/DL (ref 12–16)
HEMOGLOBIN: 9.1 G/DL (ref 12–16)
HEMOGLOBIN: 9.2 G/DL (ref 12–16)
HEMOGLOBIN: 9.7 G/DL (ref 12–16)
HEMOGLOBIN: 9.8 G/DL (ref 12–16)
INR BLD: 1.13 (ref 0.86–1.14)
INR BLD: 1.19 (ref 0.86–1.14)
INR BLD: 1.2 (ref 0.86–1.14)
INR BLD: 1.22 (ref 0.86–1.14)
KETONES, URINE: ABNORMAL MG/DL
KETONES, URINE: NEGATIVE MG/DL
L. PNEUMOPHILA SEROGP 1 UR AG: NORMAL
LACTATE: 1.2 MMOL/L (ref 0.4–2)
LACTIC ACID: 1.3 MMOL/L (ref 0.4–2)
LACTIC ACID: 2 MMOL/L (ref 0.4–2)
LDL CHOLESTEROL CALCULATED: 53 MG/DL
LEUKOCYTE ESTERASE, URINE: ABNORMAL
LEUKOCYTE ESTERASE, URINE: NEGATIVE
LYMPHOCYTES ABSOLUTE: 0.5 K/UL (ref 1–5.1)
LYMPHOCYTES ABSOLUTE: 0.6 K/UL (ref 1–5.1)
LYMPHOCYTES ABSOLUTE: 0.7 K/UL (ref 1–5.1)
LYMPHOCYTES ABSOLUTE: 0.8 K/UL (ref 1–5.1)
LYMPHOCYTES ABSOLUTE: 0.9 K/UL (ref 1–5.1)
LYMPHOCYTES ABSOLUTE: 0.9 K/UL (ref 1–5.1)
LYMPHOCYTES ABSOLUTE: 1.2 K/UL (ref 1–5.1)
LYMPHOCYTES ABSOLUTE: 1.4 K/UL (ref 1–5.1)
LYMPHOCYTES ABSOLUTE: 1.5 K/UL (ref 1–5.1)
LYMPHOCYTES ABSOLUTE: 1.7 K/UL (ref 1–5.1)
LYMPHOCYTES ABSOLUTE: 2.2 K/UL (ref 1–5.1)
LYMPHOCYTES RELATIVE PERCENT: 11.8 %
LYMPHOCYTES RELATIVE PERCENT: 12.4 %
LYMPHOCYTES RELATIVE PERCENT: 17 %
LYMPHOCYTES RELATIVE PERCENT: 17.6 %
LYMPHOCYTES RELATIVE PERCENT: 19.7 %
LYMPHOCYTES RELATIVE PERCENT: 25.2 %
LYMPHOCYTES RELATIVE PERCENT: 28.4 %
LYMPHOCYTES RELATIVE PERCENT: 7.2 %
LYMPHOCYTES RELATIVE PERCENT: 8.4 %
LYMPHOCYTES RELATIVE PERCENT: 9.6 %
LYMPHOCYTES RELATIVE PERCENT: 9.6 %
MAGNESIUM: 1.6 MG/DL (ref 1.8–2.4)
MAGNESIUM: 1.8 MG/DL (ref 1.8–2.4)
MAGNESIUM: 1.9 MG/DL (ref 1.8–2.4)
MAGNESIUM: 2 MG/DL (ref 1.8–2.4)
MAGNESIUM: 2.1 MG/DL (ref 1.8–2.4)
MAGNESIUM: 2.4 MG/DL (ref 1.8–2.4)
MCH RBC QN AUTO: 30.7 PG (ref 26–34)
MCH RBC QN AUTO: 30.8 PG (ref 26–34)
MCH RBC QN AUTO: 31 PG (ref 26–34)
MCH RBC QN AUTO: 31.1 PG (ref 26–34)
MCH RBC QN AUTO: 31.3 PG (ref 26–34)
MCH RBC QN AUTO: 31.4 PG (ref 26–34)
MCH RBC QN AUTO: 31.7 PG (ref 26–34)
MCH RBC QN AUTO: 31.9 PG (ref 26–34)
MCH RBC QN AUTO: 32.1 PG (ref 26–34)
MCH RBC QN AUTO: 32.2 PG (ref 26–34)
MCH RBC QN AUTO: 32.3 PG (ref 26–34)
MCH RBC QN AUTO: 32.3 PG (ref 26–34)
MCH RBC QN AUTO: 32.4 PG (ref 26–34)
MCHC RBC AUTO-ENTMCNC: 31.1 G/DL (ref 31–36)
MCHC RBC AUTO-ENTMCNC: 32.1 G/DL (ref 31–36)
MCHC RBC AUTO-ENTMCNC: 32.2 G/DL (ref 31–36)
MCHC RBC AUTO-ENTMCNC: 32.3 G/DL (ref 31–36)
MCHC RBC AUTO-ENTMCNC: 32.5 G/DL (ref 31–36)
MCHC RBC AUTO-ENTMCNC: 32.9 G/DL (ref 31–36)
MCHC RBC AUTO-ENTMCNC: 33.1 G/DL (ref 31–36)
MCHC RBC AUTO-ENTMCNC: 33.2 G/DL (ref 31–36)
MCHC RBC AUTO-ENTMCNC: 33.4 G/DL (ref 31–36)
MCHC RBC AUTO-ENTMCNC: 34.1 G/DL (ref 31–36)
MCV RBC AUTO: 94 FL (ref 80–100)
MCV RBC AUTO: 94.2 FL (ref 80–100)
MCV RBC AUTO: 94.2 FL (ref 80–100)
MCV RBC AUTO: 94.4 FL (ref 80–100)
MCV RBC AUTO: 95.1 FL (ref 80–100)
MCV RBC AUTO: 95.3 FL (ref 80–100)
MCV RBC AUTO: 95.8 FL (ref 80–100)
MCV RBC AUTO: 96.2 FL (ref 80–100)
MCV RBC AUTO: 96.2 FL (ref 80–100)
MCV RBC AUTO: 96.8 FL (ref 80–100)
MCV RBC AUTO: 97.2 FL (ref 80–100)
MCV RBC AUTO: 97.9 FL (ref 80–100)
MCV RBC AUTO: 99.6 FL (ref 80–100)
METHEMOGLOBIN ARTERIAL: 0 % (ref 0–1.4)
METHEMOGLOBIN ARTERIAL: 0.4 % (ref 0–1.4)
METHEMOGLOBIN VENOUS: 0.3 %
MICROSCOPIC EXAMINATION: YES
MONOCYTES ABSOLUTE: 0.4 K/UL (ref 0–1.3)
MONOCYTES ABSOLUTE: 0.5 K/UL (ref 0–1.3)
MONOCYTES ABSOLUTE: 0.5 K/UL (ref 0–1.3)
MONOCYTES ABSOLUTE: 0.6 K/UL (ref 0–1.3)
MONOCYTES ABSOLUTE: 0.8 K/UL (ref 0–1.3)
MONOCYTES ABSOLUTE: 0.8 K/UL (ref 0–1.3)
MONOCYTES ABSOLUTE: 1 K/UL (ref 0–1.3)
MONOCYTES ABSOLUTE: 1.7 K/UL (ref 0–1.3)
MONOCYTES RELATIVE PERCENT: 10.4 %
MONOCYTES RELATIVE PERCENT: 10.9 %
MONOCYTES RELATIVE PERCENT: 11.2 %
MONOCYTES RELATIVE PERCENT: 11.5 %
MONOCYTES RELATIVE PERCENT: 6.4 %
MONOCYTES RELATIVE PERCENT: 7.8 %
MONOCYTES RELATIVE PERCENT: 8 %
MONOCYTES RELATIVE PERCENT: 8 %
MONOCYTES RELATIVE PERCENT: 8.2 %
MONOCYTES RELATIVE PERCENT: 9.3 %
MONOCYTES RELATIVE PERCENT: 9.7 %
MRSA SCREEN RT-PCR: NORMAL
MUCUS: ABNORMAL /LPF
NEUTROPHILS ABSOLUTE: 17.8 K/UL (ref 1.7–7.7)
NEUTROPHILS ABSOLUTE: 3.4 K/UL (ref 1.7–7.7)
NEUTROPHILS ABSOLUTE: 3.5 K/UL (ref 1.7–7.7)
NEUTROPHILS ABSOLUTE: 3.6 K/UL (ref 1.7–7.7)
NEUTROPHILS ABSOLUTE: 4.1 K/UL (ref 1.7–7.7)
NEUTROPHILS ABSOLUTE: 4.5 K/UL (ref 1.7–7.7)
NEUTROPHILS ABSOLUTE: 5.2 K/UL (ref 1.7–7.7)
NEUTROPHILS ABSOLUTE: 5.3 K/UL (ref 1.7–7.7)
NEUTROPHILS ABSOLUTE: 5.9 K/UL (ref 1.7–7.7)
NEUTROPHILS ABSOLUTE: 7.1 K/UL (ref 1.7–7.7)
NEUTROPHILS ABSOLUTE: 9.5 K/UL (ref 1.7–7.7)
NEUTROPHILS RELATIVE PERCENT: 57.5 %
NEUTROPHILS RELATIVE PERCENT: 60.5 %
NEUTROPHILS RELATIVE PERCENT: 66.3 %
NEUTROPHILS RELATIVE PERCENT: 69.1 %
NEUTROPHILS RELATIVE PERCENT: 73.5 %
NEUTROPHILS RELATIVE PERCENT: 76.5 %
NEUTROPHILS RELATIVE PERCENT: 79.7 %
NEUTROPHILS RELATIVE PERCENT: 80.7 %
NEUTROPHILS RELATIVE PERCENT: 80.9 %
NEUTROPHILS RELATIVE PERCENT: 81.9 %
NEUTROPHILS RELATIVE PERCENT: 84.4 %
NITRITE, URINE: NEGATIVE
O2 CONTENT, VEN: 15 VOL %
O2 SAT, ARTERIAL: 100 % (ref 93–100)
O2 SAT, ARTERIAL: 96 % (ref 93–100)
O2 SAT, ARTERIAL: 99 % (ref 93–100)
O2 SAT, VEN: 95 %
O2 THERAPY: ABNORMAL
ORGANISM: ABNORMAL
OSMOLALITY URINE: 635 MOSM/KG (ref 390–1070)
OSMOLALITY: 273 MOSM/KG (ref 278–305)
PCO2 ARTERIAL: 36.7 MMHG (ref 35–45)
PCO2 ARTERIAL: 37.3 MMHG (ref 35–45)
PCO2 ARTERIAL: 50.2 MM HG (ref 35–45)
PCO2, VEN: 38.9 MMHG (ref 40–50)
PDW BLD-RTO: 14.6 % (ref 12.4–15.4)
PDW BLD-RTO: 14.6 % (ref 12.4–15.4)
PDW BLD-RTO: 14.7 % (ref 12.4–15.4)
PDW BLD-RTO: 14.8 % (ref 12.4–15.4)
PDW BLD-RTO: 14.9 % (ref 12.4–15.4)
PDW BLD-RTO: 15 % (ref 12.4–15.4)
PDW BLD-RTO: 15 % (ref 12.4–15.4)
PDW BLD-RTO: 15.3 % (ref 12.4–15.4)
PDW BLD-RTO: 15.4 % (ref 12.4–15.4)
PDW BLD-RTO: 15.6 % (ref 12.4–15.4)
PDW BLD-RTO: 16.4 % (ref 12.4–15.4)
PDW BLD-RTO: 16.4 % (ref 12.4–15.4)
PDW BLD-RTO: 16.8 % (ref 12.4–15.4)
PERFORMED ON: ABNORMAL
PH ARTERIAL: 7.25 (ref 7.35–7.45)
PH ARTERIAL: 7.39 (ref 7.35–7.45)
PH ARTERIAL: 7.42 (ref 7.35–7.45)
PH UA: 6 (ref 5–8)
PH UA: 7 (ref 5–8)
PH UA: 8.5 (ref 5–8)
PH VENOUS: 7.42 (ref 7.35–7.45)
PHOSPHORUS: 2.7 MG/DL (ref 2.5–4.9)
PHOSPHORUS: 3.6 MG/DL (ref 2.5–4.9)
PHOSPHORUS: 4.3 MG/DL (ref 2.5–4.9)
PLATELET # BLD: 204 K/UL (ref 135–450)
PLATELET # BLD: 226 K/UL (ref 135–450)
PLATELET # BLD: 235 K/UL (ref 135–450)
PLATELET # BLD: 236 K/UL (ref 135–450)
PLATELET # BLD: 248 K/UL (ref 135–450)
PLATELET # BLD: 257 K/UL (ref 135–450)
PLATELET # BLD: 267 K/UL (ref 135–450)
PLATELET # BLD: 268 K/UL (ref 135–450)
PLATELET # BLD: 272 K/UL (ref 135–450)
PLATELET # BLD: 286 K/UL (ref 135–450)
PLATELET # BLD: 287 K/UL (ref 135–450)
PLATELET # BLD: 297 K/UL (ref 135–450)
PLATELET # BLD: 335 K/UL (ref 135–450)
PLATELET # BLD: 488 K/UL (ref 135–450)
PLATELET # BLD: 603 K/UL (ref 135–450)
PMV BLD AUTO: 6.7 FL (ref 5–10.5)
PMV BLD AUTO: 6.8 FL (ref 5–10.5)
PMV BLD AUTO: 6.8 FL (ref 5–10.5)
PMV BLD AUTO: 6.9 FL (ref 5–10.5)
PMV BLD AUTO: 7 FL (ref 5–10.5)
PMV BLD AUTO: 7.2 FL (ref 5–10.5)
PMV BLD AUTO: 7.4 FL (ref 5–10.5)
PMV BLD AUTO: 7.5 FL (ref 5–10.5)
PMV BLD AUTO: 7.6 FL (ref 5–10.5)
PO2 ARTERIAL: 117 MMHG (ref 75–108)
PO2 ARTERIAL: 154 MMHG (ref 75–108)
PO2 ARTERIAL: 92 MM HG (ref 75–108)
PO2, VEN: 85.7 MMHG (ref 25–40)
POC POTASSIUM: 4.3 MMOL/L (ref 3.5–5.1)
POC SAMPLE TYPE: ABNORMAL
POC SODIUM: 129 MMOL/L (ref 136–145)
POTASSIUM REFLEX MAGNESIUM: 3.7 MMOL/L (ref 3.5–5.1)
POTASSIUM REFLEX MAGNESIUM: 4.1 MMOL/L (ref 3.5–5.1)
POTASSIUM REFLEX MAGNESIUM: 4.3 MMOL/L (ref 3.5–5.1)
POTASSIUM REFLEX MAGNESIUM: 4.6 MMOL/L (ref 3.5–5.1)
POTASSIUM REFLEX MAGNESIUM: 5 MMOL/L (ref 3.5–5.1)
POTASSIUM REFLEX MAGNESIUM: 5.1 MMOL/L (ref 3.5–5.1)
POTASSIUM REFLEX MAGNESIUM: 5.1 MMOL/L (ref 3.5–5.1)
POTASSIUM SERPL-SCNC: 3.3 MMOL/L (ref 3.5–5.1)
POTASSIUM SERPL-SCNC: 3.5 MMOL/L (ref 3.5–5.1)
POTASSIUM SERPL-SCNC: 3.8 MMOL/L (ref 3.5–5.1)
POTASSIUM SERPL-SCNC: 4 MMOL/L (ref 3.5–5.1)
POTASSIUM SERPL-SCNC: 4 MMOL/L (ref 3.5–5.1)
POTASSIUM SERPL-SCNC: 4.2 MMOL/L (ref 3.5–5.1)
POTASSIUM SERPL-SCNC: 4.2 MMOL/L (ref 3.5–5.1)
POTASSIUM SERPL-SCNC: 4.4 MMOL/L (ref 3.5–5.1)
POTASSIUM SERPL-SCNC: 4.5 MMOL/L (ref 3.5–5.1)
POTASSIUM SERPL-SCNC: 4.6 MMOL/L (ref 3.5–5.1)
POTASSIUM SERPL-SCNC: 5 MMOL/L (ref 3.5–5.1)
POTASSIUM SERPL-SCNC: 5 MMOL/L (ref 3.5–5.1)
POTASSIUM SERPL-SCNC: 5.1 MMOL/L (ref 3.5–5.1)
POTASSIUM SERPL-SCNC: 5.1 MMOL/L (ref 3.5–5.1)
POTASSIUM SERPL-SCNC: 5.4 MMOL/L (ref 3.5–5.1)
POTASSIUM SERPL-SCNC: 5.5 MMOL/L (ref 3.5–5.1)
PRO-BNP: 2983 PG/ML (ref 0–449)
PRO-BNP: 3188 PG/ML (ref 0–449)
PROCALCITONIN: 0.07 NG/ML (ref 0–0.15)
PROCALCITONIN: 0.25 NG/ML (ref 0–0.15)
PROLACTIN: 42.2 NG/ML
PROTEIN UA: 30 MG/DL
PROTEIN UA: ABNORMAL MG/DL
PROTHROMBIN TIME: 13.1 SEC (ref 10–13.2)
PROTHROMBIN TIME: 13.8 SEC (ref 10–13.2)
PROTHROMBIN TIME: 13.9 SEC (ref 10–13.2)
PROTHROMBIN TIME: 14.2 SEC (ref 10–13.2)
RBC # BLD: 2.28 M/UL (ref 4–5.2)
RBC # BLD: 2.64 M/UL (ref 4–5.2)
RBC # BLD: 2.65 M/UL (ref 4–5.2)
RBC # BLD: 2.65 M/UL (ref 4–5.2)
RBC # BLD: 2.66 M/UL (ref 4–5.2)
RBC # BLD: 2.7 M/UL (ref 4–5.2)
RBC # BLD: 2.81 M/UL (ref 4–5.2)
RBC # BLD: 2.87 M/UL (ref 4–5.2)
RBC # BLD: 2.88 M/UL (ref 4–5.2)
RBC # BLD: 2.89 M/UL (ref 4–5.2)
RBC # BLD: 2.93 M/UL (ref 4–5.2)
RBC # BLD: 2.98 M/UL (ref 4–5.2)
RBC # BLD: 3.18 M/UL (ref 4–5.2)
RBC # BLD: 3.27 M/UL (ref 4–5.2)
RBC # BLD: 3.3 M/UL (ref 4–5.2)
RBC UA: ABNORMAL /HPF (ref 0–4)
RBC UA: NORMAL /HPF (ref 0–4)
SARS-COV-2, NAAT: NOT DETECTED
SARS-COV-2, PCR: NOT DETECTED
SODIUM BLD-SCNC: 123 MMOL/L (ref 136–145)
SODIUM BLD-SCNC: 124 MMOL/L (ref 136–145)
SODIUM BLD-SCNC: 124 MMOL/L (ref 136–145)
SODIUM BLD-SCNC: 125 MMOL/L (ref 136–145)
SODIUM BLD-SCNC: 126 MMOL/L (ref 136–145)
SODIUM BLD-SCNC: 128 MMOL/L (ref 136–145)
SODIUM BLD-SCNC: 128 MMOL/L (ref 136–145)
SODIUM BLD-SCNC: 129 MMOL/L (ref 136–145)
SODIUM BLD-SCNC: 129 MMOL/L (ref 136–145)
SODIUM BLD-SCNC: 130 MMOL/L (ref 136–145)
SODIUM BLD-SCNC: 131 MMOL/L (ref 136–145)
SODIUM BLD-SCNC: 132 MMOL/L (ref 136–145)
SODIUM BLD-SCNC: 132 MMOL/L (ref 136–145)
SODIUM BLD-SCNC: 133 MMOL/L (ref 136–145)
SODIUM BLD-SCNC: 134 MMOL/L (ref 136–145)
SODIUM BLD-SCNC: 135 MMOL/L (ref 136–145)
SODIUM BLD-SCNC: 136 MMOL/L (ref 136–145)
SODIUM BLD-SCNC: 136 MMOL/L (ref 136–145)
SODIUM BLD-SCNC: 137 MMOL/L (ref 136–145)
SODIUM BLD-SCNC: 137 MMOL/L (ref 136–145)
SODIUM BLD-SCNC: 138 MMOL/L (ref 136–145)
SODIUM URINE: 138 MMOL/L
SPECIFIC GRAVITY UA: 1.01 (ref 1–1.03)
SPECIFIC GRAVITY UA: 1.02 (ref 1–1.03)
STREP PNEUMONIAE ANTIGEN, URINE: NORMAL
TCO2 ARTERIAL: 24 MMOL/L
TCO2 ARTERIAL: 24 MMOL/L
TCO2 ARTERIAL: 25 MMOL/L
TCO2 CALC VENOUS: 26 MMOL/L
TOTAL CK: 46 U/L (ref 26–192)
TOTAL PROTEIN: 6.4 G/DL (ref 6.4–8.2)
TOTAL PROTEIN: 6.9 G/DL (ref 6.4–8.2)
TOTAL PROTEIN: 7.3 G/DL (ref 6.4–8.2)
TOTAL PROTEIN: 7.4 G/DL (ref 6.4–8.2)
TOTAL PROTEIN: 7.4 G/DL (ref 6.4–8.2)
TRIGL SERPL-MCNC: 89 MG/DL (ref 0–150)
TROPONIN: 0.01 NG/ML
TROPONIN: <0.01 NG/ML
TSH REFLEX: 1.15 UIU/ML (ref 0.27–4.2)
URINE CULTURE, ROUTINE: ABNORMAL
URINE REFLEX TO CULTURE: ABNORMAL
URINE REFLEX TO CULTURE: YES
URINE TYPE: ABNORMAL
UROBILINOGEN, URINE: 0.2 E.U./DL
VLDLC SERPL CALC-MCNC: 18 MG/DL
WBC # BLD: 13 K/UL (ref 4–11)
WBC # BLD: 16.1 K/UL (ref 4–11)
WBC # BLD: 21.2 K/UL (ref 4–11)
WBC # BLD: 5 K/UL (ref 4–11)
WBC # BLD: 5 K/UL (ref 4–11)
WBC # BLD: 5.2 K/UL (ref 4–11)
WBC # BLD: 5.5 K/UL (ref 4–11)
WBC # BLD: 5.7 K/UL (ref 4–11)
WBC # BLD: 5.9 K/UL (ref 4–11)
WBC # BLD: 6.1 K/UL (ref 4–11)
WBC # BLD: 6.5 K/UL (ref 4–11)
WBC # BLD: 6.6 K/UL (ref 4–11)
WBC # BLD: 7.7 K/UL (ref 4–11)
WBC # BLD: 8.3 K/UL (ref 4–11)
WBC # BLD: 8.7 K/UL (ref 4–11)
WBC UA: ABNORMAL /HPF (ref 0–5)
WBC UA: NORMAL /HPF (ref 0–5)

## 2021-01-01 PROCEDURE — 6370000000 HC RX 637 (ALT 250 FOR IP): Performed by: INTERNAL MEDICINE

## 2021-01-01 PROCEDURE — 97535 SELF CARE MNGMENT TRAINING: CPT

## 2021-01-01 PROCEDURE — 31720 CLEARANCE OF AIRWAYS: CPT

## 2021-01-01 PROCEDURE — 97116 GAIT TRAINING THERAPY: CPT

## 2021-01-01 PROCEDURE — 83605 ASSAY OF LACTIC ACID: CPT

## 2021-01-01 PROCEDURE — 2580000003 HC RX 258: Performed by: PSYCHIATRY & NEUROLOGY

## 2021-01-01 PROCEDURE — 93010 ELECTROCARDIOGRAM REPORT: CPT | Performed by: INTERNAL MEDICINE

## 2021-01-01 PROCEDURE — 6360000002 HC RX W HCPCS: Performed by: INTERNAL MEDICINE

## 2021-01-01 PROCEDURE — 81001 URINALYSIS AUTO W/SCOPE: CPT

## 2021-01-01 PROCEDURE — 33208 INSRT HEART PM ATRIAL & VENT: CPT | Performed by: INTERNAL MEDICINE

## 2021-01-01 PROCEDURE — 6370000000 HC RX 637 (ALT 250 FOR IP): Performed by: NURSE PRACTITIONER

## 2021-01-01 PROCEDURE — 2580000003 HC RX 258: Performed by: INTERNAL MEDICINE

## 2021-01-01 PROCEDURE — 70450 CT HEAD/BRAIN W/O DYE: CPT

## 2021-01-01 PROCEDURE — 85730 THROMBOPLASTIN TIME PARTIAL: CPT

## 2021-01-01 PROCEDURE — 82550 ASSAY OF CK (CPK): CPT

## 2021-01-01 PROCEDURE — 85025 COMPLETE CBC W/AUTO DIFF WBC: CPT

## 2021-01-01 PROCEDURE — C9254 INJECTION, LACOSAMIDE: HCPCS | Performed by: PSYCHIATRY & NEUROLOGY

## 2021-01-01 PROCEDURE — 85027 COMPLETE CBC AUTOMATED: CPT

## 2021-01-01 PROCEDURE — 2500000003 HC RX 250 WO HCPCS: Performed by: NURSE ANESTHETIST, CERTIFIED REGISTERED

## 2021-01-01 PROCEDURE — 71045 X-RAY EXAM CHEST 1 VIEW: CPT

## 2021-01-01 PROCEDURE — 94664 DEMO&/EVAL PT USE INHALER: CPT

## 2021-01-01 PROCEDURE — 84484 ASSAY OF TROPONIN QUANT: CPT

## 2021-01-01 PROCEDURE — 6370000000 HC RX 637 (ALT 250 FOR IP): Performed by: NEUROLOGICAL SURGERY

## 2021-01-01 PROCEDURE — 97166 OT EVAL MOD COMPLEX 45 MIN: CPT

## 2021-01-01 PROCEDURE — 2580000003 HC RX 258: Performed by: EMERGENCY MEDICINE

## 2021-01-01 PROCEDURE — 2060000000 HC ICU INTERMEDIATE R&B

## 2021-01-01 PROCEDURE — 6360000002 HC RX W HCPCS: Performed by: PHYSICIAN ASSISTANT

## 2021-01-01 PROCEDURE — 99223 1ST HOSP IP/OBS HIGH 75: CPT | Performed by: INTERNAL MEDICINE

## 2021-01-01 PROCEDURE — 74018 RADEX ABDOMEN 1 VIEW: CPT

## 2021-01-01 PROCEDURE — 97530 THERAPEUTIC ACTIVITIES: CPT

## 2021-01-01 PROCEDURE — 99285 EMERGENCY DEPT VISIT HI MDM: CPT

## 2021-01-01 PROCEDURE — 6360000004 HC RX CONTRAST MEDICATION: Performed by: INTERNAL MEDICINE

## 2021-01-01 PROCEDURE — 92523 SPEECH SOUND LANG COMPREHEN: CPT

## 2021-01-01 PROCEDURE — 6360000002 HC RX W HCPCS: Performed by: PSYCHIATRY & NEUROLOGY

## 2021-01-01 PROCEDURE — 96375 TX/PRO/DX INJ NEW DRUG ADDON: CPT

## 2021-01-01 PROCEDURE — 99232 SBSQ HOSP IP/OBS MODERATE 35: CPT | Performed by: INTERNAL MEDICINE

## 2021-01-01 PROCEDURE — 83735 ASSAY OF MAGNESIUM: CPT

## 2021-01-01 PROCEDURE — 36415 COLL VENOUS BLD VENIPUNCTURE: CPT

## 2021-01-01 PROCEDURE — 87040 BLOOD CULTURE FOR BACTERIA: CPT

## 2021-01-01 PROCEDURE — 99153 MOD SED SAME PHYS/QHP EA: CPT

## 2021-01-01 PROCEDURE — 6370000000 HC RX 637 (ALT 250 FOR IP): Performed by: PHYSICIAN ASSISTANT

## 2021-01-01 PROCEDURE — 6360000004 HC RX CONTRAST MEDICATION: Performed by: EMERGENCY MEDICINE

## 2021-01-01 PROCEDURE — 97110 THERAPEUTIC EXERCISES: CPT

## 2021-01-01 PROCEDURE — 83930 ASSAY OF BLOOD OSMOLALITY: CPT

## 2021-01-01 PROCEDURE — 2580000003 HC RX 258: Performed by: PHYSICIAN ASSISTANT

## 2021-01-01 PROCEDURE — 96365 THER/PROPH/DIAG IV INF INIT: CPT

## 2021-01-01 PROCEDURE — 90686 IIV4 VACC NO PRSV 0.5 ML IM: CPT | Performed by: INTERNAL MEDICINE

## 2021-01-01 PROCEDURE — 2500000003 HC RX 250 WO HCPCS: Performed by: NEUROLOGICAL SURGERY

## 2021-01-01 PROCEDURE — C1713 ANCHOR/SCREW BN/BN,TIS/BN: HCPCS | Performed by: NEUROLOGICAL SURGERY

## 2021-01-01 PROCEDURE — 97161 PT EVAL LOW COMPLEX 20 MIN: CPT

## 2021-01-01 PROCEDURE — 92526 ORAL FUNCTION THERAPY: CPT

## 2021-01-01 PROCEDURE — 6360000002 HC RX W HCPCS: Performed by: STUDENT IN AN ORGANIZED HEALTH CARE EDUCATION/TRAINING PROGRAM

## 2021-01-01 PROCEDURE — 80048 BASIC METABOLIC PNL TOTAL CA: CPT

## 2021-01-01 PROCEDURE — 87088 URINE BACTERIA CULTURE: CPT

## 2021-01-01 PROCEDURE — 2580000003 HC RX 258: Performed by: NURSE ANESTHETIST, CERTIFIED REGISTERED

## 2021-01-01 PROCEDURE — 95813 EEG EXTND MNTR 61-119 MIN: CPT

## 2021-01-01 PROCEDURE — 87086 URINE CULTURE/COLONY COUNT: CPT

## 2021-01-01 PROCEDURE — 94761 N-INVAS EAR/PLS OXIMETRY MLT: CPT

## 2021-01-01 PROCEDURE — 80069 RENAL FUNCTION PANEL: CPT

## 2021-01-01 PROCEDURE — 87449 NOS EACH ORGANISM AG IA: CPT

## 2021-01-01 PROCEDURE — 92507 TX SP LANG VOICE COMM INDIV: CPT

## 2021-01-01 PROCEDURE — 6360000002 HC RX W HCPCS: Performed by: ANESTHESIOLOGY

## 2021-01-01 PROCEDURE — 2580000003 HC RX 258: Performed by: ANESTHESIOLOGY

## 2021-01-01 PROCEDURE — 2500000003 HC RX 250 WO HCPCS: Performed by: STUDENT IN AN ORGANIZED HEALTH CARE EDUCATION/TRAINING PROGRAM

## 2021-01-01 PROCEDURE — G0008 ADMIN INFLUENZA VIRUS VAC: HCPCS | Performed by: INTERNAL MEDICINE

## 2021-01-01 PROCEDURE — 84295 ASSAY OF SERUM SODIUM: CPT

## 2021-01-01 PROCEDURE — 85610 PROTHROMBIN TIME: CPT

## 2021-01-01 PROCEDURE — 93280 PM DEVICE PROGR EVAL DUAL: CPT | Performed by: INTERNAL MEDICINE

## 2021-01-01 PROCEDURE — 3E0G76Z INTRODUCTION OF NUTRITIONAL SUBSTANCE INTO UPPER GI, VIA NATURAL OR ARTIFICIAL OPENING: ICD-10-PCS | Performed by: INTERNAL MEDICINE

## 2021-01-01 PROCEDURE — 95819 EEG AWAKE AND ASLEEP: CPT

## 2021-01-01 PROCEDURE — A9576 INJ PROHANCE MULTIPACK: HCPCS | Performed by: NEUROLOGICAL SURGERY

## 2021-01-01 PROCEDURE — 0DH63UZ INSERTION OF FEEDING DEVICE INTO STOMACH, PERCUTANEOUS APPROACH: ICD-10-PCS | Performed by: INTERNAL MEDICINE

## 2021-01-01 PROCEDURE — 84443 ASSAY THYROID STIM HORMONE: CPT

## 2021-01-01 PROCEDURE — 92610 EVALUATE SWALLOWING FUNCTION: CPT

## 2021-01-01 PROCEDURE — 87635 SARS-COV-2 COVID-19 AMP PRB: CPT

## 2021-01-01 PROCEDURE — 51798 US URINE CAPACITY MEASURE: CPT

## 2021-01-01 PROCEDURE — C1894 INTRO/SHEATH, NON-LASER: HCPCS

## 2021-01-01 PROCEDURE — 74176 CT ABD & PELVIS W/O CONTRAST: CPT

## 2021-01-01 PROCEDURE — 3700000001 HC ADD 15 MINUTES (ANESTHESIA): Performed by: NEUROLOGICAL SURGERY

## 2021-01-01 PROCEDURE — 80061 LIPID PANEL: CPT

## 2021-01-01 PROCEDURE — 93005 ELECTROCARDIOGRAM TRACING: CPT | Performed by: INTERNAL MEDICINE

## 2021-01-01 PROCEDURE — 6360000004 HC RX CONTRAST MEDICATION: Performed by: NEUROLOGICAL SURGERY

## 2021-01-01 PROCEDURE — 83880 ASSAY OF NATRIURETIC PEPTIDE: CPT

## 2021-01-01 PROCEDURE — 71046 X-RAY EXAM CHEST 2 VIEWS: CPT

## 2021-01-01 PROCEDURE — 99152 MOD SED SAME PHYS/QHP 5/>YRS: CPT | Performed by: INTERNAL MEDICINE

## 2021-01-01 PROCEDURE — 3600000004 HC SURGERY LEVEL 4 BASE: Performed by: NEUROLOGICAL SURGERY

## 2021-01-01 PROCEDURE — 99232 SBSQ HOSP IP/OBS MODERATE 35: CPT | Performed by: PSYCHIATRY & NEUROLOGY

## 2021-01-01 PROCEDURE — 2500000003 HC RX 250 WO HCPCS: Performed by: EMERGENCY MEDICINE

## 2021-01-01 PROCEDURE — 99284 EMERGENCY DEPT VISIT MOD MDM: CPT

## 2021-01-01 PROCEDURE — 6360000002 HC RX W HCPCS

## 2021-01-01 PROCEDURE — 3600000014 HC SURGERY LEVEL 4 ADDTL 15MIN: Performed by: NEUROLOGICAL SURGERY

## 2021-01-01 PROCEDURE — 94799 UNLISTED PULMONARY SVC/PX: CPT

## 2021-01-01 PROCEDURE — 6370000000 HC RX 637 (ALT 250 FOR IP): Performed by: STUDENT IN AN ORGANIZED HEALTH CARE EDUCATION/TRAINING PROGRAM

## 2021-01-01 PROCEDURE — 2580000003 HC RX 258: Performed by: STUDENT IN AN ORGANIZED HEALTH CARE EDUCATION/TRAINING PROGRAM

## 2021-01-01 PROCEDURE — 80053 COMPREHEN METABOLIC PANEL: CPT

## 2021-01-01 PROCEDURE — 3700000000 HC ANESTHESIA ATTENDED CARE: Performed by: INTERNAL MEDICINE

## 2021-01-01 PROCEDURE — 3700000001 HC ADD 15 MINUTES (ANESTHESIA): Performed by: INTERNAL MEDICINE

## 2021-01-01 PROCEDURE — 33208 INSRT HEART PM ATRIAL & VENT: CPT

## 2021-01-01 PROCEDURE — 82803 BLOOD GASES ANY COMBINATION: CPT

## 2021-01-01 PROCEDURE — 2500000003 HC RX 250 WO HCPCS

## 2021-01-01 PROCEDURE — 2700000000 HC OXYGEN THERAPY PER DAY

## 2021-01-01 PROCEDURE — 84100 ASSAY OF PHOSPHORUS: CPT

## 2021-01-01 PROCEDURE — 83935 ASSAY OF URINE OSMOLALITY: CPT

## 2021-01-01 PROCEDURE — C1729 CATH, DRAINAGE: HCPCS | Performed by: NEUROLOGICAL SURGERY

## 2021-01-01 PROCEDURE — 73502 X-RAY EXAM HIP UNI 2-3 VIEWS: CPT

## 2021-01-01 PROCEDURE — 93005 ELECTROCARDIOGRAM TRACING: CPT | Performed by: EMERGENCY MEDICINE

## 2021-01-01 PROCEDURE — 2500000003 HC RX 250 WO HCPCS: Performed by: ANESTHESIOLOGY

## 2021-01-01 PROCEDURE — 6360000002 HC RX W HCPCS: Performed by: NURSE ANESTHETIST, CERTIFIED REGISTERED

## 2021-01-01 PROCEDURE — C1898 LEAD, PMKR, OTHER THAN TRANS: HCPCS

## 2021-01-01 PROCEDURE — 99221 1ST HOSP IP/OBS SF/LOW 40: CPT | Performed by: NURSE PRACTITIONER

## 2021-01-01 PROCEDURE — 82533 TOTAL CORTISOL: CPT

## 2021-01-01 PROCEDURE — 99223 1ST HOSP IP/OBS HIGH 75: CPT | Performed by: PSYCHIATRY & NEUROLOGY

## 2021-01-01 PROCEDURE — 0JH606Z INSERTION OF PACEMAKER, DUAL CHAMBER INTO CHEST SUBCUTANEOUS TISSUE AND FASCIA, OPEN APPROACH: ICD-10-PCS | Performed by: INTERNAL MEDICINE

## 2021-01-01 PROCEDURE — 2720000010 HC SURG SUPPLY STERILE: Performed by: NEUROLOGICAL SURGERY

## 2021-01-01 PROCEDURE — 82947 ASSAY GLUCOSE BLOOD QUANT: CPT

## 2021-01-01 PROCEDURE — 87186 SC STD MICRODIL/AGAR DIL: CPT

## 2021-01-01 PROCEDURE — 99233 SBSQ HOSP IP/OBS HIGH 50: CPT | Performed by: INTERNAL MEDICINE

## 2021-01-01 PROCEDURE — 7100000001 HC PACU RECOVERY - ADDTL 15 MIN: Performed by: NEUROLOGICAL SURGERY

## 2021-01-01 PROCEDURE — 87641 MR-STAPH DNA AMP PROBE: CPT

## 2021-01-01 PROCEDURE — 99282 EMERGENCY DEPT VISIT SF MDM: CPT

## 2021-01-01 PROCEDURE — 02H63JZ INSERTION OF PACEMAKER LEAD INTO RIGHT ATRIUM, PERCUTANEOUS APPROACH: ICD-10-PCS | Performed by: INTERNAL MEDICINE

## 2021-01-01 PROCEDURE — 84146 ASSAY OF PROLACTIN: CPT

## 2021-01-01 PROCEDURE — 6360000002 HC RX W HCPCS: Performed by: NEUROLOGICAL SURGERY

## 2021-01-01 PROCEDURE — 99222 1ST HOSP IP/OBS MODERATE 55: CPT | Performed by: INTERNAL MEDICINE

## 2021-01-01 PROCEDURE — 82330 ASSAY OF CALCIUM: CPT

## 2021-01-01 PROCEDURE — 6360000002 HC RX W HCPCS: Performed by: EMERGENCY MEDICINE

## 2021-01-01 PROCEDURE — 84145 PROCALCITONIN (PCT): CPT

## 2021-01-01 PROCEDURE — 3700000000 HC ANESTHESIA ATTENDED CARE: Performed by: NEUROLOGICAL SURGERY

## 2021-01-01 PROCEDURE — C8923 2D TTE W OR W/O FOL W/CON,CO: HCPCS

## 2021-01-01 PROCEDURE — 1200000000 HC SEMI PRIVATE

## 2021-01-01 PROCEDURE — 2580000003 HC RX 258: Performed by: NEUROLOGICAL SURGERY

## 2021-01-01 PROCEDURE — 7100000000 HC PACU RECOVERY - FIRST 15 MIN: Performed by: NEUROLOGICAL SURGERY

## 2021-01-01 PROCEDURE — APPSS30 APP SPLIT SHARED TIME 16-30 MINUTES: Performed by: NURSE PRACTITIONER

## 2021-01-01 PROCEDURE — 99291 CRITICAL CARE FIRST HOUR: CPT

## 2021-01-01 PROCEDURE — 83036 HEMOGLOBIN GLYCOSYLATED A1C: CPT

## 2021-01-01 PROCEDURE — 84132 ASSAY OF SERUM POTASSIUM: CPT

## 2021-01-01 PROCEDURE — 7100000010 HC PHASE II RECOVERY - FIRST 15 MIN: Performed by: INTERNAL MEDICINE

## 2021-01-01 PROCEDURE — 99232 SBSQ HOSP IP/OBS MODERATE 35: CPT | Performed by: NURSE PRACTITIONER

## 2021-01-01 PROCEDURE — 7100000011 HC PHASE II RECOVERY - ADDTL 15 MIN: Performed by: INTERNAL MEDICINE

## 2021-01-01 PROCEDURE — 93005 ELECTROCARDIOGRAM TRACING: CPT | Performed by: STUDENT IN AN ORGANIZED HEALTH CARE EDUCATION/TRAINING PROGRAM

## 2021-01-01 PROCEDURE — 02HK3JZ INSERTION OF PACEMAKER LEAD INTO RIGHT VENTRICLE, PERCUTANEOUS APPROACH: ICD-10-PCS | Performed by: INTERNAL MEDICINE

## 2021-01-01 PROCEDURE — 84300 ASSAY OF URINE SODIUM: CPT

## 2021-01-01 PROCEDURE — 94760 N-INVAS EAR/PLS OXIMETRY 1: CPT

## 2021-01-01 PROCEDURE — 93308 TTE F-UP OR LMTD: CPT

## 2021-01-01 PROCEDURE — C1785 PMKR, DUAL, RATE-RESP: HCPCS

## 2021-01-01 PROCEDURE — 2709999900 HC NON-CHARGEABLE SUPPLY: Performed by: INTERNAL MEDICINE

## 2021-01-01 PROCEDURE — 0WC10ZZ EXTIRPATION OF MATTER FROM CRANIAL CAVITY, OPEN APPROACH: ICD-10-PCS | Performed by: NEUROLOGICAL SURGERY

## 2021-01-01 PROCEDURE — 94150 VITAL CAPACITY TEST: CPT

## 2021-01-01 PROCEDURE — 99152 MOD SED SAME PHYS/QHP 5/>YRS: CPT

## 2021-01-01 PROCEDURE — 99233 SBSQ HOSP IP/OBS HIGH 50: CPT | Performed by: PSYCHIATRY & NEUROLOGY

## 2021-01-01 PROCEDURE — 93970 EXTREMITY STUDY: CPT

## 2021-01-01 PROCEDURE — 6370000000 HC RX 637 (ALT 250 FOR IP): Performed by: EMERGENCY MEDICINE

## 2021-01-01 PROCEDURE — 36600 WITHDRAWAL OF ARTERIAL BLOOD: CPT

## 2021-01-01 PROCEDURE — C9254 INJECTION, LACOSAMIDE: HCPCS | Performed by: INTERNAL MEDICINE

## 2021-01-01 PROCEDURE — 97162 PT EVAL MOD COMPLEX 30 MIN: CPT

## 2021-01-01 PROCEDURE — 2500000003 HC RX 250 WO HCPCS: Performed by: INTERNAL MEDICINE

## 2021-01-01 PROCEDURE — 51702 INSERT TEMP BLADDER CATH: CPT

## 2021-01-01 PROCEDURE — 3609013300 HC EGD TUBE PLACEMENT: Performed by: INTERNAL MEDICINE

## 2021-01-01 PROCEDURE — 96374 THER/PROPH/DIAG INJ IV PUSH: CPT

## 2021-01-01 PROCEDURE — 2709999900 HC NON-CHARGEABLE SUPPLY: Performed by: NEUROLOGICAL SURGERY

## 2021-01-01 PROCEDURE — U0003 INFECTIOUS AGENT DETECTION BY NUCLEIC ACID (DNA OR RNA); SEVERE ACUTE RESPIRATORY SYNDROME CORONAVIRUS 2 (SARS-COV-2) (CORONAVIRUS DISEASE [COVID-19]), AMPLIFIED PROBE TECHNIQUE, MAKING USE OF HIGH THROUGHPUT TECHNOLOGIES AS DESCRIBED BY CMS-2020-01-R: HCPCS

## 2021-01-01 PROCEDURE — 70553 MRI BRAIN STEM W/O & W/DYE: CPT

## 2021-01-01 DEVICE — PLATE BNE L12MM THK0.4MM 2 H CRANIOMAXILLOFACIAL BILAT BLU: Type: IMPLANTABLE DEVICE | Site: CRANIAL | Status: FUNCTIONAL

## 2021-01-01 DEVICE — SCREW BNE L4MM DIA1.5MM CRANIOFACIAL TI SELF DRL: Type: IMPLANTABLE DEVICE | Site: CRANIAL | Status: FUNCTIONAL

## 2021-01-01 RX ORDER — OLANZAPINE 10 MG/1
10 INJECTION, POWDER, LYOPHILIZED, FOR SOLUTION INTRAMUSCULAR
Status: DISPENSED | OUTPATIENT
Start: 2021-01-01 | End: 2021-01-01

## 2021-01-01 RX ORDER — EPHEDRINE SULFATE 50 MG/ML
INJECTION INTRAVENOUS PRN
Status: DISCONTINUED | OUTPATIENT
Start: 2021-01-01 | End: 2021-01-01 | Stop reason: SDUPTHER

## 2021-01-01 RX ORDER — LORAZEPAM 2 MG/ML
INJECTION INTRAMUSCULAR
Status: COMPLETED
Start: 2021-01-01 | End: 2021-01-01

## 2021-01-01 RX ORDER — LORAZEPAM 2 MG/ML
1 CONCENTRATE ORAL EVERY 4 HOURS PRN
Status: DISCONTINUED | OUTPATIENT
Start: 2021-01-01 | End: 2021-01-01

## 2021-01-01 RX ORDER — TOLVAPTAN 15 MG/1
15 TABLET ORAL ONCE
Status: COMPLETED | OUTPATIENT
Start: 2021-01-01 | End: 2021-01-01

## 2021-01-01 RX ORDER — HALOPERIDOL 2 MG/ML
2 SOLUTION ORAL EVERY 6 HOURS PRN
Status: DISCONTINUED | OUTPATIENT
Start: 2021-01-01 | End: 2021-04-10 | Stop reason: HOSPADM

## 2021-01-01 RX ORDER — MAGNESIUM SULFATE 1 G/100ML
1000 INJECTION INTRAVENOUS ONCE
Status: COMPLETED | OUTPATIENT
Start: 2021-01-01 | End: 2021-01-01

## 2021-01-01 RX ORDER — CYCLOBENZAPRINE HCL 10 MG
10 TABLET ORAL 3 TIMES DAILY PRN
Status: DISCONTINUED | OUTPATIENT
Start: 2021-01-01 | End: 2021-01-01

## 2021-01-01 RX ORDER — POTASSIUM CHLORIDE 7.45 MG/ML
10 INJECTION INTRAVENOUS
Status: DISCONTINUED | OUTPATIENT
Start: 2021-01-01 | End: 2021-01-01

## 2021-01-01 RX ORDER — HEPARIN SODIUM 5000 [USP'U]/ML
5000 INJECTION, SOLUTION INTRAVENOUS; SUBCUTANEOUS EVERY 12 HOURS
Status: DISCONTINUED | OUTPATIENT
Start: 2021-01-01 | End: 2021-01-01 | Stop reason: HOSPADM

## 2021-01-01 RX ORDER — SODIUM CHLORIDE 9 MG/ML
1000 INJECTION, SOLUTION INTRAVENOUS CONTINUOUS
Status: DISCONTINUED | OUTPATIENT
Start: 2021-01-01 | End: 2021-01-01 | Stop reason: HOSPADM

## 2021-01-01 RX ORDER — SODIUM CHLORIDE 0.9 % (FLUSH) 0.9 %
10 SYRINGE (ML) INJECTION PRN
Status: DISCONTINUED | OUTPATIENT
Start: 2021-01-01 | End: 2021-04-10 | Stop reason: HOSPADM

## 2021-01-01 RX ORDER — EZETIMIBE 10 MG/1
10 TABLET ORAL NIGHTLY
Status: DISCONTINUED | OUTPATIENT
Start: 2021-01-01 | End: 2021-01-01 | Stop reason: HOSPADM

## 2021-01-01 RX ORDER — LACOSAMIDE 10 MG/ML
200 INJECTION, SOLUTION INTRAVENOUS 2 TIMES DAILY
Status: DISCONTINUED | OUTPATIENT
Start: 2021-01-01 | End: 2021-01-01

## 2021-01-01 RX ORDER — GLYCOPYRROLATE 0.2 MG/ML
INJECTION INTRAMUSCULAR; INTRAVENOUS PRN
Status: DISCONTINUED | OUTPATIENT
Start: 2021-01-01 | End: 2021-01-01 | Stop reason: SDUPTHER

## 2021-01-01 RX ORDER — LEVETIRACETAM 100 MG/ML
1000 SOLUTION ORAL 2 TIMES DAILY
Status: DISCONTINUED | OUTPATIENT
Start: 2021-01-01 | End: 2021-01-01

## 2021-01-01 RX ORDER — ESCITALOPRAM OXALATE 5 MG/1
2.5 TABLET ORAL DAILY
COMMUNITY
End: 2021-01-01 | Stop reason: ALTCHOICE

## 2021-01-01 RX ORDER — METOPROLOL SUCCINATE 25 MG/1
25 TABLET, EXTENDED RELEASE ORAL DAILY
Status: DISCONTINUED | OUTPATIENT
Start: 2021-01-01 | End: 2021-01-01

## 2021-01-01 RX ORDER — MAGNESIUM SULFATE 1 G/100ML
1000 INJECTION INTRAVENOUS PRN
Status: DISCONTINUED | OUTPATIENT
Start: 2021-01-01 | End: 2021-01-01 | Stop reason: HOSPADM

## 2021-01-01 RX ORDER — CEFAZOLIN SODIUM 2 G/50ML
2000 SOLUTION INTRAVENOUS ONCE
Status: COMPLETED | OUTPATIENT
Start: 2021-01-01 | End: 2021-01-01

## 2021-01-01 RX ORDER — FAMOTIDINE 20 MG/1
20 TABLET, FILM COATED ORAL DAILY
Status: DISCONTINUED | OUTPATIENT
Start: 2021-01-01 | End: 2021-04-10 | Stop reason: HOSPADM

## 2021-01-01 RX ORDER — SODIUM CHLORIDE 9 MG/ML
25 INJECTION, SOLUTION INTRAVENOUS PRN
Status: DISCONTINUED | OUTPATIENT
Start: 2021-01-01 | End: 2021-04-10 | Stop reason: HOSPADM

## 2021-01-01 RX ORDER — ALPRAZOLAM 0.25 MG/1
0.12 TABLET ORAL 2 TIMES DAILY
Status: ON HOLD | COMMUNITY
End: 2021-01-01 | Stop reason: SDUPTHER

## 2021-01-01 RX ORDER — LACOSAMIDE 10 MG/ML
200 SOLUTION ORAL 2 TIMES DAILY
Qty: 600 ML | Refills: 1 | Status: SHIPPED | OUTPATIENT
Start: 2021-01-01 | End: 2021-05-07

## 2021-01-01 RX ORDER — LORAZEPAM 2 MG/ML
INJECTION INTRAMUSCULAR
Status: DISCONTINUED
Start: 2021-01-01 | End: 2021-01-01 | Stop reason: HOSPADM

## 2021-01-01 RX ORDER — LORAZEPAM 2 MG/ML
INJECTION INTRAMUSCULAR
Status: DISPENSED
Start: 2021-01-01 | End: 2021-01-01

## 2021-01-01 RX ORDER — CEPHALEXIN 500 MG/1
500 CAPSULE ORAL 2 TIMES DAILY
Qty: 14 CAPSULE | Refills: 0 | Status: SHIPPED | OUTPATIENT
Start: 2021-01-01 | End: 2021-01-01

## 2021-01-01 RX ORDER — FAMOTIDINE 20 MG/1
20 TABLET, FILM COATED ORAL DAILY
Status: DISCONTINUED | OUTPATIENT
Start: 2021-01-01 | End: 2021-01-01 | Stop reason: HOSPADM

## 2021-01-01 RX ORDER — LEVETIRACETAM 500 MG/1
1000 TABLET ORAL 2 TIMES DAILY
Status: DISCONTINUED | OUTPATIENT
Start: 2021-01-01 | End: 2021-01-01 | Stop reason: HOSPADM

## 2021-01-01 RX ORDER — ONDANSETRON 2 MG/ML
4 INJECTION INTRAMUSCULAR; INTRAVENOUS EVERY 6 HOURS PRN
Status: DISCONTINUED | OUTPATIENT
Start: 2021-01-01 | End: 2021-01-01 | Stop reason: HOSPADM

## 2021-01-01 RX ORDER — BUPIVACAINE HYDROCHLORIDE AND EPINEPHRINE 5; 5 MG/ML; UG/ML
INJECTION, SOLUTION EPIDURAL; INTRACAUDAL; PERINEURAL PRN
Status: DISCONTINUED | OUTPATIENT
Start: 2021-01-01 | End: 2021-01-01 | Stop reason: ALTCHOICE

## 2021-01-01 RX ORDER — FERROUS SULFATE 300 MG/5ML
300 LIQUID (ML) ORAL DAILY
Status: DISCONTINUED | OUTPATIENT
Start: 2021-01-01 | End: 2021-01-01 | Stop reason: HOSPADM

## 2021-01-01 RX ORDER — LISINOPRIL 40 MG/1
40 TABLET ORAL DAILY
Status: DISCONTINUED | OUTPATIENT
Start: 2021-01-01 | End: 2021-01-01 | Stop reason: HOSPADM

## 2021-01-01 RX ORDER — SODIUM CHLORIDE 0.9 % (FLUSH) 0.9 %
10 SYRINGE (ML) INJECTION EVERY 12 HOURS SCHEDULED
Status: DISCONTINUED | OUTPATIENT
Start: 2021-01-01 | End: 2021-01-01 | Stop reason: HOSPADM

## 2021-01-01 RX ORDER — LEVETIRACETAM 1000 MG/1
1000 TABLET ORAL 2 TIMES DAILY
Qty: 60 TABLET | Refills: 3 | Status: SHIPPED | OUTPATIENT
Start: 2021-01-01 | End: 2021-01-01

## 2021-01-01 RX ORDER — LORAZEPAM 2 MG/ML
4 INJECTION INTRAMUSCULAR ONCE
Status: COMPLETED | OUTPATIENT
Start: 2021-01-01 | End: 2021-01-01

## 2021-01-01 RX ORDER — SODIUM CHLORIDE 0.9 % (FLUSH) 0.9 %
10 SYRINGE (ML) INJECTION EVERY 12 HOURS SCHEDULED
Status: DISCONTINUED | OUTPATIENT
Start: 2021-01-01 | End: 2021-01-01

## 2021-01-01 RX ORDER — CEFAZOLIN SODIUM 1 G/3ML
INJECTION, POWDER, FOR SOLUTION INTRAMUSCULAR; INTRAVENOUS PRN
Status: DISCONTINUED | OUTPATIENT
Start: 2021-01-01 | End: 2021-01-01 | Stop reason: SDUPTHER

## 2021-01-01 RX ORDER — FUROSEMIDE 10 MG/ML
20 INJECTION INTRAMUSCULAR; INTRAVENOUS ONCE
Status: COMPLETED | OUTPATIENT
Start: 2021-01-01 | End: 2021-01-01

## 2021-01-01 RX ORDER — LISINOPRIL 5 MG/1
5 TABLET ORAL DAILY
Status: DISCONTINUED | OUTPATIENT
Start: 2021-01-01 | End: 2021-01-01

## 2021-01-01 RX ORDER — CLONIDINE 0.2 MG/24H
1 PATCH, EXTENDED RELEASE TRANSDERMAL WEEKLY
Status: DISCONTINUED | OUTPATIENT
Start: 2021-01-01 | End: 2021-01-01

## 2021-01-01 RX ORDER — LABETALOL HYDROCHLORIDE 5 MG/ML
10 INJECTION, SOLUTION INTRAVENOUS
Status: DISCONTINUED | OUTPATIENT
Start: 2021-01-01 | End: 2021-01-01

## 2021-01-01 RX ORDER — LORAZEPAM 2 MG/ML
1 CONCENTRATE ORAL
Status: DISCONTINUED | OUTPATIENT
Start: 2021-01-01 | End: 2021-04-10 | Stop reason: HOSPADM

## 2021-01-01 RX ORDER — SODIUM CHLORIDE 9 MG/ML
INJECTION, SOLUTION INTRAVENOUS ONCE
Status: COMPLETED | OUTPATIENT
Start: 2021-01-01 | End: 2021-01-01

## 2021-01-01 RX ORDER — ESCITALOPRAM OXALATE 5 MG/1
2.5 TABLET ORAL DAILY
Status: DISCONTINUED | OUTPATIENT
Start: 2021-01-01 | End: 2021-01-01 | Stop reason: HOSPADM

## 2021-01-01 RX ORDER — ACETAMINOPHEN 325 MG/1
650 TABLET ORAL EVERY 6 HOURS PRN
Status: DISCONTINUED | OUTPATIENT
Start: 2021-01-01 | End: 2021-04-10 | Stop reason: HOSPADM

## 2021-01-01 RX ORDER — POLYETHYLENE GLYCOL 3350 17 G/17G
17 POWDER, FOR SOLUTION ORAL DAILY
Status: DISCONTINUED | OUTPATIENT
Start: 2021-01-01 | End: 2021-01-01 | Stop reason: HOSPADM

## 2021-01-01 RX ORDER — HYDROCODONE BITARTRATE AND ACETAMINOPHEN 5; 325 MG/1; MG/1
1 TABLET ORAL EVERY 6 HOURS PRN
Status: DISCONTINUED | OUTPATIENT
Start: 2021-01-01 | End: 2021-01-01

## 2021-01-01 RX ORDER — METOPROLOL SUCCINATE 25 MG/1
25 TABLET, EXTENDED RELEASE ORAL ONCE
Status: COMPLETED | OUTPATIENT
Start: 2021-01-01 | End: 2021-01-01

## 2021-01-01 RX ORDER — MAGNESIUM HYDROXIDE 1200 MG/15ML
LIQUID ORAL CONTINUOUS PRN
Status: COMPLETED | OUTPATIENT
Start: 2021-01-01 | End: 2021-01-01

## 2021-01-01 RX ORDER — NALOXONE HYDROCHLORIDE 0.4 MG/ML
0.2 INJECTION, SOLUTION INTRAMUSCULAR; INTRAVENOUS; SUBCUTANEOUS PRN
Status: DISCONTINUED | OUTPATIENT
Start: 2021-01-01 | End: 2021-01-01 | Stop reason: HOSPADM

## 2021-01-01 RX ORDER — LORAZEPAM 2 MG/ML
2 INJECTION INTRAMUSCULAR ONCE
Status: COMPLETED | OUTPATIENT
Start: 2021-01-01 | End: 2021-01-01

## 2021-01-01 RX ORDER — ALPRAZOLAM 0.25 MG/1
0.12 TABLET ORAL 2 TIMES DAILY PRN
Status: DISCONTINUED | OUTPATIENT
Start: 2021-01-01 | End: 2021-01-01 | Stop reason: HOSPADM

## 2021-01-01 RX ORDER — ONDANSETRON 2 MG/ML
4 INJECTION INTRAMUSCULAR; INTRAVENOUS EVERY 6 HOURS PRN
Status: DISCONTINUED | OUTPATIENT
Start: 2021-01-01 | End: 2021-04-10 | Stop reason: HOSPADM

## 2021-01-01 RX ORDER — ONDANSETRON 2 MG/ML
4 INJECTION INTRAMUSCULAR; INTRAVENOUS EVERY 6 HOURS PRN
Status: DISCONTINUED | OUTPATIENT
Start: 2021-01-01 | End: 2021-01-01 | Stop reason: SDUPTHER

## 2021-01-01 RX ORDER — ACETAMINOPHEN 650 MG/1
650 SUPPOSITORY RECTAL EVERY 6 HOURS PRN
Status: DISCONTINUED | OUTPATIENT
Start: 2021-01-01 | End: 2021-04-10 | Stop reason: HOSPADM

## 2021-01-01 RX ORDER — BACITRACIN ZINC AND POLYMYXIN B SULFATE 500; 10000 [USP'U]/G; [USP'U]/G
OINTMENT TOPICAL PRN
Status: DISCONTINUED | OUTPATIENT
Start: 2021-01-01 | End: 2021-01-01 | Stop reason: ALTCHOICE

## 2021-01-01 RX ORDER — POLYETHYLENE GLYCOL 3350 17 G/17G
17 POWDER, FOR SOLUTION ORAL DAILY PRN
Status: DISCONTINUED | OUTPATIENT
Start: 2021-01-01 | End: 2021-01-01 | Stop reason: HOSPADM

## 2021-01-01 RX ORDER — SODIUM CHLORIDE 0.9 % (FLUSH) 0.9 %
10 SYRINGE (ML) INJECTION PRN
Status: DISCONTINUED | OUTPATIENT
Start: 2021-01-01 | End: 2021-01-01 | Stop reason: SDUPTHER

## 2021-01-01 RX ORDER — FAMOTIDINE 20 MG/1
20 TABLET, FILM COATED ORAL DAILY
Status: DISCONTINUED | OUTPATIENT
Start: 2021-01-01 | End: 2021-01-01

## 2021-01-01 RX ORDER — LEVETIRACETAM 100 MG/ML
2000 SOLUTION ORAL 2 TIMES DAILY
Status: DISCONTINUED | OUTPATIENT
Start: 2021-01-01 | End: 2021-01-01 | Stop reason: HOSPADM

## 2021-01-01 RX ORDER — ACETAMINOPHEN 325 MG/1
650 TABLET ORAL EVERY 6 HOURS PRN
Status: DISCONTINUED | OUTPATIENT
Start: 2021-01-01 | End: 2021-01-01 | Stop reason: HOSPADM

## 2021-01-01 RX ORDER — VITAMIN B COMPLEX
1000 TABLET ORAL DAILY
Status: DISCONTINUED | OUTPATIENT
Start: 2021-01-01 | End: 2021-01-01 | Stop reason: HOSPADM

## 2021-01-01 RX ORDER — METOPROLOL SUCCINATE 50 MG/1
50 TABLET, EXTENDED RELEASE ORAL DAILY
Status: DISCONTINUED | OUTPATIENT
Start: 2021-01-01 | End: 2021-01-01 | Stop reason: HOSPADM

## 2021-01-01 RX ORDER — LEVETIRACETAM 100 MG/ML
1000 SOLUTION ORAL 2 TIMES DAILY
Status: DISCONTINUED | OUTPATIENT
Start: 2021-01-01 | End: 2021-04-10 | Stop reason: HOSPADM

## 2021-01-01 RX ORDER — SODIUM CHLORIDE 9 MG/ML
INJECTION, SOLUTION INTRAVENOUS CONTINUOUS
Status: DISCONTINUED | OUTPATIENT
Start: 2021-01-01 | End: 2021-01-01

## 2021-01-01 RX ORDER — DILTIAZEM HYDROCHLORIDE 5 MG/ML
10 INJECTION INTRAVENOUS ONCE
Status: COMPLETED | OUTPATIENT
Start: 2021-01-01 | End: 2021-01-01

## 2021-01-01 RX ORDER — ONDANSETRON 2 MG/ML
4 INJECTION INTRAMUSCULAR; INTRAVENOUS
Status: DISCONTINUED | OUTPATIENT
Start: 2021-01-01 | End: 2021-01-01 | Stop reason: HOSPADM

## 2021-01-01 RX ORDER — PHENYLEPHRINE HYDROCHLORIDE 10 MG/ML
INJECTION INTRAVENOUS PRN
Status: DISCONTINUED | OUTPATIENT
Start: 2021-01-01 | End: 2021-01-01 | Stop reason: SDUPTHER

## 2021-01-01 RX ORDER — FUROSEMIDE 10 MG/ML
INJECTION INTRAMUSCULAR; INTRAVENOUS
Status: DISCONTINUED
Start: 2021-01-01 | End: 2021-01-01

## 2021-01-01 RX ORDER — LACOSAMIDE 50 MG/1
200 TABLET ORAL 2 TIMES DAILY
Status: DISCONTINUED | OUTPATIENT
Start: 2021-01-01 | End: 2021-04-10 | Stop reason: HOSPADM

## 2021-01-01 RX ORDER — LORAZEPAM 2 MG/ML
1 INJECTION INTRAMUSCULAR
Status: COMPLETED | OUTPATIENT
Start: 2021-01-01 | End: 2021-01-01

## 2021-01-01 RX ORDER — ROCURONIUM BROMIDE 10 MG/ML
INJECTION, SOLUTION INTRAVENOUS PRN
Status: DISCONTINUED | OUTPATIENT
Start: 2021-01-01 | End: 2021-01-01 | Stop reason: SDUPTHER

## 2021-01-01 RX ORDER — ACETAMINOPHEN 325 MG/1
650 TABLET ORAL EVERY 4 HOURS PRN
Status: DISCONTINUED | OUTPATIENT
Start: 2021-01-01 | End: 2021-01-01 | Stop reason: HOSPADM

## 2021-01-01 RX ORDER — LACOSAMIDE 10 MG/ML
200 SOLUTION ORAL 2 TIMES DAILY
Status: DISCONTINUED | OUTPATIENT
Start: 2021-01-01 | End: 2021-01-01

## 2021-01-01 RX ORDER — ALPRAZOLAM 0.25 MG/1
0.12 TABLET ORAL 2 TIMES DAILY
Qty: 3 TABLET | Refills: 0 | Status: SHIPPED | OUTPATIENT
Start: 2021-01-01 | End: 2021-04-10

## 2021-01-01 RX ORDER — DEXTROSE MONOHYDRATE 50 MG/ML
INJECTION, SOLUTION INTRAVENOUS CONTINUOUS
Status: DISCONTINUED | OUTPATIENT
Start: 2021-01-01 | End: 2021-01-01

## 2021-01-01 RX ORDER — VITAMIN B COMPLEX
2500 TABLET ORAL DAILY
COMMUNITY

## 2021-01-01 RX ORDER — CARVEDILOL 3.12 MG/1
3.12 TABLET ORAL 2 TIMES DAILY WITH MEALS
Status: DISCONTINUED | OUTPATIENT
Start: 2021-01-01 | End: 2021-01-01

## 2021-01-01 RX ORDER — PROPOFOL 10 MG/ML
INJECTION, EMULSION INTRAVENOUS PRN
Status: DISCONTINUED | OUTPATIENT
Start: 2021-01-01 | End: 2021-01-01 | Stop reason: SDUPTHER

## 2021-01-01 RX ORDER — AZITHROMYCIN 250 MG/1
250 TABLET, FILM COATED ORAL SEE ADMIN INSTRUCTIONS
Qty: 6 TABLET | Refills: 0 | Status: SHIPPED | OUTPATIENT
Start: 2021-01-01 | End: 2021-01-01

## 2021-01-01 RX ORDER — FUROSEMIDE 10 MG/ML
40 INJECTION INTRAMUSCULAR; INTRAVENOUS ONCE
Status: COMPLETED | OUTPATIENT
Start: 2021-01-01 | End: 2021-01-01

## 2021-01-01 RX ORDER — POTASSIUM CHLORIDE 7.45 MG/ML
10 INJECTION INTRAVENOUS
Status: COMPLETED | OUTPATIENT
Start: 2021-01-01 | End: 2021-01-01

## 2021-01-01 RX ORDER — AMLODIPINE BESYLATE 5 MG/1
5 TABLET ORAL DAILY
Status: DISCONTINUED | OUTPATIENT
Start: 2021-01-01 | End: 2021-01-01

## 2021-01-01 RX ORDER — FENTANYL CITRATE 50 UG/ML
INJECTION, SOLUTION INTRAMUSCULAR; INTRAVENOUS PRN
Status: DISCONTINUED | OUTPATIENT
Start: 2021-01-01 | End: 2021-01-01 | Stop reason: SDUPTHER

## 2021-01-01 RX ORDER — SODIUM CHLORIDE, SODIUM LACTATE, POTASSIUM CHLORIDE, CALCIUM CHLORIDE 600; 310; 30; 20 MG/100ML; MG/100ML; MG/100ML; MG/100ML
INJECTION, SOLUTION INTRAVENOUS CONTINUOUS PRN
Status: DISCONTINUED | OUTPATIENT
Start: 2021-01-01 | End: 2021-01-01 | Stop reason: SDUPTHER

## 2021-01-01 RX ORDER — HYDRALAZINE HYDROCHLORIDE 20 MG/ML
10 INJECTION INTRAMUSCULAR; INTRAVENOUS EVERY 6 HOURS PRN
Status: DISCONTINUED | OUTPATIENT
Start: 2021-01-01 | End: 2021-01-01

## 2021-01-01 RX ORDER — SODIUM CHLORIDE 0.9 % (FLUSH) 0.9 %
10 SYRINGE (ML) INJECTION PRN
Status: DISCONTINUED | OUTPATIENT
Start: 2021-01-01 | End: 2021-01-01 | Stop reason: HOSPADM

## 2021-01-01 RX ORDER — ATORVASTATIN CALCIUM 80 MG/1
80 TABLET, FILM COATED ORAL NIGHTLY
Status: DISCONTINUED | OUTPATIENT
Start: 2021-01-01 | End: 2021-01-01 | Stop reason: HOSPADM

## 2021-01-01 RX ORDER — LIDOCAINE HYDROCHLORIDE 10 MG/ML
INJECTION, SOLUTION EPIDURAL; INFILTRATION; INTRACAUDAL; PERINEURAL PRN
Status: DISCONTINUED | OUTPATIENT
Start: 2021-01-01 | End: 2021-01-01 | Stop reason: ALTCHOICE

## 2021-01-01 RX ORDER — SODIUM CHLORIDE 9 MG/ML
INJECTION, SOLUTION INTRAVENOUS CONTINUOUS PRN
Status: DISCONTINUED | OUTPATIENT
Start: 2021-01-01 | End: 2021-01-01 | Stop reason: SDUPTHER

## 2021-01-01 RX ORDER — ACETAMINOPHEN 650 MG/1
650 SUPPOSITORY RECTAL EVERY 6 HOURS PRN
Status: DISCONTINUED | OUTPATIENT
Start: 2021-01-01 | End: 2021-01-01 | Stop reason: HOSPADM

## 2021-01-01 RX ORDER — HYDRALAZINE HYDROCHLORIDE 20 MG/ML
10 INJECTION INTRAMUSCULAR; INTRAVENOUS
Status: DISCONTINUED | OUTPATIENT
Start: 2021-01-01 | End: 2021-01-01 | Stop reason: HOSPADM

## 2021-01-01 RX ORDER — ATROPINE SULFATE 10 MG/ML
2 SOLUTION/ DROPS OPHTHALMIC EVERY 4 HOURS PRN
Status: DISCONTINUED | OUTPATIENT
Start: 2021-01-01 | End: 2021-04-10 | Stop reason: HOSPADM

## 2021-01-01 RX ORDER — ACETAMINOPHEN 325 MG/1
650 TABLET ORAL ONCE
Status: COMPLETED | OUTPATIENT
Start: 2021-01-01 | End: 2021-01-01

## 2021-01-01 RX ORDER — LORAZEPAM 2 MG/ML
1 INJECTION INTRAMUSCULAR EVERY 5 MIN PRN
Status: COMPLETED | OUTPATIENT
Start: 2021-01-01 | End: 2021-01-01

## 2021-01-01 RX ORDER — LIDOCAINE HYDROCHLORIDE 20 MG/ML
INJECTION, SOLUTION INTRAVENOUS PRN
Status: DISCONTINUED | OUTPATIENT
Start: 2021-01-01 | End: 2021-01-01 | Stop reason: SDUPTHER

## 2021-01-01 RX ORDER — SODIUM CHLORIDE 0.9 % (FLUSH) 0.9 %
10 SYRINGE (ML) INJECTION PRN
Status: DISCONTINUED | OUTPATIENT
Start: 2021-01-01 | End: 2021-01-01

## 2021-01-01 RX ORDER — AMLODIPINE BESYLATE 10 MG/1
10 TABLET ORAL DAILY
Status: DISCONTINUED | OUTPATIENT
Start: 2021-01-01 | End: 2021-01-01 | Stop reason: HOSPADM

## 2021-01-01 RX ORDER — PROMETHAZINE HYDROCHLORIDE 12.5 MG/1
12.5 TABLET ORAL EVERY 6 HOURS PRN
Status: DISCONTINUED | OUTPATIENT
Start: 2021-01-01 | End: 2021-01-01 | Stop reason: HOSPADM

## 2021-01-01 RX ORDER — LACOSAMIDE 10 MG/ML
200 SOLUTION ORAL 2 TIMES DAILY
Status: DISCONTINUED | OUTPATIENT
Start: 2021-01-01 | End: 2021-01-01 | Stop reason: HOSPADM

## 2021-01-01 RX ORDER — OXYCODONE HYDROCHLORIDE 5 MG/1
5 TABLET ORAL EVERY 4 HOURS PRN
Status: DISCONTINUED | OUTPATIENT
Start: 2021-01-01 | End: 2021-01-01

## 2021-01-01 RX ORDER — FERROUS SULFATE 325(65) MG
325 TABLET ORAL
Status: DISCONTINUED | OUTPATIENT
Start: 2021-01-01 | End: 2021-01-01 | Stop reason: ALTCHOICE

## 2021-01-01 RX ORDER — PROMETHAZINE HYDROCHLORIDE 25 MG/1
12.5 TABLET ORAL EVERY 6 HOURS PRN
Status: DISCONTINUED | OUTPATIENT
Start: 2021-01-01 | End: 2021-01-01 | Stop reason: HOSPADM

## 2021-01-01 RX ORDER — 0.9 % SODIUM CHLORIDE 0.9 %
500 INTRAVENOUS SOLUTION INTRAVENOUS ONCE
Status: COMPLETED | OUTPATIENT
Start: 2021-01-01 | End: 2021-01-01

## 2021-01-01 RX ORDER — LEVETIRACETAM 500 MG/1
1000 TABLET ORAL 2 TIMES DAILY
Status: DISCONTINUED | OUTPATIENT
Start: 2021-01-01 | End: 2021-01-01

## 2021-01-01 RX ORDER — LEVETIRACETAM 1000 MG/1
1000 TABLET ORAL 2 TIMES DAILY
Qty: 60 TABLET | Refills: 3 | Status: SHIPPED | OUTPATIENT
Start: 2021-01-01

## 2021-01-01 RX ORDER — LISINOPRIL 20 MG/1
40 TABLET ORAL DAILY
COMMUNITY

## 2021-01-01 RX ORDER — ONDANSETRON 2 MG/ML
INJECTION INTRAMUSCULAR; INTRAVENOUS PRN
Status: DISCONTINUED | OUTPATIENT
Start: 2021-01-01 | End: 2021-01-01 | Stop reason: SDUPTHER

## 2021-01-01 RX ORDER — FAMOTIDINE 20 MG/1
20 TABLET, FILM COATED ORAL DAILY
Qty: 60 TABLET | Refills: 3 | Status: SHIPPED | OUTPATIENT
Start: 2021-01-01

## 2021-01-01 RX ORDER — OXYCODONE HYDROCHLORIDE 5 MG/1
5 TABLET ORAL ONCE
Status: COMPLETED | OUTPATIENT
Start: 2021-01-01 | End: 2021-01-01

## 2021-01-01 RX ORDER — AMLODIPINE BESYLATE 10 MG/1
10 TABLET ORAL DAILY
Qty: 30 TABLET | Refills: 3 | Status: SHIPPED | OUTPATIENT
Start: 2021-01-01

## 2021-01-01 RX ORDER — SODIUM CHLORIDE 9 MG/ML
INJECTION, SOLUTION INTRAVENOUS CONTINUOUS
Status: ACTIVE | OUTPATIENT
Start: 2021-01-01 | End: 2021-01-01

## 2021-01-01 RX ORDER — MORPHINE SULFATE 10 MG/5ML
5 SOLUTION ORAL
Status: DISCONTINUED | OUTPATIENT
Start: 2021-01-01 | End: 2021-04-10 | Stop reason: HOSPADM

## 2021-01-01 RX ORDER — FERROUS SULFATE 325(65) MG
325 TABLET ORAL
COMMUNITY

## 2021-01-01 RX ORDER — PROMETHAZINE HYDROCHLORIDE 12.5 MG/1
12.5 TABLET ORAL EVERY 6 HOURS PRN
Status: DISCONTINUED | OUTPATIENT
Start: 2021-01-01 | End: 2021-01-01 | Stop reason: SDUPTHER

## 2021-01-01 RX ORDER — SENNA AND DOCUSATE SODIUM 50; 8.6 MG/1; MG/1
2 TABLET, FILM COATED ORAL 2 TIMES DAILY
Status: DISCONTINUED | OUTPATIENT
Start: 2021-01-01 | End: 2021-01-01 | Stop reason: HOSPADM

## 2021-01-01 RX ADMIN — Medication 15 G: at 21:19

## 2021-01-01 RX ADMIN — SODIUM CHLORIDE 500 ML: 900 INJECTION, SOLUTION INTRAVENOUS at 02:45

## 2021-01-01 RX ADMIN — ACETAMINOPHEN 650 MG: 325 TABLET ORAL at 23:10

## 2021-01-01 RX ADMIN — FERROUS SULFATE TAB 325 MG (65 MG ELEMENTAL FE) 325 MG: 325 (65 FE) TAB at 08:09

## 2021-01-01 RX ADMIN — PIPERACILLIN AND TAZOBACTAM 3375 MG: 3; .375 INJECTION, POWDER, LYOPHILIZED, FOR SOLUTION INTRAVENOUS at 22:48

## 2021-01-01 RX ADMIN — LACOSAMIDE 200 MG: 50 TABLET, FILM COATED ORAL at 09:37

## 2021-01-01 RX ADMIN — ATORVASTATIN CALCIUM 80 MG: 80 TABLET, FILM COATED ORAL at 20:05

## 2021-01-01 RX ADMIN — Medication 10 ML: at 20:48

## 2021-01-01 RX ADMIN — DOCUSATE SODIUM 50 MG AND SENNOSIDES 8.6 MG 2 TABLET: 8.6; 5 TABLET, FILM COATED ORAL at 20:20

## 2021-01-01 RX ADMIN — HYDROCODONE BITARTRATE AND ACETAMINOPHEN 1 TABLET: 5; 325 TABLET ORAL at 22:51

## 2021-01-01 RX ADMIN — CARVEDILOL 3.12 MG: 3.12 TABLET, FILM COATED ORAL at 16:57

## 2021-01-01 RX ADMIN — LISINOPRIL 40 MG: 40 TABLET ORAL at 09:37

## 2021-01-01 RX ADMIN — HEPARIN SODIUM 5000 UNITS: 5000 INJECTION INTRAVENOUS; SUBCUTANEOUS at 22:52

## 2021-01-01 RX ADMIN — EZETIMIBE 10 MG: 10 TABLET ORAL at 21:03

## 2021-01-01 RX ADMIN — CARVEDILOL 3.12 MG: 3.12 TABLET, FILM COATED ORAL at 17:21

## 2021-01-01 RX ADMIN — LEVETIRACETAM 2000 MG: 500 SOLUTION ORAL at 09:46

## 2021-01-01 RX ADMIN — Medication 1 MG: at 20:50

## 2021-01-01 RX ADMIN — PIPERACILLIN SODIUM AND TAZOBACTAM SODIUM 4500 MG: 4; .5 INJECTION, POWDER, LYOPHILIZED, FOR SOLUTION INTRAVENOUS at 18:40

## 2021-01-01 RX ADMIN — HYDRALAZINE HYDROCHLORIDE 10 MG: 20 INJECTION INTRAMUSCULAR; INTRAVENOUS at 11:51

## 2021-01-01 RX ADMIN — LEVETIRACETAM 1000 MG: 100 INJECTION, SOLUTION INTRAVENOUS at 09:23

## 2021-01-01 RX ADMIN — Medication 1 MG: at 14:32

## 2021-01-01 RX ADMIN — Medication 10 ML: at 10:43

## 2021-01-01 RX ADMIN — LORAZEPAM 2 MG: 2 INJECTION INTRAMUSCULAR; INTRAVENOUS at 15:28

## 2021-01-01 RX ADMIN — Medication 10 ML: at 08:04

## 2021-01-01 RX ADMIN — LISINOPRIL 40 MG: 40 TABLET ORAL at 10:37

## 2021-01-01 RX ADMIN — LISINOPRIL 40 MG: 40 TABLET ORAL at 09:23

## 2021-01-01 RX ADMIN — LACOSAMIDE 200 MG: 50 TABLET, FILM COATED ORAL at 02:01

## 2021-01-01 RX ADMIN — CEFAZOLIN 1000 MG: 1 INJECTION, POWDER, FOR SOLUTION INTRAMUSCULAR; INTRAVENOUS at 10:30

## 2021-01-01 RX ADMIN — AZITHROMYCIN MONOHYDRATE 500 MG: 500 INJECTION, POWDER, LYOPHILIZED, FOR SOLUTION INTRAVENOUS at 14:55

## 2021-01-01 RX ADMIN — AMLODIPINE BESYLATE 10 MG: 10 TABLET ORAL at 08:04

## 2021-01-01 RX ADMIN — HYDRALAZINE HYDROCHLORIDE 10 MG: 20 INJECTION INTRAMUSCULAR; INTRAVENOUS at 08:04

## 2021-01-01 RX ADMIN — LEVETIRACETAM 1000 MG: 500 SOLUTION ORAL at 09:37

## 2021-01-01 RX ADMIN — IOHEXOL 3 ML: 350 INJECTION, SOLUTION INTRAVENOUS at 15:38

## 2021-01-01 RX ADMIN — PIPERACILLIN AND TAZOBACTAM 3375 MG: 3; .375 INJECTION, POWDER, LYOPHILIZED, FOR SOLUTION INTRAVENOUS at 13:35

## 2021-01-01 RX ADMIN — FERROUS SULFATE TAB 325 MG (65 MG ELEMENTAL FE) 325 MG: 325 (65 FE) TAB at 09:07

## 2021-01-01 RX ADMIN — EZETIMIBE 10 MG: 10 TABLET ORAL at 21:16

## 2021-01-01 RX ADMIN — DOCUSATE SODIUM 50 MG AND SENNOSIDES 8.6 MG 2 TABLET: 8.6; 5 TABLET, FILM COATED ORAL at 10:36

## 2021-01-01 RX ADMIN — HYDROCODONE BITARTRATE AND ACETAMINOPHEN 1 TABLET: 5; 325 TABLET ORAL at 06:43

## 2021-01-01 RX ADMIN — PIPERACILLIN AND TAZOBACTAM 3375 MG: 3; .375 INJECTION, POWDER, LYOPHILIZED, FOR SOLUTION INTRAVENOUS at 00:15

## 2021-01-01 RX ADMIN — CARVEDILOL 3.12 MG: 3.12 TABLET, FILM COATED ORAL at 08:33

## 2021-01-01 RX ADMIN — FUROSEMIDE 40 MG: 10 INJECTION, SOLUTION INTRAMUSCULAR; INTRAVENOUS at 15:29

## 2021-01-01 RX ADMIN — LORAZEPAM 4 MG: 2 INJECTION INTRAMUSCULAR at 05:55

## 2021-01-01 RX ADMIN — CEFAZOLIN SODIUM 2000 MG: 1 POWDER, FOR SOLUTION INTRAMUSCULAR; INTRAVENOUS at 11:38

## 2021-01-01 RX ADMIN — Medication 1000 UNITS: at 12:33

## 2021-01-01 RX ADMIN — DEXTROSE MONOHYDRATE 200 MG: 50 INJECTION, SOLUTION INTRAVENOUS at 11:45

## 2021-01-01 RX ADMIN — EPHEDRINE SULFATE 10 MG: 50 INJECTION INTRAVENOUS at 11:56

## 2021-01-01 RX ADMIN — DOCUSATE SODIUM 50 MG AND SENNOSIDES 8.6 MG 2 TABLET: 8.6; 5 TABLET, FILM COATED ORAL at 21:03

## 2021-01-01 RX ADMIN — CYCLOBENZAPRINE 10 MG: 10 TABLET, FILM COATED ORAL at 16:53

## 2021-01-01 RX ADMIN — ACETAMINOPHEN 650 MG: 325 TABLET ORAL at 18:13

## 2021-01-01 RX ADMIN — Medication 10 ML: at 21:37

## 2021-01-01 RX ADMIN — DOCUSATE SODIUM 50 MG AND SENNOSIDES 8.6 MG 2 TABLET: 8.6; 5 TABLET, FILM COATED ORAL at 21:16

## 2021-01-01 RX ADMIN — LORAZEPAM 2 MG: 2 INJECTION INTRAMUSCULAR; INTRAVENOUS at 17:55

## 2021-01-01 RX ADMIN — AMLODIPINE BESYLATE 5 MG: 5 TABLET ORAL at 08:33

## 2021-01-01 RX ADMIN — CEFTRIAXONE 1000 MG: 1 INJECTION, POWDER, FOR SOLUTION INTRAMUSCULAR; INTRAVENOUS at 16:14

## 2021-01-01 RX ADMIN — LEVETIRACETAM 1000 MG: 100 INJECTION, SOLUTION INTRAVENOUS at 22:20

## 2021-01-01 RX ADMIN — EZETIMIBE 10 MG: 10 TABLET ORAL at 22:35

## 2021-01-01 RX ADMIN — METOPROLOL SUCCINATE 25 MG: 25 TABLET, EXTENDED RELEASE ORAL at 09:12

## 2021-01-01 RX ADMIN — Medication 10 ML: at 08:05

## 2021-01-01 RX ADMIN — DEXTROSE MONOHYDRATE: 50 INJECTION, SOLUTION INTRAVENOUS at 02:18

## 2021-01-01 RX ADMIN — Medication 10 ML: at 20:06

## 2021-01-01 RX ADMIN — Medication 5 MG: at 09:37

## 2021-01-01 RX ADMIN — PIPERACILLIN AND TAZOBACTAM 3375 MG: 3; .375 INJECTION, POWDER, LYOPHILIZED, FOR SOLUTION INTRAVENOUS at 17:06

## 2021-01-01 RX ADMIN — LEVETIRACETAM 1000 MG: 500 SOLUTION ORAL at 21:03

## 2021-01-01 RX ADMIN — LEVETIRACETAM 1000 MG: 500 SOLUTION ORAL at 08:09

## 2021-01-01 RX ADMIN — LEVETIRACETAM 2000 MG: 500 SOLUTION ORAL at 21:21

## 2021-01-01 RX ADMIN — LORAZEPAM 1 MG: 2 INJECTION INTRAMUSCULAR; INTRAVENOUS at 11:48

## 2021-01-01 RX ADMIN — LEVETIRACETAM 2000 MG: 500 SOLUTION ORAL at 21:19

## 2021-01-01 RX ADMIN — LEVETIRACETAM 2000 MG: 100 INJECTION, SOLUTION INTRAVENOUS at 19:57

## 2021-01-01 RX ADMIN — FAMOTIDINE 20 MG: 20 TABLET ORAL at 10:37

## 2021-01-01 RX ADMIN — CARVEDILOL 3.12 MG: 3.12 TABLET, FILM COATED ORAL at 17:01

## 2021-01-01 RX ADMIN — HEPARIN SODIUM 5000 UNITS: 5000 INJECTION INTRAVENOUS; SUBCUTANEOUS at 09:20

## 2021-01-01 RX ADMIN — HYDROCODONE BITARTRATE AND ACETAMINOPHEN 1 TABLET: 5; 325 TABLET ORAL at 12:37

## 2021-01-01 RX ADMIN — DOCUSATE SODIUM 50 MG AND SENNOSIDES 8.6 MG 2 TABLET: 8.6; 5 TABLET, FILM COATED ORAL at 09:54

## 2021-01-01 RX ADMIN — Medication 10 ML: at 22:24

## 2021-01-01 RX ADMIN — SODIUM CHLORIDE 1000 ML: 9 INJECTION, SOLUTION INTRAVENOUS at 07:50

## 2021-01-01 RX ADMIN — Medication 200 MG: at 08:09

## 2021-01-01 RX ADMIN — EZETIMIBE 10 MG: 10 TABLET ORAL at 23:04

## 2021-01-01 RX ADMIN — MINERAL SUPPLEMENT IRON 300 MG / 5 ML STRENGTH LIQUID 100 PER BOX UNFLAVORED 300 MG: at 15:09

## 2021-01-01 RX ADMIN — Medication 10 ML: at 09:00

## 2021-01-01 RX ADMIN — PIPERACILLIN AND TAZOBACTAM 3375 MG: 3; .375 INJECTION, POWDER, LYOPHILIZED, FOR SOLUTION INTRAVENOUS at 14:02

## 2021-01-01 RX ADMIN — FENTANYL CITRATE 100 MCG: 50 INJECTION, SOLUTION INTRAMUSCULAR; INTRAVENOUS at 11:37

## 2021-01-01 RX ADMIN — INFLUENZA A VIRUS A/VICTORIA/2454/2019 IVR-207 (H1N1) ANTIGEN (PROPIOLACTONE INACTIVATED), INFLUENZA A VIRUS A/HONG KONG/2671/2019 IVR-208 (H3N2) ANTIGEN (PROPIOLACTONE INACTIVATED), INFLUENZA B VIRUS B/VICTORIA/705/2018 BVR-11 ANTIGEN (PROPIOLACTONE INACTIVATED), INFLUENZA B VIRUS B/PHUKET/3073/2013 BVR-1B ANTIGEN (PROPIOLACTONE INACTIVATED) 0.5 ML: 15; 15; 15; 15 INJECTION, SUSPENSION INTRAMUSCULAR at 16:50

## 2021-01-01 RX ADMIN — HEPARIN SODIUM 5000 UNITS: 5000 INJECTION INTRAVENOUS; SUBCUTANEOUS at 10:31

## 2021-01-01 RX ADMIN — DOCUSATE SODIUM 50 MG AND SENNOSIDES 8.6 MG 2 TABLET: 8.6; 5 TABLET, FILM COATED ORAL at 23:04

## 2021-01-01 RX ADMIN — Medication 5 MG: at 07:33

## 2021-01-01 RX ADMIN — Medication 10 ML: at 09:37

## 2021-01-01 RX ADMIN — PROPOFOL 20 MG: 10 INJECTION, EMULSION INTRAVENOUS at 12:41

## 2021-01-01 RX ADMIN — FERROUS SULFATE TAB 325 MG (65 MG ELEMENTAL FE) 325 MG: 325 (65 FE) TAB at 09:23

## 2021-01-01 RX ADMIN — GADOTERIDOL 13 ML: 279.3 INJECTION, SOLUTION INTRAVENOUS at 12:29

## 2021-01-01 RX ADMIN — PIPERACILLIN AND TAZOBACTAM 3375 MG: 3; .375 INJECTION, POWDER, LYOPHILIZED, FOR SOLUTION INTRAVENOUS at 06:00

## 2021-01-01 RX ADMIN — CEFTRIAXONE 1000 MG: 1 INJECTION, POWDER, FOR SOLUTION INTRAMUSCULAR; INTRAVENOUS at 16:04

## 2021-01-01 RX ADMIN — HEPARIN SODIUM 5000 UNITS: 5000 INJECTION INTRAVENOUS; SUBCUTANEOUS at 22:35

## 2021-01-01 RX ADMIN — FAMOTIDINE 20 MG: 20 TABLET, FILM COATED ORAL at 08:09

## 2021-01-01 RX ADMIN — CEFTRIAXONE 1000 MG: 1 INJECTION, POWDER, FOR SOLUTION INTRAMUSCULAR; INTRAVENOUS at 14:37

## 2021-01-01 RX ADMIN — MAGNESIUM SULFATE HEPTAHYDRATE 1000 MG: 1 INJECTION, SOLUTION INTRAVENOUS at 16:29

## 2021-01-01 RX ADMIN — Medication 10 ML: at 19:58

## 2021-01-01 RX ADMIN — FAMOTIDINE 20 MG: 20 TABLET, FILM COATED ORAL at 08:48

## 2021-01-01 RX ADMIN — FAMOTIDINE 20 MG: 20 TABLET, FILM COATED ORAL at 09:36

## 2021-01-01 RX ADMIN — Medication 15 G: at 09:48

## 2021-01-01 RX ADMIN — GLYCOPYRROLATE 0.2 MG: 0.2 INJECTION INTRAMUSCULAR; INTRAVENOUS at 11:20

## 2021-01-01 RX ADMIN — FAMOTIDINE 20 MG: 20 TABLET ORAL at 10:40

## 2021-01-01 RX ADMIN — Medication 10 ML: at 11:26

## 2021-01-01 RX ADMIN — PHENYLEPHRINE HYDROCHLORIDE 50 MCG: 10 INJECTION INTRAVENOUS at 12:11

## 2021-01-01 RX ADMIN — LORAZEPAM 1 MG: 2 INJECTION INTRAMUSCULAR; INTRAVENOUS at 11:18

## 2021-01-01 RX ADMIN — LISINOPRIL 40 MG: 40 TABLET ORAL at 09:53

## 2021-01-01 RX ADMIN — PIPERACILLIN AND TAZOBACTAM 3375 MG: 3; .375 INJECTION, POWDER, LYOPHILIZED, FOR SOLUTION INTRAVENOUS at 15:09

## 2021-01-01 RX ADMIN — MINERAL SUPPLEMENT IRON 300 MG / 5 ML STRENGTH LIQUID 100 PER BOX UNFLAVORED 300 MG: at 10:22

## 2021-01-01 RX ADMIN — PIPERACILLIN AND TAZOBACTAM 3375 MG: 3; .375 INJECTION, POWDER, LYOPHILIZED, FOR SOLUTION INTRAVENOUS at 05:16

## 2021-01-01 RX ADMIN — HEPARIN SODIUM 5000 UNITS: 5000 INJECTION INTRAVENOUS; SUBCUTANEOUS at 10:36

## 2021-01-01 RX ADMIN — Medication 10 ML: at 22:15

## 2021-01-01 RX ADMIN — ATROPINE SULFATE 2 DROP: 10 SOLUTION/ DROPS OPHTHALMIC at 20:50

## 2021-01-01 RX ADMIN — HYDRALAZINE HYDROCHLORIDE 10 MG: 20 INJECTION INTRAMUSCULAR; INTRAVENOUS at 15:16

## 2021-01-01 RX ADMIN — ATROPINE SULFATE 2 DROP: 10 SOLUTION/ DROPS OPHTHALMIC at 02:36

## 2021-01-01 RX ADMIN — EZETIMIBE 10 MG: 10 TABLET ORAL at 21:56

## 2021-01-01 RX ADMIN — CYCLOBENZAPRINE 10 MG: 10 TABLET, FILM COATED ORAL at 08:10

## 2021-01-01 RX ADMIN — SODIUM CHLORIDE: 9 INJECTION, SOLUTION INTRAVENOUS at 13:53

## 2021-01-01 RX ADMIN — MINERAL SUPPLEMENT IRON 300 MG / 5 ML STRENGTH LIQUID 100 PER BOX UNFLAVORED 300 MG: at 08:04

## 2021-01-01 RX ADMIN — LISINOPRIL 40 MG: 40 TABLET ORAL at 09:54

## 2021-01-01 RX ADMIN — SUGAMMADEX 200 MG: 100 INJECTION, SOLUTION INTRAVENOUS at 12:24

## 2021-01-01 RX ADMIN — SODIUM CHLORIDE, SODIUM LACTATE, POTASSIUM CHLORIDE, AND CALCIUM CHLORIDE: .6; .31; .03; .02 INJECTION, SOLUTION INTRAVENOUS at 11:20

## 2021-01-01 RX ADMIN — PIPERACILLIN SODIUM AND TAZOBACTAM SODIUM 4500 MG: 4; .5 INJECTION, POWDER, LYOPHILIZED, FOR SOLUTION INTRAVENOUS at 09:32

## 2021-01-01 RX ADMIN — DEXTROSE MONOHYDRATE 200 MG: 50 INJECTION, SOLUTION INTRAVENOUS at 11:23

## 2021-01-01 RX ADMIN — ACETAMINOPHEN 650 MG: 325 TABLET ORAL at 15:43

## 2021-01-01 RX ADMIN — Medication 200 MG: at 22:06

## 2021-01-01 RX ADMIN — HEPARIN SODIUM 5000 UNITS: 5000 INJECTION INTRAVENOUS; SUBCUTANEOUS at 21:16

## 2021-01-01 RX ADMIN — IOPAMIDOL 85 ML: 755 INJECTION, SOLUTION INTRAVENOUS at 23:55

## 2021-01-01 RX ADMIN — LEVETIRACETAM 1000 MG: 100 INJECTION, SOLUTION INTRAVENOUS at 07:08

## 2021-01-01 RX ADMIN — AMLODIPINE BESYLATE 10 MG: 10 TABLET ORAL at 09:20

## 2021-01-01 RX ADMIN — Medication 10 ML: at 08:50

## 2021-01-01 RX ADMIN — HEPARIN SODIUM 5000 UNITS: 5000 INJECTION INTRAVENOUS; SUBCUTANEOUS at 13:34

## 2021-01-01 RX ADMIN — LEVETIRACETAM 2000 MG: 500 SOLUTION ORAL at 10:21

## 2021-01-01 RX ADMIN — FAMOTIDINE 20 MG: 20 TABLET ORAL at 10:22

## 2021-01-01 RX ADMIN — EZETIMIBE 10 MG: 10 TABLET ORAL at 21:30

## 2021-01-01 RX ADMIN — HEPARIN SODIUM 5000 UNITS: 5000 INJECTION INTRAVENOUS; SUBCUTANEOUS at 12:32

## 2021-01-01 RX ADMIN — EZETIMIBE 10 MG: 10 TABLET ORAL at 21:19

## 2021-01-01 RX ADMIN — FERROUS SULFATE TAB 325 MG (65 MG ELEMENTAL FE) 325 MG: 325 (65 FE) TAB at 09:53

## 2021-01-01 RX ADMIN — ROCURONIUM BROMIDE 50 MG: 10 INJECTION INTRAVENOUS at 11:37

## 2021-01-01 RX ADMIN — Medication 10 ML: at 10:24

## 2021-01-01 RX ADMIN — HYDROCODONE BITARTRATE AND ACETAMINOPHEN 1 TABLET: 5; 325 TABLET ORAL at 04:14

## 2021-01-01 RX ADMIN — Medication 10 ML: at 20:38

## 2021-01-01 RX ADMIN — HYDROCODONE BITARTRATE AND ACETAMINOPHEN 1 TABLET: 5; 325 TABLET ORAL at 08:10

## 2021-01-01 RX ADMIN — LISINOPRIL 40 MG: 40 TABLET ORAL at 09:07

## 2021-01-01 RX ADMIN — Medication 15 G: at 09:33

## 2021-01-01 RX ADMIN — Medication 15 G: at 21:30

## 2021-01-01 RX ADMIN — SODIUM CHLORIDE: 9 INJECTION, SOLUTION INTRAVENOUS at 12:35

## 2021-01-01 RX ADMIN — DEXTROSE MONOHYDRATE 200 MG: 50 INJECTION, SOLUTION INTRAVENOUS at 21:30

## 2021-01-01 RX ADMIN — Medication 10 ML: at 20:22

## 2021-01-01 RX ADMIN — POLYETHYLENE GLYCOL (3350) 17 G: 17 POWDER, FOR SOLUTION ORAL at 09:53

## 2021-01-01 RX ADMIN — POTASSIUM CHLORIDE 10 MEQ: 10 INJECTION, SOLUTION INTRAVENOUS at 20:37

## 2021-01-01 RX ADMIN — POLYETHYLENE GLYCOL (3350) 17 G: 17 POWDER, FOR SOLUTION ORAL at 10:40

## 2021-01-01 RX ADMIN — DOCUSATE SODIUM 50 MG AND SENNOSIDES 8.6 MG 2 TABLET: 8.6; 5 TABLET, FILM COATED ORAL at 20:48

## 2021-01-01 RX ADMIN — CEFAZOLIN 1000 MG: 1 INJECTION, POWDER, FOR SOLUTION INTRAMUSCULAR; INTRAVENOUS at 02:23

## 2021-01-01 RX ADMIN — AZITHROMYCIN MONOHYDRATE 500 MG: 500 INJECTION, POWDER, LYOPHILIZED, FOR SOLUTION INTRAVENOUS at 12:39

## 2021-01-01 RX ADMIN — EPHEDRINE SULFATE 5 MG: 50 INJECTION INTRAVENOUS at 12:09

## 2021-01-01 RX ADMIN — FAMOTIDINE 20 MG: 20 TABLET, FILM COATED ORAL at 09:54

## 2021-01-01 RX ADMIN — PIPERACILLIN AND TAZOBACTAM 3375 MG: 3; .375 INJECTION, POWDER, LYOPHILIZED, FOR SOLUTION INTRAVENOUS at 14:19

## 2021-01-01 RX ADMIN — HYDROCODONE BITARTRATE AND ACETAMINOPHEN 1 TABLET: 5; 325 TABLET ORAL at 10:53

## 2021-01-01 RX ADMIN — Medication 10 ML: at 19:53

## 2021-01-01 RX ADMIN — CEFAZOLIN 1000 MG: 1 INJECTION, POWDER, FOR SOLUTION INTRAMUSCULAR; INTRAVENOUS at 20:15

## 2021-01-01 RX ADMIN — POLYETHYLENE GLYCOL (3350) 17 G: 17 POWDER, FOR SOLUTION ORAL at 09:36

## 2021-01-01 RX ADMIN — Medication 10 ML: at 07:34

## 2021-01-01 RX ADMIN — Medication 5 MG: at 12:04

## 2021-01-01 RX ADMIN — EPHEDRINE SULFATE 25 MG: 50 INJECTION INTRAVENOUS at 13:20

## 2021-01-01 RX ADMIN — Medication 15 G: at 10:42

## 2021-01-01 RX ADMIN — DOCUSATE SODIUM 50 MG AND SENNOSIDES 8.6 MG 2 TABLET: 8.6; 5 TABLET, FILM COATED ORAL at 21:19

## 2021-01-01 RX ADMIN — Medication 5 MG: at 20:49

## 2021-01-01 RX ADMIN — Medication 10 ML: at 08:09

## 2021-01-01 RX ADMIN — HEPARIN SODIUM 5000 UNITS: 5000 INJECTION INTRAVENOUS; SUBCUTANEOUS at 00:18

## 2021-01-01 RX ADMIN — AMLODIPINE BESYLATE 5 MG: 5 TABLET ORAL at 09:53

## 2021-01-01 RX ADMIN — PIPERACILLIN AND TAZOBACTAM 3375 MG: 3; .375 INJECTION, POWDER, LYOPHILIZED, FOR SOLUTION INTRAVENOUS at 09:08

## 2021-01-01 RX ADMIN — AMLODIPINE BESYLATE 5 MG: 5 TABLET ORAL at 09:54

## 2021-01-01 RX ADMIN — METOPROLOL SUCCINATE 25 MG: 25 TABLET, EXTENDED RELEASE ORAL at 12:50

## 2021-01-01 RX ADMIN — PIPERACILLIN AND TAZOBACTAM 3375 MG: 3; .375 INJECTION, POWDER, LYOPHILIZED, FOR SOLUTION INTRAVENOUS at 22:45

## 2021-01-01 RX ADMIN — LEVETIRACETAM 2000 MG: 500 SOLUTION ORAL at 22:35

## 2021-01-01 RX ADMIN — Medication 10 ML: at 09:25

## 2021-01-01 RX ADMIN — Medication 1 MG: at 18:41

## 2021-01-01 RX ADMIN — HEPARIN SODIUM 5000 UNITS: 5000 INJECTION INTRAVENOUS; SUBCUTANEOUS at 23:28

## 2021-01-01 RX ADMIN — Medication 10 ML: at 21:00

## 2021-01-01 RX ADMIN — AMLODIPINE BESYLATE 5 MG: 5 TABLET ORAL at 08:42

## 2021-01-01 RX ADMIN — HYDROCODONE BITARTRATE AND ACETAMINOPHEN 1 TABLET: 5; 325 TABLET ORAL at 11:41

## 2021-01-01 RX ADMIN — DILTIAZEM HYDROCHLORIDE 10 MG: 5 INJECTION INTRAVENOUS at 22:35

## 2021-01-01 RX ADMIN — PROPOFOL 20 MG: 10 INJECTION, EMULSION INTRAVENOUS at 12:46

## 2021-01-01 RX ADMIN — EPHEDRINE SULFATE 5 MG: 50 INJECTION INTRAVENOUS at 11:20

## 2021-01-01 RX ADMIN — Medication 10 ML: at 00:30

## 2021-01-01 RX ADMIN — OXYCODONE 5 MG: 5 TABLET ORAL at 17:23

## 2021-01-01 RX ADMIN — ACETAMINOPHEN 650 MG: 325 TABLET ORAL at 11:42

## 2021-01-01 RX ADMIN — POLYETHYLENE GLYCOL (3350) 17 G: 17 POWDER, FOR SOLUTION ORAL at 09:54

## 2021-01-01 RX ADMIN — PIPERACILLIN AND TAZOBACTAM 3375 MG: 3; .375 INJECTION, POWDER, LYOPHILIZED, FOR SOLUTION INTRAVENOUS at 22:52

## 2021-01-01 RX ADMIN — Medication 5 MG: at 14:31

## 2021-01-01 RX ADMIN — PROPOFOL 40 MG: 10 INJECTION, EMULSION INTRAVENOUS at 12:40

## 2021-01-01 RX ADMIN — HYDROCODONE BITARTRATE AND ACETAMINOPHEN 1 TABLET: 5; 325 TABLET ORAL at 17:40

## 2021-01-01 RX ADMIN — Medication 1 MG: at 12:07

## 2021-01-01 RX ADMIN — Medication 15 G: at 12:34

## 2021-01-01 RX ADMIN — FERROUS SULFATE TAB 325 MG (65 MG ELEMENTAL FE) 325 MG: 325 (65 FE) TAB at 09:37

## 2021-01-01 RX ADMIN — LEVETIRACETAM 1000 MG: 500 SOLUTION ORAL at 09:36

## 2021-01-01 RX ADMIN — LEVETIRACETAM 1000 MG: 100 INJECTION, SOLUTION INTRAVENOUS at 09:06

## 2021-01-01 RX ADMIN — Medication 200 MG: at 20:59

## 2021-01-01 RX ADMIN — Medication 5 MG: at 16:31

## 2021-01-01 RX ADMIN — LEVETIRACETAM 1000 MG: 500 SOLUTION ORAL at 21:16

## 2021-01-01 RX ADMIN — DEXTROSE MONOHYDRATE 200 MG: 50 INJECTION, SOLUTION INTRAVENOUS at 10:20

## 2021-01-01 RX ADMIN — EZETIMIBE 10 MG: 10 TABLET ORAL at 19:55

## 2021-01-01 RX ADMIN — LORAZEPAM 2 MG: 2 INJECTION INTRAMUSCULAR; INTRAVENOUS at 17:45

## 2021-01-01 RX ADMIN — CYCLOBENZAPRINE 10 MG: 10 TABLET, FILM COATED ORAL at 04:41

## 2021-01-01 RX ADMIN — FAMOTIDINE 20 MG: 20 TABLET ORAL at 09:48

## 2021-01-01 RX ADMIN — CYCLOBENZAPRINE 10 MG: 10 TABLET, FILM COATED ORAL at 21:22

## 2021-01-01 RX ADMIN — ESCITALOPRAM 2.5 MG: 5 TABLET, FILM COATED ORAL at 12:33

## 2021-01-01 RX ADMIN — LEVETIRACETAM 1000 MG: 100 INJECTION, SOLUTION INTRAVENOUS at 20:21

## 2021-01-01 RX ADMIN — HYDROCODONE BITARTRATE AND ACETAMINOPHEN 1 TABLET: 5; 325 TABLET ORAL at 05:52

## 2021-01-01 RX ADMIN — Medication 10 ML: at 10:44

## 2021-01-01 RX ADMIN — Medication 10 ML: at 10:23

## 2021-01-01 RX ADMIN — PROPOFOL 40 MG: 10 INJECTION, EMULSION INTRAVENOUS at 12:43

## 2021-01-01 RX ADMIN — Medication 1000 MG: at 20:49

## 2021-01-01 RX ADMIN — POLYETHYLENE GLYCOL (3350) 17 G: 17 POWDER, FOR SOLUTION ORAL at 10:06

## 2021-01-01 RX ADMIN — Medication 10 ML: at 09:55

## 2021-01-01 RX ADMIN — Medication 200 MG: at 10:38

## 2021-01-01 RX ADMIN — LEVETIRACETAM 2000 MG: 100 INJECTION, SOLUTION INTRAVENOUS at 20:35

## 2021-01-01 RX ADMIN — LEVETIRACETAM 1000 MG: 500 SOLUTION ORAL at 20:23

## 2021-01-01 RX ADMIN — DEXTROSE MONOHYDRATE 200 MG: 50 INJECTION, SOLUTION INTRAVENOUS at 10:38

## 2021-01-01 RX ADMIN — LISINOPRIL 40 MG: 40 TABLET ORAL at 09:12

## 2021-01-01 RX ADMIN — ACETAMINOPHEN 650 MG: 325 TABLET ORAL at 21:19

## 2021-01-01 RX ADMIN — DEXTROSE MONOHYDRATE 200 MG: 50 INJECTION, SOLUTION INTRAVENOUS at 00:11

## 2021-01-01 RX ADMIN — FAMOTIDINE 20 MG: 20 TABLET, FILM COATED ORAL at 09:19

## 2021-01-01 RX ADMIN — Medication 15 G: at 10:30

## 2021-01-01 RX ADMIN — HYDRALAZINE HYDROCHLORIDE 10 MG: 20 INJECTION INTRAMUSCULAR; INTRAVENOUS at 16:57

## 2021-01-01 RX ADMIN — LISINOPRIL 40 MG: 40 TABLET ORAL at 10:22

## 2021-01-01 RX ADMIN — FAMOTIDINE 20 MG: 20 TABLET, FILM COATED ORAL at 09:37

## 2021-01-01 RX ADMIN — Medication 1 MG: at 09:37

## 2021-01-01 RX ADMIN — HEPARIN SODIUM 5000 UNITS: 5000 INJECTION INTRAVENOUS; SUBCUTANEOUS at 22:49

## 2021-01-01 RX ADMIN — PIPERACILLIN AND TAZOBACTAM 3375 MG: 3; .375 INJECTION, POWDER, LYOPHILIZED, FOR SOLUTION INTRAVENOUS at 15:24

## 2021-01-01 RX ADMIN — HYDROCODONE BITARTRATE AND ACETAMINOPHEN 1 TABLET: 5; 325 TABLET ORAL at 03:12

## 2021-01-01 RX ADMIN — CARVEDILOL 3.12 MG: 3.12 TABLET, FILM COATED ORAL at 08:48

## 2021-01-01 RX ADMIN — AZITHROMYCIN MONOHYDRATE 500 MG: 500 INJECTION, POWDER, LYOPHILIZED, FOR SOLUTION INTRAVENOUS at 15:50

## 2021-01-01 RX ADMIN — EZETIMIBE 10 MG: 10 TABLET ORAL at 20:05

## 2021-01-01 RX ADMIN — HEPARIN SODIUM 5000 UNITS: 5000 INJECTION INTRAVENOUS; SUBCUTANEOUS at 13:03

## 2021-01-01 RX ADMIN — Medication 10 ML: at 08:20

## 2021-01-01 RX ADMIN — ACETAMINOPHEN 650 MG: 325 TABLET ORAL at 06:34

## 2021-01-01 RX ADMIN — DOCUSATE SODIUM 50 MG AND SENNOSIDES 8.6 MG 2 TABLET: 8.6; 5 TABLET, FILM COATED ORAL at 22:35

## 2021-01-01 RX ADMIN — EZETIMIBE 10 MG: 10 TABLET ORAL at 20:20

## 2021-01-01 RX ADMIN — PIPERACILLIN AND TAZOBACTAM 3375 MG: 3; .375 INJECTION, POWDER, LYOPHILIZED, FOR SOLUTION INTRAVENOUS at 22:10

## 2021-01-01 RX ADMIN — ACETAMINOPHEN 650 MG: 325 TABLET ORAL at 09:23

## 2021-01-01 RX ADMIN — DEXTROSE MONOHYDRATE 200 MG: 50 INJECTION, SOLUTION INTRAVENOUS at 11:54

## 2021-01-01 RX ADMIN — LISINOPRIL 40 MG: 40 TABLET ORAL at 08:33

## 2021-01-01 RX ADMIN — HYDROCODONE BITARTRATE AND ACETAMINOPHEN 1 TABLET: 5; 325 TABLET ORAL at 04:41

## 2021-01-01 RX ADMIN — EZETIMIBE 10 MG: 10 TABLET ORAL at 20:23

## 2021-01-01 RX ADMIN — POTASSIUM CHLORIDE 10 MEQ: 10 INJECTION, SOLUTION INTRAVENOUS at 21:36

## 2021-01-01 RX ADMIN — ATORVASTATIN CALCIUM 80 MG: 80 TABLET, FILM COATED ORAL at 20:21

## 2021-01-01 RX ADMIN — LISINOPRIL 40 MG: 40 TABLET ORAL at 09:20

## 2021-01-01 RX ADMIN — SODIUM CHLORIDE: 9 INJECTION, SOLUTION INTRAVENOUS at 10:37

## 2021-01-01 RX ADMIN — Medication 200 MG: at 21:22

## 2021-01-01 RX ADMIN — LEVETIRACETAM 2000 MG: 100 INJECTION, SOLUTION INTRAVENOUS at 09:19

## 2021-01-01 RX ADMIN — PHENYLEPHRINE HYDROCHLORIDE 50 MCG: 10 INJECTION INTRAVENOUS at 11:56

## 2021-01-01 RX ADMIN — AMLODIPINE BESYLATE 5 MG: 5 TABLET ORAL at 12:15

## 2021-01-01 RX ADMIN — CYCLOBENZAPRINE 10 MG: 10 TABLET, FILM COATED ORAL at 21:03

## 2021-01-01 RX ADMIN — AMLODIPINE BESYLATE 10 MG: 10 TABLET ORAL at 10:40

## 2021-01-01 RX ADMIN — POTASSIUM CHLORIDE 10 MEQ: 10 INJECTION, SOLUTION INTRAVENOUS at 18:54

## 2021-01-01 RX ADMIN — Medication 10 ML: at 08:43

## 2021-01-01 RX ADMIN — HEPARIN SODIUM 5000 UNITS: 5000 INJECTION INTRAVENOUS; SUBCUTANEOUS at 23:38

## 2021-01-01 RX ADMIN — FAMOTIDINE 20 MG: 20 TABLET, FILM COATED ORAL at 09:07

## 2021-01-01 RX ADMIN — PIPERACILLIN AND TAZOBACTAM 3375 MG: 3; .375 INJECTION, POWDER, LYOPHILIZED, FOR SOLUTION INTRAVENOUS at 14:05

## 2021-01-01 RX ADMIN — ONDANSETRON 4 MG: 2 INJECTION INTRAMUSCULAR; INTRAVENOUS at 12:21

## 2021-01-01 RX ADMIN — DEXTROSE MONOHYDRATE 200 MG: 50 INJECTION, SOLUTION INTRAVENOUS at 10:21

## 2021-01-01 RX ADMIN — PROPOFOL 100 MG: 10 INJECTION, EMULSION INTRAVENOUS at 11:37

## 2021-01-01 RX ADMIN — LEVETIRACETAM 2000 MG: 500 SOLUTION ORAL at 10:36

## 2021-01-01 RX ADMIN — Medication 1000 MG: at 02:36

## 2021-01-01 RX ADMIN — FUROSEMIDE 20 MG: 10 INJECTION, SOLUTION INTRAMUSCULAR; INTRAVENOUS at 11:42

## 2021-01-01 RX ADMIN — AMLODIPINE BESYLATE 5 MG: 5 TABLET ORAL at 08:48

## 2021-01-01 RX ADMIN — LEVETIRACETAM 1000 MG: 100 INJECTION, SOLUTION INTRAVENOUS at 09:52

## 2021-01-01 RX ADMIN — CARVEDILOL 3.12 MG: 3.12 TABLET, FILM COATED ORAL at 08:42

## 2021-01-01 RX ADMIN — Medication 200 MG: at 11:33

## 2021-01-01 RX ADMIN — CEFTRIAXONE 1000 MG: 1 INJECTION, POWDER, FOR SOLUTION INTRAMUSCULAR; INTRAVENOUS at 13:01

## 2021-01-01 RX ADMIN — FAMOTIDINE 20 MG: 20 TABLET ORAL at 08:04

## 2021-01-01 RX ADMIN — Medication 5 MG: at 18:40

## 2021-01-01 RX ADMIN — Medication 1 MG: at 16:37

## 2021-01-01 RX ADMIN — METOPROLOL SUCCINATE 25 MG: 25 TABLET, EXTENDED RELEASE ORAL at 09:07

## 2021-01-01 RX ADMIN — LEVETIRACETAM 2000 MG: 500 SOLUTION ORAL at 22:26

## 2021-01-01 RX ADMIN — PIPERACILLIN AND TAZOBACTAM 3375 MG: 3; .375 INJECTION, POWDER, LYOPHILIZED, FOR SOLUTION INTRAVENOUS at 16:21

## 2021-01-01 RX ADMIN — LEVETIRACETAM 1000 MG: 100 INJECTION, SOLUTION INTRAVENOUS at 20:15

## 2021-01-01 RX ADMIN — PIPERACILLIN AND TAZOBACTAM 3375 MG: 3; .375 INJECTION, POWDER, LYOPHILIZED, FOR SOLUTION INTRAVENOUS at 05:58

## 2021-01-01 RX ADMIN — AMLODIPINE BESYLATE 10 MG: 10 TABLET ORAL at 10:22

## 2021-01-01 RX ADMIN — LEVETIRACETAM 2000 MG: 100 INJECTION, SOLUTION INTRAVENOUS at 07:59

## 2021-01-01 RX ADMIN — Medication 1000 MG: at 09:33

## 2021-01-01 RX ADMIN — CEFAZOLIN SODIUM 1000 MG: 1 POWDER, FOR SOLUTION INTRAMUSCULAR; INTRAVENOUS at 14:53

## 2021-01-01 RX ADMIN — EZETIMIBE 10 MG: 10 TABLET ORAL at 20:48

## 2021-01-01 RX ADMIN — EPHEDRINE SULFATE 25 MG: 50 INJECTION INTRAVENOUS at 13:21

## 2021-01-01 RX ADMIN — FAMOTIDINE 20 MG: 20 TABLET, FILM COATED ORAL at 09:53

## 2021-01-01 RX ADMIN — ONDANSETRON 4 MG: 2 INJECTION INTRAMUSCULAR; INTRAVENOUS at 02:50

## 2021-01-01 RX ADMIN — LEVETIRACETAM 2000 MG: 100 INJECTION, SOLUTION INTRAVENOUS at 08:02

## 2021-01-01 RX ADMIN — LEVETIRACETAM 2000 MG: 500 SOLUTION ORAL at 20:48

## 2021-01-01 RX ADMIN — HYDROCODONE BITARTRATE AND ACETAMINOPHEN 1 TABLET: 5; 325 TABLET ORAL at 21:04

## 2021-01-01 RX ADMIN — Medication 15 G: at 10:24

## 2021-01-01 RX ADMIN — POLYETHYLENE GLYCOL (3350) 17 G: 17 POWDER, FOR SOLUTION ORAL at 09:48

## 2021-01-01 RX ADMIN — HEPARIN SODIUM 5000 UNITS: 5000 INJECTION INTRAVENOUS; SUBCUTANEOUS at 14:58

## 2021-01-01 RX ADMIN — POLYETHYLENE GLYCOL (3350) 17 G: 17 POWDER, FOR SOLUTION ORAL at 08:09

## 2021-01-01 RX ADMIN — Medication 10 ML: at 21:20

## 2021-01-01 RX ADMIN — SODIUM CHLORIDE 2000 MG: 900 INJECTION, SOLUTION INTRAVENOUS at 18:23

## 2021-01-01 RX ADMIN — FAMOTIDINE 20 MG: 20 TABLET, FILM COATED ORAL at 10:53

## 2021-01-01 RX ADMIN — PIPERACILLIN AND TAZOBACTAM 3375 MG: 3; .375 INJECTION, POWDER, LYOPHILIZED, FOR SOLUTION INTRAVENOUS at 05:55

## 2021-01-01 RX ADMIN — HYDROCODONE BITARTRATE AND ACETAMINOPHEN 1 TABLET: 5; 325 TABLET ORAL at 11:44

## 2021-01-01 RX ADMIN — LEVETIRACETAM 2000 MG: 100 INJECTION, SOLUTION INTRAVENOUS at 19:56

## 2021-01-01 RX ADMIN — LEVETIRACETAM 2000 MG: 500 SOLUTION ORAL at 10:41

## 2021-01-01 RX ADMIN — VANCOMYCIN HYDROCHLORIDE 750 MG: 10 INJECTION, POWDER, LYOPHILIZED, FOR SOLUTION INTRAVENOUS at 14:10

## 2021-01-01 RX ADMIN — AMLODIPINE BESYLATE 10 MG: 10 TABLET ORAL at 09:48

## 2021-01-01 RX ADMIN — EZETIMIBE 10 MG: 10 TABLET ORAL at 21:25

## 2021-01-01 RX ADMIN — PROPOFOL 20 MG: 10 INJECTION, EMULSION INTRAVENOUS at 12:45

## 2021-01-01 RX ADMIN — EZETIMIBE 10 MG: 10 TABLET ORAL at 20:21

## 2021-01-01 RX ADMIN — PIPERACILLIN AND TAZOBACTAM 3375 MG: 3; .375 INJECTION, POWDER, LYOPHILIZED, FOR SOLUTION INTRAVENOUS at 05:45

## 2021-01-01 RX ADMIN — LIDOCAINE HYDROCHLORIDE 50 MG: 20 INJECTION, SOLUTION INTRAVENOUS at 11:37

## 2021-01-01 RX ADMIN — LEVETIRACETAM 2000 MG: 100 INJECTION, SOLUTION INTRAVENOUS at 06:03

## 2021-01-01 RX ADMIN — LEVETIRACETAM 2000 MG: 500 SOLUTION ORAL at 21:30

## 2021-01-01 RX ADMIN — LEVETIRACETAM 1000 MG: 100 INJECTION, SOLUTION INTRAVENOUS at 22:02

## 2021-01-01 RX ADMIN — LEVETIRACETAM 2000 MG: 100 INJECTION, SOLUTION INTRAVENOUS at 20:47

## 2021-01-01 RX ADMIN — LORAZEPAM 4 MG: 2 INJECTION INTRAMUSCULAR; INTRAVENOUS at 05:55

## 2021-01-01 RX ADMIN — DOCUSATE SODIUM 50 MG AND SENNOSIDES 8.6 MG 2 TABLET: 8.6; 5 TABLET, FILM COATED ORAL at 21:29

## 2021-01-01 RX ADMIN — DEXTROSE MONOHYDRATE 200 MG: 50 INJECTION, SOLUTION INTRAVENOUS at 21:16

## 2021-01-01 RX ADMIN — LISINOPRIL 40 MG: 40 TABLET ORAL at 14:06

## 2021-01-01 RX ADMIN — LISINOPRIL 40 MG: 40 TABLET ORAL at 09:48

## 2021-01-01 RX ADMIN — CARVEDILOL 3.12 MG: 3.12 TABLET, FILM COATED ORAL at 12:16

## 2021-01-01 RX ADMIN — EZETIMIBE 10 MG: 10 TABLET ORAL at 21:22

## 2021-01-01 RX ADMIN — PIPERACILLIN SODIUM AND TAZOBACTAM SODIUM 4500 MG: 4; .5 INJECTION, POWDER, LYOPHILIZED, FOR SOLUTION INTRAVENOUS at 02:36

## 2021-01-01 RX ADMIN — HEPARIN SODIUM 5000 UNITS: 5000 INJECTION INTRAVENOUS; SUBCUTANEOUS at 09:57

## 2021-01-01 RX ADMIN — LISINOPRIL 40 MG: 40 TABLET ORAL at 08:09

## 2021-01-01 RX ADMIN — LISINOPRIL 40 MG: 40 TABLET ORAL at 10:40

## 2021-01-01 RX ADMIN — LEVETIRACETAM 1000 MG: 100 INJECTION, SOLUTION INTRAVENOUS at 09:18

## 2021-01-01 RX ADMIN — LEVETIRACETAM 2000 MG: 100 INJECTION, SOLUTION INTRAVENOUS at 09:08

## 2021-01-01 RX ADMIN — FAMOTIDINE 20 MG: 20 TABLET, FILM COATED ORAL at 09:33

## 2021-01-01 RX ADMIN — LISINOPRIL 40 MG: 40 TABLET ORAL at 08:42

## 2021-01-01 RX ADMIN — ENOXAPARIN SODIUM 40 MG: 40 INJECTION SUBCUTANEOUS at 09:32

## 2021-01-01 RX ADMIN — MINERAL SUPPLEMENT IRON 300 MG / 5 ML STRENGTH LIQUID 100 PER BOX UNFLAVORED 300 MG: at 10:41

## 2021-01-01 RX ADMIN — Medication 200 MG: at 12:23

## 2021-01-01 RX ADMIN — LISINOPRIL 40 MG: 40 TABLET ORAL at 08:48

## 2021-01-01 RX ADMIN — Medication 200 MG: at 22:29

## 2021-01-01 RX ADMIN — ATORVASTATIN CALCIUM 80 MG: 80 TABLET, FILM COATED ORAL at 21:56

## 2021-01-01 RX ADMIN — DOCUSATE SODIUM 50 MG AND SENNOSIDES 8.6 MG 2 TABLET: 8.6; 5 TABLET, FILM COATED ORAL at 20:23

## 2021-01-01 RX ADMIN — AZITHROMYCIN MONOHYDRATE 500 MG: 500 INJECTION, POWDER, LYOPHILIZED, FOR SOLUTION INTRAVENOUS at 14:34

## 2021-01-01 RX ADMIN — Medication 200 MG: at 22:44

## 2021-01-01 RX ADMIN — POTASSIUM CHLORIDE 10 MEQ: 7.46 INJECTION, SOLUTION INTRAVENOUS at 16:28

## 2021-01-01 RX ADMIN — DOCUSATE SODIUM 50 MG AND SENNOSIDES 8.6 MG 2 TABLET: 8.6; 5 TABLET, FILM COATED ORAL at 09:37

## 2021-01-01 RX ADMIN — LORAZEPAM 1 MG: 2 INJECTION INTRAMUSCULAR; INTRAVENOUS at 10:30

## 2021-01-01 RX ADMIN — Medication 200 MG: at 21:44

## 2021-01-01 RX ADMIN — MINERAL SUPPLEMENT IRON 300 MG / 5 ML STRENGTH LIQUID 100 PER BOX UNFLAVORED 300 MG: at 10:30

## 2021-01-01 RX ADMIN — DOCUSATE SODIUM 50 MG AND SENNOSIDES 8.6 MG 2 TABLET: 8.6; 5 TABLET, FILM COATED ORAL at 09:48

## 2021-01-01 RX ADMIN — LEVETIRACETAM 2000 MG: 500 SOLUTION ORAL at 08:07

## 2021-01-01 RX ADMIN — HEPARIN SODIUM 5000 UNITS: 5000 INJECTION INTRAVENOUS; SUBCUTANEOUS at 21:44

## 2021-01-01 RX ADMIN — HEPARIN SODIUM 5000 UNITS: 5000 INJECTION INTRAVENOUS; SUBCUTANEOUS at 10:38

## 2021-01-01 RX ADMIN — HEPARIN SODIUM 5000 UNITS: 5000 INJECTION INTRAVENOUS; SUBCUTANEOUS at 11:05

## 2021-01-01 RX ADMIN — LEVETIRACETAM 1000 MG: 100 INJECTION, SOLUTION INTRAVENOUS at 10:38

## 2021-01-01 RX ADMIN — DEXTROSE MONOHYDRATE 200 MG: 50 INJECTION, SOLUTION INTRAVENOUS at 17:19

## 2021-01-01 RX ADMIN — AMLODIPINE BESYLATE 10 MG: 10 TABLET ORAL at 10:37

## 2021-01-01 RX ADMIN — LEVETIRACETAM 1000 MG: 100 INJECTION, SOLUTION INTRAVENOUS at 08:35

## 2021-01-01 RX ADMIN — SODIUM CHLORIDE: 9 INJECTION, SOLUTION INTRAVENOUS at 12:32

## 2021-01-01 RX ADMIN — PIPERACILLIN AND TAZOBACTAM 3375 MG: 3; .375 INJECTION, POWDER, LYOPHILIZED, FOR SOLUTION INTRAVENOUS at 22:15

## 2021-01-01 RX ADMIN — DOCUSATE SODIUM 50 MG AND SENNOSIDES 8.6 MG 2 TABLET: 8.6; 5 TABLET, FILM COATED ORAL at 09:36

## 2021-01-01 RX ADMIN — Medication 15 MG: at 09:54

## 2021-01-01 RX ADMIN — Medication 10 ML: at 22:37

## 2021-01-01 RX ADMIN — EZETIMIBE 10 MG: 10 TABLET ORAL at 22:25

## 2021-01-01 RX ADMIN — EPHEDRINE SULFATE 10 MG: 50 INJECTION INTRAVENOUS at 11:41

## 2021-01-01 RX ADMIN — LISINOPRIL 40 MG: 40 TABLET ORAL at 08:35

## 2021-01-01 RX ADMIN — LEVETIRACETAM 1000 MG: 100 INJECTION, SOLUTION INTRAVENOUS at 20:05

## 2021-01-01 RX ADMIN — LISINOPRIL 40 MG: 40 TABLET ORAL at 09:08

## 2021-01-01 RX ADMIN — HYDROCODONE BITARTRATE AND ACETAMINOPHEN 1 TABLET: 5; 325 TABLET ORAL at 21:11

## 2021-01-01 RX ADMIN — HYDRALAZINE HYDROCHLORIDE 10 MG: 20 INJECTION INTRAMUSCULAR; INTRAVENOUS at 02:52

## 2021-01-01 RX ADMIN — CEFAZOLIN SODIUM 2000 MG: 2 SOLUTION INTRAVENOUS at 10:22

## 2021-01-01 RX ADMIN — PROPOFOL 20 MG: 10 INJECTION, EMULSION INTRAVENOUS at 12:42

## 2021-01-01 RX ADMIN — LISINOPRIL 40 MG: 40 TABLET ORAL at 08:04

## 2021-01-01 RX ADMIN — POLYETHYLENE GLYCOL (3350) 17 G: 17 POWDER, FOR SOLUTION ORAL at 10:37

## 2021-01-01 RX ADMIN — PROPOFOL 20 MG: 10 INJECTION, EMULSION INTRAVENOUS at 12:44

## 2021-01-01 RX ADMIN — CEFAZOLIN 1000 MG: 1 INJECTION, POWDER, FOR SOLUTION INTRAMUSCULAR; INTRAVENOUS at 12:32

## 2021-01-01 RX ADMIN — HEPARIN SODIUM 5000 UNITS: 5000 INJECTION INTRAVENOUS; SUBCUTANEOUS at 22:40

## 2021-01-01 RX ADMIN — DEXTROSE MONOHYDRATE 200 MG: 50 INJECTION, SOLUTION INTRAVENOUS at 23:49

## 2021-01-01 RX ADMIN — MINERAL SUPPLEMENT IRON 300 MG / 5 ML STRENGTH LIQUID 100 PER BOX UNFLAVORED 300 MG: at 09:48

## 2021-01-01 RX ADMIN — PIPERACILLIN AND TAZOBACTAM 3375 MG: 3; .375 INJECTION, POWDER, LYOPHILIZED, FOR SOLUTION INTRAVENOUS at 20:48

## 2021-01-01 RX ADMIN — LORAZEPAM 2 MG: 2 INJECTION INTRAMUSCULAR; INTRAVENOUS at 07:55

## 2021-01-01 RX ADMIN — Medication 10 ML: at 10:06

## 2021-01-01 RX ADMIN — DOCUSATE SODIUM 50 MG AND SENNOSIDES 8.6 MG 2 TABLET: 8.6; 5 TABLET, FILM COATED ORAL at 09:53

## 2021-01-01 RX ADMIN — LACOSAMIDE 200 MG: 50 TABLET, FILM COATED ORAL at 20:49

## 2021-01-01 RX ADMIN — PIPERACILLIN AND TAZOBACTAM 3375 MG: 3; .375 INJECTION, POWDER, LYOPHILIZED, FOR SOLUTION INTRAVENOUS at 06:17

## 2021-01-01 RX ADMIN — DOCUSATE SODIUM 50 MG AND SENNOSIDES 8.6 MG 2 TABLET: 8.6; 5 TABLET, FILM COATED ORAL at 08:09

## 2021-01-01 RX ADMIN — Medication 10 ML: at 09:46

## 2021-01-01 RX ADMIN — Medication 200 MG: at 09:46

## 2021-01-01 RX ADMIN — CEFAZOLIN SODIUM 2000 MG: 2 SOLUTION INTRAVENOUS at 09:37

## 2021-01-01 RX ADMIN — LEVETIRACETAM 2000 MG: 100 INJECTION, SOLUTION INTRAVENOUS at 08:38

## 2021-01-01 RX ADMIN — FAMOTIDINE 20 MG: 20 TABLET, FILM COATED ORAL at 08:33

## 2021-01-01 RX ADMIN — DEXTROSE MONOHYDRATE 200 MG: 50 INJECTION, SOLUTION INTRAVENOUS at 21:31

## 2021-01-01 RX ADMIN — ATROPINE SULFATE 2 DROP: 10 SOLUTION/ DROPS OPHTHALMIC at 07:37

## 2021-01-01 RX ADMIN — Medication 5 MG: at 02:01

## 2021-01-01 RX ADMIN — POLYETHYLENE GLYCOL (3350) 17 G: 17 POWDER, FOR SOLUTION ORAL at 08:33

## 2021-01-01 RX ADMIN — Medication 10 ML: at 19:54

## 2021-01-01 RX ADMIN — HYDROCODONE BITARTRATE AND ACETAMINOPHEN 1 TABLET: 5; 325 TABLET ORAL at 23:28

## 2021-01-01 RX ADMIN — HEPARIN SODIUM 5000 UNITS: 5000 INJECTION INTRAVENOUS; SUBCUTANEOUS at 11:47

## 2021-01-01 RX ADMIN — POTASSIUM CHLORIDE 10 MEQ: 10 INJECTION, SOLUTION INTRAVENOUS at 23:05

## 2021-01-01 RX ADMIN — EPHEDRINE SULFATE 10 MG: 50 INJECTION INTRAVENOUS at 11:46

## 2021-01-01 RX ADMIN — DOCUSATE SODIUM 50 MG AND SENNOSIDES 8.6 MG 2 TABLET: 8.6; 5 TABLET, FILM COATED ORAL at 19:58

## 2021-01-01 RX ADMIN — METOPROLOL SUCCINATE 50 MG: 50 TABLET, EXTENDED RELEASE ORAL at 08:35

## 2021-01-01 RX ADMIN — LEVETIRACETAM 2000 MG: 100 INJECTION, SOLUTION INTRAVENOUS at 18:13

## 2021-01-01 RX ADMIN — PHENYLEPHRINE HYDROCHLORIDE 160 MCG: 10 INJECTION, SOLUTION INTRAMUSCULAR; INTRAVENOUS; SUBCUTANEOUS at 12:49

## 2021-01-01 RX ADMIN — PIPERACILLIN AND TAZOBACTAM 3375 MG: 3; .375 INJECTION, POWDER, LYOPHILIZED, FOR SOLUTION INTRAVENOUS at 02:55

## 2021-01-01 RX ADMIN — HEPARIN SODIUM 5000 UNITS: 5000 INJECTION INTRAVENOUS; SUBCUTANEOUS at 00:06

## 2021-01-01 RX ADMIN — Medication 10 ML: at 02:52

## 2021-01-01 RX ADMIN — LEVETIRACETAM 2000 MG: 100 INJECTION, SOLUTION INTRAVENOUS at 19:37

## 2021-01-01 RX ADMIN — Medication 15 G: at 02:36

## 2021-01-01 RX ADMIN — LEVETIRACETAM 2000 MG: 100 INJECTION, SOLUTION INTRAVENOUS at 07:33

## 2021-01-01 RX ADMIN — EZETIMIBE 10 MG: 10 TABLET ORAL at 19:57

## 2021-01-01 RX ADMIN — DOCUSATE SODIUM 50 MG AND SENNOSIDES 8.6 MG 2 TABLET: 8.6; 5 TABLET, FILM COATED ORAL at 10:40

## 2021-01-01 ASSESSMENT — PAIN SCALES - PAIN ASSESSMENT IN ADVANCED DEMENTIA (PAINAD)
TOTALSCORE: 6
BREATHING: 0
BODYLANGUAGE: 1
NEGVOCALIZATION: 1
CONSOLABILITY: 1
CONSOLABILITY: 0
FACIALEXPRESSION: 0
BODYLANGUAGE: 0
BODYLANGUAGE: 0
TOTALSCORE: 0
BREATHING: 0
FACIALEXPRESSION: 0
BREATHING: 0
NEGVOCALIZATION: 0
CONSOLABILITY: 0
FACIALEXPRESSION: 0
NEGVOCALIZATION: 0
BODYLANGUAGE: 2
NEGVOCALIZATION: 0
TOTALSCORE: 0
FACIALEXPRESSION: 0
BODYLANGUAGE: 0
TOTALSCORE: 10
BODYLANGUAGE: 0
FACIALEXPRESSION: 0
CONSOLABILITY: 0
TOTALSCORE: 0
TOTALSCORE: 0
NEGVOCALIZATION: 0
NEGVOCALIZATION: 0
BREATHING: 0
NEGVOCALIZATION: 2
TOTALSCORE: 0
BODYLANGUAGE: 0
CONSOLABILITY: 0
NEGVOCALIZATION: 0
FACIALEXPRESSION: 0
TOTALSCORE: 0
TOTALSCORE: 1
NEGVOCALIZATION: 0
BREATHING: 0
BODYLANGUAGE: 0
FACIALEXPRESSION: 0
BODYLANGUAGE: 1
FACIALEXPRESSION: 0
FACIALEXPRESSION: 0
NEGVOCALIZATION: 0
TOTALSCORE: 6
FACIALEXPRESSION: 1
BREATHING: 2
BREATHING: 0
BODYLANGUAGE: 0
TOTALSCORE: 6
BODYLANGUAGE: 0
FACIALEXPRESSION: 0
NEGVOCALIZATION: 0
FACIALEXPRESSION: 0
NEGVOCALIZATION: 0
NEGVOCALIZATION: 0
TOTALSCORE: 0
FACIALEXPRESSION: 0
FACIALEXPRESSION: 0
CONSOLABILITY: 0
CONSOLABILITY: 0
TOTALSCORE: 0
NEGVOCALIZATION: 0
FACIALEXPRESSION: 0
FACIALEXPRESSION: 0
BODYLANGUAGE: 0
NEGVOCALIZATION: 0
NEGVOCALIZATION: 1
BODYLANGUAGE: 0
BODYLANGUAGE: 0
CONSOLABILITY: 0
BREATHING: 0
BODYLANGUAGE: 0
NEGVOCALIZATION: 0
NEGVOCALIZATION: 0
FACIALEXPRESSION: 0
FACIALEXPRESSION: 0
BODYLANGUAGE: 2
TOTALSCORE: 1
NEGVOCALIZATION: 0
BODYLANGUAGE: 0
CONSOLABILITY: 0
BODYLANGUAGE: 0
CONSOLABILITY: 0
BODYLANGUAGE: 0
BREATHING: 0
BODYLANGUAGE: 2
CONSOLABILITY: 0
TOTALSCORE: 2
TOTALSCORE: 0
NEGVOCALIZATION: 0
FACIALEXPRESSION: 0
FACIALEXPRESSION: 0
NEGVOCALIZATION: 1
CONSOLABILITY: 1
FACIALEXPRESSION: 0
BODYLANGUAGE: 0
FACIALEXPRESSION: 0
TOTALSCORE: 0
NEGVOCALIZATION: 1
CONSOLABILITY: 0
NEGVOCALIZATION: 0
FACIALEXPRESSION: 0
NEGVOCALIZATION: 0
FACIALEXPRESSION: 1
TOTALSCORE: 1
NEGVOCALIZATION: 0
NEGVOCALIZATION: 0
BODYLANGUAGE: 0
CONSOLABILITY: 0
NEGVOCALIZATION: 0
FACIALEXPRESSION: 0
BREATHING: 0
CONSOLABILITY: 0
TOTALSCORE: 0
TOTALSCORE: 0
CONSOLABILITY: 0
CONSOLABILITY: 2
NEGVOCALIZATION: 1
BREATHING: 0
TOTALSCORE: 1
BODYLANGUAGE: 1
BODYLANGUAGE: 0
CONSOLABILITY: 0
BODYLANGUAGE: 0
CONSOLABILITY: 0
CONSOLABILITY: 1
BREATHING: 0
BODYLANGUAGE: 0
BODYLANGUAGE: 0
NEGVOCALIZATION: 0
FACIALEXPRESSION: 0
TOTALSCORE: 0
FACIALEXPRESSION: 1
NEGVOCALIZATION: 1
CONSOLABILITY: 0
CONSOLABILITY: 0
BODYLANGUAGE: 0
BREATHING: 0
TOTALSCORE: 0
NEGVOCALIZATION: 0
CONSOLABILITY: 0
BREATHING: 0
NEGVOCALIZATION: 0
CONSOLABILITY: 1
FACIALEXPRESSION: 0
BODYLANGUAGE: 0
BREATHING: 0
BODYLANGUAGE: 0
BODYLANGUAGE: 0
BREATHING: 0
NEGVOCALIZATION: 0
FACIALEXPRESSION: 0
BODYLANGUAGE: 0
NEGVOCALIZATION: 0
BODYLANGUAGE: 0
CONSOLABILITY: 0
TOTALSCORE: 0
FACIALEXPRESSION: 1
TOTALSCORE: 0
CONSOLABILITY: 0
NEGVOCALIZATION: 2
TOTALSCORE: 0
BODYLANGUAGE: 1
CONSOLABILITY: 0
FACIALEXPRESSION: 0
TOTALSCORE: 0
FACIALEXPRESSION: 0
BODYLANGUAGE: 0
CONSOLABILITY: 0
BODYLANGUAGE: 0
BREATHING: 0
FACIALEXPRESSION: 0
BREATHING: 0
FACIALEXPRESSION: 0
BREATHING: 0
BODYLANGUAGE: 0
FACIALEXPRESSION: 0
CONSOLABILITY: 0
BODYLANGUAGE: 0
BREATHING: 0
BREATHING: 0
TOTALSCORE: 0
FACIALEXPRESSION: 0
NEGVOCALIZATION: 0
BREATHING: 0
NEGVOCALIZATION: 0
CONSOLABILITY: 0
BREATHING: 0
BODYLANGUAGE: 0
BREATHING: 0
BREATHING: 0
TOTALSCORE: 0
BODYLANGUAGE: 0
NEGVOCALIZATION: 1
NEGVOCALIZATION: 0
FACIALEXPRESSION: 0
NEGVOCALIZATION: 0
TOTALSCORE: 5
BODYLANGUAGE: 0
BODYLANGUAGE: 2
CONSOLABILITY: 0
BODYLANGUAGE: 0
BODYLANGUAGE: 0
FACIALEXPRESSION: 0
BREATHING: 0
FACIALEXPRESSION: 1
BREATHING: 0
FACIALEXPRESSION: 0
NEGVOCALIZATION: 0
FACIALEXPRESSION: 0
BODYLANGUAGE: 0
BREATHING: 2
BODYLANGUAGE: 0
BREATHING: 0
CONSOLABILITY: 0
TOTALSCORE: 0
TOTALSCORE: 0
CONSOLABILITY: 0
BREATHING: 2
BREATHING: 0
FACIALEXPRESSION: 0
NEGVOCALIZATION: 0
TOTALSCORE: 0
NEGVOCALIZATION: 1
TOTALSCORE: 0
TOTALSCORE: 6
TOTALSCORE: 0
TOTALSCORE: 10
CONSOLABILITY: 1
NEGVOCALIZATION: 0
BREATHING: 0
CONSOLABILITY: 0
BREATHING: 0
BREATHING: 0
FACIALEXPRESSION: 2
CONSOLABILITY: 0
NEGVOCALIZATION: 0
CONSOLABILITY: 0
BODYLANGUAGE: 2
BREATHING: 0
CONSOLABILITY: 0
TOTALSCORE: 0
FACIALEXPRESSION: 0
CONSOLABILITY: 0
BODYLANGUAGE: 0
NEGVOCALIZATION: 0
TOTALSCORE: 0
BREATHING: 0
FACIALEXPRESSION: 0
TOTALSCORE: 0
CONSOLABILITY: 0
BREATHING: 0
CONSOLABILITY: 0
TOTALSCORE: 0
FACIALEXPRESSION: 0
TOTALSCORE: 0
BODYLANGUAGE: 0
BREATHING: 0
BODYLANGUAGE: 0
BODYLANGUAGE: 2
BREATHING: 0
TOTALSCORE: 6
BREATHING: 0
BREATHING: 0
CONSOLABILITY: 1
FACIALEXPRESSION: 0
CONSOLABILITY: 0
BODYLANGUAGE: 0
CONSOLABILITY: 0
TOTALSCORE: 1
FACIALEXPRESSION: 0
FACIALEXPRESSION: 0
NEGVOCALIZATION: 1
TOTALSCORE: 0
BODYLANGUAGE: 0
NEGVOCALIZATION: 0
CONSOLABILITY: 0
TOTALSCORE: 0
NEGVOCALIZATION: 0
NEGVOCALIZATION: 2
BREATHING: 0
FACIALEXPRESSION: 0
BREATHING: 0
FACIALEXPRESSION: 0
NEGVOCALIZATION: 0
TOTALSCORE: 4
NEGVOCALIZATION: 0
FACIALEXPRESSION: 2
TOTALSCORE: 0
BODYLANGUAGE: 0
BREATHING: 0
CONSOLABILITY: 0
NEGVOCALIZATION: 0
TOTALSCORE: 0
BODYLANGUAGE: 0
BODYLANGUAGE: 0
TOTALSCORE: 0
BODYLANGUAGE: 1
CONSOLABILITY: 0
BREATHING: 1
BODYLANGUAGE: 0
FACIALEXPRESSION: 0
BREATHING: 2
TOTALSCORE: 0
BREATHING: 0
NEGVOCALIZATION: 0
TOTALSCORE: 2
NEGVOCALIZATION: 0
TOTALSCORE: 0
NEGVOCALIZATION: 0
CONSOLABILITY: 0
FACIALEXPRESSION: 0
BREATHING: 0
BREATHING: 0
FACIALEXPRESSION: 0
TOTALSCORE: 1
BODYLANGUAGE: 0
FACIALEXPRESSION: 0
BODYLANGUAGE: 0
FACIALEXPRESSION: 2
FACIALEXPRESSION: 0
BREATHING: 0
BODYLANGUAGE: 0
NEGVOCALIZATION: 0
NEGVOCALIZATION: 0
TOTALSCORE: 0
BREATHING: 1
NEGVOCALIZATION: 0
TOTALSCORE: 4
BODYLANGUAGE: 0
TOTALSCORE: 7
FACIALEXPRESSION: 0
BREATHING: 0
CONSOLABILITY: 0
NEGVOCALIZATION: 0
BREATHING: 0
TOTALSCORE: 0
NEGVOCALIZATION: 0
CONSOLABILITY: 0
BREATHING: 1
BREATHING: 0
TOTALSCORE: 0
BREATHING: 0
FACIALEXPRESSION: 0
BREATHING: 0
FACIALEXPRESSION: 0
NEGVOCALIZATION: 0
CONSOLABILITY: 1
CONSOLABILITY: 0
BREATHING: 0
FACIALEXPRESSION: 0
FACIALEXPRESSION: 0
CONSOLABILITY: 0
FACIALEXPRESSION: 0
BREATHING: 0
NEGVOCALIZATION: 1
NEGVOCALIZATION: 0
BODYLANGUAGE: 1
CONSOLABILITY: 0
BREATHING: 0
BODYLANGUAGE: 0
BODYLANGUAGE: 1
BODYLANGUAGE: 0
CONSOLABILITY: 0
BODYLANGUAGE: 0
FACIALEXPRESSION: 0
BODYLANGUAGE: 0
TOTALSCORE: 0
FACIALEXPRESSION: 0
CONSOLABILITY: 0
FACIALEXPRESSION: 0
NEGVOCALIZATION: 1
TOTALSCORE: 0
BODYLANGUAGE: 1
CONSOLABILITY: 0
TOTALSCORE: 0
CONSOLABILITY: 0
TOTALSCORE: 0
BREATHING: 0
NEGVOCALIZATION: 0
NEGVOCALIZATION: 0
TOTALSCORE: 1
CONSOLABILITY: 0
NEGVOCALIZATION: 0
NEGVOCALIZATION: 0
FACIALEXPRESSION: 1
CONSOLABILITY: 0
TOTALSCORE: 1
TOTALSCORE: 0
NEGVOCALIZATION: 1
CONSOLABILITY: 1
CONSOLABILITY: 1
TOTALSCORE: 0
BODYLANGUAGE: 0
BODYLANGUAGE: 0
NEGVOCALIZATION: 0
NEGVOCALIZATION: 1
NEGVOCALIZATION: 0
CONSOLABILITY: 0
FACIALEXPRESSION: 0
BODYLANGUAGE: 0
TOTALSCORE: 0
NEGVOCALIZATION: 0
NEGVOCALIZATION: 0
BODYLANGUAGE: 0
BREATHING: 0
CONSOLABILITY: 0
CONSOLABILITY: 0
FACIALEXPRESSION: 0
BODYLANGUAGE: 0
CONSOLABILITY: 0
NEGVOCALIZATION: 0
FACIALEXPRESSION: 2
BREATHING: 0
TOTALSCORE: 0
FACIALEXPRESSION: 0
FACIALEXPRESSION: 0
BODYLANGUAGE: 0
FACIALEXPRESSION: 0
TOTALSCORE: 0
FACIALEXPRESSION: 0
CONSOLABILITY: 0
BODYLANGUAGE: 2
TOTALSCORE: 0
FACIALEXPRESSION: 0
NEGVOCALIZATION: 0
NEGVOCALIZATION: 1
BREATHING: 0
FACIALEXPRESSION: 0
CONSOLABILITY: 0
CONSOLABILITY: 0
TOTALSCORE: 0
BREATHING: 0
BODYLANGUAGE: 0

## 2021-01-01 ASSESSMENT — PAIN SCALES - GENERAL
PAINLEVEL_OUTOF10: 0
PAINLEVEL_OUTOF10: 7
PAINLEVEL_OUTOF10: 0
PAINLEVEL_OUTOF10: 6
PAINLEVEL_OUTOF10: 0
PAINLEVEL_OUTOF10: 10
PAINLEVEL_OUTOF10: 10
PAINLEVEL_OUTOF10: 0
PAINLEVEL_OUTOF10: 6
PAINLEVEL_OUTOF10: 0
PAINLEVEL_OUTOF10: 6
PAINLEVEL_OUTOF10: 0
PAINLEVEL_OUTOF10: 5
PAINLEVEL_OUTOF10: 10
PAINLEVEL_OUTOF10: 0
PAINLEVEL_OUTOF10: 6
PAINLEVEL_OUTOF10: 10
PAINLEVEL_OUTOF10: 4
PAINLEVEL_OUTOF10: 0
PAINLEVEL_OUTOF10: 10
PAINLEVEL_OUTOF10: 7
PAINLEVEL_OUTOF10: 0
PAINLEVEL_OUTOF10: 7
PAINLEVEL_OUTOF10: 3
PAINLEVEL_OUTOF10: 0
PAINLEVEL_OUTOF10: 10
PAINLEVEL_OUTOF10: 0
PAINLEVEL_OUTOF10: 10
PAINLEVEL_OUTOF10: 0
PAINLEVEL_OUTOF10: 0
PAINLEVEL_OUTOF10: 3
PAINLEVEL_OUTOF10: 0
PAINLEVEL_OUTOF10: 0
PAINLEVEL_OUTOF10: 6
PAINLEVEL_OUTOF10: 0
PAINLEVEL_OUTOF10: 3
PAINLEVEL_OUTOF10: 6
PAINLEVEL_OUTOF10: 8
PAINLEVEL_OUTOF10: 0
PAINLEVEL_OUTOF10: 5
PAINLEVEL_OUTOF10: 0
PAINLEVEL_OUTOF10: 6

## 2021-01-01 ASSESSMENT — PULMONARY FUNCTION TESTS
PIF_VALUE: 4
PIF_VALUE: 1
PIF_VALUE: 18
PIF_VALUE: 18
PIF_VALUE: 1
PIF_VALUE: 0
PIF_VALUE: 3
PIF_VALUE: 18
PIF_VALUE: 17
PIF_VALUE: 17
PIF_VALUE: 4
PIF_VALUE: 17
PIF_VALUE: 1
PIF_VALUE: 1
PIF_VALUE: 18
PIF_VALUE: 1
PIF_VALUE: 17
PIF_VALUE: 1
PIF_VALUE: 1
PIF_VALUE: 17
PIF_VALUE: 18
PIF_VALUE: 1
PIF_VALUE: 20
PIF_VALUE: 18
PIF_VALUE: 12
PIF_VALUE: 18
PIF_VALUE: 18
PIF_VALUE: 1
PIF_VALUE: 18
PIF_VALUE: 1
PIF_VALUE: 18
PIF_VALUE: 17
PIF_VALUE: 18
PIF_VALUE: 17
PIF_VALUE: 1
PIF_VALUE: 18
PIF_VALUE: 17
PIF_VALUE: 17
PIF_VALUE: 18
PIF_VALUE: 18
PIF_VALUE: 1
PIF_VALUE: 1
PIF_VALUE: 2
PIF_VALUE: 13
PIF_VALUE: 18

## 2021-01-01 ASSESSMENT — PAIN DESCRIPTION - PAIN TYPE
TYPE: ACUTE PAIN
TYPE: ACUTE PAIN

## 2021-01-01 ASSESSMENT — PAIN SCALES - WONG BAKER
WONGBAKER_NUMERICALRESPONSE: 0
WONGBAKER_NUMERICALRESPONSE: 2
WONGBAKER_NUMERICALRESPONSE: 0

## 2021-01-01 ASSESSMENT — PAIN DESCRIPTION - ONSET
ONSET: UNABLE TO TELL
ONSET: ON-GOING
ONSET: ON-GOING

## 2021-01-01 ASSESSMENT — PAIN - FUNCTIONAL ASSESSMENT
PAIN_FUNCTIONAL_ASSESSMENT: PREVENTS OR INTERFERES SOME ACTIVE ACTIVITIES AND ADLS
PAIN_FUNCTIONAL_ASSESSMENT: FACES
PAIN_FUNCTIONAL_ASSESSMENT: PREVENTS OR INTERFERES SOME ACTIVE ACTIVITIES AND ADLS

## 2021-01-01 ASSESSMENT — ENCOUNTER SYMPTOMS
SHORTNESS OF BREATH: 0
ABDOMINAL PAIN: 0
VOMITING: 0
ABDOMINAL PAIN: 0
NAUSEA: 0
DIARRHEA: 0
COUGH: 0
EYE DISCHARGE: 0
TACHYPNEA: 1
SHORTNESS OF BREATH: 0
BACK PAIN: 1
COUGH: 0
SORE THROAT: 0

## 2021-01-01 ASSESSMENT — PAIN DESCRIPTION - LOCATION
LOCATION: HIP
LOCATION: BACK
LOCATION: HIP
LOCATION: BACK

## 2021-01-01 ASSESSMENT — PAIN DESCRIPTION - ORIENTATION
ORIENTATION: RIGHT
ORIENTATION: LEFT
ORIENTATION: LEFT

## 2021-01-01 ASSESSMENT — PAIN DESCRIPTION - PROGRESSION
CLINICAL_PROGRESSION: NOT CHANGED
CLINICAL_PROGRESSION: NOT CHANGED
CLINICAL_PROGRESSION: GRADUALLY WORSENING

## 2021-01-01 ASSESSMENT — PAIN DESCRIPTION - FREQUENCY
FREQUENCY: CONTINUOUS

## 2021-01-01 ASSESSMENT — PAIN DESCRIPTION - DESCRIPTORS: DESCRIPTORS: ACHING

## 2021-01-01 ASSESSMENT — COPD QUESTIONNAIRES
CAT_SEVERITY: MILD

## 2021-03-04 NOTE — ED PROVIDER NOTES
[address] ED      CHIEF COMPLAINT  Shortness of Breath (pt has been having SOB for \"some time now\" (unable to quantify) and was at PCP today whom ordered at CT scan of chest and sts that she has a blood clot in lung.)       HISTORY OF PRESENT ILLNESS  Christine Stanley is a 80 y.o. female  who presents to the ED complaining of shortness of breath, generalized fatigue and concern for possible PE. Patient had a checkup at her PCP office which the son states was for general checkup. She had blood work drawn and per notes, it appears that her D-dimer is elevated and she was advised to come to the ER for further evaluation of possible blood clot. The son states that over the past few weeks she has had some generalized fatigue. He states that she has not been eating as much as usual.  No vomiting. No current complaint of chest pain but she states that sometimes she will have chest pain but is unable to really tell me when. She has a significant history of Alzheimer's dementia. He states that from time to time she will have some twitching that causes her to fall usually she only partially fall but is unsure if she has ever hit her head during these falls in the past few weeks. No known fevers at all. She has had somewhat of a cough over the past 3 weeks or so. She has had some shortness of breath, occasionally with exertion. She has a checkup appointment with her cardiologist tomorrow. She is on aspirin and Plavix. No other complaints, modifying factors or associated symptoms. I have reviewed the following from the nursing documentation.     Past Medical History:   Diagnosis Date    Facial twitching     Hair loss     wears wig- unknown cause    Hyperlipidemia     Hypertension     Seasonal allergies      Past Surgical History:   Procedure Laterality Date    COLONOSCOPY      COLONOSCOPY  6/22/15    diverticulosis    HIP FRACTURE SURGERY Left     LEFT HIP HEMIARTHROPLASTY     HIP SURGERY Left 7/30/2020    LEFT HIP HEMIARTHROPLASTY performed by Richard Collier MD at [address]       Family History   Problem Relation Age of Onset    Other Father         \"black Lung\"     Social History     Socioeconomic History    Marital status:      Spouse name: Not on file    Number of children: Not on file    Years of education: Not on file    Highest education level: Not on file   Occupational History    Not on file   Social Needs    Financial resource strain: Not on file    Food insecurity     Worry: Not on file     Inability: Not on file    Transportation needs     Medical: Not on file     Non-medical: Not on file   Tobacco Use    Smoking status: Never Smoker    Smokeless tobacco: Never Used   Substance and Sexual Activity    Alcohol use: No    Drug use: No    Sexual activity: Not on file   Lifestyle    Physical activity     Days per week: Not on file     Minutes per session: Not on file    Stress: Not on file   Relationships    Social connections     Talks on phone: Not on file     Gets together: Not on file     Attends Zoroastrianism service: Not on file     Active member of club or organization: Not on file     Attends meetings of clubs or organizations: Not on file     Relationship status: Not on file    Intimate partner violence     Fear of current or ex partner: Not on file     Emotionally abused: Not on file     Physically abused: Not on file     Forced sexual activity: Not on file   Other Topics Concern    Not on file   Social History Narrative    Not on file     No current facility-administered medications for this encounter.       Current Outpatient Medications   Medication Sig Dispense Refill    cephALEXin (KEFLEX) 500 MG capsule Take 1 capsule by mouth 2 times daily for 7 days 14 capsule 0    azithromycin (ZITHROMAX) 250 MG tablet Take 1 tablet by mouth See Admin Instructions for 5 days 500mg on day 1 followed by 250mg on days 2 - 5 6 tablet 0    lisinopril (PRINIVIL;ZESTRIL) 5 MG tablet Take 1 tablet by mouth daily 30 tablet 3    metoprolol succinate (TOPROL XL) 25 MG extended release tablet Take 1 tablet by mouth daily 30 tablet 3    clopidogrel (PLAVIX) 75 MG tablet Take 1 tablet by mouth daily 30 tablet 3    atorvastatin (LIPITOR) 80 MG tablet Take 1 tablet by mouth nightly 30 tablet 3    aspirin 81 MG chewable tablet Take 1 tablet by mouth daily 30 tablet 3    senna-docusate (PERICOLACE) 8.6-50 MG per tablet Take 2 tablets by mouth nightly      vitamin D (CHOLECALCIFEROL) 25 MCG (1000 UT) TABS tablet Take 1,000 Units by mouth daily      Loratadine (CLARITIN PO) Take 10 mg by mouth daily       ezetimibe (ZETIA) 10 MG tablet Take 10 mg by mouth daily. Allergies   Allergen Reactions    Quinidine Hives    Aminoglycosides      Pt unsure    Levofloxacin      unknown    Neomycin      unknown    Simvastatin      Unknown to pt       REVIEW OF SYSTEMS  10 systems reviewed, pertinent positives per HPI otherwise noted to be negative. PHYSICAL EXAM  BP (!) 150/54   Pulse 55   Temp 96.4 °F (35.8 °C) (Oral)   Resp 17   Ht 5' 3\" (1.6 m)   Wt 110 lb (49.9 kg)   SpO2 98%   BMI 19.49 kg/m²    GENERAL APPEARANCE: Awake and alert. Cooperative. No acute distress. HENT: Normocephalic. Atraumatic. No tse sign or raccoon's eyes. Mucous membranes are moist.  No drooling or stridor. NECK: Supple. No JVD. No central C-spine tenderness. EYES: PERRL. EOM's grossly intact. HEART/CHEST: RRR. No murmurs. 2+ radial pulses bilaterally. LUNGS: Respirations unlabored. CTAB. Good air exchange. Speaking comfortably in full sentences. ABDOMEN: No tenderness. Soft. Non-distended. No masses. No organomegaly. No guarding or rebound. MUSCULOSKELETAL: 1+ bilateral lower extremity edema. Compartments soft. No deformity. No tenderness in the extremities. All extremities neurovascularly intact. SKIN: Warm and dry. No acute rashes.    NEUROLOGICAL: Alert and oriented. CN's 2-12 intact. No gross facial drooping. Strength 5/5, sensation intact. No gross focal deficits. PSYCHIATRIC: Normal mood and affect. LABS  I have reviewed all labs for this visit.    Results for orders placed or performed during the hospital encounter of 03/03/21   CBC Auto Differential   Result Value Ref Range    WBC 6.1 4.0 - 11.0 K/uL    RBC 2.98 (L) 4.00 - 5.20 M/uL    Hemoglobin 9.7 (L) 12.0 - 16.0 g/dL    Hematocrit 29.2 (L) 36.0 - 48.0 %    MCV 97.9 80.0 - 100.0 fL    MCH 32.4 26.0 - 34.0 pg    MCHC 33.1 31.0 - 36.0 g/dL    RDW 16.4 (H) 12.4 - 15.4 %    Platelets 244 772 - 156 K/uL    MPV 6.9 5.0 - 10.5 fL    Neutrophils % 66.3 %    Lymphocytes % 19.7 %    Monocytes % 10.4 %    Eosinophils % 2.7 %    Basophils % 0.9 %    Neutrophils Absolute 4.1 1.7 - 7.7 K/uL    Lymphocytes Absolute 1.2 1.0 - 5.1 K/uL    Monocytes Absolute 0.6 0.0 - 1.3 K/uL    Eosinophils Absolute 0.2 0.0 - 0.6 K/uL    Basophils Absolute 0.1 0.0 - 0.2 K/uL   Comprehensive Metabolic Panel w/ Reflex to MG   Result Value Ref Range    Sodium 138 136 - 145 mmol/L    Potassium reflex Magnesium 3.7 3.5 - 5.1 mmol/L    Chloride 102 99 - 110 mmol/L    CO2 29 21 - 32 mmol/L    Anion Gap 7 3 - 16    Glucose 154 (H) 70 - 99 mg/dL    BUN 27 (H) 7 - 20 mg/dL    CREATININE 1.0 0.6 - 1.2 mg/dL    GFR Non- 53 (A) >60    GFR African American >60 >60    Calcium 9.3 8.3 - 10.6 mg/dL    Total Protein 7.3 6.4 - 8.2 g/dL    Albumin 4.0 3.4 - 5.0 g/dL    Albumin/Globulin Ratio 1.2 1.1 - 2.2    Total Bilirubin 0.5 0.0 - 1.0 mg/dL    Alkaline Phosphatase 80 40 - 129 U/L    ALT 8 (L) 10 - 40 U/L    AST 19 15 - 37 U/L    Globulin 3.3 g/dL   Troponin   Result Value Ref Range    Troponin <0.01 <0.01 ng/mL   Urinalysis Reflex to Culture    Specimen: Urine, clean catch   Result Value Ref Range    Color, UA Yellow Straw/Yellow    Clarity, UA CLOUDY (A) Clear    Glucose, Ur Negative Negative mg/dL    Bilirubin Urine Negative Negative    Ketones, Urine Negative Negative mg/dL    Specific Gravity, UA 1.025 1.005 - 1.030    Blood, Urine MODERATE (A) Negative    pH, UA 6.0 5.0 - 8.0    Protein, UA 30 (A) Negative mg/dL    Urobilinogen, Urine 0.2 <2.0 E.U./dL    Nitrite, Urine Negative Negative    Leukocyte Esterase, Urine LARGE (A) Negative    Microscopic Examination YES     Urine Type NotGiven     Urine Reflex to Culture Yes    Microscopic Urinalysis   Result Value Ref Range    Mucus, UA 2+ (A) None Seen /LPF    WBC, UA  (A) 0 - 5 /HPF    RBC, UA 3-4 0 - 4 /HPF    Bacteria, UA 4+ (A) None Seen /HPF   COVID-19   Result Value Ref Range    SARS-CoV-2, PCR Not Detected Not Detected   EKG 12 Lead   Result Value Ref Range    Ventricular Rate 52 BPM    Atrial Rate 52 BPM    P-R Interval 164 ms    QRS Duration 114 ms    Q-T Interval 488 ms    QTc Calculation (Bazett) 453 ms    P Axis 55 degrees    R Axis -58 degrees    T Axis 117 degrees    Diagnosis       Sinus bradycardia with Premature atrial complexesLeft anterior fascicular blockSeptal infarct oldT wave abnormality, consider anterolateral ischemiaAbnormal ECGConfirmed by ISSAC THORNTON, CHANTELL ([id number]) on 3/4/2021 7:50:17 AM       ECG  The Ekg interpreted by me shows  sinus bradycardia, rate=52  Axis is   Left axis deviation  QTc is  within an acceptable range  Intervals and Durations are unremarkable. ST Segments: nonspecific changes, ST changes improved compared with prior ECG dated 12/23/20 (pt found to have likely NSTEMI at that time)      RADIOLOGY  Ct Head Wo Contrast    Result Date: 3/4/2021  EXAMINATION: CT OF THE HEAD WITHOUT CONTRAST  3/3/2021 11:50 pm TECHNIQUE: CT of the head was performed without the administration of intravenous contrast. Dose modulation, iterative reconstruction, and/or weight based adjustment of the mA/kV was utilized to reduce the radiation dose to as low as reasonably achievable. COMPARISON: CT head 12/22/2020.  HISTORY: ORDERING SYSTEM PROVIDED HISTORY: arteries. Mediastinum: Heart is enlarged. No pericardial effusion. Aorta is within normal limits in size. Aorta is otherwise not well evaluated due to paucity of luminal contrast. Lungs/pleura: Central airways are patent. None diffuse interlobular septal thickening. Opacities in segmental and subsegmental bronchi. Opacities in the right middle and lower lobes. Scattered tree-in-bud opacities. Trace pleural effusions. No pneumothorax. Upper Abdomen: Limited images of the upper abdomen are unremarkable. Soft Tissues/Bones: Mediastinal and hilar adenopathy. Scattered degenerative changes noted in the visualized spine without spondylolisthesis. 1.  Artifact degraded evaluation of the pulmonary arteries. No acute pulmonary embolism to the proximal segmental arteries given limitation. 2. Airspace disease in the right middle and lower lobes. Correlate with presentation for pneumonia. Follow-up to resolution recommended. 3. Diffuse interlobular septal thickening and trace pleural effusions could represent congestive heart failure. 4. Nonspecific adenopathy. Neoplasm is in the differential.  Follow-up recommended. 5.  Other findings as described. ED COURSE/MDM  Patient seen and evaluated. Old records reviewed. Labs and imaging reviewed and results discussed with patient. Pt with dyspnea, positive d dimer and sent to ED for further evaluation. CTPE study obtained and showed no e/o PE. Does show a possible PNA. Pt also with + UTI. Spoke with family and patient. They feel comfortable treating as outpt with close f/u. No hypoxia, increased WOB, and currently son feels comfortable with care at home. Pt will be placed on keflex for UTI and zpack for PNA based on review of previous allergies. COVID swab sent and pending at time of d/c. Pt to f/u with PCP in 1-2 days for recheck, and return with any new or worsening sxs. All questions answered prior to d/c.     I estimate there is LOW risk for PULMONARY EMBOLISM, ACUTE CORONARY SYNDROME, OR THORACIC AORTIC DISSECTION, thus I consider the discharge disposition reasonable. Lali Soto and I have discussed the diagnosis and risks, and we agree with discharging home to follow-up with their primary doctor. We also discussed returning to the Emergency Department immediately if new or worsening symptoms occur. We have discussed the symptoms which are most concerning (e.g., bloody sputum, fever, worsening pain or shortness of breath, vomiting) that necessitate immediate return. During the patient's ED course, the patient was given:  Medications   iopamidol (ISOVUE-370) 76 % injection 75 mL (85 mLs Intravenous Given 3/3/21 9701)        CLINICAL IMPRESSION  1. Pneumonia due to organism    2. Urinary tract infection without hematuria, site unspecified        Blood pressure (!) 150/54, pulse 55, temperature 96.4 °F (35.8 °C), temperature source Oral, resp. rate 17, height 5' 3\" (1.6 m), weight 110 lb (49.9 kg), SpO2 98 %. DISPOSITION  Lali Soto was discharged to home in stable condition. Patient was given scripts for the following medications. I counseled patient how to take these medications. Discharge Medication List as of 3/4/2021  2:17 AM      START taking these medications    Details   cephALEXin (KEFLEX) 500 MG capsule Take 1 capsule by mouth 2 times daily for 7 days, Disp-14 capsule, R-0Normal      azithromycin (ZITHROMAX) 250 MG tablet Take 1 tablet by mouth See Admin Instructions for 5 days 500mg on day 1 followed by 250mg on days 2 - 5, Disp-6 tablet, R-0Normal             Follow-up with:  Kayla Childs MD  . [address]  [phone]    Schedule an appointment as soon as possible for a visit in 2 days  For recheck      DISCLAIMER: This chart was created using Dragon dictation software.   Efforts were made by me to ensure accuracy, however some errors may be present due to limitations of this technology and occasionally words are not transcribed correctly.         Oswaldo Beyer MD  03/04/21 1788

## 2021-03-04 NOTE — ED NOTES
Pt placed on CMU, pulse ox, and NIBP. Pt given call light and instructed on its use. No new needs at this time. Will cont to monitor.       Shanae Buck RN  03/03/21 6281

## 2021-03-12 PROBLEM — S06.5XAA SUBDURAL HEMATOMA: Status: ACTIVE | Noted: 2021-01-01

## 2021-03-12 NOTE — ED NOTES
Pt with urine saturated attends and stool covering buttocks and back area. Writer cleaned stool from skin, slight redness. Ginna-care provided, attends changed, and linens changed. Purewick placed on patient at this time. Pt tolerated well. Will continue to monitor.       Citlali Gill RN  03/12/21 5031

## 2021-03-12 NOTE — CONSULTS
daily  Loratadine (CLARITIN PO), Take 10 mg by mouth daily   ezetimibe (ZETIA) 10 MG tablet, Take 10 mg by mouth daily. Allergies   Allergen Reactions    Quinidine Hives    Aminoglycosides      Pt unsure    Levofloxacin      unknown    Neomycin      unknown    Simvastatin      Unknown to pt     Family History   Problem Relation Age of Onset    Other Father         \"black Lung\"     Social History     Tobacco Use   Smoking Status Never Smoker   Smokeless Tobacco Never Used     Social History     Substance and Sexual Activity   Drug Use No     Social History     Substance and Sexual Activity   Alcohol Use No       ROS:  JONAH given encephalopathy      Exam:  Blood pressure (!) 145/61, pulse 75, temperature 97.8 °F (36.6 °C), temperature source Oral, resp. rate 20. See attending attestation; visited together and attending examined    Labs  Creatinine 0.9 mg/dL  Troponin < 0.01 ng/mL  LFTs okay  Glucose 134 mg/dL  WBC 6.6K  INR 1.1  Platelets 730H  SARS CoV-2 NAAT not detected    Studies  Repeat Head CT: 3/12/21 independently reviewed. slight decrease in the size of the large but thin left SDH. Extension onto the left tentorium suspected and unchanged. Mild to moderate sulcal effacement. Slight increased midline shift. TTE: EF 30-35%. Left atrium moderately dilated. Scheduled Medications   sodium chloride flush  10 mL Intravenous 2 times per day    levetiracetam  1,000 mg Intravenous Q12H    atorvastatin  80 mg Oral Nightly    lisinopril  5 mg Oral Daily    ezetimibe  10 mg Oral Daily    metoprolol succinate  25 mg Oral Daily    azithromycin  500 mg Intravenous Q24H    And    cefTRIAXone (ROCEPHIN) IV  1,000 mg Intravenous Q24H     Impression:  Gabriela Lee is a 80 y.o. female with a history of seizure who presented with seizure found to have acute left SDH.  She was given benzodiazepine for GTC this AM but 8 hours later remains very somnolent and do not have good exam to follow. Recommendations:  - Continue LEV 1g BID  - cEEG given waxing and waning mentation - last seizure almost 8 hours ago, would suspect her to be more alert by now; must exclude NCSE  - Repeat head CT ordered for 13:30  - No antiplatelets or anticoagulants; defer timing of any necessary resumption to neurosurgery (on asa and Plavix at home).    - Neurosurgery following     A copy of this note was provided for MD Christina Kelly [facility]  [phone]  Evenings, weekends, and off weeks please discuss neurologist on-call

## 2021-03-12 NOTE — PROGRESS NOTES
Arrived per squad  From [facility], [place], confused, figidy, incont of urine am care given , restless, ?? Tel irreg, pur-wic placed , see assessment , unable to complete admission chart, 4 eye skin assessment completed with Timur Ventura RN  , camera place, seizure pads to rails; no seizure with transport in squad, see orders. Lungs diminished on room air sat 96% ; left hip swollen bruised lrg. Amt, x-ray at [facility], no results, nurse there states hip surgery left in past ?? Age , +/+ PP dry skin peeling on feet, heels dirty ; [facility] called for .

## 2021-03-12 NOTE — PROGRESS NOTES
Lab here for troponin, pt very sleepy to do neuro exam, opens eye , falls back to sleep snoring, moans when doing eye /pupil re-reaction, pulls away, moves extremities in her sleep just not to command

## 2021-03-12 NOTE — ED NOTES
Report given to ABHISHEK Jiang. MD Claudy Rudolph, and ABHISHEK Jiang at bedside for evaluation at this time.       Aziza Crum RN  03/12/21 8983

## 2021-03-12 NOTE — PROGRESS NOTES
Dr. Deandre Blue here awARE OF STATUS, ekg, AND PT INCONT. OF URINE UPON ARRIVAL; pur wic in place , may have difficulty sending off urine samples, she is aware , pt can be restless pulling lines out etc, tubes. Rosario Galvez

## 2021-03-12 NOTE — PROGRESS NOTES
Clinical Pharmacy Progress Note  Medication History     Admit Date: 3/12/2021    List of current medications patient is taking is complete. Home medication list in EPIC updated to reflect changes noted below. Source of information: patient, outpatient fill records      The following medications were added to admission medication list:  Alprazolam 0.125mg BID   Escitalopram 2.5mg daily   Ferrous sulfate 325mg daily   Cyanocobalamn 2500 mcg daily     The following medications were removed from admission medication list:  Senna-docusate 2 tabs nightly     The following medication instructions/doses were adjusted to reflect how patient is taking:  Lisinopril changed from 5mg daily to 40mg daily     Please note:  · Per Emma Sanchez, patient is getting weaned off of escitalopram - currently getting 1/2 of 5mg tablet daily   · Patient was prescribed azithromycin 250mg x5 days + Keflex 500mg BID x7 days on 3/3 - Emma Sanchez states patient has 2 tabs of Keflex remaining in her pill bottle. Please call with questions.   Galina Sahni, PharmD, [address] [place] [address] Pharmacy: [phone]  [address] [place] [address]: [phone]  3/12/2021 1:57 PM

## 2021-03-12 NOTE — ED NOTES
6116- Call placed to Transfer [facility] Neuro surgery.       Kimani Hankins  03/12/21 0712

## 2021-03-12 NOTE — CONSULTS
NEUROSURGERY CONSULT NOTE    Renzo Finnegan  [id number]   1934   3/12/2021    Requesting physician: Alex Dwyer MD    Reason for consultation: SDH     History of present illness: Patient unable to give history, attempt at contacting family member unsuccessful. History obtained from chart review. Patient is an 80 y.o. female w/ PMH of HTN, HLD, CAD (ASA and Plavix), and s/p LEFT HIP HEMIARTHROPLASTY on 7/30/2021 who presented on 3/12/2021 to [facility] ED w/AMS. According to Dr. Samaria Ruiz, the patient was brought to ED via EMS after patient was found unresponsive by grandson. Grandson states patient was on couch, he was talking to her and when she did not respond he got up and went over to her. She did not respond to him right away and seemed to have some shaking.  This lasted less than a minute.  He said that this was similar to her episode around [place] time where she had some shaking and was not responding well at that point either.  She was noted to be encephalopathic at that time. Lois Gutiérrez also had an NSTEMI at that time. She is a limited code. Shavonne Ricks would like everything (intubation, medications, defibrillation) but no chest compressions. Per ED documentation, the RN was at bedside starting fluids and pt started seizing. Lasted approx 30 seconds to a minute. Dr Samaria Ruiz at bedside verbal order for 2mg ativan. Pt stopped seizing prior to administration and was post ictal. Small laceration noted to tongue. CT head revealed have left-sided subdural hematoma with associated mass effect and left to right midline shift, she takes 81 mg aspirin and plavix daily,  aPTT was 28.7 and INR was 1.19, she was transferred to [facility] for neurosurgical evaluation.      ROS:   JONAH 2/2 mental status     Allergies   Allergen Reactions    Quinidine Hives    Aminoglycosides      Pt unsure    Levofloxacin      unknown    Neomycin      unknown    Simvastatin      Unknown to pt       Past Medical History:   Diagnosis Date    Facial twitching     Hair loss     wears wig- unknown cause    Hyperlipidemia     Hypertension     Seasonal allergies         Past Surgical History:   Procedure Laterality Date    COLONOSCOPY      COLONOSCOPY  6/22/15    diverticulosis    HIP FRACTURE SURGERY Left     LEFT HIP HEMIARTHROPLASTY     HIP SURGERY Left 7/30/2020    LEFT HIP HEMIARTHROPLASTY performed by Malini Elizondo MD at [address] History     Occupational History    Not on file   Tobacco Use    Smoking status: Never Smoker    Smokeless tobacco: Never Used   Substance and Sexual Activity    Alcohol use: No    Drug use: No    Sexual activity: Not on file        Family History   Problem Relation Age of Onset    Other Father         \"black Lung\"        No outpatient medications have been marked as taking for the 3/12/21 encounter [facility] Encounter).         Current Facility-Administered Medications   Medication Dose Route Frequency Provider Last Rate Last Admin    sodium chloride flush 0.9 % injection 10 mL  10 mL Intravenous 2 times per day Mikaela Padilla MD        sodium chloride flush 0.9 % injection 10 mL  10 mL Intravenous PRN Mikaela Padilla MD        promethazine (PHENERGAN) tablet 12.5 mg  12.5 mg Oral Q6H PRN Mikaela Padilla MD        Or    ondansetron [facility]) injection 4 mg  4 mg Intravenous Q6H PRN Mikaela Padilla MD        polyethylene glycol (GLYCOLAX) packet 17 g  17 g Oral Daily PRN Mikaela Padilla MD        acetaminophen (TYLENOL) tablet 650 mg  650 mg Oral Q6H PRN Mikaela Padilla MD        Or   [address] [place] [address] acetaminophen (TYLENOL) suppository 650 mg  650 mg Rectal Q6H PRN Mikaela Padilla MD        levETIRAcetam (KEPPRA) 1,000 mg in sodium chloride 0.9 % 100 mL IVPB  1,000 mg Intravenous Q12H Mikaela Padilla MD        atorvastatin (LIPITOR) tablet 80 mg  80 mg Oral Nightly Mikaela Padilla MD        lisinopril (PRINIVIL;ZESTRIL) tablet 5 mg  5 mg Oral Daily Mikaela Padilla MD  ezetimibe (ZETIA) tablet 10 mg  10 mg Oral Daily Jatin Phillip MD        metoprolol succinate (TOPROL XL) extended release tablet 25 mg  25 mg Oral Daily Jatin Phillip MD        [facility]) 500 mg in dextrose 5 % 250 mL IVPB  500 mg Intravenous Q24H Jatin Phillip MD        And    cefTRIAXone (ROCEPHIN) 1000 mg IVPB in 50 mL D5W minibag  1,000 mg Intravenous Q24H Jatin Phillip MD            Objective: There were no vitals taken for this visit. Physical Exam:   GCS:  3- opens eyes to voice   4 - Seems confused, disoriented  6 - Follows simple motor commands    General: elderly, frail appearing. Does not appear in acute distress. HENT: atraumatic, small laceration to tongue   Eyes: Optic discs: Not tested  Pulmonary: unlabored respiratory effort  Cardiovascular:  No peripheral edema  Gastrointestinal: abdomen soft, NT, ND  Extensive bruising to left hip/outer left thigh     Neurological:  Mental Status: Awakens to voice, oriented to person only, following some simple commands   Attention: impaired   Language: garbled speech, some incomprehensible words, expressive aphasia     Sensation: responds to pain in all 4 extremities   Coordination: JONAH 2/2 not following complex commands     Cranial Nerves:  II: Visual acuity not tested, blinks bilaterally to threat   III, IV, VI: PERRL, 3 mm bilaterally, does not formally follow EOMs   V: JONAH    VII: Face symmetric  VIII: Hearing intact bilaterally to spoken voice  IX: Palate movement equal bilaterally  XI: Shoulder shrug equal bilaterally  XII: Tongue midline    Musculoskeletal:   Gait: Not tested   Assist devices: None   Tone: normal   Motor strength: Patient follows commands in all extremities, wiggles toes bilaterally, lifts legs briefly against gravity strength 3+/5. Bilateral hand grasps 4/5, holds both arms briefly against gravity with physical/verbal cueing. Radiological Findings:  CT head wo contrast  3/12/2021 0701  1. Findings consistent with presence of left-sided subdural hematoma with associated mass effect upon adjacent cerebral hemisphere as well as lateral ventricles with 5 mm left-to-right midline shift. 2. Findings suggestive of presence of minimal blood along the leftward aspect of the tentorium cerebelli. CT head wo contrast  3/12/2021 1017  Slight decrease in the size of the large but thin left subdural hematoma. Extension onto the left tentorium suspected and unchanged. Mild or moderate sulcal effacement. Slight increased midline shift. 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 female who presented with seizure like activity, found to have left-sided subdural hematoma with associated mass effect and left to right midline shift. Plan:  1. No emergent neurosurgical intervention indicated  2. Neurologic exams frequency: q 4   3.  For change in exam MUST contact neurosurgery team along with critical care or primary team  4. CT Head w/o contrast 6 hours from previous scan  5. Maintain SBP <160  6. Keep Plt >100k & INR <1.4  7. Hold anticoagulation & antiplatelet for 2 weeks  8. Keep Na within normal limits, HOB elevated, NO dextrose in IVF's or in IV drips  9. SCDs for DVT prophylaxis, ok for SQ heparin 24 hours after stable CT if medically indicated  10. Seizure prophylaxis: Keppra, neurology consult (+ seizure in ED)  11. PT/OT/speech therapy  12. Diet/activity per primary team  13. Thanks for consult, will follow. Please follow with questions. DISPO: Remain inpatient from neurosurgery standpoint. Dispo timing to be determined by primary team once patient is medically stable for discharge. Patient was discussed with Dr. Laura Desai who agrees with above assessment and plan. Electronically signed by:  GENO Mejia, 3/12/2021 1:20 PM  [phone]
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
## 2021-03-12 NOTE — ED TRIAGE NOTES
Chief Complaint   Patient presents with    Shaking     Pt brought in by EMS for shaking/possible seizure like activity per patients grandson.

## 2021-03-12 NOTE — ED NOTES
Report called to [facility] at [phone]. Report given to [facility].      Percy Bazan RN  03/12/21 [id number]

## 2021-03-12 NOTE — PLAN OF CARE
Completed BSE and speech/language evaluation. Please see report in EMR.      Omari Dior M.A., [place]  Speech-Language Pathologist

## 2021-03-12 NOTE — PROGRESS NOTES
4 Eyes Admission Assessment     I agree as the admission nurse that 2 RN's have performed a thorough Head to Toe Skin Assessment on the patient. ALL assessment sites listed below have been assessed on admission. Areas assessed by both nurses: Jace Osorio and Stefan Mueller rn       Head, Face, and Ears none  [x]   Shoulders, Back, and Chest  [x]   Arms, Elbows, and Hands   [x]   Coccyx, Sacrum, and Ischium  Rt. Butt, tiny skin tear, IAD recatal area  Not blanchable , q2hr turn barrier cream ; tail bone are with tiny bruise;  left hip swollen large amt of bruising Dr. Katheryn Myles saw Jesus Baeza  See x-ray neg  [x]   Legs, Feet, and Heels very dry flaky skin on heels, dirty heels even after wiping      Does the Patient have Skin Breakdown?   Yes a wound was noted on the Admission Assessment and an LDA was Initiated documentation include the Ginna-wound, Wound Assessment, Measurements, Dressing Treatment, Drainage, and Color\",         Xavier Prevention initiated:  Yes   Wound Care Orders initiated:  No      [facility] nurse consulted for Pressure Injury (Stage 3,4, Unstageable, DTI, NWPT, and Complex wounds) or Xavier score 18 or lower:  No      Nurse 1 eSignature: Electronically signed by Yaa Ford RN on 3/12/21 at 1:53 PM EST    **SHARE this note so that the co-signing nurse is able to place an eSignature**    Nurse 2 eSignature: Electronically signed by Se King RN on 3/12/21 at 2:29 PM EST

## 2021-03-12 NOTE — PROGRESS NOTES
Speech Language Pathology  Facility/Department: [place] [address] [facility]   CLINICAL BEDSIDE SWALLOW EVALUATION    NAME: Christine Stanley  : 1934  MRN: [id number]    ADMISSION DATE: 3/12/2021  ADMITTING DIAGNOSIS: has Pneumonia; Closed displaced fracture of left femoral neck (HCC); MGUS (monoclonal gammopathy of unknown significance); Benign essential hypertension; Aortic insufficiency; Clonic hemifacial spasm, bilateral; GEORGES (generalized anxiety disorder); Gastroesophageal reflux disease without esophagitis; Mixed hyperlipidemia; NSTEMI (non-ST elevated myocardial infarction) ([facility] [address] 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  ONSET DATE: 3/12/2021    Recent Chest Xray:  3/12/2021  Impression   Moderate pulmonary interstitial edema.       Bibasilar ill-defined consolidation consistent with overlying alveolar edema   versus pneumonia.       Mild-to-moderate cardiomegaly. ate of Eval: 3/12/2021  Evaluating Therapist: Lazara Mccabe    Current Diet level:  Current Diet : Regular  Current Liquid Diet : Thin      Primary Complaint  Patient Complaint: unable to state    Pain: None indicated     Reason for Referral  Christine Stanley was referred for a bedside swallow evaluation to assess the efficiency of her swallow function, identify signs and symptoms of aspiration and make recommendations regarding safe dietary consistencies, effective compensatory strategies, and safe eating environment. Impression  Dysphagia Diagnosis: Moderate oral stage dysphagia  Dysphagia Impression : Pt initially unable to accept PO declining verbally and by pushing PO away. Pt intermittently unaware of utensil in mouth or straw in mouth which appeared related to confusion. The patient was never able to bite bolus off from a solid despite multiple attempts.  Slow masticaiton with some holding but responsive to verbal cues to continue with PO intake occasionally required tactile cues. No oral residue after the swallow and adequate oral containment despite difficulty with self feeding occasionally causing spilling. No overt s/s associated with aspiration during evaluation. Following evaluation the pt was exhibiting coughing while lying supine @ 40*. Recommend soft and bite sized / thins / meds PO given current confusion, assist feed and only feed when fully alert. Son confirms no dysphagia concerns at baseline. Dysphagia Outcome Severity Scale: Level 4: Mild moderate dysphagia- Intermittent supervision/cueing. One - two diet consistencies restricted     Treatment Plan  Requires SLP Intervention: Yes  Duration/Frequency of Treatment: 1-3x per week for length of stay  D/C Recommendations: 24 hour supervision/assistance     Recommended Diet and Intervention  Diet Solids Recommendation: Dysphagia Soft and Bite-Sized (Dysphagia III)  Liquid Consistency Recommendation: Thin  Recommended Form of Meds: PO     Therapeutic Interventions: Diet tolerance monitoring; Therapeutic PO trials with SLP    Compensatory Swallowing Strategies  Compensatory Swallowing Strategies: Upright as possible for all oral intake;Eat/Feed slowly; Assist feed    Treatment/Goals  Short-term Goals  Goal 1: Pt will tolerate least restrictive diet without overt s/s associated with aspiration and without respiratory decline. Goal 2: Pt will tolerate regular solids with typical acceptance of PO and adequate anterior to posterior propulsion without holding across trial.    General  Chart Reviewed: Yes  Subjective  Subjective: Pt presented to hospital due to reduced responsiveness and period of shaking. Pt with witnessed seizure in ED. At time of evaluation, pt is fluently aphasic with paraphasias, neologisms and inability to follow commands. Reduced ability to participate and cooperate due to reduced command following. Behavior/Cognition: Confused; Distractible;Requires cueing;Doesn't follow directions  Temperature Spikes Noted: No  Respiratory Status: Room air  Communication Observation: Aphasia  Follows Directions: Simple(intermittently able to follow commands)  Dentition: Adequate  Patient Positioning: Upright in bed  Baseline Vocal Quality: Normal  Volitional Cough: (unable to follow commands)  Volitional Swallow: Absent  Prior Dysphagia History: None per chart review; Son confirms no history of dysphagia and eats normal diet at baseline  Consistencies Administered: Reg solid; Thin - straw; Thin - cup; Thin - teaspoon; Ice Chips;Dysphagia Pureed (Dysphagia I)     Vision/Hearing  Vision  Vision: (? if pt having difficulty seeing or if due to language/cognitive impairments)  Hearing  Hearing: Within functional limits    Oral Motor Deficits  Oral/Motor  Oral Motor: Exceptions to WFL(unable to particpate in oral mech exam)  Labial Symmetry: Abnormal symmetry right    Oral Phase Dysfunction  Oral Phase  Oral Phase: Exceptions  Oral Phase Dysfunction  Impaired Mastication: Soft Solid  Oral Phase  Oral Phase - Comment: Pt initially unable to accept PO declining verbally and by pushing PO away. Pt intermittently unaware of utensil in mouth or straw in mouth which appeared related to confusion. The patient was never able to bite bolus off from a solid despite multiple attempts. Slow masticaiton with some holding but responsive to verbal cues to continue with PO intake occasionally required tactile cues. Indicators of Pharyngeal Phase Dysfunction   Pharyngeal Phase  Pharyngeal Phase: WFL  Pharyngeal Phase   Pharyngeal: No overt s/s associated with aspiraiton during evaluation. Following evaluation the pt was exhibiting coughing while lying supine @ 40*. Prognosis  Prognosis  Prognosis for safe diet advancement: fair  Barriers to reach goals: cognitive deficits;language deficits  Individuals consulted  Consulted and agree with results and recommendations: Patient; Family member  Family member consulted: Mitchell Federico Hung    Education  Patient Education: Education provided to patient but no evidence of comprehension. Educated son re: swallow assessment and suspect that dysphagia related to confusion at this time and results/recommendations given over the phone. Son endorsed no questions. Patient Education Response: No evidence of learning  Safety Devices in place: Yes  Type of devices: Call light within reach; Bed alarm in place       Therapy Time  SLP Individual Minutes  Time In: 0176  Time Out: 1630  Minutes: 15  SLP Co-Treatment Minutes  Time In: 0000  Time Out: 0000  Minutes: 0  SLP Total Treatment Time  Timed Code Treatment Minutes: 0 Minutes  Total Treatment Time: 15    Brett Grijalva M.A., [place]  Speech-Language Pathologist

## 2021-03-12 NOTE — ED NOTES
At bedside starting fluids and pt started seizing. Lasted approx 30 seconds to a minute. Dr Moses Hogan at bedside verbal order for 2mg ativan. Pt stopped seizing prior to administration and was post ictal. Small laceration noted to tongue.       Percy Bazan RN  03/12/21 [id number]

## 2021-03-12 NOTE — PROGRESS NOTES
Speech Language Pathology  Facility/Department: [place] [address] [facility]  Initial Speech/Language/Cognitive Assessment    NAME: Roma Gleason  : 1934   MRN: [id number]  ADMISSION DATE: 3/12/2021  ADMITTING DIAGNOSIS: has Pneumonia; Closed displaced fracture of left femoral neck ([facility] [address] MGUS (monoclonal gammopathy of unknown significance); Benign essential hypertension; Aortic insufficiency; Clonic hemifacial spasm, bilateral; GEORGES (generalized anxiety disorder); Gastroesophageal reflux disease without esophagitis; Mixed hyperlipidemia; NSTEMI (non-ST elevated myocardial infarction) ([address] 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 ([facility] [address] Confusion; Ischemic cardiomyopathy; and Subdural hematoma ([facility] [address] on their problem list.  DATE ONSET: 3/12/2021    Date of Eval: 3/12/2021   Evaluating Therapist: ANCA Sanchez    RECENT RESULTS  CT OF HEAD: 3/12/2021     Impression   Slight decrease in the size of the large but thin left subdural hematoma. Extension onto the left tentorium suspected and unchanged.  Mild or moderate   sulcal effacement.  Slight increased midline shift.         Primary Complaint: Unable to report - continued to perseverate on \"I need\" without ability to expand on utterance   Pain: None identified    Assessment:  Aphasia Diagnosis: Fluent aphasia  Speech Diagnosis: Dysfluency at baseline per pts son   Diagnosis: Pt presents with fluent aphasia at this time with normal syntax appreciated but frequent neologisms, phonemic and semantic paraphasias as well as perseverative word finding impairments. Pt with reduced ability to even respond appropriately to yes/no questions due to comprehension impairments and frequent difficulty with 1 step command following with basic contextual tasks (i.e. take a drink, take a bite) and also with body movement structured non contextual commands (touch your nose, make a thumbs up). The patient does not exhibit error awareness throughout assessment. Confirmed with the patients son, Cleveland Talley, that she does not typically present with langauge impairments but does stutter intermittently at baseline. Recommendations:  Requires SLP Intervention: Yes  Duration/Frequency of Treatment: 1-3x per week for length of stay  D/C Recommendations: 24 hour supervision/assistance     Plan:   Goals:  Short-term Goals  Goal 1: Pt will respond with yes/no responses or single word responses when asked questions. Goal 2: Pt will follow 1 step commands with mod-max cues  Goal 3: Pt will complete confrontational naming tasks with max cues. Goal 4: Pt will communicate basic wants/needs using total communication with mod cues. Goal 5: Pt will tolerate ongoing cognitive-linguistic assessment     Subjective:  General  Chart Reviewed: Yes  Family / Caregiver Present: No  Subjective  Subjective: Pt presented to hospital due to reduced responsiveness and period of shaking. Pt with witnessed seizure in ED. At time of evaluation, pt is fluently aphasic with paraphasias, neologisms and inability to follow commands. Reduced ability to participate and cooperate due to reduced command following. Social/Functional History  (Pt unable to provide any history)  Vision  Vision: (? if pt having difficulty seeing due to language impairments)  Hearing  Hearing: Within functional limits     Objective:  Oral/Motor  Oral Motor: Exceptions to WFL(unable to particpate in oral [facility] exam)  Labial Symmetry: Abnormal symmetry right    Auditory Comprehension  Comprehension: Exceptions  Yes/No Questions: Severe (I.e. when asked \"Is your name Demarco Bunch? \" Pt responded with echolalic rephrasing \"Is my Jaden Munda? \"  One Step Basic Commands:  Moderate  Two Step Basic Commands: Severe  Common Objects: Severe(does not demonstrate comprehension of objects as evidenced by object confusion with self feeding)  Conversation: Severe     Expression  Primary Mode of Expression: Verbal  Verbal Expression  Verbal Expression: Exceptions to functional limits  Repetition: Severe(Frequent echolalia but upon request for repetition, pt exhibited difficulty with ability for single words x1 out of multiple attempts)  Automatic Speech: Mild(Able to count following model when given cues)  Confrontation: Severe(Frequent phonemic and semantic paraphasias and neologisms - I.e. \"paink\" for 'pen', continued to use this neologism for other stimulus without awareness)  Conversation: Severe (Example: \"I thought the jam brown. ...brunned\", \"Do we have a halfin apoppit? Is there another people? \")  Interfering Components: Limited error awareness;Paraphasia; Perseverations  Written Expression  Written Expression: Unable to assess    Motor Speech  Motor Speech: Exceptions to WFL(Difficult to fully assess secondary to nature of language impairments but no overt dysarthria or apraxia)    Pragmatics/Social Functioning  Pragmatics: Exceptions to [facility]  Eye Contact: Severe  Turn Taking: Severe    Cognition:   Unable to fully assess due to language impairments  Orientation  Overall Orientation Status: (only stated own name)    Prognosis:  Speech Therapy Prognosis  Prognosis: Guarded  Additional Comments: Fluent aphasia carries poorer prognosis  Individuals consulted  Consulted and agree with results and recommendations: Patient; Family member  Family member consulted: SonHair    Education:  Patient Education: Education provided regarding language impairments; no evidence of comprhension. Basic education provided to son regarding language impairments at this time. Will monitor for improvement and provide education as indicated. Patient Education Response: No evidence of learning  Safety Devices in place: Yes  Type of devices: Call light within reach; Bed alarm in place    Therapy Time:   Individual Concurrent Group Co-treatment   Time In 1630 0000 0000 0000   Time Out 1645 0000 0000 0000   Minutes 15 0 0 0   Variance: 0  Timed Code Treatment Minutes: 0 Minutes  Total

## 2021-03-12 NOTE — PLAN OF CARE
NEUROSURGERY PROGRESS NOTE    Israel Portillo  [id number]   1934   3/12/2021    ED physician: Dr. Alistair Doty    Reason for call: SDH    History of present illness: Patient is a 80 y.o. female w/ PMH of HTN, HLD, CAD (ASA and Plavix), and s/p LEFT HIP HEMIARTHROPLASTY on 7/30/2021 who presented on 3/12/2021 to [facility] ED w/AMS. According to Dr. Juliana Ibarra, the patient was brought to ED via EMS after patient was found unresponsive by grandson. Grandson states patient was on couch, he was talking to her and when she did not respond he got up and went over to her. She did not respond to him right away and seemed to have some shaking. This lasted less than a minute. He said that this was similar to her episode around [place] time where she had some shaking and was not responding well at that point either. She was noted to be encephalopathic at that time. She also had an NSTEMI at that time.     She is a limited code. They would like everything (intubation, medications, defibrillation) but no chest compressions. Per nursing note, the RN was at bedside starting fluids and pt started seizing. Lasted approx 30 seconds to a minute. Dr Juliana Ibarra at bedside verbal order for 2mg ativan. Pt stopped seizing prior to administration and was post ictal. Small laceration noted to tongue. Physical Exam:     BP (!) 179/68   Pulse 66   Temp 98.3 °F (36.8 °C) (Oral)   Resp 16   Wt 110 lb (49.9 kg)   SpO2 97%   BMI 19.49 kg/m²   GCS per ED physician:  4 - Opens eyes on own  4 - Seems confused, disoriented  6 - Follows simple motor commands    Radiological Findings:  Ct Head Wo Contrast  Result Date: 3/12/2021  1. Findings consistent with presence of left-sided subdural hematoma with associated mass effect upon adjacent cerebral hemisphere as well as lateral ventricles with 5 mm left-to-right midline shift. 2. Findings suggestive of presence of minimal blood along the leftward aspect of the tentorium cerebelli.

## 2021-03-12 NOTE — H&P
ENT: No discharge. Pharynx clear. Neck: No JVD. Trachea midline. Resp: No accessory muscle use. No crackles. No wheezes. No rhonchi. CV: Regular rate. Regular rhythm. No murmur. No rub. No edema. GI: Non-tender. Non-distended. No masses. No organomegaly. Normal bowel sounds. No hernia. Skin: Warm and dry. No nodule on exposed extremities. No rash on exposed extremities. M/S: No cyanosis. No joint deformity. No clubbing. Neuro: Awake. Moving all extremities   Psych: Oriented to self only. Mildly agitated.       CBC:   Recent Labs     03/12/21  0632   WBC 6.6   HGB 8.7*   HCT 26.3*   MCV 97.2        BMP:   Recent Labs     03/12/21  0632   *   K 4.6   CL 98*   CO2 27   BUN 15   CREATININE 0.9     LIVER PROFILE:   Recent Labs     03/12/21  0632   AST 15   ALT <5*   BILITOT 0.7   ALKPHOS 69     PT/INR:   Recent Labs     03/12/21  0632   PROTIME 13.8*   INR 1.19*     APTT:   Recent Labs     03/12/21  0632   APTT 28.7     UA:  Recent Labs     03/12/21  0910   COLORU Yellow   PHUR 8.5*   WBCUA 0-2   RBCUA 11-20*   BACTERIA 1+*   CLARITYU Clear   SPECGRAV 1.020   LEUKOCYTESUR Negative   UROBILINOGEN 0.2   BILIRUBINUR Negative   BLOODU MODERATE*   GLUCOSEU Negative          CARDIAC ENZYMES  Recent Labs     03/12/21  0632   TROPONINI <0.01       U/A:    Lab Results   Component Value Date    COLORU Yellow 03/12/2021    WBCUA 0-2 03/12/2021    RBCUA 11-20 03/12/2021    MUCUS 2+ 03/03/2021    BACTERIA 1+ 03/12/2021    CLARITYU Clear 03/12/2021    SPECGRAV 1.020 03/12/2021    LEUKOCYTESUR Negative 03/12/2021    BLOODU MODERATE 03/12/2021    GLUCOSEU Negative 03/12/2021       ABG  No results found for: OLG8RUS, BEART, Z5VYUXSW, PHART, THGBART, DDZ4QVL, PO2ART, IGL1THM    Chest x-ray with moderate pulmonary edema, bibasilar ill-defined consolidation edema versus pneumonia      RADIOLOGY  CT HEAD WO CONTRAST    (Results Pending)           ASSESSMENT/PLAN:    SDH with midline shift, etiology unclear, likely traumatic she has history of frequent falls  - pt with underlying dementia, typically oriented to self, place, situation.     Currently oriented to self  No antiplatelet or anticoagulant, hold aspirin and Plavix  Consulted neurosurgery  Every 4 neurochecks  Continue Keppra    Seizure-like activity  Continue Keppra twice daily  Consult neurology    Abnormal EKG  She had NSTEMI 3 months ago  Check troponins  We will consult cardiology as there are new ST-T changes in V5 and V6    #MGUS  - f/b hem onc     #Mod MR / Mod-severe AR    #HTN  Beta-blocker with hold parameter    #HLD  #Chronic iron def anemia     DVT Prophylaxis:  scds  Diet: Diet NPO Effective Now  Code Status: Limited      she is a limited code  Intubation: YES  Defib/cardioversion: YES  Chest compressions: NO  Resuscitative meds: YES      3/12/2021 2:55 PM        Kellee Miller 3/12/2021 2:55 PM

## 2021-03-13 NOTE — PROGRESS NOTES
Hospitalist Progress Note      PCP: Iemlda Ramos MD    Date of Admission: 3/12/2021    Hospital Course: This is a 59-year-old female with a history of hypertension mitral valve regurgitation aortic valve insufficiency hyperlipidemia dementia who presented from [facility] for possible seizure-like episode. Is found to have SDH with midline shift    Subjective: Patient seen and examined  More alert today, oriented x2  Denying any chest pain no difficulty breathing      Medications:  Reviewed    Infusion Medications   Scheduled Medications    sodium chloride flush  10 mL Intravenous 2 times per day    levetiracetam  1,000 mg Intravenous Q12H    atorvastatin  80 mg Oral Nightly    ezetimibe  10 mg Oral Nightly    azithromycin  500 mg Intravenous Q24H    And    cefTRIAXone (ROCEPHIN) IV  1,000 mg Intravenous Q24H    influenza virus vaccine  0.5 mL Intramuscular Prior to discharge    metoprolol succinate  25 mg Oral Daily    lisinopril  40 mg Oral Daily     PRN Meds: sodium chloride flush, promethazine **OR** ondansetron, polyethylene glycol, acetaminophen **OR** acetaminophen      Intake/Output Summary (Last 24 hours) at 3/13/2021 1057  Last data filed at 3/13/2021 0600  Gross per 24 hour   Intake 370 ml   Output 450 ml   Net -80 ml       Physical Exam Performed:    BP (!) 129/56   Pulse 73   Temp 98.2 °F (36.8 °C) (Oral)   Resp 16   SpO2 93%     Gen: Elderly female. Awake and alert, forgetful,  Not in distress. Eyes: PERRL. No sclera icterus. No conjunctival injection. ENT: No discharge. Pharynx clear. Neck: No JVD. Trachea midline. Resp: No accessory muscle use. No crackles. No wheezes. No rhonchi. CV: Regular rate. Regular rhythm. No murmur. No rub. No edema. GI: Non-tender. Non-distended. No masses. No organomegaly. Normal bowel sounds. No hernia. Skin: Warm and dry. No nodule on exposed extremities. No rash on exposed extremities. M/S: No cyanosis.  No joint deformity. No clubbing. Neuro: Awake. Moving all extremities   Psych:  Alert oriented x2    Labs:   Recent Labs     03/12/21  0632 03/13/21  0559   WBC 6.6 5.0   HGB 8.7* 8.5*   HCT 26.3* 25.7*    235     Recent Labs     03/12/21  0632 03/13/21  0559   * 134*   K 4.6 4.3   CL 98* 99   CO2 27 27   BUN 15 12   CREATININE 0.9 0.9   CALCIUM 8.8 9.0     Recent Labs     03/12/21  0632   AST 15   ALT <5*   BILITOT 0.7   ALKPHOS 69     Recent Labs     03/12/21  0632   INR 1.19*     Recent Labs     03/12/21  0632 03/12/21  1521   TROPONINI <0.01 <0.01       Urinalysis:      Lab Results   Component Value Date    NITRU Negative 03/12/2021    WBCUA 0-2 03/12/2021    BACTERIA 1+ 03/12/2021    RBCUA 11-20 03/12/2021    BLOODU MODERATE 03/12/2021    SPECGRAV 1.020 03/12/2021    GLUCOSEU Negative 03/12/2021       Radiology:  CT HEAD WO CONTRAST   Final Result   1. Stable left subdural hematoma with mild mass effect and mild midline shift. Assessment/Plan:    Active Hospital Problems    Diagnosis    Subdural hematoma ([address] [Z61.4G7A]     1. SDH with midline shift, etiology unclear, likely traumatic she has history of frequent falls  - pt with underlying dementia, typically oriented to self, place, situation. No antiplatelet or anticoagulant, hold aspirin and Plavix  Consulted neurosurgery/ neurology   Every 4 neurochecks  Continue Keppra     2. Seizure-like activity  Continue Keppra twice daily  Consult neurology     3. Abnormal EKG  She had NSTEMI 3 months ago  Check troponins  We will consult cardiology as there are new ST-T changes in V5 and V6, appreciate recommendation, medical management only, currently holding aspirin and Plavix     #MGUS  - f/b hem onc      #Mod MR / Mod-severe AR     #HTN  Beta-blocker with hold parameter     #HLD  #Chronic iron def anemia     DVT Prophylaxis: scds  Diet: DIET GENERAL; Dysphagia Soft and Bite-Sized  Code Status: Limited    PT/OT Eval Status:   Will mayur Daly MD

## 2021-03-13 NOTE — PLAN OF CARE
Problem: Falls - Risk of:  Goal: Will remain free from falls  Description: Will remain free from falls  Outcome: Ongoing  Note: Fall precautions in place. Bed is in lowest position, wheels locked and alarm on. Non-skid socks on. Call light and bedside table within reach. Pt calls out appropriately. Pt is up x 2 assist with a walker and GB. Will continue to assess and monitor. Problem: Self-Concept:  Goal: Level of anxiety will decrease  Description: Level of anxiety will decrease  Outcome: Ongoing  Note: Patient is resting comfortably in bed. There is no sign of anxiety / agitation noted. Problem: Pain:  Goal: Pain level will decrease  Description: Pain level will decrease  Outcome: Ongoing  Note: Advanced Dementia scale used to assess pain. This patient scores a 0 in all areas. Will continue to monitor.

## 2021-03-13 NOTE — CONSULTS
Nutrition Note:    RD consulted for Xavier score. The patient will still be monitored per nutrition standards of care. Consult dietitian if additional nutrition intervention is needed. Neither the total Xavier score nor the nutrition subscale score have been independently validated for use as a tool to predict risk of malnutrition. Many have concluded that the Xavier scale is highly overpredictive of actual pressure injury development. In other words, not all patients who are predicted to develop a pressure ulcer injury actually develop one.  This overprediction could be seen as a major flaw of the xavier scale, weakening its practical utility overall, especially as it relates to nutrition screening and referral to the RDN. ([place] Academy of [employer] and Dietetics, 2017)    Sebastien Bryan RD, LD  Pager:  [phone]  Office:  [phone]

## 2021-03-13 NOTE — PROGRESS NOTES
Patient admitted to [address] [place] [address] room 8304 via bed with all belongings with her. 4 eye completed with secondary RN - vital taken // all vitals are stable. Patient is sleepy and falls back to sleep easily. Advanced Dementia scale used to assess pain. She scores a 0 in all areas. Will continue to monitor and assess.

## 2021-03-13 NOTE — CONSULTS
NEUROSURGERY CONSULT NOTE    Shandra Liu  [id number]   1934   3/12/2021    Requesting physician: Jaime Kendall MD    Reason for consultation: SDH     History of present illness: Patient unable to give history, attempt at contacting family member unsuccessful. History obtained from chart review. Patient is an 80 y.o. female w/ PMH of HTN, HLD, CAD (ASA and Plavix), and s/p LEFT HIP HEMIARTHROPLASTY on 7/30/2021 who presented on 3/12/2021 to [facility] ED w/AMS. According to Dr. Twyla Mccann, the patient was brought to ED via EMS after patient was found unresponsive by grandson. Grandson states patient was on couch, he was talking to her and when she did not respond he got up and went over to her. She did not respond to him right away and seemed to have some shaking.  This lasted less than a minute.  He said that this was similar to her episode around [place] time where she had some shaking and was not responding well at that point either.  She was noted to be encephalopathic at that time. Va Valenzuela also had an NSTEMI at that time. She is a limited code. Petros Ballesteros would like everything (intubation, medications, defibrillation) but no chest compressions. Per ED documentation, the RN was at bedside starting fluids and pt started seizing. Lasted approx 30 seconds to a minute. Dr Twyla Mccann at bedside verbal order for 2mg ativan. Pt stopped seizing prior to administration and was post ictal. Small laceration noted to tongue. CT head revealed have left-sided subdural hematoma with associated mass effect and left to right midline shift, she takes 81 mg aspirin and plavix daily,  aPTT was 28.7 and INR was 1.19, she was transferred to [facility] for neurosurgical evaluation.      ROS:   JONAH 2/2 mental status     Allergies   Allergen Reactions    Quinidine Hives    Aminoglycosides      Pt unsure    Levofloxacin      unknown    Neomycin      unknown    Simvastatin      Unknown to pt       Past Medical History:   Diagnosis Date    Facial mL Intravenous 2 times per day Mayela Forrester MD        sodium chloride flush 0.9 % injection 10 mL  10 mL Intravenous PRN Mayela Forrester MD        promethazine (PHENERGAN) tablet 12.5 mg  12.5 mg Oral Q6H PRN Mayela Forrester MD        Or    [facility]ron [facility] injection 4 mg  4 mg Intravenous Q6H PRN Mayela Forrester MD        polyethylene glycol (GLYCOLAX) packet 17 g  17 g Oral Daily PRN Mayela Forrester MD        acetaminophen (TYLENOL) tablet 650 mg  650 mg Oral Q6H PRN Mayela Forrester MD        Or   Collette Satchel acetaminophen (TYLENOL) suppository 650 mg  650 mg Rectal Q6H PRN Mayela Forrester MD        levETIRAcetam (KEPPRA) 1,000 mg in sodium chloride 0.9 % 100 mL IVPB  1,000 mg Intravenous Q12H Mayela Forrester MD   Stopped at 03/12/21 2217    atorvastatin (LIPITOR) tablet 80 mg  80 mg Oral Nightly Mayela Forrester MD   80 mg at 03/12/21 2156    ezetimibe (ZETIA) tablet 10 mg  10 mg Oral Nightly Mayela Forrester MD   10 mg at 03/12/21 2156    azithromycin (ZITHROMAX) 500 mg in dextrose 5 % 250 mL IVPB  500 mg Intravenous Q24H Mayela Forrester MD   Stopped at 03/12/21 1604    And    cefTRIAXone (ROCEPHIN) 1000 mg IVPB in 50 mL D5W minibag  1,000 mg Intravenous Q24H Mayela Forrester MD   Stopped at 03/12/21 1749    influenza quadrivalent split vaccine (FLUZONE;FLUARIX;FLULAVAL;AFLURIA) injection 0.5 mL  0.5 mL Intramuscular Prior to discharge Mayela Forrester MD        [START ON 3/13/2021] metoprolol succinate (TOPROL XL) extended release tablet 25 mg  25 mg Oral Daily Jaye Palma, MD Collette Satchel Ricki Ohm ON 3/13/2021] lisinopril (PRINIVIL;ZESTRIL) tablet 40 mg  40 mg Oral Daily Mayela Forrester MD            Objective:  BP (!) 115/49   Pulse 71   Temp 98.1 °F (36.7 °C) (Axillary)   Resp 19   SpO2 92%     Physical Exam:   GCS:  4- opens eyes spontaneously   4 - Oriented to self and place  6 - Follows simple motor commands    General: elderly, frail appearing.  Does not appear in acute distress. HENT: atraumatic, small laceration to tongue   Eyes: Optic discs: Not tested  Pulmonary: unlabored respiratory effort  Cardiovascular:  No peripheral edema  Gastrointestinal: abdomen soft, NT, ND  Extensive bruising to left hip/outer left thigh     Neurological:  Mental Status: Awakens to voice, oriented to person only, following some simple commands   Attention: impaired   Language: garbled speech, some incomprehensible words, expressive aphasia     Sensation: responds to pain in all 4 extremities   Coordination: JONAH 2/2 not following complex commands     Cranial Nerves:  II: Visual acuity not tested, blinks bilaterally to threat   III, IV, VI: PERRL, 3 mm bilaterally, does not formally follow EOMs   V: JONAH    VII: Face symmetric  VIII: Hearing intact bilaterally to spoken voice  IX: Palate movement equal bilaterally  XI: Shoulder shrug equal bilaterally  XII: Tongue midline    Musculoskeletal:   Gait: Not tested   Assist devices: None   Tone: normal   Motor strength: Patient follows commands in all extremities, wiggles toes bilaterally, lifts legs briefly against gravity strength 3+/5. Bilateral hand grasps 4/5, holds both arms briefly against gravity with physical/verbal cueing. Radiological Findings:    I personally reviewed the patient's imaging which consists of a CT head dated 3/12/2021. This demonstrated a left sided extra-axial collection c/w an acute SDH. There is mild mass effect on the underlying parenchyma with MLS.      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 female who presented with seizure like activity, found to have left-sided subdural hematoma with associated mass effect and left to right midline shift. Plan:  1. Given patient's age and parenchymal hemorrhage distribution, would no recommend acute neurosurgical intervention. 2. Repeat CT stable  3. Neurologic exams frequency: q 4   4. Maintain SBP <160  5. Keep Plt >100k & INR <1.4  6. Hold anticoagulation & antiplatelet for 2 weeks  7. Keep Na within normal limits, HOB elevated, NO dextrose in IVF's or in IV drips  8. Seizure prophylaxis: Keppra, neurology consult (+ seizure in ED)  9. PT/OT/speech therapy  10. Follow up in 2 weeks with updated head CT. 11. Thanks for consult, will follow peripherally while in house. Please follow with questions.        Eula Dennis MD, PhD  [address], [address] [place], [place], [address] [phone] (c), [phone] (o)

## 2021-03-13 NOTE — CONSULTS
[address]   Cardiology Consultation   Date: 3/13/2021  Admit Date:  3/12/2021  Reason for Consultation: Abnormal EKG changes  Consult Requesting Physician: Curtis Ham MD     No chief complaint on file. HPI: Diana Rai is a 80 y.o. female with a baseline dementia, with a past medical history significant for CAD, possible LAD territory infarct, ischemic cardiomyopathy, LV ejection fraction 35%, hypertension, hyperlipidemia, mitral valve regurgitation, MGUS who was admitted to the hospital secondary to possible seizure-like episode. The ER, she was noted to have a left-sided subdural hematoma with mass-effect and 5 mm left-to-right midline shift. Hence, she is being transferred to [facility] for neurosurgical evaluation. Cardiology was consulted secondary to ST-T changes in EKG, which is new when compared to the previous EKG from first week of March. At baseline, she is a poor historian. She denies any history of chest pain, shortness of breath or lightheadedness. She is not aware of her recent hospitalization where she was diagnosed to have coronary artery disease and ischemic cardiomyopathy. \" They keep on say that I have this problem or that problem\"      Past Medical History:   Diagnosis Date    Facial twitching     Hair loss     wears wig- unknown cause    Hyperlipidemia     Hypertension     Seasonal allergies         Past Surgical History:   Procedure Laterality Date    COLONOSCOPY      COLONOSCOPY  6/22/15    diverticulosis    HIP FRACTURE SURGERY Left     LEFT HIP HEMIARTHROPLASTY     HIP SURGERY Left 7/30/2020    LEFT HIP HEMIARTHROPLASTY performed by Ramila Munoz MD at [address] [place] [address]         Allergies   Allergen Reactions    Quinidine Hives    Aminoglycosides      Pt unsure    Levofloxacin      unknown    Neomycin      unknown    Simvastatin      Unknown to pt       Social History:  Reviewed. reports that she has never smoked.  She has never used smokeless tobacco. She reports that she does not drink alcohol or use drugs. Family History:  Reviewed. family history includes Other in her father. No premature CAD. Review of System:  All other systems reviewed except for that noted above. Pertinent negatives and positives are:     · General: negative for fever, chills   · Ophthalmic ROS: negative for - eye pain or loss of vision  · ENT ROS: negative for - headaches, sore throat   · Respiratory: negative for - cough, sputum  · Cardiovascular: Reviewed in HPI  · Gastrointestinal: negative for - abdominal pain, diarrhea, N/V  · Hematology: negative for - bleeding, blood clots, bruising or jaundice  · Genito-Urinary:  negative for - Dysuria or incontinence  · Musculoskeletal: negative for - Joint swelling, muscle pain  · Neurological: negative for - confusion, dizziness, headaches   · Psychiatric: No anxiety, no depression. · Dermatological: negative for - rash    Physical Examination:  Vitals:    21 0730   BP: (!) 129/56   Pulse: 73   Resp: 16   Temp: 98.2 °F (36.8 °C)   SpO2:         Intake/Output Summary (Last 24 hours) at 3/13/2021 1049  Last data filed at 3/13/2021 0600  Gross per 24 hour   Intake 370 ml   Output 450 ml   Net -80 ml     In: 320 [P.O.:100; I.V.:220]  Out: 450    Wt Readings from Last 3 Encounters:   21 110 lb (49.9 kg)   21 110 lb (49.9 kg)   20 115 lb 1.6 oz (52.2 kg)     Temp  Av.3 °F (36.8 °C)  Min: 97.8 °F (36.6 °C)  Max: 98.5 °F (36.9 °C)  Pulse  Av.3  Min: 53  Max: 75  BP  Min: 115/49  Max: 158/43  SpO2  Av.3 %  Min: 92 %  Max: 98 %    · Telemetry: Sinus rhythm   · Constitutional: Alert. Oriented to person, place, and time. No distress. · Head: Normocephalic and atraumatic. · Mouth/Throat: Lips appear moist. Oropharynx is clear and moist.  · Eyes: Conjunctivae normal. EOM are normal.   · Neck: Neck supple. No lymphadenopathy. No rigidity. No JVD present.    · Cardiovascular: Normal rate, regular rhythm. Normal S1&S2. Carotid pulse 2+ bilaterally. · Pulmonary/Chest: Bilateral respiratory sounds present. No respiratory accessory muscle use. No wheezes, No rhonchi. · Abdominal: Soft. Normal bowel sounds present. No distension, No tenderness. No splenomegaly. No hernia. · Musculoskeletal: No tenderness. No edema    · Lymphadenopathy: Has no cervical adenopathy. · Neurological: Alert and oriented. Cranial nerve II-XII grossly intact, No gross deficit to touch. · Skin: Skin is warm and dry. No rash, lesions, ulcerations noted. · Psychiatric: No anxiety nor agitation. Labs:  Reviewed. Recent Labs     03/12/21  0632 03/13/21  0559   * 134*   K 4.6 4.3   CL 98* 99   CO2 27 27   BUN 15 12   CREATININE 0.9 0.9     Recent Labs     03/12/21  0632 03/13/21  0559   WBC 6.6 5.0   HGB 8.7* 8.5*   HCT 26.3* 25.7*   MCV 97.2 97.2    235     Lab Results   Component Value Date    CKTOTAL 73 07/29/2020    TROPONINI <0.01 03/12/2021     Lab Results   Component Value Date    BNP 66.2 01/27/2012     Lab Results   Component Value Date    PROTIME 13.8 03/12/2021    PROTIME 12.3 08/03/2020    INR 1.19 03/12/2021    INR 1.06 08/03/2020     No results found for: CHOL, HDL, TRIG    Diagnostic and imaging results reviewed. ECG: Sinus rhythm, PACs, diffuse, symmetrical T wave inversion in V5 V6. Echo: From December 2020 showed LV ejection fraction of 30 to 35% with hypokinesis in the LAD territory. Cath: Not performed secondary to baseline dementia and poor quality of life. I independently reviewed the ECG and telemetry.     Scheduled Meds:   sodium chloride flush  10 mL Intravenous 2 times per day    levetiracetam  1,000 mg Intravenous Q12H    atorvastatin  80 mg Oral Nightly    ezetimibe  10 mg Oral Nightly    azithromycin  500 mg Intravenous Q24H    And    cefTRIAXone (ROCEPHIN) IV  1,000 mg Intravenous Q24H    influenza virus vaccine  0.5 mL Intramuscular Prior to discharge  metoprolol succinate  25 mg Oral Daily    lisinopril  40 mg Oral Daily     Continuous Infusions:  PRN Meds:.sodium chloride flush, promethazine **OR** ondansetron, polyethylene glycol, acetaminophen **OR** acetaminophen     Assessment:   Patient Active Problem List    Diagnosis Date Noted    [facility] 03/12/2021    Ischemic cardiomyopathy     NSTEMI (non-ST elevated myocardial infarction) ([facility] 12/22/2020    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 ([facility] 12/22/2020    Confusion     Closed displaced fracture of left femoral neck ([facility] 07/30/2020    MGUS (monoclonal gammopathy of unknown significance) 07/30/2020    Mixed hyperlipidemia 07/30/2020    Clonic hemifacial spasm, bilateral 07/25/2019    Gastroesophageal reflux disease without esophagitis 04/09/2019    GEORGES (generalized anxiety disorder) 07/02/2018    Benign essential hypertension 04/23/2014    Aortic insufficiency 02/18/2013    Pneumonia 01/28/2012      Active Hospital Problems    Diagnosis Date Noted    Subdural hematoma ([facility] [S06.5X9A] 03/12/2021     Recommendation(s):  1. CAD  2. Ischemic cardiomyopathy, LV ejection fraction 30 to 35%  3. Subdural hematoma, with midline shift  4. Dementia  5. Hyperlipidemia    Patient is known to have ischemic cardiomyopathy from before (December 2020). Secondary to her baseline dementia and poor functional capacity, she is being treated medically with optimal medical therapy.   She is admitted to the hospital with seizures and found to have subdural hematoma with midline shift  In the setting, she have new onset T wave changes in the lateral precordial leads  No ischemic symptoms  Telemetry showed sinus rhythm, with no malignant arrhythmias  Her troponin is negative  Clinically, patient is hemodynamically stable    More than likely, his EKG changes secondary to repolarization changes in the setting of subdural hematoma  Nevertheless, as previously planned, her treatment would be medical management of her chronic coronary artery disease  We'll do a limited transthoracic echocardiogram to make sure that she does not have any evidence of stress cardiomyopathy or further drop in the LV ejection fraction, in the setting of subdural hematoma    Continue her on statins, beta-blockers and ACE inhibitor's  Her aspirin and Plavix is on hold secondary to subdural hematoma  If her echocardiogram is stable, there is no further cardiac work-up needed    Thank you for allowing me to participate in the care of Deidre Tello . If you have any questions/comments, please do not hesitate to contact us. Tanika Francisco MD   Cardiac Electrophysiology  [facility]  Office [phone]    For any EP related issues after 5 PM, contact [facility] on call cardiology through .

## 2021-03-13 NOTE — PROGRESS NOTES
4 Eyes Admission Assessment     I agree as the admission nurse that 2 RN's have performed a thorough Head to Toe Skin Assessment on the patient. ALL assessment sites listed below have been assessed on admission. Areas assessed by both nurses:   [x]   Head, Face, and Ears   [x]   Shoulders, Back, and Chest  [x]   Arms, Elbows, and Hands   [x]   Coccyx, Sacrum, and Ischium  [x]   Legs, Feet, and Heels        Does the Patient have Skin Breakdown?   Scattered bruising, stage 1 to the right buttocks         Xavier Prevention initiated:  Yes   Wound Care Orders initiated:  No      [facility] nurse consulted for Pressure Injury (Stage 3,4, Unstageable, DTI, NWPT, and Complex wounds) or Xavier score 18 or lower:  No      Nurse 1 eSignature: Electronically signed by Tanika Farmer RN on 3/12/21 at 11:55 PM EST    **SHARE this note so that the co-signing nurse is able to place an eSignature**    Nurse 2 eSignature: Electronically signed by Juliette Sun RN on 3/13/21 at 4:08 AM EST

## 2021-03-13 NOTE — PROGRESS NOTES
Neurology Progress Note    Patient: Deidre Tello MRN: [id number]    YOB: 1934  Age: 80 y.o. Sex: female   Unit: [facility] Room/Bed: 9786/8679-26 Location: [address] [place] [address]    Today's Date: 3/13/2021  Date of Admission: 3/12/2021  1:12 PM  Admitting Physician: Dorene Whitten    Primary Care Physician: Imelda Ramos MD          LOS: 1 day      ASSESSMENT & RECOMMENDATIONS     Assessment  - Presume that pt's prolonged encephalopathy yesterday was secondary to being postictal and having received Ativan in the context of underlying dementia and of frail age  - Given that she is seemingly back to baseline, no need for cvEEG at this time    Recommendations  - Discontinued cvEEG order  - Continue Keppra 1000mg bid (may be able considering lowering it to 500mg bid given she has never been on it before, but will not do so yet in light of her acute SDH)  - If any further seizures will increase Keppra      SUBJECTIVE     Chart reviewed, events noted, pt seen & examined. No acute events overnight. Afebrile. Pt is markedly improved. Awake and answering questions appropriately. No complaints    Review of Systems  No changes since pt last seen other than those noted above. PFSH  No change since the original history and note. OBJECTIVE     Patient Vitals for the past 24 hrs:   BP Temp Temp src Pulse Resp SpO2   03/13/21 1115 (!) 126/57 98.6 °F (37 °C) Oral 59 16 96 %   03/13/21 0730 (!) 129/56 98.2 °F (36.8 °C) Oral 73 16 96 %   03/13/21 0243 (!) 120/37 98.4 °F (36.9 °C) Axillary 69 16 93 %   03/12/21 2353 (!) 121/43 98.5 °F (36.9 °C) Oral 53 21 92 %   03/12/21 2043 (!) 115/49 98.1 °F (36.7 °C) Axillary 71 19 92 %   03/12/21 1512 (!) 125/48 98.5 °F (36.9 °C) Axillary 56 20 98 %   03/12/21 1330 (!) 145/61 97.8 °F (36.6 °C) Axillary 75 20 96 %       Neurological Exam (performed on 3/13/2021 at 1400):  -Mental status: Alert.  Oriented to self and hospital; pleasant & appropriate  -Speech & Language: no aphasia; no dysarthria  -Cranial nerves: pupils symmetric; no notable dysconjugate gaze; eyes midline; no facial asymmetry  -Motor: moving all extremities symmetrically and fully  -Other: no adventitious movements noted  Other Systems  -General Appearance: well-developed, well-nourished, no apparent distress  -Neck: supple  -Lungs: breathing unlabored, regular, no audible wheezes  -CV: pulses strong x4 extremities  -Abd: flat     Imaging: All reports below, not included in previous notes, personally reviewed & actual images reviewed where indicated. Pertinent positives & negatives are addressed in Assessment & Plan section of note  CT HEAD WO CONTRAST  Images independently reviewed. Agree with findings. --NADIRA    Final Result   1. Stable left subdural hematoma with mild mass effect and mild midline shift. Laboratory Review: All results below, not included in previous notes, personally reviewed.  Pertinent positives & negatives are addressed in Assessment & Plan section of note  Recent Results (from the past 36 hour(s))   CBC Auto Differential    Collection Time: 03/12/21  6:32 AM   Result Value Ref Range    WBC 6.6 4.0 - 11.0 K/uL    RBC 2.70 (L) 4.00 - 5.20 M/uL    Hemoglobin 8.7 (L) 12.0 - 16.0 g/dL    Hematocrit 26.3 (L) 36.0 - 48.0 %    MCV 97.2 80.0 - 100.0 fL    MCH 32.3 26.0 - 34.0 pg    MCHC 33.2 31.0 - 36.0 g/dL    RDW 15.6 (H) 12.4 - 15.4 %    Platelets 469 480 - 107 K/uL    MPV 6.8 5.0 - 10.5 fL    Neutrophils % 79.7 %    Lymphocytes % 9.6 %    Monocytes % 8.0 %    Eosinophils % 1.2 %    Basophils % 1.5 %    Neutrophils Absolute 5.3 1.7 - 7.7 K/uL    Lymphocytes Absolute 0.6 (L) 1.0 - 5.1 K/uL    Monocytes Absolute 0.5 0.0 - 1.3 K/uL    Eosinophils Absolute 0.1 0.0 - 0.6 K/uL    Basophils Absolute 0.1 0.0 - 0.2 K/uL   Comprehensive Metabolic Panel w/ Reflex to MG    Collection Time: 03/12/21  6:32 AM   Result Value Ref Range    Sodium 133 (L) 136 - 145 mmol/L    Potassium reflex Magnesium 4.6 3.5 - 5.1 mmol/L    Chloride 98 (L) 99 - 110 mmol/L    CO2 27 21 - 32 mmol/L    Anion Gap 8 3 - 16    Glucose 134 (H) 70 - 99 mg/dL    BUN 15 7 - 20 mg/dL    CREATININE 0.9 0.6 - 1.2 mg/dL    GFR Non- 59 (A) >60    GFR African American >60 >60    Calcium 8.8 8.3 - 10.6 mg/dL    Total Protein 6.9 6.4 - 8.2 g/dL    Albumin 3.9 3.4 - 5.0 g/dL    Albumin/Globulin Ratio 1.3 1.1 - 2.2    Total Bilirubin 0.7 0.0 - 1.0 mg/dL    Alkaline Phosphatase 69 40 - 129 U/L    ALT <5 (L) 10 - 40 U/L    AST 15 15 - 37 U/L    Globulin 3.0 g/dL   Troponin    Collection Time: 03/12/21  6:32 AM   Result Value Ref Range    Troponin <0.01 <0.01 ng/mL   Protime-INR    Collection Time: 03/12/21  6:32 AM   Result Value Ref Range    Protime 13.8 (H) 10.0 - 13.2 sec    INR 1.19 (H) 0.86 - 1.14   APTT    Collection Time: 03/12/21  6:32 AM   Result Value Ref Range    aPTT 28.7 24.2 - 36.2 sec   EKG 12 Lead    Collection Time: 03/12/21  6:36 AM   Result Value Ref Range    Ventricular Rate 59 BPM    Atrial Rate 59 BPM    P-R Interval 162 ms    QRS Duration 110 ms    Q-T Interval 484 ms    QTc Calculation (Bazett) 479 ms    P Axis 77 degrees    R Axis -47 degrees    T Axis 106 degrees    Diagnosis       Sinus bradycardia with Premature atrial complexes in a pattern of bigeminyLeft axis deviationLeft ventricular hypertrophy with repolarization abnormalitySeptal infarct , age undeterminedST abnormality anterolateral leads consider ischemiaAbnormal ECGWhen compared with ECG of 3/3/21No significant change was foundConfirmed by RUSSELL Guevara MD ([id number]) on 3/12/2021 7:44:48 AM   SARS-CoV-2 NAAT (Rapid)    Collection Time: 03/12/21  7:55 AM    Specimen: Nasopharyngeal Swab   Result Value Ref Range    SARS-CoV-2, NAAT Not Detected Not Detected   Urinalysis Reflex to Culture    Collection Time: 03/12/21  9:10 AM    Specimen: Urine, clean catch   Result Value Ref Range    Color, UA Yellow Straw/Yellow Clarity, UA Clear Clear    Glucose, Ur Negative Negative mg/dL    Bilirubin Urine Negative Negative    Ketones, Urine Negative Negative mg/dL    Specific Gravity, UA 1.020 1.005 - 1.030    Blood, Urine MODERATE (A) Negative    pH, UA 8.5 (A) 5.0 - 8.0    Protein, UA TRACE (A) Negative mg/dL    Urobilinogen, Urine 0.2 <2.0 E.U./dL    Nitrite, Urine Negative Negative    Leukocyte Esterase, Urine Negative Negative    Microscopic Examination YES     Urine Type Voided     Urine Reflex to Culture Not Indicated    Microscopic Urinalysis    Collection Time: 03/12/21  9:10 AM   Result Value Ref Range    WBC, UA 0-2 0 - 5 /HPF    RBC, UA 11-20 (A) 0 - 4 /HPF    Epithelial Cells, UA 2-5 0 - 5 /HPF    Bacteria, UA 1+ (A) None Seen /HPF   EKG 12 Lead    Collection Time: 03/12/21  1:42 PM   Result Value Ref Range    Ventricular Rate 62 BPM    Atrial Rate 62 BPM    P-R Interval 162 ms    QRS Duration 110 ms    Q-T Interval 462 ms    QTc Calculation (Bazett) 468 ms    P Axis 41 degrees    R Axis -55 degrees    T Axis 120 degrees    Diagnosis       Sinus rhythm with Premature atrial complexes in a pattern of bigeminyLeft axis deviationLeft ventricular hypertrophy with repolarization abnormalitySeptal infarct , age undeterminedAbnormal ECGConfirmed by Bonnie Borrego ([id number]) on 3/12/2021 2:05:59 PM   Procalcitonin    Collection Time: 03/12/21  3:21 PM   Result Value Ref Range    Procalcitonin 0.07 0.00 - 0.15 ng/mL   Troponin    Collection Time: 03/12/21  3:21 PM   Result Value Ref Range    Troponin <0.01 <0.01 ng/mL   Strep Pneumoniae Antigen    Collection Time: 03/12/21  4:16 PM    Specimen: Urine, clean catch   Result Value Ref Range    STREP PNEUMONIAE ANTIGEN, URINE       Presumptive Negative  Presumptive negative suggests no current or recent  pneumococcal infection.  Infection due to Strep pneumoniae  cannot be ruled out since the antigen present in the sample  may be below the detection limit of the test.  Normal Range:Presumptive Negative     Legionella antigen, urine    Collection Time: 03/12/21  4:16 PM    Specimen: Urine, clean catch   Result Value Ref Range    L. pneumophila Serogp 1 Ur Ag       Presumptive Negative  No Legionella pneumophila serogroup 1 antigens detected. A negative result does not exclude infection with  Legionella pneumophila serogroup 1 nor does it rule out  other microbial-caused respiratory infections or  disease caused by other serogroups of  Legionella pneumophila.   Normal Range: Presumptive Negative     Basic Metabolic Panel w/ Reflex to MG    Collection Time: 03/13/21  5:59 AM   Result Value Ref Range    Sodium 134 (L) 136 - 145 mmol/L    Potassium reflex Magnesium 4.3 3.5 - 5.1 mmol/L    Chloride 99 99 - 110 mmol/L    CO2 27 21 - 32 mmol/L    Anion Gap 8 3 - 16    Glucose 94 70 - 99 mg/dL    BUN 12 7 - 20 mg/dL    CREATININE 0.9 0.6 - 1.2 mg/dL    GFR Non- 59 (A) >60    GFR African American >60 >60    Calcium 9.0 8.3 - 10.6 mg/dL   CBC    Collection Time: 03/13/21  5:59 AM   Result Value Ref Range    WBC 5.0 4.0 - 11.0 K/uL    RBC 2.65 (L) 4.00 - 5.20 M/uL    Hemoglobin 8.5 (L) 12.0 - 16.0 g/dL    Hematocrit 25.7 (L) 36.0 - 48.0 %    MCV 97.2 80.0 - 100.0 fL    MCH 32.2 26.0 - 34.0 pg    MCHC 33.2 31.0 - 36.0 g/dL    RDW 15.4 12.4 - 15.4 %    Platelets 160 025 - 923 K/uL    MPV 7.0 5.0 - 10.5 fL       Scheduled Meds:   sodium chloride flush  10 mL Intravenous 2 times per day    levetiracetam  1,000 mg Intravenous Q12H    atorvastatin  80 mg Oral Nightly    ezetimibe  10 mg Oral Nightly    azithromycin  500 mg Intravenous Q24H    And    cefTRIAXone (ROCEPHIN) IV  1,000 mg Intravenous Q24H    influenza virus vaccine  0.5 mL Intramuscular Prior to discharge    metoprolol succinate  25 mg Oral Daily    lisinopril  40 mg Oral Daily       Continuous Infusions:       PRN Meds:  sodium chloride flush, 10 mL, PRN  promethazine, 12.5 mg, Q6H PRN    Or  ondansetron, 4 mg, Q6H PRN  polyethylene glycol, 17 g, Daily PRN  acetaminophen, 650 mg, Q6H PRN    Or  acetaminophen, 650 mg, Q6H PRN                Discussed at length with patient and nursing    Jayesh Giron M.D.   Neurology  March 13, 2021

## 2021-03-13 NOTE — PLAN OF CARE
Problem: Falls - Risk of:  Goal: Will remain free from falls  Description: Will remain free from falls  3/13/2021 0405 by Karen Miller RN  Outcome: Ongoing  Note: Fall precautions in place. Bed is in lowest position, wheels locked and alarm on. Non-skid socks on. Call light and bedside table within reach. Pt calls out appropriately. Pt is up x 2 assist with a walker and GB. Will continue to assess and monitor.

## 2021-03-14 NOTE — PROGRESS NOTES
[facility]   Cardiology Progress Note     Admit Date: 3/12/2021     Reason for follow up: Abnormal EKG    HPI and Interval History:   Patient seen and examined. Clinical notes reviewed. Telemetry reviewed. No new complaint today. No major events overnight. Denies having chest pain, shortness of breath, dyspnea on exertion, Orthopnea, PND at the time of this visit. Review of System:  All other systems reviewed except for that noted above. Pertinent negatives and positives are:     · General: negative for fever, chills   · Ophthalmic ROS: negative for - eye pain or loss of vision  · ENT ROS: negative for - headaches, sore throat   · Respiratory: negative for - cough, sputum  · Cardiovascular: Reviewed in HPI  · Gastrointestinal: negative for - abdominal pain, diarrhea, N/V  · Hematology: negative for - bleeding, blood clots, bruising or jaundice  · Genito-Urinary:  negative for - Dysuria or incontinence  · Musculoskeletal: negative for - Joint swelling, muscle pain  · Neurological: negative for - confusion, dizziness, headaches   · Psychiatric: No anxiety, no depression. · Dermatological: negative for - rash      Physical Examination:  Vitals:    21 0700   BP: (!) 142/60   Pulse: 63   Resp: 16   Temp: 98.1 °F (36.7 °C)   SpO2: 96%        Intake/Output Summary (Last 24 hours) at 3/14/2021 1104  Last data filed at 3/14/2021 0350  Gross per 24 hour   Intake 800 ml   Output 700 ml   Net 100 ml     In: 480 [P.O.:480]  Out: 700    Wt Readings from Last 3 Encounters:   21 110 lb (49.9 kg)   21 110 lb (49.9 kg)   20 115 lb 1.6 oz (52.2 kg)     Temp  Av.5 °F (36.9 °C)  Min: 97.8 °F (36.6 °C)  Max: 98.8 °F (37.1 °C)  Pulse  Av.7  Min: 55  Max: 63  BP  Min: 126/57  Max: 143/56  SpO2  Av.5 %  Min: 94 %  Max: 96 %    · Telemetry: Sinus rhythm   · Constitutional: Alert. Oriented to person, place, and time. No distress. · Head: Normocephalic and atraumatic. · Mouth/Throat: Lips appear moist. Oropharynx is clear and moist.  · Eyes: Conjunctivae normal. EOM are normal.   · Neck: Neck supple. No lymphadenopathy. No rigidity. No JVD present. · Cardiovascular: Normal rate, regular rhythm. Normal S1&S2. Carotid pulse 2+ bilaterally. · Pulmonary/Chest: Bilateral respiratory sounds present. No respiratory accessory muscle use. No wheezes, No rhonchi. · Abdominal: Soft. Normal bowel sounds present. No distension, No tenderness. No splenomegaly. No hernia. · Musculoskeletal: No tenderness. No edema    · Lymphadenopathy: Has no cervical adenopathy. · Neurological: Alert and oriented. Cranial nerve II-XII grossly intact, No gross deficit to touch. · Skin: Skin is warm and dry. No rash, lesions, ulcerations noted. · Psychiatric: No anxiety nor agitation. Labs, diagnostic and imaging results reviewed. Reviewed. Recent Labs     03/12/21  0632 03/13/21  0559   * 134*   K 4.6 4.3   CL 98* 99   CO2 27 27   BUN 15 12   CREATININE 0.9 0.9     Recent Labs     03/12/21  0632 03/13/21  0559   WBC 6.6 5.0   HGB 8.7* 8.5*   HCT 26.3* 25.7*   MCV 97.2 97.2    235     Lab Results   Component Value Date    CKTOTAL 73 07/29/2020    TROPONINI <0.01 03/12/2021     CrCl cannot be calculated (Unknown ideal weight.).    Lab Results   Component Value Date    BNP 66.2 01/27/2012     Lab Results   Component Value Date    PROTIME 13.8 03/12/2021    PROTIME 12.3 08/03/2020    INR 1.19 03/12/2021    INR 1.06 08/03/2020     No results found for: CHOL, HDL, TRIG    Scheduled Meds:   sodium chloride flush  10 mL Intravenous 2 times per day    levetiracetam  1,000 mg Intravenous Q12H    atorvastatin  80 mg Oral Nightly    ezetimibe  10 mg Oral Nightly    azithromycin  500 mg Intravenous Q24H    And    cefTRIAXone (ROCEPHIN) IV  1,000 mg Intravenous Q24H    influenza virus vaccine  0.5 mL Intramuscular Prior to discharge    metoprolol succinate  25 mg Oral Daily    lisinopril  40 mg Oral Daily     Continuous Infusions:  PRN Meds:sodium chloride flush, promethazine **OR** ondansetron, polyethylene glycol, acetaminophen **OR** acetaminophen     Patient Active Problem List    Diagnosis Date Noted    [facility] 03/12/2021    Ischemic cardiomyopathy     NSTEMI (non-ST elevated myocardial infarction) ([facility] 12/22/2020    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 ([facility] 12/22/2020    Confusion     Closed displaced fracture of left femoral neck ([facility] 07/30/2020    MGUS (monoclonal gammopathy of unknown significance) 07/30/2020    Mixed hyperlipidemia 07/30/2020    Clonic hemifacial spasm, bilateral 07/25/2019    Gastroesophageal reflux disease without esophagitis 04/09/2019    GEORGES (generalized anxiety disorder) 07/02/2018    Benign essential hypertension 04/23/2014    Aortic insufficiency 02/18/2013    Pneumonia 01/28/2012      Active Hospital Problems    Diagnosis Date Noted    Subdural hematoma ([facility] [S06.5X9A] 03/12/2021       Assessment and Plan:     1. CAD  2. Ischemic cardiomyopathy, LV ejection fraction 30 to 35%  3. Subdural hematoma, with midline shift  4. Dementia  5. Hyperlipidemia  6. Hypertension     Patient is known to have ischemic cardiomyopathy from before (December 2020). Secondary to her baseline dementia and poor functional capacity, she is being treated medically with optimal medical therapy.   She is admitted to the hospital with seizures and found to have subdural hematoma with midline shift  In the setting, she have new onset T wave changes in the lateral precordial leads  No ischemic symptoms  Telemetry showed sinus rhythm, with no malignant arrhythmias  Her troponin is negative  Clinically, patient is hemodynamically stable     More than likely, his EKG changes secondary to repolarization changes in the setting of subdural hematoma  Nevertheless, as previously planned, her treatment would be medical management

## 2021-03-14 NOTE — PLAN OF CARE
Problem: Falls - Risk of:  Goal: Will remain free from falls  Description: Will remain free from falls  Note: Pt free from injury or falls at this time, fall precautions in place, bed in low position, side rail up x2, Spear Fall Risk: High (45 and higher), bed alarm on, reoriented to room and call light, reminded not to get up without assistance, call light in reach, will continue to monitor. Pt verbalizes understanding of fall risk procedures. Problem: Self-Concept:  Goal: Level of anxiety will decrease  Description: Level of anxiety will decrease  Note: Patient calm and cooperative, no signs of anxiety     Problem: Pain:  Goal: Pain level will decrease  Description: Pain level will decrease  Note: Denies pain at this time, vitals stable, assisted to position of comfort.

## 2021-03-14 NOTE — PLAN OF CARE
Problem: Coping:  Goal: Ability to cope will improve  Description: Ability to cope will improve  Outcome: Ongoing  Note: Alert oriented times 4 , able to feed self, no signs of seizure activity , moves all extremities ,

## 2021-03-14 NOTE — PLAN OF CARE
Problem: Falls - Risk of:  Goal: Will remain free from falls  Description: Will remain free from falls  3/14/2021 0506 by Mayo Guzman RN  Note: Pt free from injury or falls at this time, fall precautions in place, bed in low position, side rail up x2, Spear Fall Risk: High (45 and higher), bed alarm on, reoriented to room and call light, reminded not to get up without assistance, call light in reach, will continue to monitor. Pt verbalizes understanding of fall risk procedures.

## 2021-03-14 NOTE — PROGRESS NOTES
Patient resting in bed, Spear Fall Risk: High (45 and higher), Pain Level: 0, vitals stable, assisted to position of comfort, purewick changed, pericare done, call light and belongings in reach, encouraged to call with needs, will continue to monitor.   Vitals:    03/14/21 0338   BP: (!) 139/55   Pulse: 58   Resp: 16   Temp: 97.8 °F (36.6 °C)   SpO2: 94%

## 2021-03-15 PROBLEM — R94.31 ABNORMAL EKG: Status: ACTIVE | Noted: 2021-01-01

## 2021-03-15 PROBLEM — I25.10 CAD (CORONARY ARTERY DISEASE): Status: ACTIVE | Noted: 2021-01-01

## 2021-03-15 NOTE — DISCHARGE SUMMARY
Hospital Medicine Discharge Summary    Patient ID: Deidre Tello      Patient's PCP: Imelda Ramos MD    Admit Date: 3/12/2021     Discharge Date:   03/15/2021    Admitting Physician: Jaime Kendall MD     Discharge Physician: Dayana Donovan MD     Discharge Diagnoses: Active Hospital Problems    Diagnosis Date Noted    Abnormal EKG [R94.31] 03/15/2021    CAD (coronary artery disease) [I25.10] 03/15/2021    Subdural hematoma ([address] [S06.5X9A] 03/12/2021    Ischemic cardiomyopathy [I25.5]        The patient was seen and examined on day of discharge and this discharge summary is in conjunction with any daily progress note from day of discharge. Hospital Course:  80 y.o. female with hypertension, mitral valve regurgitation, aortic valve insufficiency, hyperlipidemia, MGUS, dementia, iron def anemia, CAD (ASA and Plavix), ischemic cardiomyopathy, LV ejection fraction 35%, and s/p LEFT HIP HEMIARTHROPLASTY on 7/30/2021 who presented [facility] with possible seizure-like episode. - patient was found unresponsive by grandson. Grandson states patient was on couch, he was talking to her and when she did not respond he got up and went over to her. She did not respond to him right away and seemed to have some shaking.  - similar episode around [place] time where she had some shaking and was not responding well at that point either.  - She also had an NSTEMI at that time. She is a limited code. Petros Ballesteros would like everything (intubation, medications, defibrillation) but no chest compressions. - while in the ED, RN was at bedside starting fluids and pt started seizing.  Lasted approx 30 seconds to a minute; 2mg ativan was prepared but patient stopped seizing prior to administration and was post ictal. Small laceration noted to tongue. CT head revealed have left-sided subdural hematoma with associated mass effect and left to right midline shift, she takes 81 mg aspirin and plavix daily,  aPTT was 28.7 and INR was 1.19, she was transferred to [facility] for neurosurgical evaluation.   - During hospitalization she was started on Keppra 1 gm bid  - She also had abnormal EKG with new T wave inversions, trop was negative and she denied chest pain so ACS was ruled out    Hospital problems addressed  Generalized tonic clonic seizures  Acute Subdural Hematoma  pt with underlying dementia, typically oriented to self, place, situation.    No antiplatelet or anticoagulant, hold aspirin and Plavix  Neurosurgery/ neurology assisted in care of the patient  Continue Keppra     CAD (was on ASA, Plavix)  Ischemic cardiomyopathy, with LV EF 30-35%  Patient is known to have ischemic cardiomyopathy December 2020 Secondary to her baseline dementia and poor functional capacity, she is being treated medically with optimal medical therapy. she have new onset T wave changes in the lateral precordial leads  No ischemic symptoms  Telemetry showed sinus rhythm, with no malignant arrhythmias  Her troponin is negative  Cardiology was consulted, no acute cardiac intervention anticipated   Limited echo was done which showed preserved EF 55%, with severe LA dilation and no regional wall motion abnormalities     #MGUS  - f/b hem onc      #Mod MR / Mod-severe AR     #HTN  Beta-blocker with hold parameter     #HLD  #Chronic iron def anemia      #Dementia      Physical Exam Performed:     BP (!) 109/37   Pulse 51   Temp 98.3 °F (36.8 °C) (Axillary)   Resp 16   Ht 5' 2\" (1.575 m)   SpO2 95%   BMI 20.12 kg/m²       General appearance:  No apparent distress, appears stated age and cooperative. HEENT:  Normal cephalic, atraumatic without obvious deformity. Pupils equal, round, and reactive to light. Extra ocular muscles intact. Conjunctivae/corneas clear. Neck: Supple, with full range of motion. No jugular venous distention. Trachea midline. Respiratory:  Normal respiratory effort.  Clear to auscultation, bilaterally without Rales/Wheezes/Rhonchi. Cardiovascular:  Regular rate and rhythm with normal S1/S2 without murmurs, rubs or gallops. Abdomen: Soft, non-tender, non-distended with normal bowel sounds. Musculoskeletal:  No clubbing, cyanosis or edema bilaterally. Full range of motion without deformity. Skin: Skin color, texture, turgor normal.  No rashes or lesions. Neurologic:  Neurovascularly intact without any focal sensory/motor deficits. Cranial nerves: II-XII intact, grossly non-focal.  Psychiatric:  Alert and oriented, thought content appropriate, normal insight  Capillary Refill: Brisk,< 3 seconds   Peripheral Pulses: +2 palpable, equal bilaterally       Labs: For convenience and continuity at follow-up the following most recent labs are provided:      CBC:    Lab Results   Component Value Date    WBC 5.0 03/13/2021    HGB 8.5 03/13/2021    HCT 25.7 03/13/2021     03/13/2021       Renal:    Lab Results   Component Value Date     03/13/2021    K 4.3 03/13/2021    CL 99 03/13/2021    CO2 27 03/13/2021    BUN 12 03/13/2021    CREATININE 0.9 03/13/2021    CALCIUM 9.0 03/13/2021         Significant Diagnostic Studies    Radiology:   CT HEAD WO CONTRAST   Final Result   1. Stable left subdural hematoma with mild mass effect and mild midline shift.              Consults:     IP CONSULT TO NEUROLOGY  IP CONSULT TO NEUROSURGERY  IP CONSULT TO DIETITIAN  IP CONSULT TO CARDIOLOGY  IP CONSULT TO SOCIAL WORK  IP CONSULT TO HOME CARE NEEDS    Disposition:  Home     Condition at Discharge: Stable    Discharge Instructions/Follow-up:    Recommended Labs or Other Treatments After Discharge:   Physical therapy  Occupational therapy  Speech therapy  Medications check for safety  Vitals check  Follow up with primary care, cardiology, neurology and neuro surgery  No ASA or Plavix x 14 days    Code Status:  Limited    Activity: activity as tolerated    Diet: cardiac diet      Discharge Medications:     Current Discharge Medication List           Details   levETIRAcetam (KEPPRA) 1000 MG tablet Take 1 tablet by mouth 2 times daily  Qty: 60 tablet, Refills: 3              Details   ALPRAZolam (XANAX) 0.25 MG tablet Take 0.125 mg by mouth 2 times daily. escitalopram (LEXAPRO) 5 MG tablet Take 2.5 mg by mouth daily      lisinopril (PRINIVIL;ZESTRIL) 20 MG tablet Take 40 mg by mouth daily      ferrous sulfate (IRON 325) 325 (65 Fe) MG tablet Take 325 mg by mouth daily (with breakfast)      Cyanocobalamin 2500 MCG SUBL Place 2,500 mcg under the tongue daily      metoprolol succinate (TOPROL XL) 25 MG extended release tablet Take 1 tablet by mouth daily  Qty: 30 tablet, Refills: 3      atorvastatin (LIPITOR) 80 MG tablet Take 1 tablet by mouth nightly  Qty: 30 tablet, Refills: 3      vitamin D (CHOLECALCIFEROL) 25 MCG (1000 UT) TABS tablet Take 1,000 Units by mouth daily      ezetimibe (ZETIA) 10 MG tablet Take 10 mg by mouth nightly       Loratadine (CLARITIN PO) Take 10 mg by mouth daily              Time Spent on discharge is more than 45 minutes in the examination, evaluation, counseling and review of medications and discharge plan. Signed:    Sylvia Campo MD   3/15/2021      Thank you Ac Lee MD for the opportunity to be involved in this patient's care. If you have any questions or concerns please feel free to contact me at [phone].

## 2021-03-15 NOTE — PROGRESS NOTES
Hospitalist Progress Note      PCP: Salena Champagne MD    Date of Admission: 3/12/2021    Chief Complaint: seizure like acitivity    Hospital Course:    80 y.o. female with hypertension, mitral valve regurgitation, aortic valve insufficiency, hyperlipidemia, MGUS, dementia, iron def anemia, CAD (ASA and Plavix), ischemic cardiomyopathy, LV ejection fraction 35%, and s/p LEFT HIP HEMIARTHROPLASTY on 7/30/2021 who presented to [facility] with possible seizure-like episode. - patient was found unresponsive by grandson. Grandson states patient was on couch, he was talking to her and when she did not respond he got up and went over to her. She did not respond to him right away and seemed to have some shaking.  - similar episode around [place] time where she had some shaking and was not responding well at that point either.  - She also had an NSTEMI at that time. She is a limited code. Theta Overcast would like everything (intubation, medications, defibrillation) but no chest compressions. - while in the ED, RN was at bedside starting fluids and pt started seizing. Lasted approx 30 seconds to a minute; 2mg ativan was prepared but patient stopped seizing prior to administration and was post ictal. Small laceration noted to tongue.  CT head revealed have left-sided subdural hematoma with associated mass effect and left to right midline shift, she takes 81 mg aspirin and plavix daily,  aPTT was 28.7 and INR was 1.19, she was transferred to [facility] for neurosurgical evaluation.   - During hospitalization she was started on Keppra 1 gm bid  - She also had abnormal EKG with new T wave inversions, trop was negative and she denied chest pain so ACS was ruled out    Subjective: out of bed in chair, doing well, no complains    Medications:  Reviewed    Infusion Medications   Scheduled Medications    metoprolol succinate  50 mg Oral Daily    sodium chloride flush  10 mL Intravenous 2 times per day    levetiracetam  1,000 mg Intravenous Q12H    atorvastatin  80 mg Oral Nightly    ezetimibe  10 mg Oral Nightly    azithromycin  500 mg Intravenous Q24H    And    cefTRIAXone (ROCEPHIN) IV  1,000 mg Intravenous Q24H    influenza virus vaccine  0.5 mL Intramuscular Prior to discharge    lisinopril  40 mg Oral Daily     PRN Meds: sodium chloride flush, promethazine **OR** ondansetron, polyethylene glycol, acetaminophen **OR** acetaminophen      Intake/Output Summary (Last 24 hours) at 3/15/2021 1144  Last data filed at 3/15/2021 0800  Gross per 24 hour   Intake 720 ml   Output 700 ml   Net 20 ml       Physical Exam Performed:    BP (!) 109/37   Pulse 51   Temp 98.3 °F (36.8 °C) (Axillary)   Resp 16   Ht 5' 2\" (1.575 m)   SpO2 95%   BMI 20.12 kg/m²     Gen: Elderly female.  Awake and alert, forgetful,  Not in distress.     Eyes: PERRL. No sclera icterus. No conjunctival injection. ENT: No discharge. Pharynx clear. Neck: No JVD.  Trachea midline. Resp: No accessory muscle use. No crackles. No wheezes. No rhonchi. CV: Regular rate. Regular rhythm. No murmur.  No rub. No edema. GI: Non-tender. Non-distended. No masses. No organomegaly. Normal bowel sounds. No hernia. Skin: Warm and dry. No nodule on exposed extremities. No rash on exposed extremities. M/S: No cyanosis. No joint deformity. No clubbing. Neuro: Awake. Moving all extremities   Psych:  Alert oriented x2    Labs:   Recent Labs     03/13/21  0559   WBC 5.0   HGB 8.5*   HCT 25.7*        Recent Labs     03/13/21  0559   *   K 4.3   CL 99   CO2 27   BUN 12   CREATININE 0.9   CALCIUM 9.0     No results for input(s): AST, ALT, BILIDIR, BILITOT, ALKPHOS in the last 72 hours. No results for input(s): INR in the last 72 hours.   Recent Labs     03/12/21  1521   TROPONINI <0.01       Urinalysis:      Lab Results   Component Value Date    NITRU Negative 03/12/2021    WBCUA 0-2 03/12/2021    BACTERIA 1+ 03/12/2021    RBCUA 11-20 03/12/2021    BLOODU MODERATE 03/12/2021    SPECGRAV 1.020 03/12/2021    GLUCOSEU Negative 03/12/2021       Radiology:  CT HEAD WO CONTRAST   Final Result   1. Stable left subdural hematoma with mild mass effect and mild midline shift. Limited TTE 3/15/21   Summary    Limited echo to follow up echo done December 2020.    Normal left ventricle size. There is mild concentric left ventricular    hypertrophy. Normal left ventricular systolic function with an estimated    ejection fraction of 55%. No regional wall motion abnormalities are seen.    The left atrium is severely dilated.           Assessment/Plan:    Active Hospital Problems    Diagnosis Date Noted    Subdural hematoma ([address] [S83.8Q0Z] 03/12/2021     Generalized tonic clonic seizures  Acute Subdural Hematoma  pt with underlying dementia, typically oriented to self, place, situation.    No antiplatelet or anticoagulant, hold aspirin and Plavix  Consulted neurosurgery/ neurology   Every 4 neurochecks  Continue Keppra    CAD (was on ASA, Plavix)  Ischemic cardiomyopathy, with LV EF 30-35%  Patient is known to have ischemic cardiomyopathy December 2020 Secondary to her baseline dementia and poor functional capacity, she is being treated medically with optimal medical therapy.   she have new onset T wave changes in the lateral precordial leads  No ischemic symptoms  Telemetry showed sinus rhythm, with no malignant arrhythmias  Her troponin is negative  Discussed with Dr. Slava Chase, will continue medical management and no acute cardiac intervention anticipated  Limited echo was done which showed preserved EF 55%, with severe LA dilation and no regional wall motion abnormalities    #MGUS  - f/b hem onc      #Mod MR / Mod-severe AR     #HTN  Beta-blocker with hold parameter     #HLD  #Chronic iron def anemia     #Dementia      DVT Prophylaxis: SCDs  Diet: DIET GENERAL; Dysphagia Soft and Bite-Sized  Code Status: Limited    PT/OT Eval Status: 18/24 on the AM-PAC short mobility form    Pt reports normally independent with ambulation with walker \"sometimes. \"  Currently needing CGA for transfers/gt. Slow to respond to questions - question difficulty with processing? At mild risk for falls. Pt plans to return home with grandson - reports he is able to assist 24/7.     Dispo - ok to dc today, will need follow up with neurology and neurosurgery    Attempted to call grandson on number on EMR, no response    Misti Peñaloza MD  Hospitalist

## 2021-03-15 NOTE — PLAN OF CARE
Problem: Pain:  Goal: Pain level will decrease  Description: Pain level will decrease  3/15/2021 0017 by Haley Valdes RN  Note: Denies pain at this time, vitals stable, assisted to position of comfort. Problem: Skin Integrity:  Goal: Will show no infection signs and symptoms  Description: Will show no infection signs and symptoms  Note: Skin intact, mild redness to coccyx, patient repositioned for comfort, on specialty bed     Problem: Falls - Risk of:  Goal: Will remain free from falls  Description: Will remain free from falls  Note: Pt free from injury or falls at this time, fall precautions in place, bed in low position, side rail up x2, Spear Fall Risk: High (45 and higher), bed alarm on, reoriented to room and call light, reminded not to get up without assistance, call light in reach, will continue to monitor. Pt verbalizes understanding of fall risk procedures.

## 2021-03-15 NOTE — PROGRESS NOTES
Patient resting in bed, Spear Fall Risk: High (45 and higher), Pain Level: 0, vitals stable, assisted to position of comfort, call light and belongings in reach, encouraged to call with needs, will continue to monitor.   Vitals:    03/14/21 2255   BP: 119/66   Pulse: 67   Resp: 16   Temp: 97.9 °F (36.6 °C)   SpO2: 97%

## 2021-03-15 NOTE — PROGRESS NOTES
Mrs. Chloe Mckay was discharged home with her son with all of her personal belongings. We reviewed the need to schedule a follow-up appointment and starting Keppra. All questions were answered.

## 2021-03-15 NOTE — CARE COORDINATION
Case Management Assessment            Discharge Note                    Date / Time of Note: 3/15/2021 12:38 PM                  Discharge Note Completed by: Mendel Peabody    Patient Name: Elijah Gillette   YOB: 1934  Diagnosis: Subdural hematoma [facility]) [S06.5X9A]   Date / Time: 3/12/2021  1:12 PM    Current PCP: Megan Rodriguez MD  Clinic patient: No    Hospitalization in the last 30 days: No    Advance Directives:  Code Status: [address] DNR form completed and on chart: Not Indicated    Financial:  Payor: MEDICARE / Plan: MEDICARE PART A AND B / Product Type: *No Product type* /      Pharmacy:    [id number] - [address], [phone]  [address] [address]  Phone: [phone] Fax: [phone]      Assistance purchasing medications?:    Assistance provided by Case Management: None at this time    Does patient want to participate in local refill/ meds to beds program?:      Meds To Beds General Rules:  1. Can ONLY be done Monday- Friday between 8:30am-5pm  2. Prescription(s) must be in pharmacy by 3pm to be filled same day  3. Copy of patient's insurance/ prescription drug card and patient face sheet must be sent along with the prescription(s)  4. Cost of Rx cannot be added to hospital bill. If financial assistance is needed, please contact unit  or ;  or  CANNOT provide pharmacy voucher for patients co-pays  5. Patients can then  the prescription on their way out of the hospital at discharge, or pharmacy can deliver to the bedside if staff is available. (payment due at time of pick-up or delivery - cash, check, or card accepted)     Able to afford home medications/ co-pay costs: Yes    ADLS:  Current PT AM-PAC Score: 18 /24  Current OT AM-PAC Score: 18 /24      DISCHARGE Disposition: Home with [facility] [address]: .      LOC at discharge: Not Applicable  MICHA Completed: Yes    Notification completed in HENS/PAS?:  Not Applicable    IMM Completed:   Not Indicated    Transportation:  Transportation PLAN for discharge: family   Mode of Transport: Private Car  Reason for medical transport: Not Applicable  Name of [address] [place] [address],[address]: Not Applicable  Time of Transport: when ready    Transport form completed: Not Indicated    Home Care:  [address] ordered at discharge: Yes  [address]: [facility]  Orders faxed: Yes    Durable Medical Equipment:  DME Provider:   Equipment obtained during hospitalization:     Home Oxygen and Respiratory Equipment:  Oxygen needed at discharge?: Not [address]: Not Applicable  Portable tank available for discharge?: Not Indicated    Dialysis:  Dialysis patient: No    Dialysis Center:  Not Applicable    Hospice Services:  Location: Not Applicable  Agency: Not Applicable    Consents signed: Not Indicated    Referrals made at [facility] for outpatient continued care:  Not Applicable    Additional CM Notes:   CM spoke to patient at bedside and son Preeti Martinez [phone] regarding discharge plans. Patient lives with otf Chavez. Byron Chavez unable to be reached at this time. Son agreeable with [facility]. Stated either him or Byron Chavez will be transporting. CM left message for Lisa FLANAGAN to set up services. CM left a message with [facility] to determine whether they can accept patient.   Agency will need to call CM back    Rx    these medications from any pharmacy with your printed prescription  1 levETIRAcetam    [facility]   FAX            [address] [address]       Phone: [phone]       Fax: [phone]            The Plan for Transition of Care is related to the following treatment goals of Subdural hematoma ([facility] [S06.5X9A]    The Patient and/or patient representative Erick Aviles and her family were provided with a choice of provider and agrees with the discharge plan Yes    Freedom of choice

## 2021-03-15 NOTE — PROGRESS NOTES
Speech Language Pathology  Facility/Department: [facility] 5T ORTHO/NEURO  Daily Treatment Note  NAME: Gilmar Gaines  : 1934  MRN: [id number]    Date of Eval: 3/15/2021  Evaluating Therapist: Brett Grijalva    Patient Diagnosis(es): has Pneumonia; Closed displaced fracture of left femoral neck ([address] MGUS (monoclonal gammopathy of unknown significance); Benign essential hypertension; Aortic insufficiency; Clonic hemifacial spasm, bilateral; GEORGES (generalized anxiety disorder); Gastroesophageal reflux disease without esophagitis; Mixed hyperlipidemia; NSTEMI (non-ST elevated myocardial infarction) ([address] 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     Chart reviewed. Pain: None indicated     Medical diagnosis: SDH   Treatment diagnosis: Aphasia    Treatment:  Pt seen to address the following goals:  Goal 1: Pt will respond with yes/no responses or single word responses when asked questions. 3/15 - Occasionally did not respond to questions or demonstrated lengthy delay especially when related to functional basic wants/needs. Accuracy for yes/no questions was 100% accuracy for structured task. Continue goal.     Goal 2: Pt will follow 1 step commands with mod-max cues  3/15 - Able to follow 1 and 2 step basic body movement commands without cues. GOAL MET. Goal 3: Pt will complete confrontational naming tasks with max cues. 3/15 - 80% accuracy; pt with perseverative error x1 and dysnomia x1. No neologisms or clear paraphasic errors for this task. GOAL MET; Would modify goal to include other word finding tasks but pt is discharging this date. Goal 4: Pt will communicate basic wants/needs using total communication with mod cues.   3/15 - Pt very perseverative on stating \"I need to\" which was also noted on initial evaluation pt would look to bathroom but then demonstrated possibly blocking / halting of speech with inability to clarify or provide additional utterances. Pt was also not able to answer yes/no questions re: if she needed to use restroom for first part of session. GOAL MET. Goal 5: Pt will tolerate ongoing cognitive-linguistic assessment   3/15 - Pt with mean length of utterance of 2.5 words for verbal expression task; not stimulable to increase length of utterances. x1 verbal paraphasia/neologism for this task. GOAL MET. Education:  Education to patient on language impairments and improvements. Plan:  Continue speech/language therapy to address above goals, 3-5 x/week x LOS  DC recommendations: Recommend ongoing therapy at home care as pt is exhibiting language impairments which son denied to be baseline when education provided at admission. D/W nursing  Needs met prior to leaving room, call button in reach.   Treatment time: 15 minutes     Elfreda Dandy, M.A., [address] [email or web address]  Speech-Language Pathologist      If patient is discharged prior to next treatment, this note will serve as the discharge summary

## 2021-03-15 NOTE — PROGRESS NOTES
Occupational Therapy   Occupational Therapy Initial Assessment and Tx  Date: 3/15/2021   Patient Name: Khalida Painter  MRN: [id number]     : 1934    Date of Service: 3/15/2021    Discharge Recommendations: Khalida Painter scored a 18/24 on the AM-PAC ADL Inpatient form. Current research shows that an AM-PAC score of 18 or greater is typically associated with a discharge to the patient's home setting. Based on the patient's AM-PAC score, and their current ADL deficits, it is recommended that the patient have 2-3 sessions per week of Occupational Therapy at d/c to increase the patient's independence. At this time, this patient demonstrates the endurance and safety to discharge home with (home vs OP services) and a follow up treatment frequency of 2-3x/wk. Please see assessment section for further patient specific details. If patient discharges prior to next session this note will serve as a discharge summary. Please see below for the latest assessment towards goals. Home with Home health OT, S Level 1  OT Equipment Recommendations  Equipment Needed: No    Assessment   Performance deficits / Impairments: Decreased functional mobility ; Decreased ADL status; Decreased cognition;Decreased endurance;Decreased posture  Assessment: Pt from home with grandson, reporting prev ind with fxl mobility, transfers, ADL occassionally using RW. Pt currently requires CGA with fxl mobility and transfers using RW for increased safety and stability. Pt with some asist for ADLs this date, toileting, grooming, dressing. Pt with demo decreased processing and delayed responses at times this date. Following commands with increased time to complete. Pt would benefit from cont skilled OT while in acute care. Will follow. Treatment Diagnosis: impaired mobility, t/f, ADL  Prognosis: Good  Decision Making: Medium Complexity  OT Education: OT Role;Plan of Care;Transfer Training;ADL Adaptive Strategies; Energy Conservation  Patient Education: verb understanding- cont to reinforce  Barriers to Learning: cognition  REQUIRES OT FOLLOW UP: Yes  Activity Tolerance  Activity Tolerance: Patient Tolerated treatment well;Patient limited by fatigue  Safety Devices  Safety Devices in place: Yes  Type of devices: All fall risk precautions in place;Gait belt;Patient at risk for falls;Call light within reach; Chair alarm in place; Left in chair;Nurse notified           Patient Diagnosis(es): The encounter diagnosis was Subdural hematoma ([address] has a past medical history of Facial twitching, Hair loss, Hyperlipidemia, Hypertension, and Seasonal allergies. has a past surgical history that includes Ovary removal; Colonoscopy; Colonoscopy (6/22/15); Hip fracture surgery (Left); and hip surgery (Left, 7/30/2020). Treatment Diagnosis: impaired mobility, t/f, ADL      Restrictions  Position Activity Restriction  Other position/activity restrictions: activity as tolerated    Subjective   General  Chart Reviewed: Yes  Patient assessed for rehabilitation services?: Yes  Additional Pertinent Hx: 80 y.o. female with a baseline dementia, with a past medical history significant for CAD, possible LAD territory infarct, ischemic cardiomyopathy, LV ejection fraction 35%, hypertension, hyperlipidemia, mitral valve regurgitation, MGUS who was admitted to the hospital secondary to possible seizure-like episode. The ER, she was noted to have a left-sided subdural hematoma with mass-effect and 5 mm left-to-right midline shift. Hence, she is being transferred to [facility]. for neurosurgical evaluation.   Referring Practitioner: David Garcias MD  Diagnosis: hematoma  Subjective  Subjective: pt in bed upon arrival, agreeable to therapy session, reporting no pain at this time  Patient Currently in Pain: Yes(L hip with ambulation, not rated, RN aware)  Vital Signs  Temp: 98.3 °F (36.8 °C)  Temp Source: Axillary  Pulse: 51  Heart Rate Source: Monitor  Resp: 16  BP: (!) 109/37  BP Location: Right upper arm  Patient Position: Sitting  Level of Consciousness: Alert (0)  MEWS Score: 1  Patient Currently in Pain: Yes(L hip with ambulation, not rated, RN aware)  Oxygen Therapy  SpO2: 95 %  O2 Device: None (Room air)  Social/Functional History  Social/Functional History  Lives With: (otf)  Type of Home: House  Home Layout: One level  Home Access: Stairs to enter with rails(2 MALIA)  Bathroom Toilet: Standard  Home Equipment: Rolling walker, Reacher  ADL Assistance: Independent(sponge bathes)  Homemaking Assistance: (shares cleaning and laundry with otf; does mostly microwave meals)  Ambulation Assistance: Independent(with rolling walker \"sometimes\")  Transfer Assistance: Independent  Active : (otf takes to appointments)  Leisure & Hobbies: straightening up the house  Additional Comments: 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 Reports had falls PTA - unsure of circumstances. Question reliability - slow to respond to questions at times       Objective        Orientation  Overall Orientation Status: Within Functional Limits     Balance  Sitting Balance: Stand by assistance  Standing Balance: Contact guard assistance  Standing Balance  Time: ~7-10 min  Activity: to and from bathroom, mobility in room-to hallway, stance at sink grooming  Functional Mobility  Functional - Mobility Device: Rolling Walker  Activity: To/from bathroom; Other  Assist Level: Contact guard assistance  Functional Mobility Comments: Pt CGA with fxl mobility, fairly steady gait, fatigues quickly, no LOB noted  Toilet Transfers  Toilet - Technique: Ambulating  Equipment Used: Standard toilet  Toilet Transfer: Contact guard assistance  ADL  Grooming: Contact guard assistance;Minimal assistance(stance at sink, CGA/SBA wash hands and face, Benitez to comb hair)  LE Dressing:  Moderate assistance;Minimal assistance;Stand by assistance(Jaki weaver on commodeirving sock with spvn, irving left sock with Benitez)  Toileting: Stand by assistance;Contact guard assistance        Bed mobility  Supine to Sit: Stand by assistance  Scooting: Stand by assistance  Transfers  Sit to stand: Contact guard assistance  Stand to sit: Contact guard assistance     Cognition  Overall Cognitive Status: Exceptions  Arousal/Alertness: Delayed responses to stimuli  Following Commands: Follows one step commands with increased time; Follows one step commands with repetition  Attention Span: Attends with cues to redirect  Initiation: Requires cues for some  Sequencing: Requires cues for some  Cognition Comment: decreased processing, delayed responses                 LUE AROM (degrees)  LUE AROM : WFL  Left Hand AROM (degrees)  Left Hand AROM: WFL  RUE AROM (degrees)  RUE AROM : WFL  Right Hand AROM (degrees)  Right Hand AROM: WFL  LUE Strength  Gross LUE Strength: WFL  RUE Strength  Gross RUE Strength: WFL                   Plan   Plan  Times per week: 5-7  Times per day: Daily      AM-PAC Score        AM-[facility] Inpatient Daily Activity Raw Score: 18 (03/15/21 1212)  AM-PAC Inpatient ADL T-Scale Score : 38.66 (03/15/21 1212)  ADL Inpatient CMS 0-100% Score: 46.65 (03/15/21 1212)  ADL Inpatient CMS G-Code Modifier : CK (03/15/21 1212)    Goals  Short term goals  Time Frame for Short term goals: d/c  Short term goal 1: pt will be SBA with fxl mobility  Short term goal 2: pt will be SBA with fxl transfers  Short term goal 3: pt will be spvn with toileting  Short term goal 4: pt will be spvn with grooming tasks in stance at the sink  Short term goal 5: pt will be Karthikeyan with LB dressing  Patient Goals   Patient goals : not stated       Therapy Time   Individual Concurrent Group Co-treatment   Time In 1015         Time Out 1055         Minutes 40           Timed Code Treatment Minutes:   30    Total Treatment Minutes:  40    ADINA Bowles, OTR/L

## 2021-03-15 NOTE — DISCHARGE INSTR - COC
Continuity of Care Form    Patient Name: Rome Esquivel   :  1934  MRN:  [id number]    Admit date:  3/12/2021  Discharge date:  ***    Code Status Order: Limited   Advance Directives:      Admitting Physician:  Amber Wheeler MD  PCP: Ac Lee MD    Discharging Nurse: [facility] Unit/Room#: 6468/8366-15  Discharging Unit Phone Number: ***    Emergency Contact:   Extended Emergency Contact Information  Primary Emergency Contact: Kevin Nicolas  Address: [address] [place] [address] Phone: [phone]  Relation: Grandchild  Secondary Emergency Contact: [address] [place] [address] Phone: [phone]  Mobile Phone: [phone]  Relation: Child    Past Surgical History:  Past Surgical History:   Procedure Laterality Date    COLONOSCOPY      COLONOSCOPY  6/22/15    diverticulosis    HIP FRACTURE SURGERY Left     LEFT HIP HEMIARTHROPLASTY     HIP SURGERY Left 2020    LEFT HIP HEMIARTHROPLASTY performed by Anjel Pérez MD at [address] [place] [address]         Immunization History:   Immunization History   Administered Date(s) Administered    Pneumococcal Polysaccharide ([address]) 2012       Active Problems:  Patient Active Problem List   Diagnosis Code    Pneumonia J18.9    Closed displaced fracture of left femoral neck ([facility] [address] S72.002A    MGUS (monoclonal gammopathy of unknown significance) D47.2    Benign essential hypertension I10    Aortic insufficiency I35.1    Clonic hemifacial spasm, 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    GEORGES (generalized anxiety disorder) F41.1    Gastroesophageal reflux disease without esophagitis K21.9    Mixed hyperlipidemia E78.2    NSTEMI (non-ST elevated myocardial infarction) ([facility] [address] I21.4    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 ([facility] [address] F10.27    Confusion R41.0    Ischemic cardiomyopathy I25.5    Subdural hematoma ([facility] [address] S06.5X9A    Abnormal EKG R94.31    CAD (coronary artery disease) I25.10       Isolation/Infection:   Isolation            No Isolation          Patient Infection Status       Infection Onset Added Last Indicated Last Indicated By Review Planned Expiration Resolved Resolved By    None active    Resolved    COVID-19 Rule Out 03/04/21 03/04/21 03/04/21 COVID-19 (Ordered)   03/04/21 Rule-Out Test Resulted    COVID-19 Rule Out 12/25/20 12/25/20 12/25/20 COVID-19 (Ordered)   12/25/20 Rule-Out Test Resulted    COVID-19 Rule Out 12/22/20 12/22/20 12/22/20 COVID-19 (Ordered)   12/23/20 Rule-Out Test Resulted    COVID-19 Rule Out 12/22/20 12/22/20 12/22/20 COVID-19 (Ordered)   12/22/20 Rule-Out Test Resulted    COVID-19 Rule Out 07/30/20 07/30/20 07/30/20 COVID-19 (Ordered)   07/30/20 Rule-Out Test Resulted            Nurse Assessment:  Last Vital Signs: BP (!) 109/37   Pulse 51   Temp 98.3 °F (36.8 °C) (Axillary)   Resp 16   Ht 5' 2\" (1.575 m)   SpO2 95%   BMI 20.12 kg/m²     Last documented pain score (0-10 scale): Pain Level: 0  Last Weight:   Wt Readings from Last 1 Encounters:   03/12/21 110 lb (49.9 kg)     Mental Status:  {IP PT MENTAL STATUS:20030:::0}    IV Access:  { MICHA IV ACCESS:258835744:::0}    Nursing Mobility/ADLs:  Walking   {CHP DME ADLs:658329407:::0}  Transfer  {CHP DME ADLs:590901593:::0}  Bathing  {CHP DME ADLs:355397927:::0}  Dressing  {CHP DME ADLs:244639868:::0}  Toileting  {CHP DME ADLs:823787704:::0}  Feeding  {CHP DME ADLs:326534791:::0}  Med Admin  {P DME ADLs:937272856:::0}  Med Delivery   { MICHA MED Delivery:518455351:::0}    Wound Care Documentation and Therapy:  Wound 03/12/21 Perineum Stage 1 (Active)   Wound Etiology Pressure Stage  1 03/15/21 0800   Dressing/Treatment Open to air 03/15/21 0800   Wound Assessment Non-blanchable erythema 03/15/21 0310   Drainage Amount None 03/15/21 0800   Ginna-wound Assessment Intact 03/15/21 0310   Number of days: 2        Elimination:  Continence: · Bowel: {YES / CW:57772}  · Bladder: {YES / XV:31675}  Urinary Catheter: {Urinary Catheter:793090686:::0}   Colostomy/Ileostomy/Ileal Conduit: {YES / OH:16258}       Date of Last BM: ***    Intake/Output Summary (Last 24 hours) at 3/15/2021 1209  Last data filed at 3/15/2021 0800  Gross per 24 hour   Intake 720 ml   Output 700 ml   Net 20 ml     I/O last 3 completed shifts:   In: 5 [P.O.:720]  Out: 700 [Urine:700]    Safety Concerns:     [address] [employer] Safety Concerns:548878171:::0}    Impairments/Disabilities:      [address] [employer] Impairments/Disabilities:144063849:::0}    Nutrition Therapy:  Current Nutrition Therapy:   [facility] Diet List:107573632:::0}    Routes of Feeding: {[facility] DME Other Feedings:802640005:::0}  Liquids: {Slp liquid thickness:84920}  Daily Fluid Restriction: {CHP DME Yes amt example:716094617:::0}  Last Modified Barium Swallow with Video (Video Swallowing Test): {Done Not Done KGQB:141539842:::2}    Treatments at the Time of Hospital Discharge:   Respiratory Treatments: ***  Oxygen Therapy:  {Therapy; copd oxygen:08512:::0}  Ventilator:    {[facility] Vent List:748467197:::0}    Rehab Therapies: {THERAPEUTIC INTERVENTION:5308985035}  Weight Bearing Status/Restrictions: [address] [employer]  Weight Bearin:::0}  Other Medical Equipment (for information only, NOT a DME order):  {EQUIPMENT:919483476}  Other Treatments: ***    Patient's personal belongings (please select all that are sent with patient):  {[facility] DME Belongings:968416046:::0}    RN SIGNATURE:  {Esignature:868631924:::0}    CASE MANAGEMENT/SOCIAL WORK SECTION    Inpatient Status Date: 3/12/2021    Readmission Risk Assessment Score:  Readmission Risk              Risk of Unplanned Readmission:        17       Discharging to Facility/ Agency     Alternate [address] [place] [address]       Phone: [phone]       Fax: [phone]            / signature: Electronically signed by Shakeel Patel Jairo Kanner, RN on 3/15/21 at 12:59 PM EDT    PHYSICIAN SECTION    Prognosis: Fair    Condition at Discharge: Stable    Rehab Potential (if transferring to Rehab): N/A    Recommended Labs or Other Treatments After Discharge:   Physical therapy  Occupational therapy  Speech therapy  Medications check for safety  Vitals check  Follow up with primary care, cardiology, neurology and neuro surgery  No ASA or Plavix x 14 days    Physician Certification: I certify the above information and transfer of Ed Jews  is necessary for the continuing treatment of the diagnosis listed and that she requires [address] for less 30 days.      Update Admission H&P: No change in H&P    PHYSICIAN SIGNATURE:  Electronically signed by Riaz Lemon MD on 3/15/21 at 12:18 PM EDT

## 2021-03-15 NOTE — PROGRESS NOTES
Physical Therapy    [facility]  Initial Assessment and Treatment    NAME: Mabel Hi  : 1934  MRN: [id number]    Date of Service: 3/15/2021    Discharge Recommendations:    Mabel Hi scored a 18/24 on the AM-PAC short mobility form. Current research shows that an AM-PAC score of 18 or greater is typically associated with a discharge to the patient's home setting. Based on the patient's AM-PAC score and their current functional mobility deficits, it is recommended that the patient have 2-3 sessions per week of Physical Therapy at d/c to increase the patient's independence. At this time, this patient demonstrates the endurance and safety to discharge home with home PT and a follow up treatment frequency of 2-3x/wk. Please see assessment section for further patient specific details. If patient discharges prior to next session this note will serve as a discharge summary. Please see below for the latest assessment towards goals. PT Equipment Recommendations  Equipment Needed: No    Assessment   Body structures, Functions, Activity limitations: Decreased functional mobility ; Decreased endurance  Assessment: Pt with slightly decreased independent mobility from baseline. Pt reports normally independent with ambulation with walker \"sometimes. \"  Currently needing CGA for transfers/gt. Slow to respond to questions - question difficulty with processing? At mild risk for falls. Pt plans to return home with grandson - reports he is able to assist . Rec cont skilled PT to maximize mobility and independence  Treatment Diagnosis: impaired functional mobility 2/2 decreased endurance  Decision Making: Low Complexity  PT Education: Goals;PT Role;Plan of Care;General Safety; Functional Mobility Training  Patient Education: Pt verbalized understanding - will likely need reinforcement  REQUIRES PT FOLLOW UP: Yes       Patient Diagnosis(es): The encounter diagnosis was Subdural hematoma ([facility] [address] has a past medical history of Facial twitching, Hair loss, Hyperlipidemia, Hypertension, and Seasonal allergies. has a past surgical history that includes Ovary removal; Colonoscopy; Colonoscopy (6/22/15); Hip fracture surgery (Left); and hip surgery (Left, 7/30/2020). Restrictions  Position Activity Restriction  Other position/activity restrictions: activity as tolerated  Vision/Hearing  Vision: Within Functional Limits(reading glasses)  Hearing: Exceptions to [facility]  Hearing Exceptions: Hard of hearing/hearing concerns     Subjective  General  Chart Reviewed: Yes  Patient assessed for rehabilitation services?: Yes  Additional Pertinent Hx: Pt is an 80 y.o. female adm 3/12 with subdural hematoma. Pt [facility] ED complaining of possible sz. Pt found unresponsive by grandson. Grandson states patient was on couch, he was talking to her and when she did not respond he got up and went over to her. She did not respond to him right away and seemed to have some shaking. This lasted less than a minute. Head CT: subdural hematoma with 5 mm of shift. Transferred to [facility] for further management. Repeat head CT:Stable left subdural hematoma with mild mass effect and mild midline shift   PMH:hyperlipidemia, HTN, L THR 7/2020  Referring Practitioner: Ronelle Dakin, MD  Referral Date : 03/13/21  Subjective  Subjective: Pt found supine. Agreeable to PT.   Slow to respond to questions at times  Pain Screening  Patient Currently in Pain: Yes(L hip with ambulation, not rated, RN aware)  Vital Signs  Patient Currently in Pain: Yes(L hip with ambulation, not rated, RN aware)       Orientation  Orientation  Overall Orientation Status: Within Functional Limits  Social/Functional History  Social/Functional History  Lives With: (grandson)  Type of Home: House  Home Layout: One level  Home Access: Stairs to enter with rails(2 MALIA)  Bathroom Toilet: Standard  Home Equipment: Rolling walker, Reacher  ADL Assistance: Independent(sponge bathes)  Homemaking Assistance: (shares cleaning and laundry with otf; does mostly microwave meals)  Ambulation Assistance: Independent(with rolling walker \"sometimes\")  Transfer Assistance: Independent  Active : (otf takes to appointments)  Leisure & Hobbies: straightening up the house  Additional Comments: 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 Reports had falls PTA - unsure of circumstances.   Question reliability - slow to respond to questions at times  Cognition        Objective          AROM RLE (degrees)  RLE AROM: WFL  AROM LLE (degrees)  LLE AROM : WFL  Strength RLE  Strength RLE: WFL  Strength LLE  Strength LLE: WFL        Bed mobility  Supine to Sit: Stand by assistance(HOB elevated with rail, increased time and effort to complete)  Transfers  Sit to Stand: Contact guard assistance(from bed and chair, cues for hand placement)  Stand to sit: Contact guard assistance  Ambulation  Ambulation?: Yes  Ambulation 1  Device: Rolling Walker  Assistance: Contact guard assistance  Quality of Gait: trunk slightly flexed, decreased stance time LLE, fairly steady with walker  Distance: 16', 25', 60'          Treatment included: bed mobility, transfers, gt, pt education      Plan   Plan  Times per week: 5-7  Current Treatment Recommendations: Functional Mobility Training, Gait Training, Stair training, Patient/Caregiver Education & Training, Safety Education & Training  Safety Devices  Type of devices: Nurse notified, Chair alarm in place, Call light within reach, Left in chair    G-Code       OutComes Score                                                  AM-PAC Score  AM-PAC Inpatient Mobility Raw Score : 18 (03/15/21 1037)  AM-PAC Inpatient T-Scale Score : 43.63 (03/15/21 1037)  Mobility Inpatient CMS 0-100% Score: 46.58 (03/15/21 1037)  Mobility Inpatient CMS G-Code Modifier : CK (03/15/21 1037)          Goals  Short term goals  Time Frame for Short term goals: By discharge  Short term goal 1: Sup to sit supervision with bed flat  Short term goal 2: Pt will transfer sit to stand supervision  Short term goal 3: Pt will amb >100' with LRAD supervision  Short term goal 4: Pt will up/down 2 steps with rail and LRAD SBA       Therapy Time   Individual Concurrent Group Co-treatment   Time In 1014         Time Out 1053         Minutes 39               Timed Code Treatment Minutes: 24      Total Treatment Minutes:  [address], PT

## 2021-03-15 NOTE — PROGRESS NOTES
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 ([address] 2020    Confusion     Closed displaced fracture of left femoral neck ([address] 2020    MGUS (monoclonal gammopathy of unknown significance) 2020    Mixed hyperlipidemia 2020    Clonic hemifacial spasm, bilateral 2019    Gastroesophageal reflux disease without esophagitis 2019    GEORGES (generalized anxiety disorder) 2018    Benign essential hypertension 2014    Aortic insufficiency 2013    Pneumonia 2012     Allergies: Allergies   Allergen Reactions    Quinidine Hives    Aminoglycosides      Pt unsure    Levofloxacin      unknown    Neomycin      unknown    Simvastatin      Unknown to pt         CXR (3/12/2021)-      Impression   Moderate pulmonary interstitial edema.       Bibasilar ill-defined consolidation consistent with overlying alveolar edema   versus pneumonia.       Mild-to-moderate cardiomegaly. Previous MBS None    Chart reviewed. Medical Diagnosis: SDH  Treatment Diagnosis: Oropharyngeal dysphagia    BSE Impression (date)-  Dysphagia Impression : Pt initially unable to accept PO declining verbally and by pushing PO away. Pt intermittently unaware of utensil in mouth or straw in mouth which appeared related to confusion. The patient was never able to bite bolus off from a solid despite multiple attempts.  Slow masticaiton with some holding but responsive to verbal cues to continue with PO intake occasionally required discharged prior to next treatment, this note will serve as the discharge summary

## 2021-03-15 NOTE — PROGRESS NOTES
Subjective:      Patient ID: Elijah Gillette is a 80 y.o. female    No chief complaint on file. Chief Complaint: Seizure like activity    Reason for consult: EKG changes    Interval History:  Denies any active chest pain, sob, palpitations. Discussed case with RN. Just had ECHO completed and results reviewed. HPI:  The patient is a 80 y.o. female with hypertension, mitral valve regurgitation, aortic valve insufficiency, hyperlipidemia, MGUS, dementia, iron def anemia who presented to [facility] with possible seizure-like episode. History mostly obtained from the ER provider. Patient unable to provide history. Patient was found unresponsive by grandson when he was talking to her and patient seem to have some shaking episode. This lasted less than 1 minute. She was admitted in [facility] around [place] when she had NSTEMI, she was seen by cardiology at that time, no intervention was done at that time. Patient presented to [facility] ER on 3/3 with shortness of breath fatigue and possible pneumonia and was discharged on p.o. antibioticsED work-up:-CT head showed soft left-sided subdural hematoma with associated mass-effect with 5 mm left-to-right midline shift. She had another episode of seizure  Like activity at [facility] ER, she was given Ativan, loading with 1 gram Keppra  He was transferred to the [facility] hospital for neurosurgical evaluation. Cardiology was consulted for abnormal change in EKG. Allergies   Allergen Reactions    Quinidine Hives    Aminoglycosides      Pt unsure    Levofloxacin      unknown    Neomycin      unknown    Simvastatin      Unknown to pt       Social History     Socioeconomic History    Marital status:       Spouse name: Not on file    Number of children: Not on file    Years of education: Not on file    Highest education level: Not on file   Occupational History    Not on file   Social Needs    Financial resource strain: Not on file    Food insecurity Worry: Not on file     Inability: Not on file    Transportation needs     Medical: Not on file     Non-medical: Not on file   Tobacco Use    Smoking status: Never Smoker    Smokeless tobacco: Never Used   Substance and Sexual Activity    Alcohol use: No    Drug use: No    Sexual activity: Not on file   Lifestyle    Physical activity     Days per week: Not on file     Minutes per session: Not on file    Stress: Not on file   Relationships    Social connections     Talks on phone: Not on file     Gets together: Not on file     Attends Buddhism service: Not on file     Active member of club or organization: Not on file     Attends meetings of clubs or organizations: Not on file     Relationship status: Not on file    Intimate partner violence     Fear of current or ex partner: Not on file     Emotionally abused: Not on file     Physically abused: Not on file     Forced sexual activity: Not on file   Other Topics Concern    Not on file   Social History Narrative    Not on file       Family History   Problem Relation Age of Onset    Other Father         \"black Lung\"        has a past medical history of Facial twitching, Hair loss, Hyperlipidemia, Hypertension, and Seasonal allergies. Review of Systems    Vitals:    03/15/21 0650   BP: 135/62   Pulse: 63   Resp: 16   Temp: 97.7 °F (36.5 °C)   SpO2: 96%       Objective:   Physical Exam  Vitals signs reviewed. Constitutional:       Appearance: Normal appearance. HENT:      Head: Normocephalic. Mouth/Throat:      Mouth: Mucous membranes are moist.   Eyes:      Pupils: Pupils are equal, round, and reactive to light. Cardiovascular:      Rate and Rhythm: Normal rate and regular rhythm. Heart sounds: Murmur present. Pulmonary:      Effort: Pulmonary effort is normal.   Abdominal:      Palpations: Abdomen is soft. Musculoskeletal:      Right lower leg: No edema. Left lower leg: No edema. Skin:     General: Skin is warm. CAD  2. Ischemic cardiomyopathy, LV ejection fraction 30 to 35%  3. Hyperlipidemia  4. Hypertension  5. Subdural hematoma, with midline shift  6. Dementia       Plan:     1. CAD  -  interested in any kind of invasive treatment per Cardiology note in 7/2020  - EKG with new onset T wave changes in lateral precordial leads in setting of SDH  - No chest pain/pressure. Not interested in invasive procedure per primary Cardiology note in 7/2020  - Troponin -ve x2  - ASA and Plavix on hold in setting of subdural hematoma with midline shift  - continue GDMT with ACEi, BB, statin    2. Ischemic cardiomyopathy (LV EF 30-35% in 12/2020)  - she is being treated conservatively with medical management due to poor functional capacity and baseline dementia. - cont optimal medical therapy: BB, ACEi, statin  - EKG with new onset T wave changes in lateral precordial leads likely as result of SDH  - Not currently on telemetry. No active chest pain/pressure. Negative troponin x2  - TTE 3/15/21 with EF 55%. No hypokinesis visualized. Severely dilated left atrium     3. HLD  - continue statin     4. HTN  - Toprol Xl recently increased to 50 mg daily. Pressures better controlled  - conitnue current regimen    5. Subdural hematoma w/ midline shift  - continue to hold ASA and Plavix  - Neurosurgery on case    6. Dementia    - per primary team      Discussed case with Dr. Jennifer Cordova MD  PGY1 Internal Medicine Resident      Staff Note      Patient seen and evaluated with the medical resident. I was physically present during the critical portions of the service when performed by the resident including the assessment and management of the patient. I agree with the findings and plans as described.

## 2021-03-18 NOTE — PROGRESS NOTES
Physician Progress Note      Heaven Joe  CSN #:                  [id number]  :                       1934  ADMIT DATE:       3/12/2021 1:12 PM  [address] DATE:        3/15/2021 5:50 PM  RESPONDING  PROVIDER #:        Guanako Marks          QUERY TEXT:    Pt admitted with SDH. Pt noted to have mass effect and midline shift. If   clinically significant, please document in progress notes and discharge   summary if you are evaluating/treating any of the following: The medical record reflects the following:  Risk Factors: SDH, HTN  Clinical Indicators: CT head showed soft left-sided subdural hematoma with   associated mass-effect with 5 mm left-to-right midline shift. H&P: SDH with midline shift, etiology unclear, likely traumatic she has   history of frequent falls  NEUROSURGERY: CT head revealed have left-sided subdural hematoma with   associated mass effect and left to right midline shift  Treatment: Ativan, Keppra, CT, Neuro checks every 4 hours, Maintain SBP >160,   Keep PLT >100k & INR > 1.4, Hold anticoagulation and antiplatelet for 2 weeks,   Keep NA wnl, HOB elevated, NO dextrose in IVF's or in IV drips, cEEG,   neurosurgery and neurology consults with recommendations for treatment. Options provided:  -- Brain compression  -- Other - I will add my own diagnosis  -- Disagree - Not applicable / Not valid  -- Disagree - Clinically unable to determine / Unknown  -- Refer to Clinical Documentation Reviewer    PROVIDER RESPONSE TEXT:    This patient has brain compression.     Query created by: Eva Breaux on 3/16/2021 6:45 AM      Electronically signed by:  Guanako Marks 3/18/2021 8:40 AM

## 2021-03-20 NOTE — ED PROVIDER NOTES
[address] ED  EMERGENCY DEPARTMENT ENCOUNTER        Pt Name: Mabel Hi  MRN: [id number]  [place] 1934  Date of evaluation: 3/20/2021  Provider: Gerard Sanchez MD  PCP: Arun Rush MD  ED Attending: Gerard Sanchez MD    CHIEF COMPLAINT       Chief Complaint   Patient presents with    Altered Mental Status     recent fall with bleeding in brain    Urinary Tract Infection       HISTORY OF PRESENT ILLNESS   (Location/Symptom, Timing/Onset, Context/Setting, Quality, Duration, Modifying Factors, Severity)  Note limiting factors. Mabel Hi is a 80 y.o. female who presents to the emergency department with some increased confusion the last couple of days. She apparently has also been complaining of some left-sided back pain. Patient has some underlying dementia. She apparently fell approximately a week or 10 days ago suffering a small subdural hematoma. She was admitted at [facility] and evaluated for this along with chest pain. Prior to that her fall she had been apparently diagnosed with urinary tract infection. Patient was brought in by grandson who lives with her secondary to the back pain primarily. Patient is unable to describe it further. She points to her left flank. She is unable to tell me whether anything seems to make it better or worse. She at times complains of her abdomen hurting as well. History is obtained from the patient, grandson, review of previous discharge summary. REVIEW OF SYSTEMS    (2-9 systems for level 4, 10 or more for level 5)     Review of Systems   Constitutional: Negative for chills and fever. HENT: Negative for congestion and sore throat. Eyes: Negative for discharge. Respiratory: Negative for cough and shortness of breath. Cardiovascular: Negative for chest pain. Gastrointestinal: Negative for abdominal pain, diarrhea, nausea and vomiting. Endocrine: Negative for polydipsia. Genitourinary: Negative for dysuria and urgency. Musculoskeletal: Positive for back pain. Negative for myalgias. Skin: Negative for rash. Neurological: Negative for weakness and headaches. Hematological: Does not bruise/bleed easily. Psychiatric/Behavioral: Positive for confusion. Positives and Pertinent negatives as per HPI. Except as noted above in the ROS, all other systems were reviewed and negative. PAST MEDICAL HISTORY     Past Medical History:   Diagnosis Date    Facial twitching     Hair loss     wears wig- unknown cause    Hyperlipidemia     Hypertension     Seasonal allergies          SURGICAL HISTORY     Past Surgical History:   Procedure Laterality Date    COLONOSCOPY      COLONOSCOPY  6/22/15    diverticulosis    HIP FRACTURE SURGERY Left     LEFT HIP HEMIARTHROPLASTY     HIP SURGERY Left 7/30/2020    LEFT HIP [facility]ARTHR[facility] [address]       Discharge Medication List as of 3/20/2021  7:44 PM      CONTINUE these medications which have NOT CHANGED    Details   levETIRAcetam (KEPPRA) 1000 MG tablet Take 1 tablet by mouth 2 times daily, Disp-60 tablet, R-3Normal      ALPRAZolam (XANAX) 0.25 MG tablet Take 0.125 mg by mouth 2 times daily. Historical Med      lisinopril (PRINIVIL;ZESTRIL) 20 MG tablet Take 40 mg by mouth dailyHistorical Med      ferrous sulfate (IRON 325) 325 (65 Fe) MG tablet Take 325 mg by mouth daily (with breakfast)Historical Med      Cyanocobalamin 2500 MCG SUBL Place 2,500 mcg under the tongue dailyHistorical Med      metoprolol succinate (TOPROL XL) 25 MG extended release tablet Take 1 tablet by mouth daily, Disp-30 tablet, R-3Print      atorvastatin (LIPITOR) 80 MG tablet Take 1 tablet by mouth nightly, Disp-30 tablet, R-3Print      vitamin D (CHOLECALCIFEROL) 25 MCG (1000 UT) TABS tablet Take 1,000 Units by mouth dailyHistorical Med      Loratadine (CLARITIN PO) Take 10 mg by mouth daily Historical Med      ezetimibe (ZETIA) 10 MG tablet Take 10 mg by mouth nightly Historical Med               ALLERGIES     Quinidine, Aminoglycosides, Levofloxacin, Neomycin, and Simvastatin    FAMILYHISTORY       Family History   Problem Relation Age of Onset    Other Father         \"black Lung\"          SOCIAL HISTORY       Social History     Socioeconomic History    Marital status:      Spouse name: None    Number of children: None    Years of education: None    Highest education level: None   Occupational History    None   Social Needs    Financial resource strain: None    Food insecurity     Worry: None     Inability: None    Transportation needs     Medical: None     Non-medical: None   Tobacco Use    Smoking status: Never Smoker    Smokeless tobacco: Never Used   Substance and Sexual Activity    Alcohol use: No    Drug use: No    Sexual activity: None   Lifestyle    Physical activity     Days per week: None     Minutes per session: None    Stress: None   Relationships    Social connections     Talks on phone: None     Gets together: None     Attends Christianity service: None     Active member of club or organization: None     Attends meetings of clubs or organizations: None     Relationship status: None    Intimate partner violence     Fear of current or ex partner: None     Emotionally abused: None     Physically abused: None     Forced sexual activity: None   Other Topics Concern    None   Social History Narrative    None       SCREENINGS   NIH Stroke Scale  NIH Stroke Scale Assessed: Yes  Interval: Baseline  Level of Consciousness (1a. ): Alert  LOC Questions (1b. ):  Answers both correctly  LOC Commands (1c. ): Performs both tasks correctly  Best Gaze (2. ): Normal  Visual (3. ): No visual loss  Facial Palsy (4. ): Normal symmetrical movement  Motor Arm, Left (5a. ): No drift  Motor Arm, Right (5b. ): No drift  Motor Leg, Left (6a. ): No drift  Motor Leg, Right (6b. ): No drift  Limb Ataxia (7. ): Absent  Sensory (8. ): Normal  Best Language (9. ): No aphasia  Dysarthria (10. ): Normal  Extinction and Inattention (11): No abnormality  Total: 0Glasgow Coma Scale  Eye Opening: Spontaneous  Best Verbal Response: Confused  Best Motor Response: Obeys commands  Shoaib Coma Scale Score: 14        PHYSICAL EXAM    (up to 7 for level 4, 8 or more for level 5)     ED Triage Vitals [03/20/21 1444]   BP Temp Temp Source Pulse Resp SpO2 Height Weight   -- 97.8 °F (36.6 °C) Oral 62 -- -- 5' 6\" (1.676 m) 110 lb (49.9 kg)       Physical Exam  Vitals signs and nursing note reviewed. Constitutional:       General: She is not in acute distress. Appearance: She is well-developed. HENT:      Head: Normocephalic and atraumatic. Right Ear: External ear normal.      Left Ear: External ear normal.      Nose: Nose normal.      Mouth/Throat:      Pharynx: No oropharyngeal exudate. Eyes:      General: No visual field deficit. Extraocular Movements: Extraocular movements intact. Conjunctiva/sclera: Conjunctivae normal.   Neck:      Musculoskeletal: Normal range of motion and neck supple. Cardiovascular:      Rate and Rhythm: Normal rate and regular rhythm. Heart sounds: Normal heart sounds. No murmur. No friction rub. No gallop. Pulmonary:      Effort: Pulmonary effort is normal. No respiratory distress. Breath sounds: Normal breath sounds. No wheezing. Abdominal:      General: Bowel sounds are normal. There is no distension. Palpations: Abdomen is soft. Tenderness: There is no abdominal tenderness. There is no guarding or rebound. Musculoskeletal: Normal range of motion. General: No tenderness. Lymphadenopathy:      Cervical: No cervical adenopathy. Skin:     General: Skin is warm and dry. Capillary Refill: Capillary refill takes less than 2 seconds. Findings: No rash.    Neurological:      Mental Status: She is alert and oriented to person, place, and time. GCS: GCS eye subscore is 4. GCS verbal subscore is 5. GCS motor subscore is 6. Cranial Nerves: No cranial nerve deficit, dysarthria or facial asymmetry. Sensory: No sensory deficit. Motor: No weakness. Coordination: Coordination normal.      Comments: Please see NIH stroke score. Patient appears to have some cognitive decline and takes a little bit to answer questions however eventually does come with a correct answer. Psychiatric:         Attention and Perception: Attention and perception normal.         Mood and Affect: Mood and affect normal.         Speech: Speech normal.         Behavior: Behavior normal. Behavior is cooperative. Cognition and Memory: Cognition is impaired. Memory is impaired.          DIAGNOSTIC RESULTS   LABS:    Results for orders placed or performed during the hospital encounter of 03/20/21   CBC Auto Differential   Result Value Ref Range    WBC 5.2 4.0 - 11.0 K/uL    RBC 2.93 (L) 4.00 - 5.20 M/uL    Hemoglobin 9.2 (L) 12.0 - 16.0 g/dL    Hematocrit 27.6 (L) 36.0 - 48.0 %    MCV 94.4 80.0 - 100.0 fL    MCH 31.4 26.0 - 34.0 pg    MCHC 33.2 31.0 - 36.0 g/dL    RDW 14.9 12.4 - 15.4 %    Platelets 304 407 - 046 K/uL    MPV 6.9 5.0 - 10.5 fL    Neutrophils % 69.1 %    Lymphocytes % 17.6 %    Monocytes % 11.5 %    Eosinophils % 0.9 %    Basophils % 0.9 %    Neutrophils Absolute 3.6 1.7 - 7.7 K/uL    Lymphocytes Absolute 0.9 (L) 1.0 - 5.1 K/uL    Monocytes Absolute 0.6 0.0 - 1.3 K/uL    Eosinophils Absolute 0.0 0.0 - 0.6 K/uL    Basophils Absolute 0.0 0.0 - 0.2 K/uL   Comprehensive Metabolic Panel w/ Reflex to MG   Result Value Ref Range    Sodium 137 136 - 145 mmol/L    Potassium reflex Magnesium 4.1 3.5 - 5.1 mmol/L    Chloride 103 99 - 110 mmol/L    CO2 26 21 - 32 mmol/L    Anion Gap 8 3 - 16    Glucose 114 (H) 70 - 99 mg/dL    BUN 20 7 - 20 mg/dL    CREATININE 0.8 0.6 - 1.2 mg/dL    GFR Non-African American >60 >60    GFR  American >60 >60    Calcium 9.5 8.3 - 10.6 mg/dL    Total Protein 7.4 6.4 - 8.2 g/dL    Albumin 4.2 3.4 - 5.0 g/dL    Albumin/Globulin Ratio 1.3 1.1 - 2.2    Total Bilirubin 0.6 0.0 - 1.0 mg/dL    Alkaline Phosphatase 85 40 - 129 U/L    ALT <5 (L) 10 - 40 U/L    AST 15 15 - 37 U/L    Globulin 3.2 g/dL   Urinalysis, reflex to microscopic   Result Value Ref Range    Color, UA Yellow Straw/Yellow    Clarity, UA Clear Clear    Glucose, Ur Negative Negative mg/dL    Bilirubin Urine Negative Negative    Ketones, Urine Negative Negative mg/dL    Specific Gravity, UA 1.025 1.005 - 1.030    Blood, Urine MODERATE (A) Negative    pH, UA 6.0 5.0 - 8.0    Protein, UA 30 (A) Negative mg/dL    Urobilinogen, Urine 0.2 <2.0 E.U./dL    Nitrite, Urine Negative Negative    Leukocyte Esterase, Urine Negative Negative    Microscopic Examination YES     Urine Type NotGiven    Microscopic Urinalysis   Result Value Ref Range    WBC, UA 3-5 0 - 5 /HPF    RBC, UA 11-20 (A) 0 - 4 /HPF    Epithelial Cells, UA 0-1 0 - 5 /HPF   EKG 12 Lead   Result Value Ref Range    Ventricular Rate 62 BPM    Atrial Rate 62 BPM    P-R Interval 146 ms    QRS Duration 114 ms    Q-T Interval 462 ms    QTc Calculation (Bazett) 468 ms    P Axis 93 degrees    R Axis -62 degrees    T Axis 109 degrees    Diagnosis       Sinus rhythm with marked sinus arrhythmiaLeft axis deviationLeft ventricular hypertrophy with repolarization abnormalitySeptal infarct (cited on or before 09-MAY-2008)Abnormal ECGWhen compared with ECG of 12-MAR-2021 13:42,Premature atrial complexes are no longer PresentT wave inversion less evident in Lateral leads       All other labs were within normal range ornot returned as of this dictation. EKG: All EKG's are interpreted by the Emergency Department Physician who either signs or Co-signs this chart in the absence of a cardiologist.  Please see their note for interpretation of EKG.     EKG Interpretation    Interpreted by emergency department physician    Rhythm: normal sinus  and sinus arrhythmia  Rate: normal  Axis: left  Ectopy: none  Conduction: normal  ST Segments: normal  T Waves: normal  Q Waves: septal    Clinical Impression: sinus arrhythmia, left axis deviation, possible LVH, septal infarct. RADIOLOGY:   Non-plain film images such as CT, Ultrasound and MRI are read by the radiologist.  Plain radiographic images are visualized and preliminarily interpreted by the ED Provider with the belowfindings:    Interpretation per the Radiologist below, if available at the time of this note:    CT ABDOMEN PELVIS WO CONTRAST Additional Contrast? None   Final Result   Unremarkable unenhanced CT examination of the abdomen and pelvis, without   finding to account for the patient's symptoms. CT Head WO Contrast   Final Result   Interval increase size of the left hemispheric convexity subdural hematoma   which now contains acute and subacute components and now measures 1.9 cm in   greatest thickness (previously 1.2 cm). There is persistent mass effect with   left-to-right midline shift of 8-9 mm (previously 5-6 mm). Results were called to Maron Cooks on 3/20/2021 at 14:39. XR CHEST (2 VW)   Final Result   Clear lungs. Cardiomegaly. No acute cardiopulmonary abnormality. PROCEDURES   Unless otherwise noted below, none     Procedures    CRITICAL CARE TIME   Total critical care time today provided was 41 minutes. This excludes seperately billable procedures and family discussion time. Provided for acute intracranial hemorrhage requiring intervention with concern for potential decompensation.       CONSULTS:  IP CONSULT TO NEUROSURGERY      EMERGENCY DEPARTMENT COURSE and DIFFERENTIAL DIAGNOSIS/MDM:   Vitals:    Vitals:    03/20/21 1723 03/20/21 1738 03/20/21 1807 03/20/21 1837   BP:  (!) 142/56 128/82 (!) 173/52   Pulse: 63 54 54 51   Resp: 16 14 15 8   Temp:       TempSrc:       SpO2: 97%  98% 97%   Weight: Height:           Patient was given the following medications:  Medications   acetaminophen (TYLENOL) tablet 650 mg (650 mg Oral Given 3/20/21 1543)   oxyCODONE (ROXICODONE) immediate release tablet 5 mg (5 mg Oral Given 3/20/21 1723)       ED Course as of Mar 21 0011   Sat Mar 20, 2021   1634 Case discussed with Dr. Jessenia Brock, on-call for neurosurgery, who wants the patient brought over. His plan is to eventually drain this. [TC]   1640 Dr. Janelle Mathew from [facility] hospitalists has accepted transfer of the patient. Patient and son updated and agreeable with the plan. Umsan Gannon MD     Patient is an 80year old with recent intracranial hemorrhage who presents with increased confusion over the last couple of days. Patient's exam is non-specific. Her head CT shows increase in her SDH, with possible acute change. Neurosurgery on consult. Hospitalists at [facility] accepted transfer. Patient and grandson updated and agreeable with transfer. Differential diagnosis included but was not limited to: stroke, TIA, intracranial bleed, trauma, infection/sepsis, medication side effect, intoxication/withdrawal, metabolic/electrolyte abnormalities. FINAL IMPRESSION      1. Altered mental status, unspecified altered mental status type    2.  Subdural hemorrhage [facility])          DISPOSITION/PLAN   DISPOSITION Decision To Transfer 03/20/2021 04:02:30 PM    (Please note that portions of this note were completed with a voice recognition program.  Efforts were made to edit the dictations but occasionally words are mis-transcribed.)    Camron Wright MD(electronically signed)             Camron Wright MD  03/21/21 2250

## 2021-03-20 NOTE — ED NOTES
Called again and was able to speak and give report to [facility], RN  03/20/21 1940

## 2021-03-20 NOTE — ED NOTES
[address] called back at [phone] with:  Bed Number: [address]   Report Number: [phone]  Accepting Doctor: Dr. Maynor Chester: Deborra Serge: [address]  03/20/21 5448

## 2021-03-20 NOTE — ED NOTES
Attempted to call report again, was transferred to another line but no one picked up the phone.      Alfreda Meneses, RN  03/20/21 1932

## 2021-03-20 NOTE — ED NOTES
Consult to [facility] Neurosurgery at [phone] via Micah Hernandez  03/20/21 1611      Consult completed with call back from Dr. Adriana Lopez at [facility]  03/20/21 8131

## 2021-03-20 NOTE — ED NOTES
Called to give report to [facility] was told to call back because it was change of shift.      Brad Monday, RN  03/20/21 1930

## 2021-03-21 NOTE — PROGRESS NOTES
Report given to Tiff Menezes RN receiving patient via telephone at [phone] with all information provided along with patient's neurological assessment.

## 2021-03-21 NOTE — PLAN OF CARE
Problem: Falls - Risk of:  Goal: Will remain free from falls  Description: Will remain free from falls  3/21/2021 0759 by Sirisha Bah RN  Outcome: Ongoing  Fall precautions in place. Bed is in lowest position, wheels locked, bed alarm on, non skid socks on. Call light and bedside table within reach. Pt calls out appropriately. Pt is up x1 with walker and gb. Will continue to assess and monitor. Problem: Pain:  Goal: Pain level will decrease  Description: Pain level will decrease  Outcome: Ongoing  Pt is sleeping in bed at this time, no s/s of pain. Will continue to monitor. Problem: HEMODYNAMIC STATUS  Goal: Patient has stable vital signs and fluid balance  3/21/2021 0759 by Sirisha Bah RN  Outcome: Ongoing  VSS. Adequate fluid output. Problem: COMMUNICATION IMPAIRMENT  Goal: Ability to express needs and understand communication  3/21/2021 0759 by Sirisha Bah RN  Outcome: Ongoing  Pt is able to communicate with staff and express needs.

## 2021-03-21 NOTE — CONSULTS
07/25/2019    NSTEMI (non-ST elevated myocardial infarction) ([facility] [address]     Seasonal allergies     Subdural hematoma ([facility] [address] 03/12/2021        Past Surgical History:   Procedure Laterality Date    COLONOSCOPY      COLONOSCOPY  06/22/2015    diverticulosis    HIP SURGERY Left 07/30/2020    LEFT HIP HEMIARTHROPLASTY performed by Dayana Weller MD at [address] History     Occupational History    Not on file   Tobacco Use    Smoking status: Never Smoker    Smokeless tobacco: Never Used   Substance and Sexual Activity    Alcohol use: No    Drug use: No    Sexual activity: Not on file        Family History   Problem Relation Age of Onset    Other Father         \"black Lung\"        No outpatient medications have been marked as taking for the 3/20/21 encounter [facility] Encounter).       Current Facility-Administered Medications   Medication Dose Route Frequency Provider Last Rate Last Admin    HYDROcodone-acetaminophen (NORCO) 5-325 MG per tablet 1 tablet  1 tablet Oral Q6H PRN Paradise Black, DO        famotidine (PEPCID) tablet 20 mg  20 mg Oral Daily Paradise Black, DO        ezetimibe (ZETIA) tablet 10 mg  10 mg Oral Nightly Jaylen Sifuentes MD        ferrous sulfate (IRON 325) tablet 325 mg  325 mg Oral Daily with breakfast Jaylen Sifuentes MD   325 mg at 03/21/21 0923    levETIRAcetam (KEPPRA) 1,000 mg in sodium chloride 0.9 % 100 mL IVPB  1,000 mg Intravenous Q12H Jaylen Sifuentes MD   Stopped at 03/21/21 1031    lisinopril (PRINIVIL;ZESTRIL) tablet 40 mg  40 mg Oral Daily Jaylen Sifuentes MD   40 mg at 03/21/21 0923    sodium chloride flush 0.9 % injection 10 mL  10 mL Intravenous 2 times per day Jaylen Sifuentes MD   10 mL at 03/21/21 0925    sodium chloride flush 0.9 % injection 10 mL  10 mL Intravenous PRN Jaylen Sifuentes MD        acetaminophen (TYLENOL) tablet 650 mg  650 mg Oral Q4H PRN Jaylen Sifuentes MD   650 mg at 03/21/21 6579    promethazine (PHENERGAN) tablet 12.5 mg  12.5 mg Oral Q6H PRN Juliane Cheek MD        Or    ondansetron (ZOFRAN) injection 4 mg  4 mg Intravenous Q6H PRN Juliane Cheek MD        magnesium sulfate 1000 mg in dextrose 5% 100 mL IVPB  1,000 mg Intravenous PRN Juliane Cheek MD          Objective:  BP (!) 145/39   Pulse 52   Temp 97.6 °F (36.4 °C) (Axillary)   Resp 16   Ht 5' 6\" (1.676 m)   Wt 110 lb (49.9 kg)   SpO2 97%   BMI 17.75 kg/m²     Physical Exam:  Patient seen and examined   General: Well developed. in no acute distress. HENT: atraumatic, neck supple  Eyes: Optic discs: Not tested  Pulmonary: unlabored respiratory effort  Cardiovascular:  Warm well perfused. No peripheral edema  Gastrointestinal: abdomen soft, NT, ND    Neurologic Exam:  Neurological:  Mental Status: confused and has a difficult itme following commands  Language: No aphasia or dysarthria noted  Sensation: Intact to all extremities to light touch  Coordination: Intact  DTRs:    Right  Left    biceps  2 2   brachioradialis  2 2    Patella  2  2   ankle clonus  - -   toes (babinski)  down down     Cranial Nerves:  Cranial Nerves:  II: Visual acuity not tested, denies new visual changes / diplopia  III, IV, VI: PERRL, 3 mm bilaterally, EOMI, no nystagmus noted  V: Facial sensation intact bilaterally to touch  VII: Face symmetric  VIII: Hearing intact bilaterally to spoken voice  IX: Palate movement equal bilaterally  XI: Shoulder shrug equal bilaterally  XII: Tongue midline    Musculoskeletal:   Gait: Not tested   Assist devices: None   Tone:   Motor strength:    Right  Left    Right  Left    Deltoid  5 5  Hip Flex  5 5   Biceps  5 5  Knee Extensors  5 5   Triceps  5 5  Knee Flexors  5 5   Wrist Ext  5 5  Ankle Dorsiflex. 5 5   Wrist Flex  5 5  Ankle Plantarflex.   5 5   Handgrip  5 5  Ext Darshan Longus  5 5   Thumb Ext  5 5             Radiological Findings:      Labs  Recent Labs     03/20/21  1450 03/21/21  0530    136    102   CO2 26 26   BUN 20 14   CREATININE 0.8 0.8   GLUCOSE 114* 98   MG  --  1.80     Recent Labs     03/20/21  1450 03/21/21  0530   WBC 5.2 5.0   RBC 2.93* 2.89*           Patient Active Problem List    Diagnosis Date Noted    Abnormal EKG 03/15/2021    CAD (coronary artery disease) 03/15/2021    Subdural hematoma (HCC) 03/12/2021    Ischemic cardiomyopathy     NSTEMI (non-ST elevated myocardial infarction) ([facility] [address] 12/22/2020    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 ([facility] [address] 12/22/2020    Confusion     Closed displaced fracture of left femoral neck (HCC) 07/30/2020    MGUS (monoclonal gammopathy of unknown significance) 07/30/2020    Mixed hyperlipidemia 07/30/2020    Clonic hemifacial spasm, bilateral 07/25/2019    Gastroesophageal reflux disease without esophagitis 04/09/2019    GEORGES (generalized anxiety disorder) 07/02/2018    Benign essential hypertension 04/23/2014    Aortic insufficiency 02/18/2013    Pneumonia 01/28/2012       Assessment:  Patient is an 80year old with recent intracranial hemorrhage who presents with increased confusion over the last couple of days. Patient's exam is non-specific. Her head CT shows increase in her SDH, with possible acute change. Neurosurgery on consult. Hospitalists at [facility] accepted transfer. Patient and grandson updated and agreeable with transfer    Plan:        1. Pt will undergo left sided trephine craniotomy for SDH. I  Spoke with the pts son. I discussed the risks, benefits and alternatives and they would like to proceed            Bouchra Burton PA-C     Patient was seen and examined with Dr. Bear Norris who agrees with above assessment and plan. Electronically signed by:  Bouchra Burton, 3/21/2021 10:51 AM

## 2021-03-21 NOTE — PROGRESS NOTES
Physical Therapy/Occupational Therapy  No treatment/discharge  Chart reviewed for PT/OT evaluation. Patient noted to be going for [facility] [address] today per anesthesia note. Will discharge from acute care PT and await post op orders.       B[facility] [address]  Daniela Ho

## 2021-03-21 NOTE — PROGRESS NOTES
Stroke Admission    I agree as the admission nurse that I have completed a thorough stroke assessment and completed the admission on the patient. ALL assessment areas listed below have been addressed and completed. Presentation: Hemorrhagic    Handoff assessment completed with Peter Martínez. Current NIHSS 2. [x]   Education Assessment  [x]   Individualized Stroke/TIA Education template added, including patient specific risk factors: Hypertension, High Cholesterol and Personal history of previous Stroke/TIA  [x]   Individualized Stroke/TIA Care Plan template added  [x]   Swallow screen completed using the [employer] Protocol, and documented on flowsheet PRIOR to oral intake: Pass  [x]   VTE Prophylaxis: SCDs ordered/addressed; SCDs: N/A           (If Medication, choose N/A and write Medication name.  ASA, Plavix and TPA are not VTE prophylaxis. )  [x]   Stroke education booklet given, and education initiated with patient and/or caregiver      Nurse eSignature: Electronically signed by Funmilayo Frazier RN on 3/20/21 at 8:23 PM EDT

## 2021-03-21 NOTE — PROGRESS NOTES
Patient admitted to PACU #13 from OR per bed at 1245 s/p LEFT CRANIOTOMY FOR SUBDURAL HEMATOMA EVACUATION (Left Head). Report received at bedside in PACU per Dr. Carolyn Devi. Patient was reported to be hypotensive in OR which she received treatment, see anesthesia record but not complications reported. Patient connected to PACU monitoring equipment. IVF's infusing with site unremarkable. Patient arrived to PACU responsive but remains very drowsy d/t not being fully wakeful from anesthesia but with respirations easy, even and regular with no pain noted or verbalized. Left top of head surgical dressing remains C,D,I with gauze and medipore tape. Open drain in place with scant amount of bloody drainage noted. Patient arrived from OR with her wig and telemetry box from the floor. No further changes. Will continue to monitor.

## 2021-03-21 NOTE — PROGRESS NOTES
Hospitalist Progress Note      PCP: Milagro Camacho MD    Date of Admission: 3/20/2021    Altered mental status        History Of Present Illness:       \"80 y.o. female who is transferred from [facility] for neurosurgical consultation. Patient was brought in by her grandson with complaints of increased confusion for the past couple of days and patient complaining of left-sided back pain. Patient was hospitalized here from 3/12 through 3/15, after being transferred from [facility] for possible seizure-like episode. At that time patient was found unresponsive by grandson on the couch and noticed seizure-like activity. In ED patient was with nurse to have a seizure by her RN at bedside which lasted for about 30 seconds. CT head revealed left-sided subdural hematoma with associated mass-effect and left-to-right midline shift. Patient had been taking 81 mg of aspirin and Plavix daily at that time. During the previous hospitalization patient was started on Keppra 1 g twice daily.     Patient was holding onto her upper belly with both hands and morning when I entered the room. Unable to exactly show where it hurts. On palpation patient does not seem to have abdominal tenderness. No guarding/rigidity noted. Patient did not even flinch during deep palpation. Seemed to be pain-free when she is distracted. Was able to lift both her legs off the bed and hold up in the ER with no pain in her belly or back. At baseline patient has underlying dementia and typically oriented to self, place and situation. Follows commands. According to her recent hospitalization records she is a limited code with no chest compressions but yes to intubation, defibrillation and medications. \"    Subjective:     Complains of left leg/hip pain this morning. She is alert and knows she is in the hospital and the year. Isn't able to explain to me if had another fall on the left hip.      Medications: Reviewed    Infusion Medications   Scheduled Medications    ezetimibe  10 mg Oral Nightly    ferrous sulfate  325 mg Oral Daily with breakfast    levetiracetam  1,000 mg Intravenous Q12H    lisinopril  40 mg Oral Daily    sodium chloride flush  10 mL Intravenous 2 times per day     PRN Meds: sodium chloride flush, acetaminophen, promethazine **OR** ondansetron, magnesium sulfate      Intake/Output Summary (Last 24 hours) at 3/21/2021 0852  Last data filed at 3/21/2021 0259  Gross per 24 hour   Intake 90 ml   Output --   Net 90 ml       Exam:    BP (!) 145/39   Pulse 52   Temp 97.6 °F (36.4 °C) (Axillary)   Resp 16   Ht 5' 6\" (1.676 m)   Wt 110 lb (49.9 kg)   SpO2 97%   BMI 17.75 kg/m²     General appearance: No apparent distress, appears stated age and cooperative. HEENT: Pupils equal, round, and reactive to light. Conjunctivae/corneas clear. Neck: Supple, with full range of motion. No jugular venous distention. Trachea midline. Respiratory:  Normal respiratory effort. Clear to auscultation, bilaterally without Rales/Wheezes/Rhonchi. Cardiovascular: Regular rate and rhythm with normal S1/S2 without murmurs, rubs or gallops. Abdomen: Soft, non-tender, non-distended with normal bowel sounds. Musculoskelatal: Ecchymosis left lateral thigh. Skin: Skin color, texture, turgor normal.  No rashes or lesions. Neurologic:  Cranial nerves: II-XII intact, grossly non-focal.  Psychiatric: Alert and oriented, thought content appropriate, normal insight    Labs:   Recent Labs     03/20/21  1450 03/21/21  0530   WBC 5.2 5.0   HGB 9.2* 9.0*   HCT 27.6* 27.8*    267     Recent Labs     03/20/21  1450 03/21/21  0530    136   K 4.1 4.2    102   CO2 26 26   BUN 20 14   CREATININE 0.8 0.8   CALCIUM 9.5 9.1     Recent Labs     03/20/21  1450   AST 15   ALT <5*   BILITOT 0.6   ALKPHOS 85     No results for input(s): INR in the last 72 hours.   No results for input(s): Cathy Finley in the last 72 hours.    Studies:  CT HEAD WO CONTRAST   Final Result      1. Unchanged left cerebral convexity subdural hematoma with 8 mm rightward subfalcine herniation. Assessment/Plan:    Active Hospital Problems    Diagnosis Date Noted    Subdural hematoma ([facility] [address] [S06.5X9A] 03/12/2021     Altered mental state  Worsening subdural hematoma, with acute and chronic components with increased mass-effect  Neurochecks every 2 hourly until tomorrow morning  Repeat CT head - Unchanged left cerebral convexity subdural hematoma with 8 mm rightward subfalcine herniation. Monitor on telemetry  Neurosurgery consulted - patient going to OR today for craniotomy  N.p.o.  Continue on Keppra 1 g IV twice daily  No antiplatelets or chemical DVT prophylaxis  Seizure and fall precautions    Abdominal pain, with CT abdomen and pelvis without acute changes  UA with moderate amount of blood  Repeat UA reflex to culture     Left hip pain and echymosis, patient unable to explain if new or new fall but seems to be in quite a bit of pain.   Repeat xray of the left hip  Not on any blood thinners    Generalized tonic-clonic seizures    CAD  #Ischemic cardiomyopathy with ejection fraction of 30-35%, moderate MR and moderate to severe AR  -was on aspirin and Plavix, which was discontinued during previous hospitalization  Continue on statin    Sinus bradycardia-patient   is on Toprol-XL at home    History of MGUS-follows with hematology-oncology    Hyperlipidemia  Continue on statin    Dementia    Chronic anemia-stable at baseline     DVT Prophylaxis: SCDs  Diet: Diet NPO Effective Now  Code Status: Limited    PT/OT Eval Status: pending    [address]

## 2021-03-21 NOTE — H&P
Hospital Medicine History & Physical      PCP: La Nena Cardenas MD    Date of Admission: 3/20/2021    Date of Service: Pt seen/examined on 3/20/2021 and Admitted to Inpatient with expected LOS greater than two midnights due to medical therapy. Chief Complaint:  Altered mental status      History Of Present Illness:      80 y.o. female who is transferred from [facility], ED for neurosurgical consultation. Patient was brought in by her grandson with complaints of increased confusion for the past couple of days and patient complaining of left-sided back pain. Patient was hospitalized here from 3/12 through 3/15, after being transferred from [facility] for possible seizure-like episode. At that time patient was found unresponsive by grandson on the couch and noticed seizure-like activity. In ED patient was with nurse to have a seizure by her RN at bedside which lasted for about 30 seconds. CT head revealed left-sided subdural hematoma with associated mass-effect and left-to-right midline shift. Patient has been taking 81 mg of aspirin and Plavix daily at that time. During the previous hospitalization patient was started on Keppra 1 g twice daily. Patient was holding onto her upper belly with both hands and morning when I entered the room. Unable to exactly show where it hurts. On palpation patient does not seem to have abdominal tenderness. No guarding/rigidity noted. Patient did not even flinch during deep palpation. Seemed to be pain-free when she is distracted. Was able to lift both her legs off the bed and hold up in the ER with no pain in her belly or back. At baseline patient has underlying dementia and typically oriented to self, place and situation. Follows commands. According to her recent hospitalization records she is a limited code with no chest compressions but yes to intubation, defibrillation and medications.     Past Medical History:          Diagnosis Date    Facial twitching     Hair loss     wears wig- unknown cause    Hyperlipidemia     Hypertension     Seasonal allergies        Past Surgical History:          Procedure Laterality Date    COLONOSCOPY      COLONOSCOPY  6/22/15    diverticulosis    HIP FRACTURE SURGERY Left     LEFT HIP HEMIARTHROPLASTY     HIP SURGERY Left 7/30/2020    LEFT HIP HEMIARTHROPLASTY performed by Viji Bonner MD at [address] [place] [address]         Medications Prior to Admission:      Prior to Admission medications    Medication Sig Start Date End Date Taking? Authorizing Provider   levETIRAcetam (KEPPRA) 1000 MG tablet Take 1 tablet by mouth 2 times daily 3/15/21   Domenic Flores MD   ALPRAZolam Merlin Overall) 0.25 MG tablet Take 0.125 mg by mouth 2 times daily. Historical Provider, MD   lisinopril (PRINIVIL;ZESTRIL) 20 MG tablet Take 40 mg by mouth daily    Historical Provider, MD   ferrous sulfate (IRON 325) 325 (65 Fe) MG tablet Take 325 mg by mouth daily (with breakfast)    Historical Provider, MD   Cyanocobalamin 2500 MCG SUBL Place 2,500 mcg under the tongue daily    Historical Provider, MD   metoprolol succinate (TOPROL XL) 25 MG extended release tablet Take 1 tablet by mouth daily 12/27/20   DEBRA Euceda CNP   atorvastatin (LIPITOR) 80 MG tablet Take 1 tablet by mouth nightly 12/27/20   DEBRA Euceda CNP   vitamin D (CHOLECALCIFEROL) 25 MCG (1000 UT) TABS tablet Take 1,000 Units by mouth daily    Historical Provider, MD   Loratadine (CLARITIN PO) Take 10 mg by mouth daily     Historical Provider, MD   ezetimibe (ZETIA) 10 MG tablet Take 10 mg by mouth nightly     Historical Provider, MD       Allergies:  Quinidine, Aminoglycosides, Levofloxacin, Neomycin, and Simvastatin    Social History:    TOBACCO:   reports that she has never smoked. She has never used smokeless tobacco.  ETOH:   reports no history of alcohol use.   E-Cigarettes/Vaping Use     Questions Responses    E-Cigarette/Vaping Use     Start Date     Passive Exposure     Quit Date     Counseling Given     Comments         Family History:    Reviewed in detail and negative for DM, CAD, Cancer, CVA. Positive as follows:        Problem Relation Age of Onset    Other Father         \"black Lung\"       REVIEW OF SYSTEMS:   Unable to get reliable history from the patient given her baseline dementia    PHYSICAL EXAM PERFORMED:    BP (!) 188/53   Pulse 58   Temp 97.8 °F (36.6 °C) (Oral)   Resp 12   SpO2 97%     General appearance:  No apparent distress, appears stated age and cooperative. Frail elderly female. Keeps picking things around her. HEENT:  Normal cephalic, atraumatic without obvious deformity. Pupils equal, round, and reactive to light. Extra ocular muscles intact. Conjunctivae/corneas clear. Neck: Supple, with full range of motion. No jugular venous distention. Trachea midline. Respiratory:  Normal respiratory effort. Clear to auscultation, bilaterally without Rales/Wheezes/Rhonchi. Cardiovascular: Bradycardic rate and rhythm with normal S1/S2 without murmurs, rubs or gallops. Abdomen: Soft, non-tender, no guarding/rigidity or rebound tenderness, non-distended with normal bowel sounds. Musculoskeletal:  No clubbing, cyanosis or edema bilaterally. Full range of motion without deformity. Skin: Skin color, texture, turgor normal.  No rashes or lesions. Neurologic:  Neurovascularly intact without any focal sensory/motor deficits. Cranial nerves: II-XII intact, grossly non-focal.  Psychiatric:  Alert and oriented to self. Follows simple commands.   Capillary Refill: Brisk,< 3 seconds   Peripheral Pulses: +2 palpable, equal bilaterally       Labs:     Recent Labs     03/20/21  1450   WBC 5.2   HGB 9.2*   HCT 27.6*        Recent Labs     03/20/21  1450      K 4.1      CO2 26   BUN 20   CREATININE 0.8   CALCIUM 9.5     Recent Labs     03/20/21  1450   AST 15   ALT <5*   BILITOT 0.6   ALKPHOS 85     No results for input(s): INR in the last 72 hours. No results for input(s): Divya Mathew in the last 72 hours. Urinalysis:      Lab Results   Component Value Date    NITRU Negative 03/20/2021    WBCUA 3-5 03/20/2021    BACTERIA 1+ 03/12/2021    RBCUA 11-20 03/20/2021    BLOODU MODERATE 03/20/2021    SPECGRAV 1.025 03/20/2021    GLUCOSEU Negative 03/20/2021       Radiology:     CXR: I have reviewed the CXR with the following interpretation: Cardiomegaly, no acute cardiopulmonary abnormality  EKG:  I have reviewed the EKG with the following interpretation: Sinus rhythm, with marked sinus arrhythmia, LAD, VR = 62, QTc = 468, LVH with repolarization abnormalities, septal infarct-seen on 2008 EKG    CT head without contrast  Interval increase size of the left hemispheric convexity subdural hematoma   which now contains acute and subacute components and now measures 1.9 cm in   greatest thickness (previously 1.2 cm).  There is persistent mass effect with   left-to-right midline shift of 8-9 mm (previously 5-6 mm). CT abdomen and pelvis  Unremarkable unenhanced CT examination of the abdomen and pelvis, without   finding to account for the patient's symptoms.        ASSESSMENT:    Active Hospital Problems    Diagnosis Date Noted    Subdural hematoma [facility]) [I33.5J6K] 03/12/2021   #Altered mental state  #Worsening subdural hematoma, with acute and chronic components with increased mass-effect  #Abdominal pain, with CT abdomen and pelvis without acute changes  #UA with moderate amount of blood  #Generalized tonic-clonic seizures  #CAD-was on aspirin and Plavix, which was discontinued during previous hospitalization  #Sinus bradycardia-patient is on Toprol-XL at home  #Ischemic cardiomyopathy with ejection fraction of 30-35%, moderate MR and moderate to severe AR  #History of MGUS-follows with hematology-oncology  #Hyperlipidemia  #Dementia  #Chronic anemia-stable at baseline    PLAN:  Neurochecks every 2 hourly until tomorrow morning  Repeat CT head at 9:30 PM today  Monitor on telemetry  Neurosurgery consult  N.p.o.  Continue on Keppra 1 g IV twice daily  No antiplatelets or chemical DVT prophylaxis  Seizure and fall precautions  Repeat UA reflex to culture in a.m. Continue on statins, aspirin and Plavix has been discontinued during previous hospitalization  Beta-blocker held due to sinus bradycardia  Monitor electrolytes, renal function and CBC  Will continue her home medications appropriately once p.o. intake is initiated  Supportive therapy    DVT Prophylaxis: SCDs  Diet: Diet NPO Effective Now  Code Status: Limited    PT/OT Eval Status: Bedrest until seen by neurosurgery    Dispo -PCU with telemetry       Christian Abreu MD    Thank you Zora Manuel MD for the opportunity to be involved in this patient's care. If you have any questions or concerns please feel free to contact me at [phone].

## 2021-03-21 NOTE — PLAN OF CARE
Physical Therapy Daily Treatment Note     Name: Monalisa THOMPSON [facility]  Clinic Number: [id number]    Therapy Diagnosis:   Encounter Diagnoses   Name Primary?    Chronic tension-type headache, not intractable     Painful cervical range of motion     Cervical pain      Physician: Humberto Fine MD    Visit Date: 4/26/2019    Physician Orders: PT Eval and Treat   Medical Diagnosis from Referral: Tension headache  Evaluation Date: 4/17/2019  Authorization Period Expiration: 12/31/2019  Plan of Care Expiration: 7/14/2019  Visit # / Visits authorized: 3/ 50  PN Due: 5/17/2019     Time In: 8:30 am  Time Out: 9:35 am  Total Billable Time: 50 minutes      Precautions: Standard & hx of seizures      Subjective     Pt reports: that her mother was in the hopstial and she felt more stress. Pt states her neck feels good today. Pt denies any neck pain.   She was compliant with home exercise program.  Response to previous treatment: neck muscle felt loose and muscle soreness   Functional change: No changes    Pain: 0/10  Location: bilateral cervical      Objective     Monalisa received therapeutic exercises to develop strength, endurance, ROM, flexibility and posture for 45 minutes including:    UBE 6 min  Upper trap stretch 5x30 sec  Levators stretch 5x30 sec   Supine cervical rotation with o-foat 1x20 ea  Supine cervical flexion and extension 1x20 ea   Supine pect stretch on half foam 1x2'   Supine on half foam shoulder horizontal abd 1x20  Standing Pectoralis muscle stretch 5x30 sec         Monalisa received the following manual therapy techniques: Soft tissue Mobilization were applied to the: cervical  for 15 minutes, including:  STM and IASTM right cervical muscles.     Vacuum/cupping STM with manual therapy techniques was performed to right cervical muscles  to decrease muscle tightness, increase circulation and promote healing process. The pt's skin was monitored for redness adjusting pressure as needed. The pt was  Problem: Falls - Risk of:  Goal: Will remain free from falls  Outcome: Ongoing   Bed locked in lowest position. Bed alarm on. Camera in place. Call light/belongings within reach. Frequent rounds made. Problem: HEMODYNAMIC STATUS  Goal: Patient has stable vital signs and fluid balance  Outcome: Ongoing  Vitals stable. Adequate fluid balance. Problem: ACTIVITY INTOLERANCE/IMPAIRED MOBILITY  Goal: Mobility/activity is maintained at optimum level for patient  Outcome: Ongoing  Able to move all extremities. Tolerating activity fairly well. Generalized weakness. Problem: COMMUNICATION IMPAIRMENT  Goal: Ability to express needs and understand communication  Outcome: Ongoing  Delayed responses. Intermittent confusion. Able to express needs and understand communication. instructed in possible side effects of bruising and/or soreness.     Pt cleared of contraindications and verbal and written consent acquired. Pt given option of copy of consent form. Pt educated on benefits and potential side effects of DN. DN for 5 minutes with pt in supine position with involved side right cervical. Cervicogenic Headache (posterior) & cervico thoracic  junction protocol with periosteal pecking and  winding.   Patient provided written and verbal consent to receive functional dry needling at today's visit (see consent form scanned into chart). FDN performed to right cervical and right upper traps . FDN performed to reduce pain and muscle tension, promote blood flow, and improve ROM and function x 5 minutes (no charge). Pt tolerated tx well without adverse effects. She was educated on what to expect following the procedure and she verbalized understanding.      Monalisa received hot pack for 10 minutes to cervical.    Home Exercises and Patient Education Provided     Education provided:   Cont HEP      Written Home Exercises Provided: Patient instructed to cont prior HEP.  Exercises were reviewed and Monalisa was able to demonstrate them prior to the end of the session.  Monalisa demonstrated good  understanding of the education provided.      See EMR under Patient Instructions for exercises provided 4/17/2019.      Assessment     Pt tolerated PT session well today. Pt demonstrated good tolerance to stretching, dry needling and cupping technique to decrease right suboccipital muscle restriction. During palpation, PT noted increase of tissue restriction of right suboccipital muscles, splenius, and upper traps. Pt tolerated well dry needling of of right upper traps and right cervical with no adverse effects. Pt demonstrated decrease of muscle tension. During manual therapy with cupping techniques, Pt demonstrated improvement of right upper traps stretches. Pt cont to tolerated stretches and strengthen  of cervical well. Plan to cont perform dry needling next 4 visits to improve results and cont stretches techniques.       Monalisa is progressing well towards her goals.   Pt prognosis is Good.     Pt will continue to benefit from skilled outpatient physical therapy to address the deficits listed in the problem list box on initial evaluation, provide pt/family education and to maximize pt's level of independence in the home and community environment.     Pt's spiritual, cultural and educational needs considered and pt agreeable to plan of care and goals.    Anticipated barriers to physical therapy: none    GOALS: Short Term Goals: 4 weeks  1.Report decreased in pain at worse less than  <   / =  5  /10  to increase tolerance for functional activities. Appropriate, ongoing  2. Pt to improve range of motion by 25% to allow for improved functional mobility to allow for improvement in IADLs. Appropriate, ongoing  3. Pt to report less frequency of headache to improve quality of life. Appropriate, ongoing  4. Pt to tolerate HEP to improve ROM and independence with ADL's.Appropriate, ongoing     Long Term Goals: 8 weeks  1.Report decreased in pain at worse less than  <   / =  2 /10  to increase tolerance for functional activities.Appropriate, ongoing  2.Pt to improve range of motion by 75% to allow for improved functional mobility to allow for improvement in IADLs.Appropriate, ongoing   3.  Pt will report At least 20 percent but less than 40 percent on FOTO neck survey score for neck pain disability to demonstrate decrease in disability and improvement in neck pain.Appropriate, ongoing  4. Pt to be Independent with HEP to improve ROM and independence with ADL's.Appropriate, ongoing  5. Pt will report no headache for at least 1 or 2 weeks to improve quality of life.Appropriate, ongoing      Plan      Plan to cont perform dry needling next 4 visits to improve results and cont stretches techniques.       Plan of care  Certification: 4/17/2019 to 7/14/2019.      Cont skilled PT session towards PT and patient's goals.    Fabio Cohen, PT   04/26/2019

## 2021-03-21 NOTE — PROGRESS NOTES
Speech Language Pathology    Order received and chart reviewed. Pt to be going to OR for crani and RN reported verbally being told operation would occur today. Will hold SLP evaluation at this time and f/u with pt tomorrow s/p crani. RN to page SLP if POC changes and pt cleared for PO this date.     Jarod Flanagan MA CCC-SLP; [id number]  Speech-Language Pathologist  Pager # [phone]  Phone # [phone]

## 2021-03-21 NOTE — ANESTHESIA PRE PROCEDURE
Department of Anesthesiology  Preprocedure Note       Name:  Estrella Snider   Age:  80 y.o.  :  1934                                          MRN:  [id number]         Date:  3/21/2021      Surgeon: Lashanda Coronado):  Alvaro Waters. Gaudencio Martinez MD    Procedure: Procedure(s):  LEFT CRANIOTOMY FOR SUBDURAL HEMATOMA EVACUATION    Medications prior to admission:   Prior to Admission medications    Medication Sig Start Date End Date Taking? Authorizing Provider   levETIRAcetam (KEPPRA) 1000 MG tablet Take 1 tablet by mouth 2 times daily 3/15/21   Orlando Dugan MD   ALPRAZolam [place] Aw) 0.25 MG tablet Take 0.125 mg by mouth 2 times daily.     Historical Provider, MD   lisinopril (PRINIVIL;ZESTRIL) 20 MG tablet Take 40 mg by mouth daily    Historical Provider, MD   ferrous sulfate (IRON 325) 325 (65 Fe) MG tablet Take 325 mg by mouth daily (with breakfast)    Historical Provider, MD   Cyanocobalamin 2500 MCG SUBL Place 2,500 mcg under the tongue daily    Historical Provider, MD   metoprolol succinate (TOPROL XL) 25 MG extended release tablet Take 1 tablet by mouth daily 20   DEBRA Holt CNP   atorvastatin (LIPITOR) 80 MG tablet Take 1 tablet by mouth nightly 20   DEBRA Holt CNP   vitamin D (CHOLECALCIFEROL) 25 MCG (1000 UT) TABS tablet Take 1,000 Units by mouth daily    Historical Provider, MD   Loratadine (CLARITIN PO) Take 10 mg by mouth daily     Historical Provider, MD   ezetimibe (ZETIA) 10 MG tablet Take 10 mg by mouth nightly     Historical Provider, MD       Current medications:    Current Facility-Administered Medications   Medication Dose Route Frequency Provider Last Rate Last Admin    ezetimibe (ZETIA) tablet 10 mg  10 mg Oral Nightly Chastity Bermudez MD        ferrous sulfate (IRON 325) tablet 325 mg  325 mg Oral Daily with breakfast Chastity Bermudez MD        levETIRAcetam (KEPPRA) 1,000 mg in sodium chloride 0.9 % 100 mL IVPB  1,000 mg Intravenous Q12H Diania Pallas, MD   Stopped at 03/20/21 2235    lisinopril (PRINIVIL;ZESTRIL) tablet 40 mg  40 mg Oral Daily Melvin Hudson MD        sodium chloride flush 0.9 % injection 10 mL  10 mL Intravenous 2 times per day Diania Pallas, MD   10 mL at 03/20/21 2100    sodium chloride flush 0.9 % injection 10 mL  10 mL Intravenous PRN Diania Pallas, MD        acetaminophen (TYLENOL) tablet 650 mg  650 mg Oral Q4H PRN Diania Pallas, MD   650 mg at 03/20/21 2310    promethazine (PHENERGAN) tablet 12.5 mg  12.5 mg Oral Q6H PRN Diania Pallas, MD        Or    ondansetron (ZOFRAN) injection 4 mg  4 mg Intravenous Q6H PRN Diania Pallas, MD        magnesium sulfate 1000 mg in dextrose 5% 100 mL IVPB  1,000 mg Intravenous PRN Diania Pallas, MD           Allergies:     Allergies   Allergen Reactions    Quinidine Hives    Aminoglycosides      Pt unsure    Levofloxacin      unknown    Neomycin      unknown    Simvastatin      Unknown to pt       Problem List:    Patient Active Problem List   Diagnosis Code    Pneumonia J18.9    Closed displaced fracture of left femoral neck (HCC) S72.002A    MGUS (monoclonal gammopathy of unknown significance) D47.2    Benign essential hypertension I10    Aortic insufficiency I35.1    Clonic hemifacial spasm, 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    GEORGES (generalized anxiety disorder) F41.1    Gastroesophageal reflux disease without esophagitis K21.9    Mixed hyperlipidemia E78.2    NSTEMI (non-ST elevated myocardial infarction) ([facility] [address] I21.4    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 ([facility]) F10.27    Confusion R41.0    Ischemic cardiomyopathy I25.5    Subdural hematoma ([facility] [address] S06.5X9A    Abnormal EKG R94.31    CAD (coronary artery disease) I25.10       Past Medical History:        Diagnosis Date    Acute encephalopathy 03/03/2021    Anxiety     Aortic insufficiency     Chronic kidney disease (CKD), stage III (moderate)     COPD, moderate (HCC)     Coronavirus infection 03/25/2020    Facial twitching     GERD (gastroesophageal reflux disease)     Hair loss     wears wig- unknown cause    Hyperlipidemia     Hypertension     Ischemic cardiomyopathy     Mild cognitive impairment with memory loss 07/25/2019    NSTEMI (non-ST elevated myocardial infarction) ([facility] [address]     Seasonal allergies     Subdural hematoma ([facility] [address] 03/12/2021       Past Surgical History:        Procedure Laterality Date    COLONOSCOPY      COLONOSCOPY  6/22/15    diverticulosis    HIP FRACTURE SURGERY Left     LEFT HIP HEMIARTHROPLASTY     HIP SURGERY Left 7/30/2020    LEFT HIP HEMIARTHROPLASTY performed by Verona Ernst MD at [address] [place] [address]         Social History:    Social History     Tobacco Use    Smoking status: Never Smoker    Smokeless tobacco: Never Used   Substance Use Topics    Alcohol use: No                                Counseling given: Not Answered      Vital Signs (Current):   Vitals:    03/20/21 1956 03/20/21 2310 03/21/21 0259 03/21/21 0713   BP: (!) 188/53 (!) 182/69 (!) 137/57 (!) 145/39   Pulse: 58 53 54 52   Resp: 12 16 14 16   Temp: 97.8 °F (36.6 °C) 97.4 °F (36.3 °C) 97.8 °F (36.6 °C) 97.6 °F (36.4 °C)   TempSrc: Oral Oral Oral Axillary   SpO2: 97% 96% 97% 97%   Weight: 110 lb (49.9 kg)      Height: 5' 6\" (1.676 m)                                                 BP Readings from Last 3 Encounters:   03/21/21 (!) 145/39   03/20/21 (!) 173/52   03/15/21 (!) 146/54       NPO Status:                                                                                 BMI:   Wt Readings from Last 3 Encounters:   03/20/21 110 lb (49.9 kg)   03/20/21 110 lb (49.9 kg)   03/12/21 110 lb (49.9 kg)     Body mass index is 17.75 kg/m².     CBC:   Lab Results   Component Value Date    WBC 5.0 03/21/2021    RBC 2.89 03/21/2021    HGB 9.0 03/21/2021    HCT 27.8 03/21/2021    MCV 96.2 03/21/2021    RDW 14.8 03/21/2021    PLT Endo/Other ROS                    Abdominal:       Abdomen: soft. Vascular: negative vascular ROS. Anesthesia Plan      general     ASA 3 - emergent       Induction: intravenous. MIPS: Postoperative opioids intended and Prophylactic antiemetics administered. Anesthetic plan and risks discussed with patient. Use of blood products discussed with patient whom consented to blood products. Plan discussed with attending.     Attending anesthesiologist reviewed and agrees with Pre Eval content              Taurus Hdz DO   3/21/2021

## 2021-03-21 NOTE — ANESTHESIA POSTPROCEDURE EVALUATION
Department of Anesthesiology  Postprocedure Note    Patient: Zena Jama  MRN: [id number]  YOB: 1934  Date of evaluation: 3/21/2021  Time:  12:56 PM     Procedure Summary     Date: 03/21/21 Room / Location: [facility]    Anesthesia Start: 1815 Anesthesia Stop:     Procedure: LEFT CRANIOTOMY FOR SUBDURAL HEMATOMA EVACUATION (Left Head) Diagnosis: (subdural hematona)    Surgeons: Katie Phoenix. Ely Goldberg, MD Responsible Provider: Hailey Marks DO    Anesthesia Type: general ASA Status: 3 - Emergent          Anesthesia Type: No value filed. Ozzie Phase I: Ozzie Score: 8    Ozzie Phase II:      Last vitals: Reviewed and per EMR flowsheets.        Anesthesia Post Evaluation    Patient location during evaluation: PACU  Patient participation: complete - patient participated  Level of consciousness: awake  Pain score: 0  Airway patency: patent  Nausea & Vomiting: no nausea and no vomiting  Complications: no  Cardiovascular status: blood pressure returned to baseline  Respiratory status: acceptable  Hydration status: euvolemic

## 2021-03-21 NOTE — OP NOTE
dura. A B1 with the footplate attachment was used to turn a trephine craniotomy and this was removed in a single piece. The 7 flat RAVEN catheter was tunneled in the subgaleal plane. The underlying dura was coagulated and opened in a cruciate fashion, releasing dark hemorrhagic stained fluid under pressure. A red rubber catheter was passed into the subdural space and it was irrigated copiously until the fluid returned clear. The red catheter was then removed and the RAVEN place into the subdural space. The dura was cover with gelfoam and the craniotomy flap was replaced with a Synthes plates and screws. The wound was irrigated copiously with Bacitracin. The galea was closed with 2-0 Vicryl sutures. The skin was closed with a running 4-0 Monocryl and dressed with dermabond. The subdural drain was attached to the Bili drainage bag. The patient awoke in the operating room and was extubated. The patient was brought to the recovery room in good condition with stable vital signs. ESTIMATED BLOOD LOSS: 100 mL. BLOOD TRANSFUSIONS: None    DRAINS: 7 Flat RAVEN in the subdural space    In conformance with HCFA regulations, I affirm that I was present for the entire operation.     Alona Flannery M.D.

## 2021-03-21 NOTE — PROGRESS NOTES
Pt returned to room 5503 from PACU. VSS. Denies any pain at this time. Resting in bed, will continue to monitor.

## 2021-03-21 NOTE — PROGRESS NOTES
Pt is alert to self and place, sometimes situation. Sleeping in bed at this time. NIH of 2. Pt passed swallow screen per night RN. Voiding adequately per bedpan or bathroom. Denies pain or n/v at this time. VSS. All fall precautions in place. Will continue to monitor.

## 2021-03-21 NOTE — PROGRESS NOTES
Patient alert. Confusion overnight with short term memory loss. Hx of dementia. Vitals stable. Passed bedside swallow eval. Voiding adequately. Impulsive at times. NIHSS 2. Intermittent pain to L hip.

## 2021-03-22 NOTE — PLAN OF CARE
Problem: Falls - Risk of:  Goal: Will remain free from falls  Description: Will remain free from falls  3/22/2021 1248 by Valerie King RN  Outcome: Ongoing  Note: Patient will remain free from falls throughout shift. Ambulating x2 assist with walker and gait belt. Fall precautions in place. Bed locked in lowest position with alarm on and 2/4 side rails up. Bedside table, belongings, and call light within reach. Patient not using call light when needing assistance and sitter placed outside of room. Hourly rounding in anticipation of patient needs. Floor clean and free from clutter. Room door open. Will continue to monitor. Problem: Pain:  Goal: Pain level will decrease  Description: Pain level will decrease  3/22/2021 1248 by Valerie King RN  Outcome: Ongoing  Note: Advanced dementia scale used for assessing pain. Patient medicated with PRN [place] per the [facility]. Education provided regarding medication and side effects but patient needs reinforcement. Assisted with repositioning up in bed/chair for comfort. Notified when next dose of medication can be administered. Will continue to monitor and reassess. Problem: HEMODYNAMIC STATUS  Goal: Patient has stable vital signs and fluid balance  Outcome: Ongoing  Note: Vital signs remain stable. Voiding without complications. Patient exhibited coughing after bites of breakfast with the SLP so patient made NPO until further evaluation. Dietician following patient for nutrition needs. Will continue to monitor.

## 2021-03-22 NOTE — CARE COORDINATION
Case Management Assessment           Initial Evaluation                Date / Time of Evaluation: 3/22/2021 5:03 PM                 Assessment Completed by: Tomasz Banker Day    Patient Name: Van Serrano     YOB: 1934  Diagnosis: Subdural hematoma [facility]) [Y54.9M0T]     Date / Time: 3/20/2021  7:52 PM    Patient Admission Status: Inpatient    If patient is discharged prior to next notation, then this note serves as note for discharge by case management. Current PCP: Luis Rangel MD  Clinic Patient: No    Chart Reviewed: Yes  Patient/ Family Interviewed: Yes    Initial assessment completed at bedside with: Chart Review     Hospitalization in the last 30 days: Yes    Emergency Contacts:  Extended Emergency Contact Information  Primary Emergency Contact: Zenaida Proctor  Address: [address] [place] [address] Phone: [phone]  Mobile Phone: [phone]  Relation: Grandchild  Secondary Emergency Contact: [address] [place] [address] Phone: [phone]  Mobile Phone: [phone]  Relation: Child    Advance Directives:   Code Status: Full [address]: No    Financial:  Payor: Connie Alcantar / Plan: MEDICARE PART A AND B / Product Type: *No Product type* /     Pre-cert required for SNF: n/a     Pharmacy:    [address] [facility] [address]  [place] [address]  Phone: [phone] Fax: [phone]      Potential assistance Purchasing Medications: Potential Assistance Purchasing Medications: No  Does Patient want to participate in local refill/ meds to beds program?: Not Assessed    Meds To Beds General Rules:  1. Can ONLY be done Monday- Friday between 8:30am-5pm  2. Prescription(s) must be in pharmacy by 3pm to be filled same day  3. Copy of patient's insurance/ prescription drug card and patient face sheet must be sent along with the prescription(s)  4. Cost of Rx cannot be added to hospital bill. If financial assistance is needed, please contact unit  or ;  or  CANNOT provide pharmacy voucher for patients co-pays  5. Patients can then  the prescription on their way out of the hospital at discharge, or pharmacy can deliver to the bedside if staff is available. (payment due at time of pick-up or delivery - cash, check, or card accepted)     Able to afford home medications/ co-pay costs: Yes    ADLS:  Support Systems: Family Members    PT AM-PAC:   OT AM-PAC: 15 /24    Housing:  Home Environment: From home with Grandson   Steps: yes     Plans to RETURN to current housing: Unknown, likely SNF   Barrier(s) to RETURNING to current housin[address] [place] [address] Road:  Currently ACTIVE with Home Health Care: Yes  [facility] Care Agency: [facility]  Phone: [phone]  Fax: [phone]    Currently ACTIVE with [place] on Aging: No    Durable Medical Equipment:  DME Provider: n/a   Equipment: walker    Home Oxygen and Respiratory Equipment:  Has HOME OXYGEN prior to admission: No    Dialysis:  Active with HD/PD prior to admission: No    DISCHARGE PLAN:  Disposition: SNF    Transportation PLAN for discharge: EMS transportation     Factors facilitating achievement of predicted outcomes: Family support    Barriers to discharge: Confusion, Limited safety awareness, Decreased endurance, Upper extremity weakness, Lower extremity weakness and APS case just opened. Additional Case Management Notes:   GILLES rounded on this date. Patient only Alert and orientated times one. GILLES placed call to Frantz Sánchez son, he reported [facility] did come out a few times. Mackakilah Patricameka was open to Short-term rehab. She has been to [facility] in the past and Frantz Sánchez was agreeable to a referral there again.  He did not report any concerns regarding care or her home situation at this time. GILLES made referral in [facility] to Abiel CAMPOVERDE spoke with Jean Claude Scruggs at [place] [address] [phone]. They received a referral on patient. GILLES provided update. Jesse Fox asked to be kept updated regarding discharge date and plan. The Plan for Transition of Care is related to the following treatment goals Subdural hematoma ([facility] [address] [S06.5X9A]      The Patient and/or patient representative Son  was provided with a choice of provider and agrees with the discharge plan Yes    Freedom of choice list was provided with basic dialogue that supports the patient's individualized plan of care/goals and shares the quality data associated with the providers.  Yes    Care Transition patient: No    CHANO Max  [facility]  Case Management Department  Ph: [phone]

## 2021-03-22 NOTE — PLAN OF CARE
Problem: Nutrition  Intervention: Swallowing evaluation  SLP completed evaluation. Please refer to notes in EMR.     Mariam Power M.A., [address]  Speech-Language Pathologist

## 2021-03-22 NOTE — PLAN OF CARE
Problem: Falls - Risk of:  Goal: Will remain free from falls  Description: Will remain free from falls  Outcome: Ongoing   Pt has been free from falls this shift, bed alarm on, bed in lowest position, 2/4 side rails up, nonskid socks on, wheels locked, bedside table and call light in reach. Encouraged pt to call out if needed anything. Problem: Pain:  Goal: Pain level will decrease  Description: Pain level will decrease  Outcome: Ongoing   Pt denies pain at this time, RN encouraged pt to call out if needed anything. Will continue to assess pain level throughout shift.

## 2021-03-22 NOTE — PROGRESS NOTES
Speech Language Pathology  Facility/Department: [facility] 5T ORTHO/NEURO   CLINICAL BEDSIDE SWALLOW EVALUATION    NAME: Anthony Ochoa  : 1934  MRN: [id number]    ADMISSION DATE: 3/20/2021  ADMITTING DIAGNOSIS: has Pneumonia; Closed displaced fracture of left femoral neck (HCC); MGUS (monoclonal gammopathy of unknown significance); Benign essential hypertension; Aortic insufficiency; Clonic hemifacial spasm, bilateral; GEORGES (generalized anxiety disorder); Gastroesophageal reflux disease without esophagitis; Mixed hyperlipidemia; NSTEMI (non-ST elevated myocardial infarction) ([address] 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  ONSET DATE: 2021    Recent Chest Xray: (2021)  Impression   Clear lungs. Cardiomegaly.  No acute cardiopulmonary abnormality. Recent CT Head: (2021)     Impression       1.  Unchanged left cerebral convexity subdural hematoma with 8 mm rightward subfalcine herniation.      Recent CT Head: (2021)  Impression       Postoperative changes noted with placement of drainage catheter into the left subdural hematoma with postoperative change from craniotomy in the superior frontal lobe noted.       The subdural hemorrhage is slightly decreased from prior study with interval development of postoperative air as above.       Degree of mild left to right shift has diminished slightly from prior exam.       Date of Eval: 3/22/2021  Evaluating Therapist: Annika Hyatt    Current Diet level:  Current Diet : Regular  Current Liquid Diet : Thin      Primary Complaint  Patient Complaint: Did not state    Pain:  Pain Assessment  Pain Assessment: 0-10  Pain Level: 0  Rogers-Baker Pain Rating: No hurt  Patient's Stated Pain Goal: No pain  Pain Type: Acute pain  Pain Location: Hip  Pain Orientation: Left  Pain able to participate    Treatment Plan  Requires SLP Intervention: Yes  Duration/Frequency of Treatment: 3-5x/wk for LOS  D/C Recommendations: To be determined       Recommended Diet and Intervention  Diet Solids Recommendation: NPO  Liquid Consistency Recommendation: NPO  Recommended Form of Meds: Via alternative means of nutrition  Recommendations: Consider ice chips PRN;Dysphagia treatment;Consider alternative nutrition;NPO  Therapeutic Interventions: Oral care; Patient/Family education; Therapeutic PO trials with SLP;[place] Protocol    Compensatory Swallowing Strategies  NPO; frequent oral hygiene    Treatment/Goals  Short-term Goals  Timeframe for Short-term Goals: 1-2 wks  Goal 1: Patient will tolerate repeat BSE. General  Chart Reviewed: Yes  Comments: Per admitting H&P (03/22/2021): '86 y.o. female who is transferred from [facility], ED for neurosurgical consultation. Patient was brought in by her grandson with complaints of increased confusion for the past couple of days and patient complaining of left-sided back pain. Patient was hospitalized here from 3/12 through 3/15, after being transferred from [facility] for possible seizure-like episode. At that time patient was found unresponsive by grandson on the couch and noticed seizure-like activity. In ED patient was with nurse to have a seizure by her RN at bedside which lasted for about 30 seconds. CT head revealed left-sided subdural hematoma with associated mass-effect and left-to-right midline shift. Patient has been taking 81 mg of aspirin and Plavix daily at that time. During the previous hospitalization patient was started on Keppra 1 g twice daily. Patient was holding onto her upper belly with both hands and morning when I entered the room. Unable to exactly show where it hurts. On palpation patient does not seem to have abdominal tenderness. No guarding/rigidity noted. Patient did not even flinch during deep palpation. Seemed to be pain-free when she is distracted. Was able to lift both her legs off the bed and hold up in the ER with no pain in her belly or back. At baseline patient has underlying dementia and typically oriented to self, place and situation. Follows commands. According to her recent hospitalization records she is a limited code with no chest compressions but yes to intubation, defibrillation and medications.'  Subjective  Subjective: Patient awake, alert, resting in bed, on room air. Behavior/Cognition: Lethargic;Requires cueing;Doesn't follow directions  Respiratory Status: Room air  O2 Device: None (Room air)  Communication Observation: Dysarthria;Aphasia(poor speech clarity)  Follows Directions: Simple(very inconsistent)  Dentition: Adequate  Patient Positioning: Upright in bed  Baseline Vocal Quality: Weak  Volitional Cough: (was not able to cue; weak automatic cough triggered with PO)  Prior Dysphagia History: Patient previously seen by speech for dysphagia, most recently 3/15/2021; recommendations at discharge were for regular solids / thin liquids. Consistencies Administered: Ice Chips; Thin - straw; Thin - teaspoon    Vision/Hearing  Vision  Vision: (unable to assess)  Hearing  Hearing: (unable to assess)    Oral Motor Deficits  Oral/Motor  Oral Motor: Exceptions to WFL(unable to fully assess; pt inconsistently following commands)  Lingual Coordination: Reduced    Prognosis  Prognosis  Prognosis for safe diet advancement: fair  Barriers to reach goals: severity of dysphagia;fatigue;age;cognitive deficits;medication(per nursing staff, pt received muscle relaxant and pain medicine earlier, which may be negatively but temporarily impacting swallow function at this time)  Individuals consulted  Consulted and agree with results and recommendations: Patient;RN    Education  Patient Education: Educated pt re: purpose of visit, dysphagia, swallow function.   Patient Education Response: No evidence of

## 2021-03-22 NOTE — CARE COORDINATION
GILLES receive a call from a family member. She wanted to \"keep it anonymous\". She reported her grandson is a convicted felon and has multiple people living in the home. She stated that they contacted the police. Grand son is [place]. Patient's son is Jourdan Ibarra ([phone]). Patient was recently at the hospital and discharge on 3/15 with home with home health care. A referral was made to [facility] at that time. Family member had local wellfare check yesterday. Patient was already in the hospital.     GILLES provided this family member with information on Adult [facility] Silvestre ([phone]). SW to follow up with [facility].      CHANO Brooks, [place]  Social Work/Case Management  [facility]   [phone]

## 2021-03-22 NOTE — PROGRESS NOTES
NEUROSURGERY PROGRESS NOTE    3/22/2021 10:14 AM                               Petros Hightower                      LOS: 2 days   POD#1  s/p Procedure(s) (LRB):  LEFT CRANIOTOMY FOR SUBDURAL HEMATOMA EVACUATION (Left)    Subjective: Patient sitting up in bed upon entering the room. No acute events overnight. Patient has no specific complaints this am.    Physical Exam:  Patient seen and examined    Vitals:    03/22/21 0644   BP: (!) 148/55   Pulse: 85   Resp: 16   Temp: 98.2 °F (36.8 °C)   SpO2: 96%     GCS:  4 - Opens eyes on own  4 - Seems confused, disoriented  6 - Follows simple motor commands  General: Well developed. Alert and impulsive  HENT: atraumatic, neck supple  Eyes: Optic discs: Not tested  Pulmonary: unlabored respiratory effort  Cardiovascular:  Warm well perfused.  No peripheral edema  Gastrointestinal: abdomen soft, NT, ND    Neurological:  Mental Status: Awake, alert, oriented to self only, impulsive  Attention: Unable to consistently follow commands  Language: No aphasia or dysarthria noted  Sensation: Intact to all extremities to light touch  Coordination: Intact    Cranial Nerves:  II: Visual acuity not tested, denies new visual changes / diplopia  III, IV, VI: PERRL, 3 mm bilaterally, EOMI, no nystagmus noted  V: Facial sensation intact bilaterally to touch  VII: Face symmetric  VIII: Hearing intact bilaterally to spoken voice  IX: Palate movement equal bilaterally  XI: Shoulder shrug equal bilaterally  XII: Tongue midline    Musculoskeletal:   Gait: Not tested   Assist devices: None   Tone: Normal  Motor strength: Exam limited 2/2 unable to follow commands consistently, but able to move all extremities independently anti gravity    Incision: CDI    Drain: 95 mL in past 24 hours    Radiological Findings:  Ct Head Wo Contrast  Result Date: 3/20/2021  Interval increase size of the left hemispheric convexity subdural hematoma which now contains acute and subacute components and now measures 1.9 cm in greatest thickness (previously 1.2 cm). There is persistent mass effect with left-to-right midline shift of 8-9 mm (previously 5-6 mm). Ct Head Wo Contrast  Result Date: 3/20/2021  Unchanged left cerebral convexity subdural hematoma with 8 mm rightward subfalcine herniation. Ct Head Wo Contrast  Result Date: 3/22/2021  Postoperative changes noted with placement of drainage catheter into the left subdural hematoma with postoperative change from craniotomy in the superior frontal lobe noted. The subdural hemorrhage is slightly decreased from prior study with interval development of postoperative air as above.  Degree of mild left to right shift has diminished slightly from prior exam.        Labs:  Recent Labs     03/22/21 0524   WBC 7.7   HGB 8.5*   HCT 25.3*          Recent Labs     03/21/21 0530 03/22/21 0524     --    K 4.2  --      --    CO2 26  --    BUN 14  --    CREATININE 0.8  --    GLUCOSE 98  --    CALCIUM 9.1  --    MG 1.80 1.60*       Recent Labs     03/22/21 0524   PROTIME 14.2*   INR 1.22*   APTT 27.8       Patient Active Problem List    Diagnosis Date Noted    Abnormal EKG 03/15/2021    CAD (coronary artery disease) 03/15/2021    Subdural hematoma ([facility] 03/12/2021    Ischemic cardiomyopathy     NSTEMI (non-ST elevated myocardial infarction) ([facility] 12/22/2020    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 female s/p Procedure(s)

## 2021-03-22 NOTE — PROGRESS NOTES
Physical Therapy/Occupational Therapy  Off floor  Orders received and chart reviewed. Attempted to evaluate pt this AM however pt is off the floor at CT. PT/OT will attempt to evaluate pt at later time vs later date as appropriate and as schedule permits. Cameron Kapadia, PT, DPT   Maximo Martínez.  [address] [facility], OTR/L [id number]

## 2021-03-22 NOTE — PROGRESS NOTES
Occupational Therapy   Occupational Therapy Initial Assessment/ Treatment  Date: 3/22/2021   Patient Name: Quince Cowden  MRN: [id number]     : 1934    Date of Service: 3/22/2021    Discharge Recommendations:    Quince Cowden scored a 15/24 on the AM-PAC ADL Inpatient form. Current research shows that an AM-PAC score of 17 or less is typically not associated with a discharge to the patient's home setting. Based on the patient's AM-PAC score and their current ADL deficits, it is recommended that the patient have 3-5 sessions per week of Occupational Therapy at d/c to increase the patient's independence. Please see assessment section for further patient specific details. If patient discharges prior to next session this note will serve as a discharge summary. Please see below for the latest assessment towards goals. OT Equipment Recommendations  Equipment Needed: No  Other: defer    Assessment   Performance deficits / Impairments: Decreased functional mobility ; Decreased endurance;Decreased coordination;Decreased ADL status; Decreased balance;Decreased strength  Assessment: Prior to admission pt was living at home with her family, was indepenent prior to admission and able to complete ADLs independently- pt was sharing IADLs with her grandson. Pt now presenting below baseline, now demonstrating min A x 2 for mobility and mod A for toileting/ ADLs. Pt demonstrates difficulty with sequencing/ initiation of familiar ADLs. Pt would benefit from continued OT while in acute care, AMPAC score indicates non homebound discharge. Contiue OT POC.   Treatment Diagnosis: decreased ADLs and transfers secondary to SDH  Prognosis: Fair  Decision Making: Medium Complexity  OT Education: OT Role;Plan of Care  Patient Education: verb understanding  REQUIRES OT FOLLOW UP: Yes  Activity Tolerance  Activity Tolerance: Patient Tolerated treatment well;Patient limited by fatigue;Patient limited by pain  Activity Tolerance: pt reporting generalized pain, grasping both her stomach and her head  Safety Devices  Safety Devices in place: Yes  Type of devices: Left in chair;Nurse notified;Call light within reach; Chair alarm in place           Patient Diagnosis(es): There were no encounter diagnoses. has a past medical history of Acute encephalopathy, Anxiety, Aortic insufficiency, CHF (congestive heart failure) ([address] Chronic kidney disease (CKD), stage III (moderate), COPD, moderate ([address] Coronavirus infection, Dementia ([address] Expressive aphasia, Facial twitching, GERD (gastroesophageal reflux disease), Hair loss, Hx of falling, Hyperlipidemia, Hypertension, Ischemic cardiomyopathy, Mild cognitive impairment with memory loss, NSTEMI (non-ST elevated myocardial infarction) ([address] Seasonal allergies, and Subdural hematoma ([address] has a past surgical history that includes Ovary removal; Colonoscopy; Colonoscopy (06/22/2015); hip surgery (Left, 07/30/2020); and craniotomy (Left, 3/21/2021). Treatment Diagnosis: decreased ADLs and transfers secondary to SDH      Restrictions  Position Activity Restriction  Other position/activity restrictions: up with assist, ambulation    Subjective   General  Chart Reviewed: Yes  Additional Pertinent Hx: Pt admitted to ED with SDH s/p fall 10 days prior. LEFT CRANIOTOMY FOR SUBDURAL HEMATOMA EVACUATION on 3/21.  PMHx includes: Acute encephalopathy (03/03/2021), Anxiety, Aortic insufficiency, CHF (congestive heart failure) ([address] Chronic kidney disease (CKD), stage III (moderate), COPD, moderate ([address] Coronavirus infection (03/25/2020), Dementia ([address] Expressive aphasia (07/29/2020), Facial twitching, GERD (gastroesophageal reflux disease), Hair loss, falling, Hyperlipidemia, Hypertension, Ischemic cardiomyopathy, Mild cognitive impairment with memory loss (07/25/2019), NSTEMI (non-ST elevated myocardial infarction) ([address] Seasonal allergies, and Subdural hematoma ([address] (03/12/2021). Family / Caregiver Present: No  Referring Practitioner: RICARDO Kemp  Diagnosis: SDH  Subjective  Subjective: Pt semi supine in bed upon arrival, agreeable to OT eval and treat. Patient Currently in Pain: Yes(refusing to take pills right now )  Pain Assessment  Pain Assessment: Advanced Dementia  Pain Level: 4  Vital Signs  Level of Consciousness: Alert (0)  Patient Currently in Pain: Yes(refusing to take pills right now )  Social/Functional History  Social/Functional History  Lives With: Family(grandson)  Type of Home: House  Home Layout: One level  Home Access: Stairs to enter with rails  Entrance Stairs - Number of Steps: 2  Bathroom Toilet: Standard  Home Equipment: Rolling walker, Reacher  ADL Assistance: Independent(sponge bathes)  Homemaking Assistance: Needs assistance(microwaves meals, shares cleaning and laundry with grandson)  Ambulation Assistance: Independent  Transfer Assistance: Independent  Active : No  Patient's  Info: grandson  Additional Comments: 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 Reports had falls PTA - unsure of circumstances. Question reliability - slow to respond to questions at times       Objective        Orientation  Overall Orientation Status: Impaired  Orientation Level: Oriented to person;Oriented to place; Disoriented to time;Disoriented to situation(unsure of month, aware she is in the hospital)     Balance  Sitting Balance: Minimal assistance(sitting EOB, pt impulsive)  Standing Balance: Minimal assistance  Standing Balance  Time: 5 mins total  Activity: mobility from bed to BSC, BSC to chair, standing at RW  Functional Mobility  Functional - Mobility Device: No device  Activity: Other(bed to BSC then BSC to chair)  Assist Level: Dependent/Total(min A x 2)  Toilet Transfers  Toilet - Technique: Ambulating  Equipment Used: Raised toilet seat with rails  Toilet Transfer: Minimal assistance  Toilet Transfers Comments: impulsive  ADL  Feeding: NPO  Grooming: Contact guard assistance(washing face seated in bedside chair- assist to initiate)  Toileting: Increased time to complete; Moderate assistance(+ verbal cueing to sequence- initiate wiping as pt was blowing her nose with wipes multiple times. Cueing for safety- pt stepping to chair prior to completing clothing mgmt)           Transfers  Sit to stand: Minimal assistance  Stand to sit: Minimal assistance     Cognition  Overall Cognitive Status: Exceptions  Arousal/Alertness: Delayed responses to stimuli  Following Commands: Follows one step commands with increased time; Follows one step commands with repetition  Attention Span: Difficulty attending to directions  Memory: Decreased recall of recent events  Safety Judgement: Decreased awareness of need for safety  Problem Solving: Decreased awareness of errors  Insights: Not aware of deficits  Initiation: Requires cues for some  Sequencing: Requires cues for some                 LUE AROM (degrees)  LUE General AROM: difficult to assess 2/2 cog  RUE AROM (degrees)  RUE General AROM: difficult to assess 2/2 cog                  Treatment included functional transfer training, ADLs, and patient education.        Plan   Plan  Times per week: 5-7  Times per day: Daily  Current Treatment Recommendations: Strengthening, Balance Training, Functional Mobility Training, Endurance Training, Self-Care / ADL, Neuromuscular Re-education           AM-PAC Score        AM-[facility] Inpatient Daily Activity Raw Score: 15 (03/22/21 1157)  AM-PAC Inpatient ADL T-Scale Score : 34.69 (03/22/21 1157)  ADL Inpatient CMS 0-100% Score: 56.46 (03/22/21 1157)  ADL Inpatient CMS G-Code Modifier : CK (03/22/21 1157)    Goals  Short term goals  Time Frame for Short term goals: by dc  Short term goal 1: Pt will complete LB dressing with mod A  Short term goal 2: Pt will complete toileting with min A  Short term goal 3: Pt will complete multi step grooming task with CGA  Short term goal 4: Pt will complete B UE HEP x 20 reps for increased tolerance  Patient Goals   Patient goals : to go home       Therapy Time   Individual Concurrent Group Co-treatment   Time In 1100         Time Out 1140         Minutes 40         Timed Code Treatment Minutes: 25 Minutes(+15 min eval)    Bonny TRACY  [address] [facility], OTR/L [id number]

## 2021-03-22 NOTE — PROGRESS NOTES
Labs     03/20/21  1450 03/21/21  0530 03/22/21  0524   WBC 5.2 5.0 7.7   HGB 9.2* 9.0* 8.5*   HCT 27.6* 27.8* 25.3*    267 272     Recent Labs     03/20/21  1450 03/21/21  0530    136   K 4.1 4.2    102   CO2 26 26   BUN 20 14   CREATININE 0.8 0.8   CALCIUM 9.5 9.1     Recent Labs     03/20/21  1450   AST 15   ALT <5*   BILITOT 0.6   ALKPHOS 85     Recent Labs     03/22/21  0524   INR 1.22*     Urinalysis:      Lab Results   Component Value Date    NITRU Negative 03/21/2021    WBCUA None seen 03/21/2021    BACTERIA Rare 03/21/2021    RBCUA 0-2 03/21/2021    BLOODU MODERATE 03/21/2021    SPECGRAV 1.020 03/21/2021    GLUCOSEU Negative 03/21/2021       Radiology:  CT HEAD WO CONTRAST   Final Result      Postoperative changes noted with placement of drainage catheter into the left subdural hematoma with postoperative change from craniotomy in the superior frontal lobe noted. The subdural hemorrhage is slightly decreased from prior study with interval development of postoperative air as above. Degree of mild left to right shift has diminished slightly from prior exam.                XR HIP LEFT (2-3 VIEWS)   Final Result   Impression:    No acute osseous injury. Left total hip arthroplasty with mild chronic loosening along the acetabulum. CT HEAD WO CONTRAST   Final Result      1. Unchanged left cerebral convexity subdural hematoma with 8 mm rightward subfalcine herniation. Assessment/Plan:    Active Hospital Problems    Diagnosis    Subdural hematoma ([address] [S06.5X9A]   Presenting w/ acute encephalopathy:  Worsening subdural hematoma, with acute and chronic components with increased mass-effect  Repeat CT head - Unchanged left cerebral convexity subdural hematoma with 8 mm rightward subfalcine herniation. Neurosurgery consulted - now s/p craniotomy pod1.   Continue on Keppra 1 g IV twice daily  No antiplatelets or chemical DVT prophylaxis  Seizure and fall precautions  Appreciate neurosurg help.     Abdominal pain:   with CT abdomen and pelvis without acute changes     Left hip pain and echymosi:  Possible fall. .  Not on any blood thinners  Repeat xray of hip negative. Generalized tonic-clonic seizures   On keppra. CAD  #Ischemic cardiomyopathy with ejection fraction of 30-35%, moderate MR and moderate to severe AR  -was on aspirin and Plavix, which was discontinued during previous hospitalization  Continue on statin     Sinus bradycardia-patient   is on Toprol-XL at home     History of MGUS-follows with hematology-oncology    DVT Prophylaxis: scd  Diet: DIET GENERAL;  Code Status: Full Code    PT/OT Eval Status: 14 and 15 respectively    Dispo - likely snf on dc.       Selwyn Chamberlain MD

## 2021-03-22 NOTE — PROGRESS NOTES
history of Acute encephalopathy, Anxiety, Aortic insufficiency, CHF (congestive heart failure) ([facility] [address] Chronic kidney disease (CKD), stage III (moderate), COPD, moderate ([address] Coronavirus infection, Dementia ([facility] [address] Expressive aphasia, Facial twitching, GERD (gastroesophageal reflux disease), Hair loss, Hx of falling, Hyperlipidemia, Hypertension, Ischemic cardiomyopathy, Mild cognitive impairment with memory loss, NSTEMI (non-ST elevated myocardial infarction) ([facility] [address] Seasonal allergies, and Subdural hematoma ([facility] [address] has a past surgical history that includes Ovary removal; Colonoscopy; Colonoscopy (06/22/2015); hip surgery (Left, 07/30/2020); and craniotomy (Left, 3/21/2021). Restrictions  Position Activity Restriction  Other position/activity restrictions: up with assist, ambulation  Vision/Hearing  Vision: Within Functional Limits  Hearing: Within functional limits     Subjective  General  Chart Reviewed: Yes  Additional Pertinent Hx: 80 y.o. female who presents to the emergency department with some increased confusion. Pt found to have SDH. S/p LEFT CRANIOTOMY FOR SUBDURAL HEMATOMA EVACUATION on 3/21. Family / Caregiver Present: No  Referring Practitioner: RICARDO Jean  Diagnosis: LEFT CRANIOTOMY FOR SUBDURAL HEMATOMA EVACUATION  Follows Commands: Within Functional Limits  Subjective  Subjective: Pt found supine in bed upon arrival, reporting unrated head pain and agreeable to therapy.  \"I need to go to the bathroom\"  Pain Screening  Patient Currently in Pain: Yes(refusing to take pills right now )        Orientation  Orientation  Overall Orientation Status: Impaired  Orientation Level: Oriented to person(able to state \"Hospital\" and \"2021\" but no more specifics)  Social/Functional History  Social/Functional History  Lives With: Family(grandson)  Type of Home: House  Home Layout: One level  Home Access: Stairs to enter with rails  Entrance Stairs - Number of Steps: 2  Bathroom Toilet: Standard  Home Equipment: Rolling walker, Reacher  ADL Assistance: Independent(sponge bathes)  Homemaking Assistance: Needs assistance(microwaves meals, shares cleaning and laundry with otf)  Ambulation Assistance: Independent  Transfer Assistance: Independent  Active : No  Patient's  Info: otf  Additional Comments: 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 Reports had falls PTA - unsure of circumstances. Question reliability - slow to respond to questions at times  Cognition   Cognition  Overall Cognitive Status: Exceptions  Arousal/Alertness: Delayed responses to stimuli  Following Commands: Follows one step commands with increased time; Follows one step commands with repetition  Attention Span: Difficulty attending to directions  Memory: Decreased recall of recent events  Safety Judgement: Decreased awareness of need for safety  Problem Solving: Decreased awareness of errors  Insights: Not aware of deficits  Initiation: Requires cues for some  Sequencing: Requires cues for some    Objective     AROM RLE (degrees)  RLE AROM: WFL  AROM LLE (degrees)  LLE AROM : WFL  Strength RLE  Strength RLE: WFL  Strength LLE  Strength LLE: WFL        Bed mobility  Supine to Sit: Contact guard assistance(HOB raised, use of rail, increased time and effort needed)  Scooting: Contact guard assistance  Transfers  Sit to Stand: Minimal Assistance(from EOB, BSC and recliner)  Stand to sit: Minimal Assistance(to BSC and to recliner x2 trials)  Ambulation  Ambulation?: Yes  Ambulation 1  Surface: level tile  Device: No Device  Assistance: 2 Person assistance(min Ax2)  Quality of Gait: slow and effortful  Distance: 2'+3'  Stairs/Curb  Stairs?: No     Balance  Posture: Fair  Sitting - Static: Good  Sitting - Dynamic: Good  Standing - Static: Fair  Standing - Dynamic: Fair(min Ax2)        Plan   Plan  Times per week: 5-7  Times per day: Daily  Current Treatment Recommendations: Strengthening, Balance Training, Gait Training, Stair

## 2021-03-22 NOTE — PROGRESS NOTES
NUTRITION ASSESSMENT  Admission Date: 3/20/2021     Type and Reason for Visit: Positive Nutrition Screen    NUTRITION RECOMMENDATIONS:   1. NPO per SLP with rec'd to start alternative means of nutrition. RD suggesting placement of NGT w/start of EN to meet pt nutrition needs. 1. Recommend EN formula Standard Formula with Fiber  Jevity 1.2  @ goal rate 60 ml/hr to provide 1440 ml total volume, 1728 kcal, 80 g protein and 1162 ml free water. 2. Initiate EN @20 mL/hr and as tolerated, increase by 25 mL/hr q 4 hours until goal of 60  mL/hr is met. 3. Recommend maintenance water flush of 30 ml every 4 hours. 4. Obtain actual/current bodyweight as current weight appears est/stated. NUTRITION ASSESSMENT:  Nutrition eval triggered d/t low BMI indicating pt is underweight w/risk of malnutrition. Pt is s/p craniotomy 3/21; SLP eval rec'd NPO at this time. Pt is too confused and in pain to answer RD questions. Discussed w/RN- SLP may re-eval later today. RD downgrading to NPO for safety and will provide EN rec'd at this time given pt low BMI and risk of malnutrition. MALNUTRITION ASSESSMENT  Context of Malnutrition: Acute Illness   Malnutrition Status: At risk for malnutrition (Comment)  Findings of the 6 clinical characteristics of malnutrition (Minimum of 2 out of 6 clinical characteristics is required to make the diagnosis of moderate or severe Protein Calorie Malnutrition based on AND/ASPEN Guidelines):  Energy Intake: Less than/equal to 50% of estimated energy requirements    Energy Intake Time: since admit x 2 days; suspect longer pta   Weight Loss %: Unable to assess    Weight loss Time: Unable to assess   Due to current CDC guidelines recommending 6-ft distancing for social isolation for COVID19 prevention, physical aspects of the malnutrition assessment were withheld at this time.      NUTRITION DIAGNOSIS   Problem: Problem #1: Inadequate oral intake   Etiology: Cognitive or neurological impairment  Signs & Symptoms: NPO status due to medical condition and Swallow Study Results    [address] and/or Nutrient Delivery: Start Tube Feeding Continue NPO or Start Tube Feeding   Nutrition Education/Counseling: Education not appropriate Coordination of Nutrition Care: Continue to monitor while inpatient       NUTRITION MONITORING & EVALUATION:  Evaluation:Goals set   Goals:Goals: pt will tolerate most appopriate form of nutrition to provide > 75% of pt nutrition needs.    Monitoring: I/O, Nutrition Progression , Pertinent Labs  or Weight      OBJECTIVE DATA:  · Nutrition-Focused Physical Findings: confused; lbm pta; suction drain output 20 ml;   · Wounds Surgical Wound w/drain from craniotomy     Past Medical History:   Diagnosis Date    Acute encephalopathy 03/03/2021    Anxiety     Aortic insufficiency     [facility]     Chronic kidney disease (CKD), stage III (moderate)     COPD, moderate (HCC)     Coronavirus infection 03/25/2020    Dementia ([facility] [address]     Expressive aphasia 07/29/2020    Facial twitching     GERD (gastroesophageal reflux disease)     Hair loss     wears wig- unknown cause    Hx of falling     Hyperlipidemia     Hypertension     Ischemic cardiomyopathy     EF 55% 03/15/2021    Mild cognitive impairment with memory loss 07/25/2019    NSTEMI (non-ST elevated myocardial infarction) ([facility])     Seasonal allergies     Subdural hematoma ([facility] [address] 03/12/2021        ANTHROPOMETRICS  Current Height: 5' 6\" (167.6 cm)  Current Weight: 110 lb (49.9 kg) STATED    Admission weight: 110 lb (49.9 kg) STATED   Ideal Bodyweight 130 lb    Usual Bodyweight 130 lb per EMR  Weight Changes brianne as all wts are stated       BMI BMI (Calculated): 17.8    Wt Readings from Last 50 Encounters:   03/20/21 110 lb (49.9 kg)   03/20/21 110 lb (49.9 kg)   03/12/21 110 lb (49.9 kg)   03/03/21 110 lb (49.9 kg)   12/27/20 115 lb 1.6 oz (52.2 kg)   09/25/20 106 lb (48.1 kg) 08/12/20 106 lb (48.1 kg)   08/03/20 135 lb (61.2 kg)   07/29/20 135 lb (61.2 kg)       COMPARATIVE STANDARDS  Estimated Total Kcals/Day : 30-35 Current Bodyweight (49.9 kg) 0917-2465 kcal    Estimated Total Protein (g/day) : 1.2-1.4 Current Bodyweight (49.9 kg) 60-70 g/day  Estimated Daily Total Fluid (ml/day): 5551-2810 mL per day     Food / Nutrition-Related History  Pre-Admission / Home Diet:  Pre-Admission/Home Diet: General   Home Supplements / Herbals:    none noted  Food Restrictions / Cultural Requests:    none noted    Current Nutrition Therapies   Diet NPO Effective Now     PO Intake: 0%  and NPO (per RN was not eating on gen diet prior to NPO today.    PO Supplement: NPO   PO Supplement Intake: NPO  IVF: NS @125 ml/hr     NUTRITION RISK LEVEL: Risk Level: [address] [facility], RD, LD  Ilya:  [phone]  Office:  [phone]

## 2021-03-23 NOTE — PROGRESS NOTES
Occupational Therapy  Facility/Department: [facility] 5T ORTHO/NEURO  Daily Treatment Note  NAME: Casandra Carrasco  : 1934  MRN: [id number]    Date of Service: 3/23/2021    Discharge Recommendations:  Casandra Carrasco scored a 15/24 on the AM-PAC ADL Inpatient form. Current research shows that an AM-PAC score of 17 or less is typically not associated with a discharge to the patient's home setting. Based on the patient's AM-PAC score and their current ADL deficits, it is recommended that the patient have 3-5 sessions per week of Occupational Therapy at d/c to increase the patient's independence. Please see assessment section for further patient specific details. If patient discharges prior to next session this note will serve as a discharge summary. Please see below for the latest assessment towards goals. OT Equipment Recommendations  Other: defer    Assessment   Assessment: Pt requiring Mod A x2 for mobility and transfers. Pt with increased agitation and not able to communicate needs batting at therapists hands. Pt requiring close attendance with CGA for static sitting due to attempting to get up quickly with frustration/agitation. Pt would benefit from inpt OT for maximizing functional independence and safety. Continue OT per POC. Treatment Diagnosis: decreased ADLs and transfers secondary to SDH  OT Education: OT Role;Transfer Training  Patient Education: Pt with limited understanding and will need reinforcment  Barriers to Learning: cognition  Activity Tolerance  Activity Tolerance: Patient limited by fatigue;Patient limited by pain;Treatment limited secondary to decreased cognition;Treatment limited secondary to agitation  Activity Tolerance: Difficult to assess pain however increased agitation and moaning throughout session. Verbal communication confused and difficult to follow. Patient Diagnosis(es): There were no encounter diagnoses.       has a past medical history of Acute Timed Code Treatment Minutes: [address]

## 2021-03-23 NOTE — PROGRESS NOTES
Speech Language Pathology  Facility/Department: 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 ([facility] [address] 2020    Confusion     Closed displaced fracture of left femoral neck ([facility] [address] 2020    MGUS (monoclonal gammopathy of unknown significance) 2020    Mixed hyperlipidemia 2020    Clonic hemifacial spasm, bilateral 2019    Gastroesophageal reflux disease without esophagitis 2019    GEORGES (generalized anxiety disorder) 2018    Benign essential hypertension 2014    Aortic insufficiency 2013    Pneumonia 2012     Allergies: Allergies   Allergen Reactions    Quinidine Hives    Aminoglycosides      Pt unsure    Levofloxacin      unknown    Neomycin      unknown    Simvastatin      Unknown to pt     Recent Chest Xray: (2021)  Impression   Clear lungs.  Cardiomegaly.  No acute cardiopulmonary abnormality.      Recent CT Head: (2021)      Impression       1.  Unchanged left cerebral convexity subdural hematoma with 8 mm rightward subfalcine herniation.      Recent CT Head: (2021)  Impression       Postoperative changes noted with placement of drainage catheter into the left subdural hematoma with postoperative change from craniotomy in the superior frontal lobe noted.       The subdural hemorrhage is slightly decreased from prior study with interval development of postoperative air as above.       Degree of mild left to right shift has diminished slightly from prior exam.        Previous MBS - re-assessment as pt participates. RN will page SLP if appropriate later this date  Cont goal    Patient/Family/Caregiver Education:    Compensatory Strategies:  n/a     Plan:  Continued daily Dysphagia treatment with goals per  plan of care. · Diet recommendations: cont NPO  · Oral care / small amounts of ice chips if alert enough for the comfort of pt, health and hydration of oropharyngeal mucosa and swallow stimulation  · Re-assessment bedside 3/24  DC recommendation: ongoing treatment indicated. Speech/cog eval indicated, when pt able to participate  Treatment: 15  D/W [facility]  Needs met prior to leaving room, call button in reach. Terrea Pallas, M.S./CCC-SLP #[id number]  Pg.  # [phone]  If patient is discharged prior to next treatment, this note will serve as the discharge summary

## 2021-03-23 NOTE — DISCHARGE INSTR - COC
Continuity of Care Form    Patient Name: Muna Batista   :  1934  MRN:  [id number]    Admit date:  3/20/2021  Discharge date:  ***    Code Status Order: Full Code   Advance Directives:   Advance Care Flowsheet Documentation       Date/Time Healthcare Directive Type of Healthcare Directive Copy in [address] Agent's Name Healthcare Agent's Phone Number    [phone]  Yes, patient has an advance directive for healthcare treatment  Living will  No, copy requested from family  --  --  --    213  No, patient does not have an advance directive for healthcare treatment  --  --  --  --  --            Admitting Physician:  Gunnar Ho MD  PCP: Divina Carbajal MD    Discharging Nurse: [facility] Unit/Room#: 9103/6864-06  Discharging Unit Phone Number: ***    Emergency Contact:   Extended Emergency Contact Information  Primary Emergency Contact: Peggy Dariuszeliana  Address: [address] [place] [address] Phone: [phone]  Mobile Phone: [phone]  Relation: Grandchild  Secondary Emergency Contact: [address] [place] [address] Phone: [phone]  Mobile Phone: [phone]  Relation: Child    Past Surgical History:  Past Surgical History:   Procedure Laterality Date    COLONOSCOPY      COLONOSCOPY  2015    diverticulosis    CRANIOTOMY Left 3/21/2021    LEFT CRANIOTOMY FOR SUBDURAL HEMATOMA EVACUATION performed by Deborah Wood.  Leonard England MD at [address] [place] [address] Left 2020    LEFT HIP HEMIARTHROPLASTY performed by Paul Pepe MD at [address] [place] [address]         Immunization History:   Immunization History   Administered Date(s) Administered    Influenza, Quadv, IM, PF (6 mo and older Fluzone, Flulaval, Fluarix, and 3 yrs and older Afluria) 03/15/2021    Pneumococcal Polysaccharide ([id number]) 2012       Active Problems:  Patient Active Mobility/ADLs:  Walking   {CHP DME WSGS:819958208}  Transfer  {CHP DME CPJP:051381039}  Bathing  {CHP DME EMES:913934292}  Dressing  {CHP DME OAOA:670136875}  Toileting  {CHP DME VIHR:681647006}  Feeding  {CHP DME QFIX:983030285}  Med Admin  {P DME CSBB:832095908}  Med Delivery   {[facility] MED Delivery:424666054}    Wound Care Documentation and Therapy:  Wound 21 Perineum Stage 1 (Active)   Wound Etiology Pressure Stage  1 03/15/21 0800   Dressing/Treatment Open to air 03/15/21 1600   Wound Assessment Other (Comment) 03/15/21 1600   Drainage Amount None 03/15/21 1600   Ginna-wound Assessment Intact 03/15/21 0310   Number of days: 10        Elimination:  Continence:   · Bowel: {YES / BZ:03054}  · Bladder: {YES / GP:45984}  Urinary Catheter: {Urinary Catheter:779739260}   Colostomy/Ileostomy/Ileal Conduit: No       Date of Last BM: 21    Intake/Output Summary (Last 24 hours) at 3/23/2021 1548  Last data filed at 3/23/2021 1539  Gross per 24 hour   Intake 396 ml   Output 15 ml   Net 381 ml     I/O last 3 completed shifts:   In: 336 [I.V.:10; NG/GT:326]  Out: 15 [Drains:15]    Safety Concerns:     [address] [employer] Safety Concerns:241210973}    Impairments/Disabilities:      [address] [employer] Impairments/Disabilities:980643190}    Nutrition Therapy:  Current Nutrition Therapy:   [address] [employer] Diet List:025462433}    Routes of Feeding: {CHP DME Other Feedings:737971885}  Liquids: NPO  Daily Fluid Restriction: {CHP DME Yes amt example:465087388}  Last Modified Barium Swallow with Video (Video Swallowing Test): N/A    Treatments at the Time of Hospital Discharge:   Respiratory Treatments: N/A  Oxygen Therapy:  {Therapy; copd oxygen:98835}  Ventilator:    { CC Vent NMIL:537238571}    Rehab Therapies: {THERAPEUTIC INTERVENTION:5569201623}  Weight Bearing Status/Restrictions: [address] Weight Bearin}  Other Medical Equipment (for information only, NOT a DME order):  {EQUIPMENT:311238961}  Other Treatments: N/A    Patient's personal belongings (please select all that are sent with patient):  {[facility] Belongings:326700445}    RN SIGNATURE:  Electronically signed by Roddy Edwards RN on 4/7/21 at 3:48 PM EDT    CASE MANAGEMENT/SOCIAL WORK SECTION     Inpatient Status Date: 3/20/2021    Readmission Risk Assessment Score:  Readmission Risk              Risk of Unplanned Readmission:        23       Discharging to Facility/ [address]            [facility] , [address] [place] [address]      report Phone: [phone]      fax Fax: [phone]            / signature: Electronically signed by Bill Hill RN on 4/7/21 at 1:56 PM EDT    PHYSICIAN SECTION    Prognosis: Fair    Condition at Discharge: Stable    Rehab Potential (if transferring to Rehab): NA    Recommended Labs or Other Treatments After Discharge: Monitor HR and blood pressure. Adjust medication as needed. Follow up with cardiology, nephrology, hematology, neurology and PCP. PEG care. Cont TF  PT/OT  Incisional Care: Open to air. Wash incision daily with warm soapy water or shower daily, and pat dry with clean dry towel. Physician Certification: I certify the above information and transfer of Van Serrano  is necessary for the continuing treatment of the diagnosis listed and that she requires Ponce Gaonauel for greater 30 days.      Update Admission H&P: No change in H&P    PHYSICIAN SIGNATURE:  Electronically signed by Alida Coker MD on 4/7/21 at 9:31 AM EDT

## 2021-03-23 NOTE — PROGRESS NOTES
Hospitalist Progress Note      PCP: Divina Carbajal MD    Date of Admission: 3/20/2021     Subjective:   NG plaved yesterday evening to initiate feeding. TF recs per dietitian. Pt very somnolent and difficult to arouse. ROS unobtainable 2/2 ams    Medications:  Reviewed    Infusion Medications     Scheduled Medications    sennosides-docusate sodium  2 tablet Oral BID    polyethylene glycol  17 g Oral Daily    levETIRAcetam  1,000 mg Oral BID    famotidine  20 mg Oral Daily    sodium chloride flush  10 mL Intravenous 2 times per day    heparin (porcine)  5,000 Units Subcutaneous Q12H    ezetimibe  10 mg Oral Nightly    ferrous sulfate  325 mg Oral Daily with breakfast    lisinopril  40 mg Oral Daily     PRN Meds: HYDROcodone 5 mg - acetaminophen, sodium chloride flush, naloxone, cyclobenzaprine, promethazine **OR** ondansetron, acetaminophen, magnesium sulfate      Intake/Output Summary (Last 24 hours) at 3/23/2021 0943  Last data filed at 3/23/2021 0918  Gross per 24 hour   Intake 336 ml   Output 15 ml   Net 321 ml       Physical Exam Performed:    BP (!) 164/65   Pulse 65   Temp 99.9 °F (37.7 °C) (Oral)   Resp 16   Ht 5' 6\" (1.676 m)   Wt 110 lb (49.9 kg)   SpO2 95%   BMI 17.75 kg/m²     General appearance: obtunded  Head: + drain w/ clearing serosanguinous drainage   HENT: + NG  Respiratory:  Normal respiratory effort. Clear to auscultation, bilaterally without Rales/Wheezes/Rhonchi. Cardiovascular: Regular rate and rhythm with normal S1/S2   Abdomen: Soft, non-tender, non-distended with normal bowel sounds. Musculoskeletal: No clubbing, cyanosis or edema bilaterally. Skin: Skin color, texture, turgor normal.  No rashes or lesions.   Neurologic:  Somnolent, otherwise non focal exam  Psychiatric: not able to assess    Labs:   Recent Labs     03/20/21  1450 03/21/21  0530 03/22/21  0524   WBC 5.2 5.0 7.7   HGB 9.2* 9.0* 8.5*   HCT 27.6* 27.8* 25.3*    267 272     Recent Labs 03/20/21  1450 03/21/21  0530    136   K 4.1 4.2    102   CO2 26 26   BUN 20 14   CREATININE 0.8 0.8   CALCIUM 9.5 9.1     Recent Labs     03/20/21  1450   AST 15   ALT <5*   BILITOT 0.6   ALKPHOS 85     Recent Labs     03/22/21  0524   INR 1.22*     Urinalysis:      Lab Results   Component Value Date    NITRU Negative 03/21/2021    WBCUA None seen 03/21/2021    BACTERIA Rare 03/21/2021    RBCUA 0-2 03/21/2021    BLOODU MODERATE 03/21/2021    SPECGRAV 1.020 03/21/2021    GLUCOSEU Negative 03/21/2021       Radiology:  XR ABDOMEN (KUB) (SINGLE AP VIEW)   Final Result      1. Feeding tube tip in stomach. 2. Airspace disease right lung, possible pneumonia. CT HEAD WO CONTRAST   Final Result      Postoperative changes noted with placement of drainage catheter into the left subdural hematoma with postoperative change from craniotomy in the superior frontal lobe noted. The subdural hemorrhage is slightly decreased from prior study with interval development of postoperative air as above. Degree of mild left to right shift has diminished slightly from prior exam.                XR HIP LEFT (2-3 VIEWS)   Final Result   Impression:    No acute osseous injury. Left total hip arthroplasty with mild chronic loosening along the acetabulum. CT HEAD WO CONTRAST   Final Result      1. Unchanged left cerebral convexity subdural hematoma with 8 mm rightward subfalcine herniation. Assessment/Plan:    Active Hospital Problems    Diagnosis    Subdural hematoma ([address] [S06.5X9A]   Presenting w/ acute encephalopathy:  Worsening subdural hematoma, with acute and chronic components with increased mass-effect  Repeat CT head - Unchanged left cerebral convexity subdural hematoma with 8 mm rightward subfalcine herniation. Neurosurgery consulted - now s/p craniotomy pod1.   Continue on Keppra 1 g IV twice daily  No antiplatelets or chemical DVT prophylaxis  Seizure and fall precautions  Appreciate neurosurg help.     Severe Protein calorie malnutrition:  Body mass index is 17.75 kg/m². NG placed/ started on tf- will have speech revvaluate swallow/ if unable to tale po will consult GI for peg placement.     Left hip pain and echymosi:  Possible fall. .  Not on any blood thinners  Repeat xray of hip negative. Generalized tonic-clonic seizures   On keppra. CAD  #Ischemic cardiomyopathy with ejection fraction of 30-35%, moderate MR and moderate to severe AR  -was on aspirin and Plavix, which was discontinued during previous hospitalization  Continue on statin     Sinus bradycardia-patient   is on Toprol-XL at home     History of MGUS-follows with hematology-oncology    DVT Prophylaxis: scd  Diet: DIET TUBE FEED CONTINUOUS/CYCLIC NPO; STANDARD WITH FIBER (free water flushes 30 ml every 4 hours);  Nasogastric; 20; 60; 12  Code Status: Full Code    PT/OT Eval Status: 14 and 15 respectively    Anita Parker MD

## 2021-03-23 NOTE — PROGRESS NOTES
Patient is alert to self only. Vital signs stable. NIH remains a 2, no change in neuro status. Advanced dementia scale used to assess for pain and patient medicated with PRN norco and flexeril per the [facility]. Surgical incision to top of head closed with staples and is KEENA. Drain removed by neurosurgery earlier in the shift. NG tube in place to L nare with tube feed running at goal of 60 mL and patient tolerating well. Clear breath sounds, active bowel tones. Voiding without complications. Ambulating x2 assist with walker and gait belt. Fall precautions in place. Bed locked in lowest position with alarm on. Call light within reach. On camera and sitter outside of room to keep patient from removing NG tube. Resting in bed with all needs met. Will continue to monitor.

## 2021-03-23 NOTE — PROGRESS NOTES
impulsivity. Transfers  Sit to stand: Dependent (Mod assist x 2) from bed, from chair (twice), from bedside commode  Stand to sit: Dependent (Mod assist x 2)  Chair to bedside commode: Dependent (Mod assist x 2) with walker  Other: Needing ongoing verbal/tactile cues for safety and technique. Ambulation  Assistance Level: Dependent (Mod assist x 2)  Assistive device: Wheeled walker  Distance: 2 ft (bed to chair), 6 ft (chair to chair); 5 ft (bedside commode to bed)  Quality of gait: Weak; unsteady; decreased pace. Pt needing assist/cues for R  to walker handle. Balance  Sat EOB with CGA to Min assist for safety due to impulsivity  Static stance with walker Mod assist x 1  Taking steps with walker Mod assist x 2    Patient Education  Role of PT. Importance of mobility. Questionable understanding due to aphasia/decreased processing noted. Safety Devices  Pt left with needs in reach. In bed with bed alarm on. RN updated. AM-PAC score  AM-PAC Inpatient Mobility Raw Score : 12  AM-PAC Inpatient T-Scale Score : 35.33  Mobility Inpatient CMS 0-100% Score: 68.66  Mobility Inpatient CMS G-Code Modifier : CL    Goals: (as determined and assessed by primary PT)  Time Frame for Short term goals: d/c  Short term goal 1: sup<>sit supervision   Short term goal 2: sit<>stand supervision with LRAD   Short term goal 3: amb 48' with LRAD and CGA      Plan:  Times per week: 5-7; Times per day: Daily  Current Treatment Recommendations: Strengthening, Balance Training, Gait Training, Stair training, Functional Mobility Training, Transfer Training, Endurance Training, Home Exercise Program, Safety Education & Training    Therapy Time    Individual  Concurrent  Group  Co-treatment    Time In  1418            Time Out  1448            Minutes  30              Timed Code Treatment Minutes: 30  Total Treatment Minutes: 30    Will continue per plan of care.    If patient is discharged prior to next treatment, this note will serve as the discharge summary.     [place], [place] #[address]

## 2021-03-23 NOTE — CARE COORDINATION
Patient here from home. Recs for SNF. Referral pending [place]. CM spoke with Vinita Hendricks [phone]. Can take patient at discharge. Possible discharge tomorrow. Per GILLES note from previous day. Bipin Torres at [facility] [phone]. They received a referral on patient. GILLES provided update. Lisa Burden asked to be kept updated regarding discharge date and plan. [facility] completed #[id number]      CM will continue to follow for discharge planning.   Андрей Larson RN  RN Case Manager  [phone]

## 2021-03-23 NOTE — PROGRESS NOTES
Patient's drain removed with sterile technique, pressure held then anayeli stitch used to close insertion site. Patient tolerated well.

## 2021-03-23 NOTE — PROGRESS NOTES
NEUROSURGERY PROGRESS NOTE    3/23/2021 9:56 AM                               Brenda Alanis                      LOS: 3 days   POD#2  s/p Procedure(s) (LRB):  LEFT CRANIOTOMY FOR SUBDURAL HEMATOMA EVACUATION (Left)    Subjective: Patient sitting up in bed upon entering the room. No acute events overnight. Patient has no specific complaints this am.    Physical Exam:  Patient seen and examined    Vitals:    03/23/21 0630   BP: (!) 164/65   Pulse: 65   Resp: 16   Temp: 99.9 °F (37.7 °C)   SpO2: 95%     GCS:  4 - Opens eyes on own  4 - Seems confused, disoriented  6 - Follows simple motor commands  General: Well developed. Alert and impulsive  HENT: atraumatic, neck supple  Eyes: Optic discs: Not tested  Pulmonary: unlabored respiratory effort  Cardiovascular:  Warm well perfused.  No peripheral edema  Gastrointestinal: abdomen soft, NT, ND    Neurological:  Mental Status: Awake, alert, oriented to self only, impulsive  Attention: Unable to consistently follow commands  Language: No aphasia or dysarthria noted  Sensation: Intact to all extremities to light touch  Coordination: Intact    Cranial Nerves:  II: Visual acuity not tested, denies new visual changes / diplopia  III, IV, VI: PERRL, 3 mm bilaterally, EOMI, no nystagmus noted  V: Facial sensation intact bilaterally to touch  VII: Face symmetric  VIII: Hearing intact bilaterally to spoken voice  IX: Palate movement equal bilaterally  XI: Shoulder shrug equal bilaterally  XII: Tongue midline    Musculoskeletal:   Gait: Not tested   Assist devices: None   Tone: Normal  Motor strength: Exam limited 2/2 unable to follow commands consistently, but able to move all extremities independently anti gravity    Incision: CDI    Drain: 35 mL in past 24 hours    Radiological Findings:  Ct Head Wo Contrast  Result Date: 3/20/2021  Interval increase size of the left hemispheric convexity subdural hematoma which now contains acute and subacute components and now measures 1.9 cm in greatest thickness (previously 1.2 cm). There is persistent mass effect with left-to-right midline shift of 8-9 mm (previously 5-6 mm). Ct Head Wo Contrast  Result Date: 3/20/2021  Unchanged left cerebral convexity subdural hematoma with 8 mm rightward subfalcine herniation. Ct Head Wo Contrast  Result Date: 3/22/2021  Postoperative changes noted with placement of drainage catheter into the left subdural hematoma with postoperative change from craniotomy in the superior frontal lobe noted. The subdural hemorrhage is slightly decreased from prior study with interval development of postoperative air as above. Degree of mild left to right shift has diminished slightly from prior exam.        Labs:  Recent Labs     03/22/21 0524   WBC 7.7   HGB 8.5*   HCT 25.3*          Recent Labs     03/21/21 0530 03/21/21 0530 03/23/21 0516     --   --    K 4.2  --   --      --   --    CO2 26  --   --    BUN 14  --   --    CREATININE 0.8  --   --    GLUCOSE 98  --   --    CALCIUM 9.1  --   --    MG 1.80   < > 1.80    < > = values in this interval not displayed.        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 female s/p Procedure(s) (LRB):  LEFT CRANIOTOMY FOR SUBDURAL HEMATOMA EVACUATION (Left)    Plan:  1. Neurologically stable  2. Neurologic exams frequency: Q4H  3. For change in exam MUST contact neurosurgery team along with critical care or primary team  4. CT Head shows expected post-op changes  5. Antibiotics: DC after drain removed  6. Drain: Remove drain today  7. Cerebral edema:  - Keep Na within normal limits  - Keep HOB >30 degrees  - If central venous access is needed please use subclavian vs femoral - No IJ as this can decrease venous return and worsen cerebral edema  8. GI Prophylaxis: Pepcid  9. Seizure prophylaxis: Keppra 1000mg BID  10. Bowel Regimen: Glycolax and Senokot-S  11. Pain control: PRN Tylenol and Norco  12. DVT Prophylaxis: SCD's & SQ Heparin  13. Mobility:  - Advance as tolerates  - PT/OT consulted, appreciate recs  14. Diet: Advance as tolerates  15. Will follow inpatient. Please call with any questions or decline in neurological status    DISPO: Remain inpatient from neurosurgery standpoint. Dispo timing to be determined by primary team once patient is medically stable for discharge. Patient was seen and examined with Dr. Monique Galvez who agrees with above assessment and plan. Electronically signed by: EDBRA Andrews-CNP, 3/23/2021 9:56 AM  [phone].    __________________________________________________________________    I saw and examined Cornelia Cuevas on 03/23/21. I agree with the assessment and plan as detailed above.      Elvie Lechuga MD, PhD  [address], [address] [facility], [address] [place] [address], [facility]  [phone] (c), [phone] (o)

## 2021-03-24 NOTE — PROGRESS NOTES
Hospitalist Progress Note      PCP: Zora Manuel MD    Date of Admission: 3/20/2021     Subjective:   More awake and lert today. Sitting in a chair. Worked w/ PT/ OT.    ROS unobtainable 2/2 aphasia    Medications:  Reviewed    Infusion Medications     Scheduled Medications    sennosides-docusate sodium  2 tablet Oral BID    polyethylene glycol  17 g Oral Daily    levETIRAcetam  1,000 mg Oral BID    famotidine  20 mg Oral Daily    sodium chloride flush  10 mL Intravenous 2 times per day    heparin (porcine)  5,000 Units Subcutaneous Q12H    ezetimibe  10 mg Oral Nightly    ferrous sulfate  325 mg Oral Daily with breakfast    lisinopril  40 mg Oral Daily     PRN Meds: HYDROcodone 5 mg - acetaminophen, sodium chloride flush, naloxone, cyclobenzaprine, promethazine **OR** ondansetron, acetaminophen, magnesium sulfate      Intake/Output Summary (Last 24 hours) at 3/24/2021 0940  Last data filed at 3/24/2021 0316  Gross per 24 hour   Intake 1479 ml   Output --   Net 1479 ml       Physical Exam Performed:    BP (!) 143/61   Pulse 67   Temp 98.5 °F (36.9 °C) (Axillary)   Resp 16   Ht 5' 6\" (1.676 m)   Wt 110 lb (49.9 kg)   SpO2 94%   BMI 17.75 kg/m²     General appearance: awake but aphasic  Head: drain clearing  HENT: + NG  Respiratory:  Normal respiratory effort. Clear lungs  Cardiovascular: Regular rate and rhythm with normal S1/S2   Abdomen: Soft, non-tender, non-distended   Skin: Skin color, texture, turgor normal.    Neurologic:  Awake, ? Confused, otherwise non focal exam  Psychiatric: not able to assess 2/2 aphasia    Labs:   Recent Labs     03/22/21  0524   WBC 7.7   HGB 8.5*   HCT 25.3*        No results for input(s): NA, K, CL, CO2, BUN, CREATININE, CALCIUM, PHOS in the last 72 hours. Invalid input(s): MAGNES  No results for input(s): AST, ALT, BILIDIR, BILITOT, ALKPHOS in the last 72 hours.   Recent Labs     03/22/21  0524   INR 1.22*     Urinalysis:      Lab Results

## 2021-03-24 NOTE — ACP (ADVANCE CARE PLANNING)
Advance Care Planning     Advance Care Planning (ACP) Physician Conversation    Date of Conversation: 3/20/2021  Conducted with:  Healthcare Decision Maker: Next of Kin by law (only applies in absence of above) (name) [address] [facility] Decision Maker:        Click here to complete [address] including selection of the Healthcare Decision Maker Relationship (ie \"Primary\")  Today we documented Decision Maker(s) consistent with Legal Next of Kin hierarchy. Care Preferences:    Hospitalization: \"If your health worsens and it becomes clear that your chance of recovery is unlikely, what would be your preference regarding hospitalization? \"  The patient would prefer hospitalization. Ventilation: \"If you were unable to breath on your own and your chance of recovery was unlikely, what would be your preference about the use of a ventilator (breathing machine) if it was available to you? \"  The patient would desire the use of a ventilator. Resuscitation: \"In the event your heart stopped as a result of an underlying serious health condition, would you want attempts made to restart your heart, or would you prefer a natural death? \"  Patient would be ok with shock but not with CPR    treatment goals, benefit/burden of treatment options, artificial nutrition and resuscitation preferences    Conversation Outcomes / Follow-Up Plan:  ACP complete - no further action today  Reviewed DNR/DNI and patient elects Full Code (Attempt Resuscitation)    Length of Voluntary ACP Conversation in minutes:  16 minutes    [address]

## 2021-03-24 NOTE — PROGRESS NOTES
NEUROSURGERY PROGRESS NOTE    3/24/2021 1:02 PM                               Tigist Bermudez                      LOS: 4 days   POD#3  s/p Procedure(s) (LRB):  LEFT CRANIOTOMY FOR SUBDURAL HEMATOMA EVACUATION (Left)    Subjective: Patient sitting up in chair upon entering the room. No acute events overnight. Patient has no specific complaints this am.    Physical Exam:  Patient seen and examined    Vitals:    03/24/21 1045   BP: (!) 166/52   Pulse: 58   Resp: 16   Temp: 98.6 °F (37 °C)   SpO2: 97%     GCS:  4 - Opens eyes on own  4 - Seems confused, disoriented  6 - Follows simple motor commands  General: Well developed. Alert and impulsive  HENT: atraumatic, neck supple  Eyes: Optic discs: Not tested  Pulmonary: unlabored respiratory effort  Cardiovascular:  Warm well perfused.  No peripheral edema  Gastrointestinal: abdomen soft, NT, ND    Neurological:  Mental Status: Awake, alert, oriented to self only, impulsive  Attention: Unable to consistently follow commands  Language: No aphasia or dysarthria noted  Sensation: Intact to all extremities to light touch  Coordination: Intact    Cranial Nerves:  II: Visual acuity not tested, denies new visual changes / diplopia  III, IV, VI: PERRL, 3 mm bilaterally, EOMI, no nystagmus noted  V: Facial sensation intact bilaterally to touch  VII: Face symmetric  VIII: Hearing intact bilaterally to spoken voice  IX: Palate movement equal bilaterally  XI: Shoulder shrug equal bilaterally  XII: Tongue midline    Musculoskeletal:   Gait: Not tested   Assist devices: None   Tone: Normal  Motor strength: Exam limited 2/2 unable to follow commands consistently, but able to move all extremities independently anti gravity    Incision: CDI    Radiological Findings:  Ct Head Wo Contrast  Result Date: 3/20/2021  Interval increase size of the left hemispheric convexity subdural hematoma which now contains acute and subacute components and now measures 1.9 cm in greatest thickness (previously 1.2 cm). There is persistent mass effect with left-to-right midline shift of 8-9 mm (previously 5-6 mm). Ct Head Wo Contrast  Result Date: 3/20/2021  Unchanged left cerebral convexity subdural hematoma with 8 mm rightward subfalcine herniation. Ct Head Wo Contrast  Result Date: 3/22/2021  Postoperative changes noted with placement of drainage catheter into the left subdural hematoma with postoperative change from craniotomy in the superior frontal lobe noted. The subdural hemorrhage is slightly decreased from prior study with interval development of postoperative air as above. Degree of mild left to right shift has diminished slightly from prior exam.        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 female s/p Procedure(s) (LRB):  LEFT CRANIOTOMY FOR SUBDURAL HEMATOMA EVACUATION (Left)    Plan:  1. Neurologically stable  2. Neurologic exams frequency: Q4H  3.  For change in exam MUST contact neurosurgery team along with critical care or primary team  4. CT Head shows expected post-op changes  5. Cerebral edema:  - Keep Na within normal limits  - Keep HOB >30 degrees  - If central venous access is needed please use subclavian vs femoral - No IJ as this can decrease venous return and worsen cerebral edema  6. Seizure prophylaxis: Keppra 1000mg BID  7. Bowel Regimen: Glycolax and Senokot-S  8. Pain control: PRN Tylenol and Norco  9. DVT Prophylaxis: SCD's & SQ Heparin  10. Mobility:  - Advance as tolerates  - PT/OT consulted, appreciate recs  11. Diet: Advance as tolerates  12. Will follow inpatient. Please call with any questions or decline in neurological status    DISPO: OK to DC from neurosurgery standpoint. Dispo timing to be determined by primary team once patient is medically stable for discharge. Patient was seen and examined with Dr. Russell Blas who agrees with above assessment and plan. Electronically signed by: GENO Hinojosa, 3/24/2021 1:02 PM  [phone].    __________________________________________________________________    I saw and examined Minor Members on 03/24/21. I agree with the assessment and plan as detailed above.      Leia Reyes MD, PhD  [address], [address] [facility] [address], [address], [address] [phone] (c), [phone] (o)

## 2021-03-24 NOTE — PROGRESS NOTES
Pt is alert and oriented to self only. VSS. NIHSS remains unchanged at a 2. Sx site is CDI and KEENA. Dry dressing on drain removal site. Pain is managed well using the advanced dmentia scale. Pt tolerating tube feeds well at 60 mL/hr. Pt tolerating ambulation well with one assist and walker. Voiding adequally this shift. All fall precautions in place.

## 2021-03-24 NOTE — H&P
GI:    Mrs Adithya Savage was seen at bed side, examined ane evaluated  Entire records were reviewed    Ass:  Acute encephalopathy with subdural hematoma  Severe protein calorie malnutrition  CAD with ischemic cardiomyopathy  Moderate MR and AR  Dementia  Statu epilepticus  hyponatremia      Patient is non communicative . Dependent on ng tube feeding. She clearly needs peg     Patient appears slightly dyspneic .  Prior EF 30-35 percent  Will get cbc and another inr  I asked dr Justine Hays and berna man for clearance to perform peg  Discussed with Rn    Thank you Dr Arlette guido m.d.  [id number]

## 2021-03-24 NOTE — PROGRESS NOTES
NUTRITION ASSESSMENT  Admission Date: 3/20/2021     Type and Reason for Visit: Reassess    NUTRITION RECOMMENDATIONS:   1. Continue EN Standard Formula with Fiber  Jevity 1.2  @ goal rate 60 ml/hr to provide 1440 ml total volume, 1728 kcal, 80 g protein and 1162 ml free water. 2. Increase free water now with IVF off- provide water bolus 100 ml every 4 hours. 3. Patient has not been weighed this admission. Please obtain actual/current bodyweight as current weight appears est/stated. 4. Consider adjusting bowel regimen if no BM on EN. No BM this admit. NUTRITION ASSESSMENT:  NGT placed and EN now at goal rate to meet 100% of pt nutition needs. RD notes SLP rec'd \"trial of full liquids\" but otherwise pt is NPO. RD notes discussion surrounding d/c plans and possible PEG placement. RD will monitor for continued EN tolerance to meet pt nutrition needs. MALNUTRITION ASSESSMENT  Context of Malnutrition: Acute Illness   Malnutrition Status: At risk for malnutrition (Comment)  Findings of the 6 clinical characteristics of malnutrition (Minimum of 2 out of 6 clinical characteristics is required to make the diagnosis of moderate or severe Protein Calorie Malnutrition based on AND/ASPEN Guidelines):  Energy Intake: Less than/equal to 50% of estimated energy requirements    Energy Intake Time: since admit x 2 days; suspect longer pta   Weight Loss %: Unable to assess    Weight loss Time: Unable to assess   Due to current CDC guidelines recommending 6-ft distancing for social isolation for COVID19 prevention, physical aspects of the malnutrition assessment were withheld at this time.      NUTRITION DIAGNOSIS   Problem: Problem #1: Inadequate oral intake   Etiology: Cognitive or neurological impairment  Signs & Symptoms: NPO status due to medical condition and Nutrition Support-EN    NUTRITION INTERVENTION  Food and/or Nutrient Delivery: Continue NPO, Continue Current Tube Feeding   Nutrition Education/Counseling: Education not appropriate Coordination of Nutrition Care: Continue to monitor while inpatient     NUTRITION MONITORING & EVALUATION:  Evaluation:Modified current goal  or Progressing towards goal   Goals:Goals: pt will continue to tolerate EN at goal rate to meet 100% of nutrition needs. Monitoring: I/O, Nutrition Progression , TF Intake , TF Tolerance  or Weight      OBJECTIVE DATA:  · Nutrition-Focused Physical Findings: thin; confused;  No BM- has senokot ordered;   · Wounds Surgical Wound w/drain from craniotomy     Past Medical History:   Diagnosis Date    Acute encephalopathy 03/03/2021    Anxiety     Aortic insufficiency     [facility]     Chronic kidney disease (CKD), stage III (moderate)     COPD, moderate (HCC)     Coronavirus infection 03/25/2020    [facility]     Expressive aphasia 07/29/2020    Facial twitching     GERD (gastroesophageal reflux disease)     Hair loss     wears wig- unknown cause    Hx of falling     Hyperlipidemia     Hypertension     Ischemic cardiomyopathy     EF 55% 03/15/2021    Mild cognitive impairment with memory loss 07/25/2019    NSTEMI (non-ST elevated myocardial infarction) ([facility])     Seasonal allergies     Subdural hematoma ([facility]) 03/12/2021        ANTHROPOMETRICS  Current Height: 5' 6\" (167.6 cm)  Current Weight: 110 lb (49.9 kg) STATED    Admission weight: 110 lb (49.9 kg) STATED   Ideal Bodyweight 130 lb    Usual Bodyweight 130 lb per EMR  Weight Changes brianne as all wts are stated       BMI BMI (Calculated): 17.8    Wt Readings from Last 50 Encounters:   03/20/21 110 lb (49.9 kg)   03/20/21 110 lb (49.9 kg)   03/12/21 110 lb (49.9 kg)   03/03/21 110 lb (49.9 kg)   12/27/20 115 lb 1.6 oz (52.2 kg)   09/25/20 106 lb (48.1 kg)   08/12/20 106 lb (48.1 kg)   08/03/20 135 lb (61.2 kg)   07/29/20 135 lb (61.2 kg)       COMPARATIVE STANDARDS  Estimated Total Kcals/Day : 30-35 Current Bodyweight (49.9 kg) 6104-8458 kcal    Estimated Total Protein (g/day) : 1.2-1.4 Current Bodyweight (49.9 kg) 60-70 g/day  Estimated Daily Total Fluid (ml/day): 6183-8321 mL per day     Food / Nutrition-Related History  Pre-Admission / Home Diet:  Pre-Admission/Home Diet: General   Home Supplements / Herbals:    none noted  Food Restrictions / Cultural Requests:    none noted    Current Nutrition Therapies   DIET TUBE FEED CONTINUOUS/CYCLIC NPO; STANDARD WITH FIBER (free water flushes 30 ml every 4 hours); Nasogastric; 20; 60; 12     PO Intake: 0%  and NPO (per RN was not eating on gen diet prior to NPO today. PO Supplement: NPO   PO Supplement Intake: NPO  IVF: d/c'd     NUTRITION RISK LEVEL: Risk Level:  Moderate     Rashad Posadas RD, LD  [place]:  [phone]  Office:  [phone]

## 2021-03-24 NOTE — PLAN OF CARE
Problem: Falls - Risk of:  Goal: Will remain free from falls  Description: Will remain free from falls  3/24/2021 0725 by Maine Nicholson RN  Outcome: Ongoing  Note: Pt is in bed with alarm on. Non-skid socks are on. Up x1 with a walker. Video monitoring on. Fall precautions in place. Call light and bedside table in reach. Problem: Pain:  Goal: Pain level will decrease  Description: Pain level will decrease  3/24/2021 0725 by Maine Nicholson RN  Outcome: Ongoing  Note: Pain managed with PRN PO medications per [facility]. Advanced dementia scale used to assess pain. Non-pharmaceutical pain management offered. Will continue to monitor.

## 2021-03-24 NOTE — PROGRESS NOTES
Occupational Therapy  Facility/Department: [facility] 5T ORTHO/NEURO  Daily Treatment Note  NAME: Eulalia Nichols  : 1934  MRN: [id number]    Date of Service: 3/24/2021    Discharge Recommendations:  Eulalia Nichols scored a 14/ on the AM-PAC ADL Inpatient form. Current research shows that an AM-PAC score of 17 or less is typically not associated with a discharge to the patient's home setting. Based on the patient's AM-PAC score and their current ADL deficits, it is recommended that the patient have 3-5 sessions per week of Occupational Therapy at d/c to increase the patient's independence. Please see assessment section for further patient specific details. If patient discharges prior to next session this note will serve as a discharge summary. Please see below for the latest assessment towards goals. OT Equipment Recommendations  Other: defer    Assessment   Assessment: Pt with decreased anxiety from previous session but continues to show signs of agitation during session. Pt requiring Min A x2 for transfers and mobility. Pt able to follow commands less than 50% of the time with perseveration on tasks. Pt with noted decreased function in L UE and LE. Pt would benefit from inpt OT for maximizing functional indepdence and safety. OT Education: OT Role  Patient Education: Pt with limited understanding and will need reinforcment  Activity Tolerance  Activity Tolerance: Patient limited by fatigue;Patient limited by pain;Treatment limited secondary to agitation  Activity Tolerance: Pt continues to have anxiety due to inability to comunicate, pain and general agitation. However decreased from previous session. Patient Diagnosis(es): There were no encounter diagnoses.       has a past medical history of Acute encephalopathy, Anxiety, Aortic insufficiency, CHF (congestive heart failure) ([address] Chronic kidney disease (CKD), stage III (moderate), COPD, moderate ([address] Coronavirus infection, Dementia ([facility] [address] Expressive aphasia, Facial twitching, GERD (gastroesophageal reflux disease), Hair loss, Hx of falling, Hyperlipidemia, Hypertension, Ischemic cardiomyopathy, Mild cognitive impairment with memory loss, NSTEMI (non-ST elevated myocardial infarction) ([facility] [address] Seasonal allergies, and Subdural hematoma ([facility] [address] has a past surgical history that includes Ovary removal; Colonoscopy; Colonoscopy (06/22/2015); hip surgery (Left, 07/30/2020); and craniotomy (Left, 3/21/2021). Restrictions  Position Activity Restriction  Other position/activity restrictions: up with assist, ambulation     Additional Pertinent Hx: 80 y.o. female who presents to the emergency department with some increased confusion. Pt found to have SDH. S/p LEFT CRANIOTOMY FOR SUBDURAL HEMATOMA EVACUATION on 3/21. Diagnosis: SDH  Treatment Diagnosis: decreased ADLs and transfers secondary to SDH    Subjective:   Pt met supine in bed with some resistance to therapy session but able to participate. Pt with agitation during session with decreased ability to communicate causing anxiety. Pain:   Pt groaning at times but unable to assess pain    Objective:    Cognition/Orientation:  Impaired      Bed mobility   Supine to sit:  CGA with cues for understanding task  Scooting: scooting back in chair with SBA then requiring Max A x2 for scooting all the way back. Functional Mobility   Sit to Stand: Min A x2 with assist for LUE placement and awareness on RW. Pt requiring RW management assist and VCs for understanding/sequencing mobility  Stand to Sit: Min Ax2 with VCs for positioning and sequencing. Bed to Chair Transfer:  Min A x2 with RW assist and VCs from EOB to toilet to recliner chair  Commode Transfer:Min A x2 with VCs for positioning  Other: Functional mobility to and from bathroom with RW and Min Ax2. Pt requiring VCs for environmental awareness, sequencing, directional cues and assist for RW management.       Min - Mod A  For maintaining stance for ADL grooming tasks. ADLs   Grooming: Mod A with VCs for washing hands in stance at sink  UB dressing: Max A donning gown seated in recliner chair  LB dressing: Mod A for donning briefs and second person Mod A for maintaining stance. Toileting: Max A for clothing management and mir care with second person for maintaining stance. Safety Devices in Place:  Pt left seated in chair with alarm on and call light in reach.  RN informed             Plan  If pt discharges prior to next treatment, this note will serve as discharge summary  Plan  Times per week: 5-7  Times per day: Daily  Current Treatment Recommendations: Strengthening, Balance Training, Functional Mobility Training, Endurance Training, Self-Care / ADL, Neuromuscular Re-education           AM-PAC Score        AM-PAC Inpatient Daily Activity Raw Score: 14 (03/24/21 1414)  AM-PAC Inpatient ADL T-Scale Score : 33.39 (03/24/21 1414)  ADL Inpatient CMS 0-100% Score: 59.67 (03/24/21 1414)  ADL Inpatient CMS G-Code Modifier : CK (03/24/21 1414)    Goals (as determined and assessed by primary OT)  Short term goals  Time Frame for Short term goals: by dc  Short term goal 1: Pt will complete LB dressing with mod A - ongoing  Short term goal 2: Pt will complete toileting with min A - ongoing  Short term goal 3: Pt will complete multi step grooming task with CGA - ongoing  Short term goal 4: Pt will complete B UE HEP x 20 reps for increased tolerance - ongoing  Patient Goals   Patient goals : to go home       Therapy Time   Individual Concurrent Group Co-treatment   Time In 1150         Time Out 1215         Minutes 25         Timed Code Treatment Minutes: [facility], R Ti Pearson

## 2021-03-24 NOTE — CONSULTS
Pulmonology Consult Note      CC HF, needs PEG    History Obtained From:  Patient, EMR     HISTORY OF PRESENT ILLNESS:    79 y/o F with PMH of dementia, seizures, L cerebral SDH, severe malnutrition/FTT, HFrEF, ischemic cardiomyopathy, who presented to [place] [address] on 3/20/21 with worsening confusion and found to have interval increase in her SDH size with midline shift of 8-9 mm. She went for a craniotomy w/ evacuation of hematoma on 3/22 and placement of drain. She was started on TF to help with nutrition and evaluated by speech therapy and kept NPO secondary to aspiration risk. GI was consulted for PEG tube after discussion with family. Pulmonary consulted for pulmonary clearance before PEG placement. History is difficult to elicit secondary to her dementia. She denies any dyspnea at rest, chest pain, cough, smoking history. Previous lung imagining shows no acute pulmonary pathology but some atlectasis has been noted. She is weak and able to elicit a cough. Past Medical History:        Diagnosis Date    Acute encephalopathy 03/03/2021    Anxiety     Aortic insufficiency     CHF (congestive heart failure) (HCC)     Chronic kidney disease (CKD), stage III (moderate)     COPD, moderate (HCC)     Coronavirus infection 03/25/2020    Dementia ([address]     Expressive aphasia 07/29/2020    Facial twitching     GERD (gastroesophageal reflux disease)     Hair loss     wears wig- unknown cause    Hx of falling     Hyperlipidemia     Hypertension     Ischemic cardiomyopathy     EF 55% 03/15/2021    Mild cognitive impairment with memory loss 07/25/2019    NSTEMI (non-ST elevated myocardial infarction) ([address]     Seasonal allergies     Subdural hematoma ([address] 03/12/2021   ·     Past Surgical History:        Procedure Laterality Date    COLONOSCOPY      COLONOSCOPY  06/22/2015    diverticulosis    CRANIOTOMY Left 3/21/2021    LEFT CRANIOTOMY FOR SUBDURAL HEMATOMA EVACUATION performed by George Hallman. [place], MD at [address] [facility] Left 07/30/2020    LEFT HIP HEMIARTHROPLASTY performed by Zia Hidalgo MD at [address]     ·     Medications Priorto Admission:    · Medications Prior to Admission: levETIRAcetam (KEPPRA) 1000 MG tablet, Take 1 tablet by mouth 2 times daily  · ALPRAZolam (XANAX) 0.25 MG tablet, Take 0.125 mg by mouth 2 times daily. · lisinopril (PRINIVIL;ZESTRIL) 20 MG tablet, Take 40 mg by mouth daily  · ferrous sulfate (IRON 325) 325 (65 Fe) MG tablet, Take 325 mg by mouth daily (with breakfast)  · Cyanocobalamin 2500 MCG SUBL, Place 2,500 mcg under the tongue daily  · metoprolol succinate (TOPROL XL) 25 MG extended release tablet, Take 1 tablet by mouth daily  · atorvastatin (LIPITOR) 80 MG tablet, Take 1 tablet by mouth nightly  · vitamin D (CHOLECALCIFEROL) 25 MCG (1000 UT) TABS tablet, Take 1,000 Units by mouth daily  · Loratadine (CLARITIN PO), Take 10 mg by mouth daily   · ezetimibe (ZETIA) 10 MG tablet, Take 10 mg by mouth nightly     Allergies:  Quinidine, Aminoglycosides, Levofloxacin, Neomycin, and Simvastatin    Social History:   · TOBACCO:   reports that she has never smoked. She has never used smokeless tobacco.  · ETOH:   reports no history of alcohol use. · DRUGS : n/a  · Patient currently lives in a nursing home  ·   Family History:       Problem Relation Age of Onset    Other Father         \"black Lung\"   ·     Review of Systems   Constitutional: Negative for fatigue and fever. Respiratory: Negative for cough and shortness of breath. Cardiovascular: Negative for chest pain. Gastrointestinal: Negative for abdominal pain. ROS: A 10 point review of systems was conducted, significant findings as noted in HPI. Physical Exam  Constitutional:       Comments: Thin  Confused , able to answer simple questions and follow commands   NG tube in place    Cardiovascular:      Rate and Rhythm: Normal rate and regular rhythm. Pulses: Normal pulses. total hip arthroplasty with mild chronic loosening along the acetabulum. CT HEAD WO CONTRAST   Final Result      1. Unchanged left cerebral convexity subdural hematoma with 8 mm rightward subfalcine herniation. XR CHEST PORTABLE    (Results Pending)           ASSESSMENT AND PLAN:    Severe calorie malnutrition/FTT requiring adequate nutrition through PEG placement   -  lung imagining shows no acute pulmonary pathology but some atelectasis has been noted. She is weak and able to elicit a cough. Lungs clear to auscultation.   - PFT not available, non-smoker   - recent echo w/ improved EF 55%   - ARISCAT score: 44 - intermediate risk   Although patient is at intermediate risk for pulmonary risk post-procedure but as the benefits for procedure out weight the risk she is cleared for PEG placement. Will discuss with attending physician Dr. Mateo Jackson MD  3/24/2021,  1:19 PM       Patient was seen, examined and discussed with Dr. Darina Opitz. I agree with the history of present illness, past medical/surgical histories, family history, social history, medication list and allergies as listed. The review of systems is as noted above. My physical exam confirms the findings listed    Chart was reviewed including labs, [facility] medical records confirm the findings noted above    80year old female with s/p left craniotomy for subdural hematoma. She has failure to thrive and not safe for swallowing. There is a plan for PEG tube placement. I was consulted for pulmonary clearance  Based on her age and piror hx of pneumonia she is at intermediate risk for post anesthesia pulmonary complications including aspiration pneumonia and hypoxia. However this risk outweigh the benefit. She is cleared from my perspective for the procedure.     Discussed with Dr. Katlyn Steven

## 2021-03-24 NOTE — CONSULTS
 Coronavirus infection 03/25/2020    D[facility]     Expressive aphasia 07/29/2020    Facial twitching     GERD (gastroesophageal reflux disease)     Hair loss     wears wig- unknown cause    Hx of falling     Hyperlipidemia     Hypertension     Ischemic cardiomyopathy     EF 55% 03/15/2021    Mild cognitive impairment with memory loss 07/25/2019    NSTEMI (non-ST elevated myocardial infarction) ([facility]     Seasonal allergies     Subdural hematoma ([facility] [address] 03/12/2021     Past Surgical History:   Procedure Laterality Date    COLONOSCOPY      COLONOSCOPY  06/22/2015    diverticulosis    CRANIOTOMY Left 3/21/2021    LEFT CRANIOTOMY FOR SUBDURAL HEMATOMA EVACUATION performed by Janee Ivory.  Chelsea Rodriguez MD at [address] [place] [address] Left 07/30/2020    LEFT HIP HEMIARTHROPLASTY performed by Solitario Moreno MD at [address] [place] [address]       Family History   Problem Relation Age of Onset    Other Father         \"black Lung\"     Social History     Tobacco Use    Smoking status: Never Smoker    Smokeless tobacco: Never Used   Substance Use Topics    Alcohol use: No    Drug use: No       Allergies   Allergen Reactions    Quinidine Hives    Aminoglycosides      Pt unsure    Levofloxacin      unknown    Neomycin      unknown    Simvastatin      Unknown to pt     Current Facility-Administered Medications   Medication Dose Route Frequency Provider Last Rate Last Admin    HYDROcodone-acetaminophen (NORCO) 5-325 MG per tablet 1 tablet  1 tablet Oral Q6H PRN DEBRA Sutton - CNP   1 tablet at 03/24/21 0643    sennosides-docusate sodium (SENOKOT-S) 8.6-50 MG tablet 2 tablet  2 tablet Oral BID Ayana Castro APRN - CNP   2 tablet at 03/24/21 0923    polyethylene glycol (GLYCOLAX) packet 17 g  17 g Oral Daily Ayana Castro APRN - CNP   17 g at 03/24/21 1006    levETIRAcetam (KEPPRA) 100 MG/ML solution 1,000 mg  1,000 mg Oral BID Ayana Castro APRABBY - CNP   1,000 mg at 03/24/21 6051  famotidine (PEPCID) tablet 20 mg  20 mg Oral Daily Paradisenataly Dennis DO   20 mg at 03/24/21 3657    sodium chloride flush 0.9 % injection 10 mL  10 mL Intravenous 2 times per day RICARDO Karimi   10 mL at 03/24/21 1006    sodium chloride flush 0.9 % injection 10 mL  10 mL Intravenous PRN RICARDO Karimi        naloxone [facility]) injection 0.2 mg  0.2 mg Intravenous PRN RICARDO Karimi        cyclobenzaprine (FLEXERIL) tablet 10 mg  10 mg Oral TID PRN RICARDO Karimi   10 mg at 03/23/21 2103    promethazine (PHENERGAN) tablet 12.5 mg  12.5 mg Oral Q6H PRN RICARDO Karimi        Or    ondansetron [facility]) injection 4 mg  4 mg Intravenous Q6H PRN RICARDO Karimi   4 mg at 03/22/21 0250    heparin (porcine) injection 5,000 Units  5,000 Units Subcutaneous Q12H RICARDO Karimi   5,000 Units at 03/24/21 1147    ezetimibe (ZETIA) tablet 10 mg  10 mg Oral Nightly Jalyn Huerta MD   10 mg at 03/23/21 2103    ferrous sulfate (IRON 325) tablet 325 mg  325 mg Oral Daily with breakfast Jalyn Huerta MD   325 mg at 03/24/21 0937    lisinopril (PRINIVIL;ZESTRIL) tablet 40 mg  40 mg Oral Daily Jalyn Huerta MD   40 mg at 03/24/21 5347    acetaminophen (TYLENOL) tablet 650 mg  650 mg Oral Q4H PRN Jalyn Huerta MD   650 mg at 03/21/21 0923    magnesium sulfate 1000 mg in dextrose 5% 100 mL IVPB  1,000 mg Intravenous PRN Jalyn Huerta MD           Physical Exam:  BP (!) 166/52   Pulse 58   Temp 98.6 °F (37 °C) (Axillary)   Resp 16   Ht 5' 6\" (1.676 m)   Wt 110 lb (49.9 kg)   SpO2 97%   BMI 17.75 kg/m²     Intake/Output Summary (Last 24 hours) at 3/24/2021 1308  Last data filed at 3/24/2021 0316  Gross per 24 hour   Intake 1479 ml   Output --   Net 1479 ml     Wt Readings from Last 2 Encounters:   03/20/21 110 lb (49.9 kg)   03/20/21 110 lb (49.9 kg)     Constitutional: She is oriented to person, place, and time. She appears well-developed and well-nourished.  In no acute distress. Head: Normocephalic and atraumatic. Eyes: SHIRLEY   Neck: Neck supple. No JVD present. Carotid bruit is not present. No mass and no thyromegaly present. No lymphadenopathy present. Cardiovascular: Normal rate, regular rhythm, normal heart sounds and intact distal pulses. Exam reveals no gallop and no friction rub. No murmur heard. Pulmonary/Chest: Effort normal and breath sounds normal. No respiratory distress. She has no wheezes, rhonchi or rales. Abdominal: Soft, non-tender. Bowel sounds and aorta are normal. She exhibits no organomegaly, mass or bruit. Extremities: No edema, cyanosis, or clubbing. Pulses are 2+ radial/carotid/dorsalis pedis and posterior tibial bilaterally. Neurological: She is alert and oriented to person, place, and time. She has normal reflexes. No cranial nerve deficit. Coordination normal.   Skin: Skin is warm and dry. There is no rash or diaphoresis. Psychiatric: She has a normal mood and affect. Her speech is normal and behavior is normal.     Lab Results   Component Value Date     03/21/2021    K 4.2 03/21/2021    K 4.1 03/20/2021    BUN 14 03/21/2021    CREATININE 0.8 03/21/2021     Recent Labs     03/22/21  0524 03/24/21  1234   WBC 7.7 5.7   HGB 8.5* 9.1*   HCT 25.3* 27.1*    248       Any Lab work EKGs stress test, angiograms, & images reviewed       Assessment:    cardiology for  cc for GI work up     [address] / Shelley Moran /seizure/ / neuro following      EKG NSR  diffuse Twave changes   Hgb 9.1 stable     TTE 3/15/2021  Limited echo to follow up echo done December 2020. Normal left ventricle size. There is mild concentric left ventricular   hypertrophy. Normal left ventricular systolic function with an estimated   ejection fraction of 55%. No regional wall motion abnormalities are seen. The left atrium is severely dilated.     CAD  was on aspirin and Plavix, which was discontinued during previous hospitalization    Hx bradycardia   no BB or leticia blocking agent      Anemic   chronic     EKG NSR  diffuse Twave changes   Hgb 9.1 stable     HLD   Resume statin when ok with others     Plan:  evaluate for peg  / cardiac clearance   discuss plan with Dr. Wm Treadwell cardiology         Patient seen to assess for EGD today with PEG tube placement. Confused but hemodynamics are stable for the procedure. Notified  Dr. Lashonda Salinas. OK to proceed; Kushal Garcia Does have a history of CAD and had been on aspirin and Plavix anemia  Patient did have a fall and had a subdural hematoma. She has scar with staples on her scalp. I do not anticipate any further need for cardiac evaluation or cardiac studies. Stephen Guevara.  Kaylen PERES,[facility]

## 2021-03-24 NOTE — PROGRESS NOTES
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 ([facility] 2020    Confusion     Closed displaced fracture of left femoral neck ([facility] 2020    MGUS (monoclonal gammopathy of unknown significance) 2020    Mixed hyperlipidemia 2020    Clonic hemifacial spasm, bilateral 2019    Gastroesophageal reflux disease without esophagitis 2019    GEORGES (generalized anxiety disorder) 2018    Benign essential hypertension 2014    Aortic insufficiency 2013    Pneumonia 2012     Allergies: Allergies   Allergen Reactions    Quinidine Hives    Aminoglycosides      Pt unsure    Levofloxacin      unknown    Neomycin      unknown    Simvastatin      Unknown to pt     Recent Chest Xray: (2021)  Impression   Clear lungs.  Cardiomegaly.  No acute cardiopulmonary abnormality.      Recent CT Head: (2021)      Impression       1.  Unchanged left cerebral convexity subdural hematoma with 8 mm rightward subfalcine herniation.      Recent CT Head: (2021)  Impression       Postoperative changes noted with placement of drainage catheter into the left subdural hematoma with postoperative change from craniotomy in the superior frontal lobe noted.       The subdural hemorrhage is slightly decreased from prior study with interval development of postoperative air as above.       Degree of mild left to right shift has diminished slightly from prior exam.        Previous MBS - n/a  Chart reviewed  Medical Diagnosis: subdural hematoma  Treatment Diagnosis: dysphagia    BSE Impression 3/22/21  Patient presents with suspected oropharyngeal dysphagia. Unable to complete oral motor exam, pt with inconsistent command following. Limited verblizations. Assessed tolerance ice chips and thin liquids via tsp/straw; pt with positive oral acceptance, mildly prolonged oral prep, suspect delayed/reduced swallow onset/laryngeal elevation, good oral clearance, consistent cough/throat clear with all PO. Recommend make NPO, consider temporary alternative means nutrition/hydration, frequent oral care, ice chips PRN. Will continue to follow. MBS results - not appropriate at this time    Pain: pt indicates pain through facial grimacing and movements attempting to get comfortable. Rn states had pain medication 15 min prior    Current Diet : NPO    Treatment:  Pt seen bedside to address the following goals:  Goal 1: Patient will tolerate repeat BSE.  3/23; new order received, pt already on caseload. D/w RN who states pt appropriate for re-assessment. Pt appeared uncomfortable throughout session, with constant movement attempting to get weight off left hip area. Rn reports pt had pain medication 15 min prior. Pt with minimal participation, verbalized only 1 x, stating, \"please wait a minute. \" No other verbalizations noted, pt very slow to respond to tasks, with intermittent attention to tasks presented. Pt accepted limited amount of PO, including 2 ice chips, 2 tsps of water, single sip of water via cup edge and straw. Pt exhibited one instance of mild cough after sip of water, no other coughing or throat clear noted. Not able to assess vocal quality due to pt with no verbalizations with the exception of one time. No participation with attempts at further evaluation. As assessment very limited, not able to safely recommend diet.   Recommend oral care, ice chips or small sips of water for comfort with re-assessment as pt participates. RN will page SLP if appropriate later this date  Cont goal  3/24: pt alert, sitting up in bed, does not respond to questions or commands. Intermittent verbalizations stating, \"please wait a minute. \"    Pt took trial and then declined another trial for several minutes, then would take one more. This pattern was repeated throughout session, over 15 minutes, pt consumed 4 ounces of water via tsp/cup and straw and 3 tsp of puree. Intermittent throat clear noted, no coughing, when vocalizing no change noted. Pt declined trials of solids, therefore not able to recommend a solid consistency. As this was behavior last session and again this date, concern would be pt ability to maintain nutrition. However research shows feeding tubes are not indicated for pts with advanced dementia, though pt does have new neuro deficits at this time. Recommend trial full liquids with close monitoring for s/s of aspiration and consider calorie count. Goal met    New goal:  2-  Pt will consume PO safely without signs or symptoms of aspiration      Patient/Family/Caregiver Education:  Pt educated to purpose of visit, does not indicate comprehension    Compensatory Strategies:  1:1 assistance with feeding - needs max encouragement  90 degrees all meals  Small bites/sips     Plan:  Continued daily Dysphagia treatment with goals per  plan of care. · Diet recommendations: trial full liquids - if any s/s of aspiration emerge, or there is respiratory decline,  make NPO until further evaluated by SLP    DC recommendation: ongoing treatment indicated. Treatment: 20  D/W nursing [place]  Needs met prior to leaving room, call button in reach. Kelsey Rodriguez M.S./CCC-SLP #[id number]  Pg.  # [phone]  If patient is discharged prior to next treatment, this note will serve as the discharge summary

## 2021-03-24 NOTE — PLAN OF CARE
Problem: Falls - Risk of:  Goal: Will remain free from falls  3/24/2021 0144 by German Ambrose RN  Outcome: Ongoing  Note: Patient is a high fall risk. All fall precautions in place: bed alarm on, nonskid socks on pt, call light within reach, bed locked in lowest position. Will continue to monitor pt safety. Problem: Pain:  Description: Pain management should include both nonpharmacologic and pharmacologic interventions. Goal: Pain level will decrease  Outcome: Ongoing  Note: Pain is managed with DONY Webb using the advanced dementia pain scale. Pt positioned for comfort and resting comfortably at this time.

## 2021-03-24 NOTE — PROGRESS NOTES
Pt is alert and JONAH orientation. VSS. NIHSS 2. Neuro checks remain unchanged. Pt follows some commands and has some intelligible speech. Sx site is CDI. Pt tolerating tube feed well at 60 ml/hr. Fall precautions in place. Will continue to monitor.

## 2021-03-25 NOTE — PROGRESS NOTES
Spoke to covering hospitalist Dr. Linden Beaver. Pt apparently now with labored breathing. She has been placed on 3L NC. Concern for aspiration given recent seizure like activity. Pt was on tube feeds which was stopped at the time of Rapid. STAT CXR ordered and pending.

## 2021-03-25 NOTE — PROGRESS NOTES
NEUROSURGERY PROGRESS NOTE    3/25/2021 7:51 AM                               Marta Sinclair                      LOS: 5 days   POD#4  s/p Procedure(s) (LRB):  LEFT CRANIOTOMY FOR SUBDURAL HEMATOMA EVACUATION (Left)    Subjective: Patient walking back from the bathroom with assist x1 upon entering the room. No acute events overnight. Patient has no specific complaints this am.    Physical Exam:  Patient seen and examined    Vitals:    03/25/21 0644   BP: (!) 145/48   Pulse: 54   Resp: 16   Temp: 98.1 °F (36.7 °C)   SpO2: 95%     GCS:  4 - Opens eyes on own  4 - Seems confused, disoriented  6 - Follows simple motor commands  General: Well developed. Alert and impulsive  HENT: atraumatic, neck supple  Eyes: Optic discs: Not tested  Pulmonary: unlabored respiratory effort  Cardiovascular:  Warm well perfused.  No peripheral edema  Gastrointestinal: abdomen soft, NT, ND    Neurological:  Mental Status: Awake, alert, oriented to self only, impulsive  Attention: Unable to consistently follow commands  Language: No aphasia or dysarthria noted  Sensation: Intact to all extremities to light touch  Coordination: Intact    Cranial Nerves:  II: Visual acuity not tested, denies new visual changes / diplopia  III, IV, VI: PERRL, 3 mm bilaterally, EOMI, no nystagmus noted  V: Facial sensation intact bilaterally to touch  VII: Face symmetric  VIII: Hearing intact bilaterally to spoken voice  IX: Palate movement equal bilaterally  XI: Shoulder shrug equal bilaterally  XII: Tongue midline    Musculoskeletal:   Gait: Not tested   Assist devices: None   Tone: Normal  Motor strength: Exam limited 2/2 unable to follow commands consistently, but able to move all extremities independently anti gravity    Incision: CDI    Radiological Findings:  Ct Head Wo Contrast  Result Date: 3/20/2021  Interval increase size of the left hemispheric convexity subdural hematoma which now contains acute and subacute components and now measures 1.9 cm in greatest thickness (previously 1.2 cm). There is persistent mass effect with left-to-right midline shift of 8-9 mm (previously 5-6 mm). Ct Head Wo Contrast  Result Date: 3/20/2021  Unchanged left cerebral convexity subdural hematoma with 8 mm rightward subfalcine herniation. Ct Head Wo Contrast  Result Date: 3/22/2021  Postoperative changes noted with placement of drainage catheter into the left subdural hematoma with postoperative change from craniotomy in the superior frontal lobe noted. The subdural hemorrhage is slightly decreased from prior study with interval development of postoperative air as above.  Degree of mild left to right shift has diminished slightly from prior exam.        Labs:  Recent Labs     03/24/21  1234   WBC 5.7   HGB 9.1*   HCT 27.1*          Recent Labs     03/24/21  1220 03/25/21  0530   *  --    K 5.1  --    CL 98*  --    CO2 26  --    BUN 19  --    CREATININE 0.7  --    GLUCOSE 118*  --    CALCIUM 8.7  --    MG  --  1.80       Recent Labs     03/24/21  1220   PROTIME 13.1   INR 1.13       Patient Active Problem List    Diagnosis Date Noted    Abnormal EKG 03/15/2021    CAD (coronary artery disease) 03/15/2021    [facility]dural hematoma ([facility] 03/12/2021    Ischemic cardiomyopathy     NSTEMI (non-ST elevated myocardial infarction) ([facility] [facility] 12/22/2020    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 female s/p Procedure(s) (LRB):  LEFT CRANIOTOMY

## 2021-03-25 NOTE — PROGRESS NOTES
Rapid response called on patient for seizure like activity. On arrival to the room RN  saw patient's R side of face twitching and uncontrollable jaw movements. Pt was dripping blood from her mouth, RN unable to unclench her jaw for suction. Pt sent to stat CT. Will continue to monitor.

## 2021-03-25 NOTE — PROGRESS NOTES
RN assessed patient's mouth and lips for signs of trauma after seizure like activity. No trauma/injury noted. Pt has some labored breathing and gulping in between breaths, MD notified. Pt is now resting in bed. Will continue to monitor.

## 2021-03-25 NOTE — SIGNIFICANT EVENT
RAPID RESPONSE called at 5:25PM    81 yo F PMH dementia and previous seizure like activity p/w increased confusion and left-sided back pain. CTH showed left subdural hematoma with associated mass-effect and L to R midline shift (was taking ASA and plavix daily at that time). Pt is POD #4 from left craniotomy for SDH evacuation (3/21). Of note, pt was hospitalized previously from 3/12-3/15 for possible seizure-like episode - \"unresponsive shaking. \" At that time, pt was discharged on keppra 1000 mg BID. /91, HR 79, SpO2 94% on RA, Temp 99.2      On evaluation, pt had twitching of the right eye and right cheek. Noted small amount of blood on the right side of her lips, appeared to have bitten her tongue. Had a BM during the episode, but reportedly incontinent at baseline. Per nurse, pt has baseline dementia, is oriented to self intermittently, unable to track in the room. Seems to be at baseline apart from twitching of the right side face. Does not respond to questions. Unable to follow commands. Gave 2 mg ativan x2, stopped TFs. Will load with keppra 2 g for continual twitching activity after ativan to serve as her evening dose of keppra. STAT BMP, CBC, lactic acid, CK, prolactin obtained. STAT CTH and spot EEG ordered -- all pending at this time. Will follow. Consider neurology consult in the morning to help with AED adjustment, if needed.       Jyothi Lozano MD, PGY-1  Internal Medicine  3/25/21 5:28PM

## 2021-03-25 NOTE — CARE COORDINATION
Patient here from home. Plans for discharge to [facility] when medically ready. PEG placement planned for Friday. Per SW previous note, Sally Champagne at [phone] requesting update regarding discharge plans. . They received a referral on patient so will be following.     Latoya completed #[id number]        CM will continue to follow for discharge planning. Dodie Huerta RN  RN Case Manager  [phone] here from home.   Plans for discharge to

## 2021-03-25 NOTE — PLAN OF CARE
Problem: Falls - Risk of:  Goal: Will remain free from falls  Description: Will remain free from falls  3/25/2021 0721 by Tiffany Dougherty RN  Outcome: Ongoing  Note: Pt is in bed with alarm on. Non-skid socks are on. Up x1 with walker. Video monitoring on. Fall precautions in place. Call light and bedside table in reach. Problem: Skin Integrity:  Goal: Will show no infection signs and symptoms  Description: Will show no infection signs and symptoms  Outcome: Ongoing  Note: No signs of infection. VSS. Incision is clean/dry/intact. No new redness, swelling or drainage. Will continue to monitor.

## 2021-03-25 NOTE — PROGRESS NOTES
Pt is disoriented. VSS. Neuro checks remain unchanged. Pt follows some commands and has some intelligible speech at times. Corpak in place running TF at goal rate. Still no BMs from patient, MD notified. Abdomen is soft and non-distended. Fall precautions in place. Will continue to monitor.

## 2021-03-25 NOTE — PROGRESS NOTES
Hospitalist Progress Note      PCP: Divina Carbajal MD    Date of Admission: 3/20/2021         80 y.o. female who is transferred from [facility], ED for neurosurgical consultation. Patient was brought in by her grandson with complaints of increased confusion for the past couple of days and patient complaining of left-sided back pain. Patient was hospitalized here from 3/12 through 3/15, after being transferred from [facility] for possible seizure-like episode. At that time patient was found unresponsive by grandson on the couch and noticed seizure-like activity. In ED patient was with nurse to have a seizure by her RN at bedside which lasted for about 30 seconds. CT head revealed left-sided subdural hematoma with associated mass-effect and left-to-right midline shift. Patient has been taking 81 mg of aspirin and Plavix daily at that time. During the previous hospitalization patient was started on Keppra 1 g twice daily.       At baseline patient has underlying dementia and typically oriented to self, place and situation. Follows commands.            Subjective:   More awake and lert today.   Sitting up in bed  Not answering my questions    ROS unobtainable 2/2 aphasia    Medications:  Reviewed    Infusion Medications     Scheduled Medications    [START ON 3/26/2021] ceFAZolin  2,000 mg Intravenous Once    carvedilol  3.125 mg Oral BID WC    amLODIPine  5 mg Oral Daily    sennosides-docusate sodium  2 tablet Oral BID    polyethylene glycol  17 g Oral Daily    levETIRAcetam  1,000 mg Oral BID    famotidine  20 mg Oral Daily    sodium chloride flush  10 mL Intravenous 2 times per day    heparin (porcine)  5,000 Units Subcutaneous Q12H    ezetimibe  10 mg Oral Nightly    ferrous sulfate  325 mg Oral Daily with breakfast    lisinopril  40 mg Oral Daily     PRN Meds: HYDROcodone 5 mg - acetaminophen, sodium chloride flush, naloxone, cyclobenzaprine, promethazine **OR** ondansetron, acetaminophen, magnesium sulfate      Intake/Output Summary (Last 24 hours) at 3/25/2021 1329  Last data filed at 3/25/2021 1243  Gross per 24 hour   Intake 320 ml   Output --   Net 320 ml       Physical Exam Performed:    BP (!) 177/76   Pulse 63   Temp 98.9 °F (37.2 °C) (Oral)   Resp 16   Ht 5' 6\" (1.676 m)   Wt 117 lb 15.1 oz (53.5 kg)   SpO2 94%   BMI 19.04 kg/m²     General appearance: awake but aphasic  Head: drain clearing  HENT: + NG  Respiratory:  Normal respiratory effort. Clear lungs  Cardiovascular: Regular rate and rhythm with normal S1/S2   Abdomen: Soft, non-tender, non-distended   Skin: Skin color, texture, turgor normal.    Neurologic:  Awake, aphasic  Psychiatric: not able to assess 2/2 aphasia    Labs:   Recent Labs     03/24/21  1234   WBC 5.7   HGB 9.1*   HCT 27.1*        Recent Labs     03/24/21  1220   *   K 5.1   CL 98*   CO2 26   BUN 19   CREATININE 0.7   CALCIUM 8.7     Recent Labs     03/24/21  1220   AST 13*   ALT <5*   BILITOT 0.4   ALKPHOS 72     Recent Labs     03/24/21  1220   INR 1.13     Urinalysis:      Lab Results   Component Value Date    NITRU Negative 03/21/2021    WBCUA None seen 03/21/2021    BACTERIA Rare 03/21/2021    RBCUA 0-2 03/21/2021    BLOODU MODERATE 03/21/2021    SPECGRAV 1.020 03/21/2021    GLUCOSEU Negative 03/21/2021         Assessment/Plan:    Active Hospital Problems    Diagnosis    Subdural hematoma ([facility] [S06.5X9A]   Acute encephalopathy sec to ICH:  Baseline dementia  Worsening subdural hematoma, with acute and chronic components with increased mass-effect  Repeat CT head - Unchanged left cerebral convexity subdural hematoma with 8 mm rightward subfalcine herniation. Neurosurgery consulted - now s/p craniotomy . Continue on Keppra 1 g IV twice daily  No antiplatelets or chemical DVT prophylaxis  Seizure and fall precautions  Appreciate neurosurg help.       Severe Protein calorie malnutrition:  Body mass index is 19.04 kg/m².   NG placed/ tolerating tf.  Still very lethargic and not safe fpr po intake. Will need peg- GI consulted.         Left hip pain and echymosis:  Possible fall. .  Not on any blood thinners  Repeat xray of hip negative. Generalized tonic-clonic seizures   On keppra. CAD/Ischemic cardiomyopathy   Ejection fraction of 30-35%, moderate MR and moderate to severe AR  -Was on aspirin and Plavix, which was discontinued during previous hospitalization  Continue on statin  Continue ACEI  Resume BB: monitor HR.         HTN: uncontrolled  Start amlodipine, Coreg  Continue lisinopril  Monitor HR as had some sinus sacha earlier. Monitor Bps and adjust meds as needed         History of MGUS-follows with hematology-oncology    Dementia        DVT Prophylaxis: SCDs  Diet: DIET FULL LIQUID;  Diet NPO, After Midnight  Code Status: Full Code    PT/OT Eval Status: 14 and 15 respectively    Disposition - SNF once peg placed and tolerating TF: Planned for tomorrow. NPO after MN  Son Adams Stovall who is NoK and he is agreeable w/ the plan and placement as outlined above.       Azam Carr MD

## 2021-03-25 NOTE — PROGRESS NOTES
During my assessment pt seemed confused and unable to speak. When I asked if she could tell me her name, , or where she is at she would just grunt and was incomprehensible. VSS. BP systolic was slightly hypertensive as 148/53, but improved from earlier's results. Hand  was almost absent for L extremity, but completely absent for R extremity. I asked pt if she was gripping my hand and she nodded. However pt has full sensation of extremities and no numbness/tingling. Pt seems restless so I gave her 10 mg flexeril via NG tube. Pt reports no pain.

## 2021-03-25 NOTE — PROGRESS NOTES
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 ([facility] 2020    Confusion     Closed displaced fracture of left femoral neck ([facility] 2020    MGUS (monoclonal gammopathy of unknown significance) 2020    Mixed hyperlipidemia 2020    Clonic hemifacial spasm, bilateral 2019    Gastroesophageal reflux disease without esophagitis 2019    GEORGES (generalized anxiety disorder) 2018    Benign essential hypertension 2014    Aortic insufficiency 2013    Pneumonia 2012     Allergies: Allergies   Allergen Reactions    Quinidine Hives    Aminoglycosides      Pt unsure    Levofloxacin      unknown    Neomycin      unknown    Simvastatin      Unknown to pt     Recent Chest Xray: (2021)  Impression   Clear lungs.  Cardiomegaly.  No acute cardiopulmonary abnormality.      Recent CT Head: (2021)      Impression       1.  Unchanged left cerebral convexity subdural hematoma with 8 mm rightward subfalcine herniation.      Recent CT Head: (2021)  Impression       Postoperative changes noted with placement of drainage catheter into the left subdural hematoma with postoperative change from craniotomy in the superior frontal lobe noted.       The subdural hemorrhage is slightly decreased from prior study with interval development of postoperative air as above.       Degree of mild left to right shift has diminished slightly from prior exam.        Previous MBS - n/a  Chart reviewed  Medical Diagnosis: subdural hematoma  Treatment Diagnosis: dysphagia    BSE Impression 3/22/21  Patient presents with suspected oropharyngeal dysphagia. Unable to complete oral motor exam, pt with inconsistent command following. Limited verblizations. Assessed tolerance ice chips and thin liquids via tsp/straw; pt with positive oral acceptance, mildly prolonged oral prep, suspect delayed/reduced swallow onset/laryngeal elevation, good oral clearance, consistent cough/throat clear with all PO. Recommend make NPO, consider temporary alternative means nutrition/hydration, frequent oral care, ice chips PRN. Will continue to follow. MBS results - not appropriate at this time    Pain: pt indicates pain through facial grimacing and movements attempting to get comfortable. Rn states had pain medication 15 min prior    Current Diet : full liquids    Treatment:  Pt seen bedside to address the following goals:  Goal 1: Patient will tolerate repeat BSE.  3/23; new order received, pt already on caseload. D/w RN who states pt appropriate for re-assessment. Pt appeared uncomfortable throughout session, with constant movement attempting to get weight off left hip area. Rn reports pt had pain medication 15 min prior. Pt with minimal participation, verbalized only 1 x, stating, \"please wait a minute. \" No other verbalizations noted, pt very slow to respond to tasks, with intermittent attention to tasks presented. Pt accepted limited amount of PO, including 2 ice chips, 2 tsps of water, single sip of water via cup edge and straw. Pt exhibited one instance of mild cough after sip of water, no other coughing or throat clear noted. Not able to assess vocal quality due to pt with no verbalizations with the exception of one time. No participation with attempts at further evaluation. As assessment very limited, not able to safely recommend diet.   Recommend oral care, ice chips or small sips of water for comfort with re-assessment as pt participates. RN will page SLP if appropriate later this date  Cont goal  3/24: pt alert, sitting up in bed, does not respond to questions or commands. Intermittent verbalizations stating, \"please wait a minute. \"    Pt took trial and then declined another trial for several minutes, then would take one more. This pattern was repeated throughout session, over 15 minutes, pt consumed 4 ounces of water via tsp/cup and straw and 3 tsp of puree. Intermittent throat clear noted, no coughing, when vocalizing no change noted. Pt declined trials of solids, therefore not able to recommend a solid consistency. As this was behavior last session and again this date, concern would be pt ability to maintain nutrition. However research shows feeding tubes are not indicated for pts with advanced dementia, though pt does have new neuro deficits at this time. Recommend trial full liquids with close monitoring for s/s of aspiration and consider calorie count. Goal met    New goal:  2-  Pt will consume PO safely without signs or symptoms of aspiration  3/25: pt sitting up in bed, tray at bedside. Pt again continually moving around in bed, in what appears to be attempts at getting comfortable. Pt accepted limited PO again this date, however consumed in less time. Pt consumed pudding x 3, jello x 1 and several sips of water via straw. Pt exhibited no overt signs of aspiration, not able to assess vocal quality as remained non verbal.  Pt did exhibit mild anterior loss with pudding that liquified. Pt attempting to verbalize but does not appear able to initiate. Pt did not respond to questions or follow commands. Per chart, pt having PEG placed 3/26.     Cont as appropriate    Patient/Family/Caregiver Education:  Pt educated to purpose of visit, does not indicate comprehension    Compensatory Strategies:  1:1 assistance with feeding - needs max encouragement  90 degrees all meals  Small bites/sips     Plan: Continued daily Dysphagia treatment with goals per  plan of care. · Diet recommendations: full liquids - if any s/s of aspiration emerge, or there is respiratory decline,  make NPO until further evaluated by SLP  · Speech eval is indicated when pt able to participate - may need to be completed at next level of care  DC recommendation: ongoing treatment indicated. Treatment: 14  D/W nursing [place]  Needs met prior to leaving room, call button in reach. Chantale Blackmon M.S./[facility]-SLP #[id number]  Pg.  # [phone]  If patient is discharged prior to next treatment, this note will serve as the discharge summary

## 2021-03-25 NOTE — CONSULTS
[address]               [address]                                  CONSULTATION    PATIENT NAME: Sincere Young                  :        1934  MED REC NO:   [id number]                          ROOM:       5503  ACCOUNT NO:   [de-identified]                           ADMIT DATE: 2021  PROVIDER:     Severo Candelario MD    CONSULT DATE:  2021    HISTORY OF PRESENT ILLNESS:  An 49-year-old female with history of  hypertension, mitral valve regurgitation, aortic insufficiency,  hyperlipidemia, dementia, who was recently admitted to the hospital and  underwent craniotomy for progressive subdural hematoma. She has had  failure to thrive and poor nutrition. She failed her swallowing  evaluation. She had been seen in consultation for a PEG tube placement. She has been cleared by Cardiology and Pulmonary. Currently admits to  no abdominal pain. No vomiting. No reports of any GI bleeding. No  prior history of documented liver or pancreatic disorders. No chronic  diarrhea. PAST MEDICAL HISTORY:    ALLERGIES:  _____, NEOMYCIN, SIMVASTATIN. PAST SURGICAL HISTORY:  Left hip hemiarthroplasty, oophorectomy,  colonoscopy. SOCIAL HISTORY:  Noncontributory. FAMILY HISTORY:  Noncontributory. REVIEW OF SYSTEMS:  Noncontributory secondary to acute mental status  change. PHYSICAL EXAMINATION:  VITAL SIGNS:  Stable, afebrile. HEENT:  No conjunctival icterus. NECK:  No adenopathy. CHEST:  Clear. CARDIOVASCULAR:  Regular rate and rhythm. No murmur, gallop, rub or  click. ABDOMEN:  Bowel sounds are present. Soft, nontender, nondistended. No  midline incisions. RECTAL:  Deferred. EXTREMITIES:  No edema. NODES:  No adenopathy. LABORATORY DATA:  White count 5700, hemoglobin 9.1, MCV 96, platelets  adequate, INR 1.2.     IMPRESSION AND PLAN:  We will proceed with EGD, PEG tube placement  having received cardiopulmonary clearance tomorrow. Preprocedure  antibiotics were written. N.p.o. after midnight. Discontinue heparin  today. Obtain consent from family members.         Marcus Stephens MD    D: 03/25/2021 6:52:17       T: 03/25/2021 7:55:06     [email or web address]  Job#: [id number]     Doc#: [id number]    CC:

## 2021-03-25 NOTE — PROGRESS NOTES
Consult dictated. Plan 1. Patient of [address] will proceed with PEG Friday. Need to stop heparin today and please obtain consent from family for PEG placement.

## 2021-03-25 NOTE — PROGRESS NOTES
Pt alert and oriented x1, VSS, IV ns locked in right AC. Pt able to follow some commands. Pt slightly agitated medicated per mar with prn flexeril. Pt verbalized some relief. Corpak in place with jevity 1.2 infusing @ goal. Pt denies n/v. Bed alarm on, bedside table and call light in reach.

## 2021-03-25 NOTE — PROGRESS NOTES
Cardiology progress note    Patient was seen yesterday in consultation for clearance for a PEG tube. This was not placed yesterday and apparently is on schedule for tomorrow. Cardiac status seems stable. Nothing for me to do at this point. Will sign off. Please call me if we can be of any assistance.   Yury Whittington MD, [facility] [place]

## 2021-03-26 NOTE — CONSULTS
[facility] NEPHROLOGY    [facility]              [phone]                      Ivelisse Boggs is admitted with AMS. We are following for Acute hyponatremia    Interval History and plan:      Patient is POD#5 of L frontal craniotomy for SDH evacuation. Patient is hyponatremic likely hypovolemic hyponatremia from low oral intake. Patient somnolent on evaluation. No sign of volume overload on exam.  Sosm 273, U sodium 138    Plan:  - Continue IVF  - Can consider hypertonic saline if Na continues to drop, currently not necessary  - Continue Na checks q6hr                   Assessment :   Acute Hyponatremia  - Likely hypovolemic hyponatremia due to low oral intake  - No identifiable causes of SIADH  - Continue IVF  - Can consider hypertonic saline if Na continues to drop, currently not necessary  - Continue Na checks q6hr             [facility] Nephrology would like to thank Carly Maher MD   for opportunity to serve this patient      Please call with questions at-   24 Hrs Answering service [phone] or  7 am- 5 pm via Perfect serve or cell phone  Dr. Krystle Sotelo        CC/reason for consult :     AMS     HPI :     80 y.o. female who is transferred from [facility], ED for neurosurgical consultation. Patient was brought in by her grandson with complaints of increased confusion for the past couple of days and patient complaining of left-sided back pain. Patient was hospitalized here from 3/12 through 3/15, after being transferred from [facility] for possible seizure-like episode. At that time patient was found unresponsive by grandson on the couch and noticed seizure-like activity. In ED patient was with nurse to have a seizure by her RN at bedside which lasted for about 30 seconds. CT head revealed left-sided subdural hematoma with associated mass-effect and left-to-right midline shift. Patient has been taking 81 mg of aspirin and Plavix daily at that time.   During the previous 03/25/2020    Dementia ([facility]     Expressive aphasia 07/29/2020    Facial twitching     GERD (gastroesophageal reflux disease)     Hair loss     wears wig- unknown cause    Hx of falling     Hyperlipidemia     Hypertension     Ischemic cardiomyopathy     EF 55% 03/15/2021    Mild cognitive impairment with memory loss 07/25/2019    NSTEMI (non-ST elevated myocardial infarction) ([facility]     Seasonal allergies     Subdural hematoma ([facility] 03/12/2021       Past Surgical History:   Procedure Laterality Date    COLONOSCOPY      COLONOSCOPY  06/22/2015    diverticulosis    CRANIOTOMY Left 3/21/2021    LEFT CRANIOTOMY FOR SUBDURAL HEMATOMA EVACUATION performed by Kandice Collins. Dakota Villa MD at [address] [place] [address] Left 07/30/2020    LEFT HIP HEMIARTHROPLASTY performed by George Harris MD at [address] [place] [address]          reports that she has never smoked. She has never used smokeless tobacco. She reports that she does not drink alcohol or use drugs. family history includes Other in her father.          Medication:     Current Facility-Administered Medications: LORazepam (ATIVAN) 2 MG/ML injection, , ,   hydrALAZINE (APRESOLINE) injection 10 mg, 10 mg, Intravenous, Q2H PRN  labetalol (NORMODYNE;TRANDATE) injection 10 mg, 10 mg, Intravenous, Q2H PRN  levETIRAcetam (KEPPRA) 2,000 mg in sodium chloride 0.9 % 150 mL IVPB, 2,000 mg, Intravenous, Q12H  0.9 % sodium chloride infusion, , Intravenous, Continuous  ceFAZolin (ANCEF) 2000 mg in dextrose 3 % 50 mL IVPB (duplex), 2,000 mg, Intravenous, Once  carvedilol (COREG) tablet 3.125 mg, 3.125 mg, Oral, BID WC  amLODIPine (NORVASC) tablet 5 mg, 5 mg, Oral, Daily  HYDROcodone-acetaminophen (NORCO) 5-325 MG per tablet 1 tablet, 1 tablet, Oral, Q6H PRN  sennosides-docusate sodium (SENOKOT-S) 8.6-50 MG tablet 2 tablet, 2 tablet, Oral, BID  polyethylene glycol (GLYCOLAX) packet 17 g, 17 g, Oral, Daily  famotidine (PEPCID) tablet 20 mg, 20 mg, Oral, Daily  sodium chloride flush 0.9 % injection 10 mL, 10 mL, Intravenous, 2 times per day  sodium chloride flush 0.9 % injection 10 mL, 10 mL, Intravenous, PRN  naloxone (NARCAN) injection 0.2 mg, 0.2 mg, Intravenous, PRN  cyclobenzaprine (FLEXERIL) tablet 10 mg, 10 mg, Oral, TID PRN  promethazine (PHENERGAN) tablet 12.5 mg, 12.5 mg, Oral, Q6H PRN **OR** ondansetron (ZOFRAN) injection 4 mg, 4 mg, Intravenous, Q6H PRN  [Held by provider] heparin (porcine) injection 5,000 Units, 5,000 Units, Subcutaneous, Q12H  ezetimibe (ZETIA) tablet 10 mg, 10 mg, Oral, Nightly  ferrous sulfate (IRON 325) tablet 325 mg, 325 mg, Oral, Daily with breakfast  lisinopril (PRINIVIL;ZESTRIL) tablet 40 mg, 40 mg, Oral, Daily  acetaminophen (TYLENOL) tablet 650 mg, 650 mg, Oral, Q4H PRN  magnesium sulfate 1000 mg in dextrose 5% 100 mL IVPB, 1,000 mg, Intravenous, PRN       Vitals :     Vitals:    03/26/21 1200   BP: (!) 137/53   Pulse: 60   Resp: 20   Temp: 97.7 °F (36.5 °C)   SpO2: 96%       I & O :       Intake/Output Summary (Last 24 hours) at 3/26/2021 1458  Last data filed at 3/26/2021 9052  Gross per 24 hour   Intake 240 ml   Output --   Net 240 ml        Physical Examination :     General appearance: Anxious- no, distressed- no, in good spirits- yes    Somnolent, not responsive to verbal cues, tactile stimuli  HEENT: Lips- normal, teeth- ok , oral mucosa- moist  Neck : Mass- no, appears symmetrical, JVD- not visible  Respiratory: Respiratory effort-  normal, wheeze- no, crackles - no  Cardiovascular:  Ausculation- No M/R/G, Edema absent  Abdomen: visible mass- no, distention- no, scar- no, tenderness- no                            hepatosplenomegaly-  no  Musculoskeletal:  clubbing no,cyanosis- no , digital ischemia- no                           muscle strength- grossly normal , tone - grossly normal  Skin: rashes- no , ulcers- no, induration- no, tightening - no  Psychiatric:  Judgement and insight- normal           AAO X 3  Additional finding: LABS:     Recent Labs     03/24/21  1234 03/25/21  1740 03/26/21  0525   WBC 5.7 5.9 8.3   HGB 9.1* 9.0* 10.5*   HCT 27.1* 27.5* 31.8*    286 268     Recent Labs     03/25/21  0530 03/25/21  1740 03/26/21  0525 03/26/21  1023   NA  --  129* 125* 126*   K  --  5.0 5.5* 5.4*   CL  --  93* 92* 91*   CO2  --  27 21 23   BUN  --  21* 20 23*   CREATININE  --  0.9 0.8 0.9   GLUCOSE  --  141* 116* 138*   MG 1.80  --  1.90 1.90   PHOS  --   --   --  3.6

## 2021-03-26 NOTE — PROGRESS NOTES
Patient drowsy, arouses to touch, however does not maintain alertness. JONAH level of orientation or fully assess NIH as patient drowsy and not interactive w/assessment. Patient turned q2h and pillow support provided. Specialty mattress ordered for patient. SCDs replaced on patient and heels elevated off of bed. Patient incontinent of bowel and bladder, frequent monitoring and mir care provided PRN. Corpak remains in place, q4h flushes held d/t NPO status at midnight pending PEG placement. Fall precautions in place, call light and bedside table within reach. Camera in use for safety.

## 2021-03-26 NOTE — CONSULTS
[facility]  Palliative Medicine Consultation Note      Date Of Admission:3/20/2021  Date of consult: 03/26/21  Seen by [facility] in the past:  No    Recommendations:        Pt is nonverbal this morning and not waking up. Called her son Bekah Myers, introduced palliative care and had a voluntary discussion about advance care planning. Bekah Myers states he is pt's [de-identified] and he is also her only living child. Bekah Myers reports pt was enjoying a good quality of life prior to this month, that she got around the house with a walker and was able to get herself food. She was forgetful but able to communicate appropriately with family members. Ness Killer an update on the events overnight which he was not aware of. He hopes she'll improve over the coming days and wants to continue aggressive care. We discussed code status in depth and Bekah Myers was in agreement with DNR-CCA. We agreed to talk again Monday about goals of care depending on pt's status. D/w Dr. Vasu Stover and ABHISHEK Drake. 1. Goals of Care/Advanced Care planning/Code status: changed to [facility] per son Bekah Myers' wishes. He wants to continue aggressive care for now in hopes of a recovery. 2. Pain: pt does not appear to be in any pain/distress. Son reports severe back pain. Last imaging in 7/2020 showed mild osteoarthritic changes and mild scoliotic curvature. She had been receiving flexeril and norco prn up until yesterday, which she wouldn't be able to take now due to AMS. Will continue to monitor. 3. SOB: pt is sating in the low 90s on 6 L oxygen. She suffered seizure activity x2 overnight and received keppra and ativan (total 8 mg). 4. AMS: pt had witnessed seizure activity twice in the past 24 hours, and is now post ictal. She received keppra and ativan (total 8 mg). Today she has not woken up. She's also hyponatremic, Na 125. She had dementia at baseline and recent SDH with craniotomy 3/21.    5. Disposition: to be determined    Reason for Consult:         [x] Acute encephalopathy 03/03/2021    Anxiety     Aortic insufficiency     CHF (congestive heart failure) (HCC)     Chronic kidney disease (CKD), stage III (moderate)     COPD, moderate (HCC)     Coronavirus infection 03/25/2020    Dementia [facility] [facility]     Expressive aphasia 07/29/2020    Facial twitching     GERD (gastroesophageal reflux disease)     Hair loss     wears wig- unknown cause    Hx of falling     Hyperlipidemia     Hypertension     Ischemic cardiomyopathy     EF 55% 03/15/2021    Mild cognitive impairment with memory loss 07/25/2019    NSTEMI (non-ST elevated myocardial infarction) ([facility] [address] [facility]     Seasonal allergies     Subdural hematoma ([facility] [address] 03/12/2021       Past Surgical History:        Procedure Laterality Date    COLONOSCOPY      COLONOSCOPY  06/22/2015    diverticulosis    CRANIOTOMY Left 3/21/2021    LEFT CRANIOTOMY FOR SUBDURAL HEMATOMA EVACUATION performed by Laura Urban. Trino Carbajal MD at [address] [place] [address] Left 07/30/2020    LEFT HIP HEMIARTHROPLASTY performed by Ann Marie Meneses MD at [address] [place] [address]         Current Medications:    Medications Prior to Admission: levETIRAcetam (KEPPRA) 1000 MG tablet, Take 1 tablet by mouth 2 times daily  ALPRAZolam (XANAX) 0.25 MG tablet, Take 0.125 mg by mouth 2 times daily.   lisinopril (PRINIVIL;ZESTRIL) 20 MG tablet, Take 40 mg by mouth daily  ferrous sulfate (IRON 325) 325 (65 Fe) MG tablet, Take 325 mg by mouth daily (with breakfast)  Cyanocobalamin 2500 MCG SUBL, Place 2,500 mcg under the tongue daily  metoprolol succinate (TOPROL XL) 25 MG extended release tablet, Take 1 tablet by mouth daily  atorvastatin (LIPITOR) 80 MG tablet, Take 1 tablet by mouth nightly  vitamin D (CHOLECALCIFEROL) 25 MCG (1000 UT) TABS tablet, Take 1,000 Units by mouth daily  Loratadine (CLARITIN PO), Take 10 mg by mouth daily   ezetimibe (ZETIA) 10 MG tablet, Take 10 mg by mouth nightly     Allergies:  Quinidine, Aminoglycosides, Levofloxacin, Neomycin, and Simvastatin    Social History:    · TOBACCO: reports that she has never smoked. She has never used smokeless tobacco.  · ETOH:   reports no history of alcohol use. · Patient currently lives with family - grandson    Review of Systems -   Review of Systems: unable to obtain due to mental status. Objective:        Physical Exam  Constitutional:       General: She is not in acute distress. Appearance: She is ill-appearing. HENT:      Head:      Comments: Dressing in place  Cardiovascular:      Rate and Rhythm: Normal rate and regular rhythm. Pulmonary:      Effort: Pulmonary effort is normal.      Comments: Snoring. On 6 L oxygen. Abdominal:      General: Bowel sounds are normal.      Palpations: Abdomen is soft. Musculoskeletal:         General: No swelling. Skin:     General: Skin is warm and dry. Neurological:      Comments: Did not awaken during exam.          Palliative Performance Scale:  [] 60% Ambulation reduced; Significant disease; Can't do hobbies/housework; intake normal or reduced; occasional assist; LOC full/confusion  [] 50% Mainly sit/lie; Extensive disease; Can't do any work; Considerable assist; intake normal  Or reduced; LOC full/confusion  [] 40% Mainly in bed; Extensive disease; Mainly assist; intake normal or reduced; occasional assist; LOC full/confusion  [] 30% Bed Bound; Extensive disease; Total care; intake reduced; LOC full/confusion  [x] 20% Bed Bound; Extensive disease; Total care; intake minimal; Drowsy/coma  [] 10% Bed Bound; Extensive disease;  Total care; Mouth care only; Drowsy/coma  [] 0% Death      Vitals:    BP (!) 155/69   Pulse 68   Temp 99.3 °F (37.4 °C) (Axillary)   Resp 20   Ht 5' 6\" (1.676 m)   Wt 117 lb 15.1 oz (53.5 kg)   SpO2 92%   BMI 19.04 kg/m²     Labs:    BMP:   Recent Labs     03/24/21  1220 03/25/21  1740 03/26/21  0525   * 129* 125*   K 5.1 5.0 5.5*   CL 98* 93* 92*   CO2 26 27 21   BUN 19 21* 20   CREATININE 0.7 0.9 0.8   GLUCOSE 118* 141* 116*     CBC:   Recent Labs     03/24/21  1234 03/25/21  1740 03/26/21  0525   WBC 5.7 5.9 8.3   HGB 9.1* 9.0* 10.5*   HCT 27.1* 27.5* 31.8*    286 268       LFT's:   Recent Labs     03/24/21  1220   AST 13*   ALT <5*   BILITOT 0.4   ALKPHOS 72     Troponin: No results for input(s): TROPONINI in the last 72 hours. BNP: No results for input(s): BNP in the last 72 hours. ABGs: No results for input(s): PHART, MRZ4TYP, PO2ART in the last 72 hours. INR:   Recent Labs     03/24/21  1220   INR 1.13       U/A:No results for input(s): NITRITE, COLORU, PHUR, LABCAST, WBCUA, RBCUA, MUCUS, TRICHOMONAS, YEAST, BACTERIA, CLARITYU, SPECGRAV, LEUKOCYTESUR, UROBILINOGEN, BILIRUBINUR, BLOODU, GLUCOSEU, AMORPHOUS in the last 72 hours. Invalid input(s): KETONESU    CT HEAD WO CONTRAST   Final Result      Left frontal craniotomy. Subdural hematoma on the left unchanged. Pneumocephalus unchanged. Midline shift left-to-right unchanged. XR CHEST PORTABLE   Final Result      Stable appearance the chest.      CT HEAD WO CONTRAST   Final Result      1. Slight reduction in the left sided subdural hematoma status post drain removal.   2.  Slightly improved mild left-to-right midline shift. 3.  Improving postoperative pneumocephalus. XR CHEST PORTABLE   Final Result   1. Congestive changes with worsening of interstitial edema in the right hemithorax with vascular redistribution   2. Nasogastric feeding tube in the stomach      XR ABDOMEN (KUB) (SINGLE AP VIEW)   Final Result      1. Feeding tube tip in stomach. 2. Airspace disease right lung, possible pneumonia. CT HEAD WO CONTRAST   Final Result      Postoperative changes noted with placement of drainage catheter into the left subdural hematoma with postoperative change from craniotomy in the superior frontal lobe noted.       The subdural hemorrhage is slightly decreased from prior study with interval development of postoperative

## 2021-03-26 NOTE — PROCEDURES
Continuous EEG monitoring record    Patient name: Justine Barney    START: 03/26/2021 @ 16:16pm   END: 03/27/2021 @ 09:00am      Electroencephalographer: Daniella Miller MD PhD      CLINICAL DETAILS:  This EEG was performed on this 80 y. o.yo female admitted for Altered mental Status and concer for subclinical seizures      TECHNICAL DETAILS:  Continuous video-EEG monitoring was performed with 27 surface scalp electrodes placed according to the International 10-20 electrode placement system, using a 32-channel [employer] headbox. All EEG and video information was acquired digitally, including the use of automated spike and seizure detection software to detect epileptiform activity. An event button was also available to be depressed during clinical events. 03/26/2021 23:00pm to 09:00am on 03/27/2021  SEIZURES:  None  INTERICTAL:  None  PUSHBUTTONS:  None  BACKGROUND:  Abundant 0.5-1 Hz low amplitude generalized delta slowing of the background with abundant superimposed faster frequencies. EKG:  regular      03/26/2021 16:16pm - 23:00pm  SEIZURES:  None  INTERICTAL:  None  PUSHBUTTONS:  22:21pm for mouth movements. EMG is seen on the EEG but no clear underlying discharges or seizures. BACKGROUND:  Abundant 0.5-1 Hz low amplitude generalized delta slowing of the background with abundant superimposed faster frequencies. EKG:  regular    CLINICAL INTERPRETATION:  This was an abnormal tracing for age and state due to a severe generalized slow wave abnormality indicating diffuse cerebral dysfunction which can be of multiple causes, including structural, or vascular abnormalities, toxic/metabolic conditions, hydrocephalus, or postictal conditions. No epileptiform discharge, focal slowing, or seizures were seen during this recording. Clinical correlation is advised.         Daniella Miller MD PhD

## 2021-03-26 NOTE — PROGRESS NOTES
NEUROSURGERY PROGRESS NOTE    3/26/2021 8:50 AM                               Dylan Hernandez                      LOS: 6 days   POD#5  s/p Procedure(s) (LRB):  LEFT CRANIOTOMY FOR SUBDURAL HEMATOMA EVACUATION (Left)    Subjective: Patient laying in bed upon entering the room. Patient had 2 seizures overnight consisting of right eye and right cheek twitching, eyes fixated to the right side, and unresponsiveness. Patient also had small amount of blood on the right side of her lips, appeared to have bitten her tongue. Patient has no specific complaints this am.    Physical Exam:  Patient seen and examined    Vitals:    03/26/21 0607   BP: (!) 155/69   Pulse: 68   Resp: 20   Temp:    SpO2: 92%     GCS:  2 - Opens eyes with pain  2 - Moans, makes unintelligible sounds  4 - Moves part of body but does not remove noxious stimulus    General: Lethargic and fatigued  HENT: Corepak in place and surgical incision on left frontal scalp  Eyes: Optic discs: Not tested  Pulmonary: unlabored respiratory effort  Cardiovascular:  Warm well perfused.  No peripheral edema  Gastrointestinal: abdomen soft, NT, ND    Neurological:  Mental Status: Lethargic and minimally responsive  Attention: JONAH 2/2 lethargy  Language: JONAH 2/2 lethargy  Sensation: Grimaces to noxious tactile stimuli in all extremities  Coordination: JONAH 2/2 lethargy    Cranial Nerves:  II: Visual acuity not tested, denies new visual changes / diplopia  III, IV, VI: PERRL, 3 mm bilaterally, JONAH EOM 2/2 lethargy  V: Facial sensation intact bilaterally to touch  VII: Face symmetric  VIII: Hearing intact bilaterally to spoken voice  IX: JONAH Palate movement equal bilaterally 2/2 lethargy  XI: JONAH Shoulder shrug equal bilaterally 2/2 lethargy  XII: Tongue midline    Musculoskeletal:   Gait: Not tested   Assist devices: None   Tone: Normal  Motor strength: Exam limited 2/2 unable to follow commands consistently, but able to move all extremities independently L>R    Incision: CDI    Radiological Findings:  Ct Head Wo Contrast  Result Date: 3/22/2021  Postoperative changes noted with placement of drainage catheter into the left subdural hematoma with postoperative change from craniotomy in the superior frontal lobe noted. The subdural hemorrhage is slightly decreased from prior study with interval development of postoperative air as above. Degree of mild left to right shift has diminished slightly from prior exam.      Ct Head Wo Contrast  Result Date: 3/25/2021  1. Slight reduction in the left sided subdural hematoma status post drain removal.  2.  Slightly improved mild left-to-right midline shift. 3.  Improving postoperative pneumocephalus. Ct Head Wo Contrast  Result Date: 3/26/2021  Left frontal craniotomy. Subdural hematoma on the left unchanged. Pneumocephalus unchanged. Midline shift left-to-right unchanged.     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 female s/p Procedure(s) (LRB):  LEFT CRANIOTOMY FOR SUBDURAL HEMATOMA EVACUATION (Left) with 2 post-op seizures in the setting of hyponatremia. Plan:  1. Neurologically stable  2. Neurologic exams frequency: Q4H  3. For change in exam MUST contact neurosurgery team along with critical care or primary team  4. Cerebral edema:  - Keep Na within normal limits. Patient Na is 125. Nephrology consulted for hyponatremia.  - Keep HOB >30 degrees  - If central venous access is needed please use subclavian vs femoral - No IJ as this can decrease venous return and worsen cerebral edema  5. Seizure prophylaxis:  - 2 seizures reported  - Keppra 1000mg BID  - Neurology consulted, appreciate recs  6. Bowel Regimen: Glycolax and Senokot-S  7. Pain control: PRN Tylenol and Norco  8. DVT Prophylaxis: SCD's & SQ Heparin  9. Mobility:  - Advance as tolerates  - PT/OT consulted, appreciate recs  10. Diet: TF currently will plan for PEG tube tomorrow  11. Incisional Care: Open to air. Wash incision daily with warm soapy water or shower daily, and pat dry with clean dry towel. [place] with CHG swab. 12. Will follow inpatient. Please call with any questions or decline in neurological status    DISPO: OK to DC from neurosurgery standpoint. Dispo timing to be determined by primary team once patient is medically stable for discharge. Patient was seen and examined with Dr. Tristian Echavarria who agrees with above assessment and plan.       __________________________________________________________________    I saw and examined Casandra Carrasco on 03/26/21. I agree with the assessment and plan as detailed above. Janeen Padron MD, PhD  [address], [address] [facility], [address]  [phone] (c), [phone] (o)     Electronically signed by: DEBRA Larose-CNP, 3/26/2021 8:50 AM  [phone].

## 2021-03-26 NOTE — ANESTHESIA PRE PROCEDURE
Department of Anesthesiology  Preprocedure Note       Name:  Roma Gleason   Age:  80 y.o.  :  1934                                          MRN:  [id number]         Date:  3/26/2021      Surgeon: Mariam Gurrola):  Luna Escobar MD    Procedure: Procedure(s):  ESOPHAGOGASTRODUODENOSCOPY PERCUTANEOUS ENDOSCOPIC GASTROSTOMY TUBE INSERTION    Medications prior to admission:   Prior to Admission medications    Medication Sig Start Date End Date Taking? Authorizing Provider   levETIRAcetam (KEPPRA) 1000 MG tablet Take 1 tablet by mouth 2 times daily 3/15/21   Demetra Orellana MD   ALPRAZolam [facility]) 0.25 MG tablet Take 0.125 mg by mouth 2 times daily. Historical Provider, MD   lisinopril (PRINIVIL;ZESTRIL) 20 MG tablet Take 40 mg by mouth daily    Historical Provider, MD   ferrous sulfate (IRON 325) 325 (65 Fe) MG tablet Take 325 mg by mouth daily (with breakfast)    Historical Provider, MD   Cyanocobalamin 2500 MCG SUBL Place 2,500 mcg under the tongue daily    Historical Provider, MD   metoprolol succinate (TOPROL XL) 25 MG extended release tablet Take 1 tablet by mouth daily 20   DEBRA Fragoso CNP   atorvastatin (LIPITOR) 80 MG tablet Take 1 tablet by mouth nightly 20   DEBRA Fragoso CNP   vitamin D (CHOLECALCIFEROL) 25 MCG (1000 UT) TABS tablet Take 1,000 Units by mouth daily    Historical Provider, MD   Loratadine (CLARITIN PO) Take 10 mg by mouth daily     Historical Provider, MD   ezetimibe (ZETIA) 10 MG tablet Take 10 mg by mouth nightly     Historical Provider, MD       Current medications:    No current facility-administered medications for this visit. No current outpatient medications on file.      Facility-Administered Medications Ordered in Other Visits   Medication Dose Route Frequency Provider Last Rate Last Admin    LORazepam (ATIVAN) 2 MG/ML injection             hydrALAZINE (APRESOLINE) injection 10 mg  10 mg Intravenous Q2H PRN Mykel Alexander 5,000 Units at 03/24/21 2116    ezetimibe (ZETIA) tablet 10 mg  10 mg Oral Nightly Earl Pandey MD   10 mg at 03/25/21 1957    ferrous sulfate (IRON 325) tablet 325 mg  325 mg Oral Daily with breakfast Earl Pandey MD   Stopped at 03/26/21 0842    lisinopril (PRINIVIL;ZESTRIL) tablet 40 mg  40 mg Oral Daily Earl Pandey MD   40 mg at 03/26/21 0842    acetaminophen (TYLENOL) tablet 650 mg  650 mg Oral Q4H PRN Earl Pandey MD   650 mg at 03/21/21 7606    magnesium sulfate 1000 mg in dextrose 5% 100 mL IVPB  1,000 mg Intravenous PRN Earl Pandey MD           Allergies:     Allergies   Allergen Reactions    Quinidine Hives    Aminoglycosides      Pt unsure    Levofloxacin      unknown    Neomycin      unknown    Simvastatin      Unknown to pt       Problem List:    Patient Active Problem List   Diagnosis Code    Pneumonia J18.9    Closed displaced fracture of left femoral neck (HCC) S72.002A    MGUS (monoclonal gammopathy of unknown significance) D47.2    Benign essential hypertension I10    Aortic insufficiency I35.1    Clonic hemifacial spasm, 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    GEORGES (generalized anxiety disorder) F41.1    Gastroesophageal reflux disease without esophagitis K21.9    Mixed hyperlipidemia E78.2    NSTEMI (non-ST elevated myocardial infarction) ([address] I21.4    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 ([address] F10.27    Confusion R41.0    Ischemic cardiomyopathy I25.5    Subdural hematoma ([address] S06.5X9A    Abnormal EKG R94.31    CAD (coronary artery disease) I25.10       Past Medical History:        Diagnosis Date    Acute encephalopathy 03/03/2021    Anxiety     Aortic insufficiency     [facility]est[facility] failure[facility])     Chronic kidney disease (CKD), stage III (moderate)     COPD, moderate ([address]     Coronavirus infection 03/25/2020    Dementia ([address]     Expressive aphasia 07/29/2020    Facial twitching     GERD (gastroesophageal reflux disease)     Hair loss     wears wig- unknown cause    Hx of falling     Hyperlipidemia     Hypertension     Ischemic cardiomyopathy     EF 55% 03/15/2021    Mild cognitive impairment with memory loss 07/25/2019    NSTEMI (non-ST elevated [facility] infarction) [facility]     Seasonal allergies     Subdural hematoma ([facility] 03/12/2021       Past Surgical History:        Procedure Laterality Date    COLONOSCOPY      COLONOSCOPY  06/22/2015    diverticulosis    CRANIOTOMY Left 3/21/2021    LEFT CRANIOTOMY FOR SUBDURAL HEMATOMA EVACUATION performed by [place] Alejandrina. Dea Tabor MD at [address] [place] [address] Left 07/30/2020    LEFT HIP HEMIARTHROPLASTY performed by Ramila Munoz MD at [address]         Social History:    Social History     Tobacco Use    Smoking status: Never Smoker    Smokeless tobacco: Never Used   Substance Use Topics    Alcohol use: No                                Counseling given: Not Answered      Vital Signs (Current): There were no vitals filed for this visit.                                            BP Readings from Last 3 Encounters:   03/26/21 (!) 137/53   03/21/21 (!) 157/70   03/20/21 (!) 173/52       NPO Status:                                                                                 BMI:   Wt Readings from Last 3 Encounters:   03/24/21 117 lb 15.1 oz (53.5 kg)   03/20/21 110 lb (49.9 kg)   03/12/21 110 lb (49.9 kg)     There is no height or weight on file to calculate BMI.    CBC:   Lab Results   Component Value Date    WBC 8.3 03/26/2021    RBC 3.30 03/26/2021    HGB 10.5 03/26/2021    HCT 31.8 03/26/2021    MCV 96.2 03/26/2021    RDW 14.7 03/26/2021     03/26/2021       CMP:   Lab Results   Component Value Date     03/26/2021    K 5.4 03/26/2021    K 5.0 03/25/2021    CL 91 03/26/2021    CO2 23 03/26/2021    BUN 23 03/26/2021    CREATININE 0.9 03/26/2021    GFRAA >60 03/26/2021    GFRAA >60 01/29/2012    AGRATIO 1.2 03/24/2021    LABGLOM 59 03/26/2021    GLUCOSE 138 03/26/2021    PROT 6.4 03/24/2021    PROT 7.0 01/29/2012    CALCIUM 8.9 03/26/2021    BILITOT 0.4 03/24/2021    ALKPHOS 72 03/24/2021    AST 13 03/24/2021    ALT <5 03/24/2021       POC Tests:   Recent Labs     03/26/21  0551   POCGLU 130*       Coags:   Lab Results   Component Value Date    PROTIME 13.1 03/24/2021    INR 1.13 03/24/2021    APTT 27.8 03/22/2021       HCG (If Applicable): No results found for: PREGTESTUR, PREGSERUM, HCG, HCGQUANT     ABGs: No results found for: PHART, PO2ART, MKS8WQM, ZTU5QSM, BEART, R8FEYZNX     Type & Screen (If Applicable):  No results found for: LABABO, LABRH    Drug/Infectious Status (If Applicable):  No results found for: HIV, HEPCAB    COVID-19 Screening (If Applicable):   Lab Results   Component Value Date    COVID19 Not Detected 03/12/2021    COVID19 Not Detected 03/04/2021           Anesthesia Evaluation  Patient summary reviewed and Nursing notes reviewed  Airway: Mallampati: II  TM distance: >3 FB   Neck ROM: full  Mouth opening: > = 3 FB Dental: normal exam         Pulmonary:normal exam  breath sounds clear to auscultation  (+) pneumonia: resolved,  COPD: mild,                             Cardiovascular:    (+) hypertension: mild, valvular problems/murmurs: AI, past MI: no interval change, CAD: no interval change,       ECG reviewed  Rhythm: regular  Rate: normal  Echocardiogram reviewed         Beta Blocker:  Dose within 24 Hrs         Neuro/Psych:   (+) seizures:, headaches:, psychiatric history: stable with treatment            GI/Hepatic/Renal:   (+) GERD: well controlled,           Endo/Other: Negative Endo/Other ROS                    Abdominal:       Abdomen: soft. Vascular: negative vascular ROS.                                          Anesthesia Plan      MAC     ASA 3 - emergent     (-npo   -recent procedure, for SDH        Echo 3.2021  Conclusions      Summary   Limited echo to follow up echo done December 2020.   Normal left ventricle size. There is mild concentric left ventricular   hypertrophy. Normal left ventricular systolic function with an estimated   ejection fraction of 55%. No regional wall motion abnormalities are seen. The left atrium is severely dilated.)  Induction: intravenous. Anesthetic plan and risks discussed with patient. Use of blood products discussed with patient whom consented to blood products. Plan discussed with attending. Attending anesthesiologist reviewed and agrees with Pre Eval content        -concern for patient with 2 episodes of rapid response called today/yesterday. Concern that patient is having increased number and frequency of seizures.   Discussed with Dr Ricky Ruiz that she is not optimized for the elective placement of a PEG tube      Jyothi Goel MD   3/26/2021

## 2021-03-26 NOTE — PROGRESS NOTES
Pt Routine EEG on hold until am. Per R.MIMI Ayoub pt received 4 mg of Ativan at 1800. I spoke with  who agreed that the Ativan could affect the test. Annika Ayoub R.N. notified.

## 2021-03-26 NOTE — PROGRESS NOTES
Physical/Occupational Therapy  HOLD    Chart reviewed. Pt with 2 rapid responses overnight due to seizure activity. RN reports pt will likely have PEG today and might have continuous EEG later. Will hold PT/OT today. Continue per plan of care as appropriate.      [place], [place] [id number]  Dominique Saunders, S/KEENA

## 2021-03-26 NOTE — PLAN OF CARE
Problem: Falls - Risk of:  Goal: Will remain free from falls  Description: Will remain free from falls  Outcome: Ongoing  Note: Hourly rounding on patient for needs. Non-skid socks on, bed in lowest position and locked. Bedside table, personal belongs, and nurse call light within reach. Instructed patient to use call light for assistance. Bed alarm on, camera in use for safety. Floor clear of clutter. Patient remains free of falls at this time. Problem: Pain:  Goal: Pain level will decrease  Description: Pain level will decrease  Outcome: Ongoing  Note: Patient resting in bed comfortably w/eyes closed and respirations greater than 10/min. Patient does not exhibit any objective s/s of pain.

## 2021-03-26 NOTE — PROGRESS NOTES
Unable to complete EEG at this time d/t administration of Ativan at 1750. Per EEG tech, test can be obtained in AM once medication is out of system.

## 2021-03-26 NOTE — CONSULTS
Neurology Consult Note  Reason for Consult: seizures  Chief complaint: Altered mental status  Dr Caroline Renae MD asked me to see Clementina Fothergill in consultation for evaluation of pt episodes of seizures. History of Present Illness:  Clementina Fothergill is a 80 y.o. female with PMH of CHF, CKD, CAD, COPD, Covid-19, dementia, seizures, NSTEMI, SDH who presents to [facility] 3/20 with increased confusion for couple of days. She was also noticed to have a seizure in the ED which lasted 30 seconds. CT head revealed L SDH 1.9cm (from 1.2 cm) with mass effect and LtoR midhline shift 8-9mm (from 5-6 mm). Neurosurgery was consulted and patient underwent craniotomy 3/21 which she tolerated very well. Had repeat CT head which was stable. Patient was awake and ambulating for the first few days post-op Cranial drain was removed 3/23. Patient was getting TF and plan was for her to undergo PEG tube placement given her severe malnutirtion 2/2 low oral intake. Of note, patient was hospitalized 3/12-3/15 after she was found unresponsive by otf. She had an episode of shaking thought to be seizure. Per otf, she had similar episodes before last one in December 2020. CT revealed SDH 1.5cm thickness, with mass effect and 5mm LtoR shift. She was started on Keppra 1g BID. Her repeat CT head was stable with slight decrease in SDH. Patient was discharged with no neurosurgical intervention. Neurology was consulted this admission after patient had two new episodes of seizures. Rapid response called on patient yesterday 5:30pm as pt was witnessed having right eye and facial twitching, eyes fixated to right side and patient was unresponsive. She received 2mg of Ativan x 2. She also recevied loading dose of Keppra 2g. She had CT head which showed stable. Another rapid was called this morning with similar episode. She received 4mg of Ativan and she got her maintenance Keppra 1g earlier than scheduled.      Patient seen and examined at bedside. She was somnolent not responding to verbal or oral stimuli. Unable to obtain ROS. Medical History:  Past Medical History:   Diagnosis Date    Acute encephalopathy 03/03/2021    Anxiety     Aortic insufficiency     CHF (congestive heart failure) (HCC)     Chronic kidney disease (CKD), stage III (moderate)     COPD, moderate (HCC)     Coronavirus infection 03/25/2020    D[facility]     Expressive aphasia 07/29/2020    Facial twitching     GERD (gastroesophageal reflux disease)     Hair loss     wears wig- unknown cause    Hx of falling     Hyperlipidemia     Hypertension     Ischemic cardiomyopathy     EF 55% 03/15/2021    Mild cognitive impairment with memory loss 07/25/2019    NSTEMI (non-ST elevated myocardial infarction) ([facility]     Seasonal allergies     Subdural hematoma ([facility] 03/12/2021     Past Surgical History:   Procedure Laterality Date    COLONOSCOPY      COLONOSCOPY  06/22/2015    diverticulosis    CRANIOTOMY Left 3/21/2021    LEFT CRANIOTOMY FOR SUBDURAL HEMATOMA EVACUATION performed by Ac Cheung. Bing Dotson MD at [address] [place] [address] Left 07/30/2020    LEFT HIP HEMIARTHROPLASTY performed by Viji Bonner MD at [address] [place] [address]       Medications Prior to Admission: levETIRAcetam (KEPPRA) 1000 MG tablet, Take 1 tablet by mouth 2 times daily  ALPRAZolam (XANAX) 0.25 MG tablet, Take 0.125 mg by mouth 2 times daily.   lisinopril (PRINIVIL;ZESTRIL) 20 MG tablet, Take 40 mg by mouth daily  ferrous sulfate (IRON 325) 325 (65 Fe) MG tablet, Take 325 mg by mouth daily (with breakfast)  Cyanocobalamin 2500 MCG SUBL, Place 2,500 mcg under the tongue daily  metoprolol succinate (TOPROL XL) 25 MG extended release tablet, Take 1 tablet by mouth daily  atorvastatin (LIPITOR) 80 MG tablet, Take 1 tablet by mouth nightly  vitamin D (CHOLECALCIFEROL) 25 MCG (1000 UT) TABS tablet, Take 1,000 Units by mouth daily  Loratadine (CLARITIN PO), Take 10 mg left to right shift has diminished slightly from prior exam.               Scheduled Medications   LORazepam        levetiracetam  1,000 mg Intravenous Q12H    ceFAZolin  2,000 mg Intravenous Once    carvedilol  3.125 mg Oral BID WC    amLODIPine  5 mg Oral Daily    [Held by provider] levETIRAcetam  1,000 mg Oral BID    sennosides-docusate sodium  2 tablet Oral BID    polyethylene glycol  17 g Oral Daily    famotidine  20 mg Oral Daily    sodium chloride flush  10 mL Intravenous 2 times per day    [Held by provider] heparin (porcine)  5,000 Units Subcutaneous Q12H    ezetimibe  10 mg Oral Nightly    ferrous sulfate  325 mg Oral Daily with breakfast    lisinopril  40 mg Oral Daily       Impression:  Minor Members is a 80 y.o. female who presented with 2 seizure episodes within 12 hours. Patient has been having what looks like generalized tonic-clonic seizures past few months  even before the newly diagnosed SDH found on 3/12/2021. On Keppra 1g BID since last admission 3/12. New episodes are more like focal seizures with facial and eye twitching. Patient currently encephalopathic 2/2 postictal and Ativan doses. Patient has a history of dementia which could also be contributing to her seizure episodes in addition to her acute SDH.      Recommendations:  - cEEG  - Will increase Keppra to 2g BID; will add Vimpat if patient had another seizure episode despite being on the new Keppra dose  - SDH managed by nsx; appreciate recs  - Hyponatremia managed by nephro and primary    A copy of this note was provided for MD Jose Escamilla MD  PG-Y2    You can reach me via perfect service    Evenings, weekends, and off weeks please discuss neurologist on-call

## 2021-03-26 NOTE — PROGRESS NOTES
Hospitalist Progress Note      PCP: José Luis Marcum MD    Date of Admission: 3/20/2021         80 y.o. female who is transferred from [facility], ED for neurosurgical consultation. Patient was brought in by her grandson with complaints of increased confusion for the past couple of days and patient complaining of left-sided back pain. Patient was hospitalized here from 3/12 through 3/15, after being transferred from [facility] for possible seizure-like episode. At that time patient was found unresponsive by grandson on the couch and noticed seizure-like activity. In ED patient was with nurse to have a seizure by her RN at bedside which lasted for about 30 seconds. CT head revealed left-sided subdural hematoma with associated mass-effect and left-to-right midline shift. Patient has been taking 81 mg of aspirin and Plavix daily at that time. During the previous hospitalization patient was started on Keppra 1 g twice daily.       At baseline patient has underlying dementia and typically oriented to self, place and situation. Follows commands. Subjective:   Has had 2 episodes of seizures since last evening, 1 last evening and another one earlier this morning.    - Seizures consisting of right eye and right cheek twitching, eyes fixated to the right side, and unresponsiveness. Patient also had small amount of blood on the right side of her lips, appeared to have bitten her tongue.  - Required doses of Ativan to control seizures. - Head CT done last evening and again this morning does not show progression of hemorrhage.  -We gave her additional doses of Keppra last evening.  Dose increase this AM to 2 g BID  -She is currently drowsy, postictal, or effective Ativan she received for seizure      - ROS unobtainable 2/2 aphasia, sedation        Medications:  Reviewed    Infusion Medications     Scheduled Medications    LORazepam        levetiracetam  2,000 mg Intravenous Q12H    levetiracetam  1,000 mg Intravenous Once    ceFAZolin  2,000 mg Intravenous Once    carvedilol  3.125 mg Oral BID WC    amLODIPine  5 mg Oral Daily    sennosides-docusate sodium  2 tablet Oral BID    polyethylene glycol  17 g Oral Daily    famotidine  20 mg Oral Daily    sodium chloride flush  10 mL Intravenous 2 times per day    [Held by provider] heparin (porcine)  5,000 Units Subcutaneous Q12H    ezetimibe  10 mg Oral Nightly    ferrous sulfate  325 mg Oral Daily with breakfast    lisinopril  40 mg Oral Daily     PRN Meds: hydrALAZINE, labetalol, HYDROcodone 5 mg - acetaminophen, sodium chloride flush, naloxone, cyclobenzaprine, promethazine **OR** ondansetron, acetaminophen, magnesium sulfate      Intake/Output Summary (Last 24 hours) at 3/26/2021 1013  Last data filed at 3/26/2021 0653  Gross per 24 hour   Intake 300 ml   Output --   Net 300 ml       Physical Exam Performed:    BP (!) 155/69   Pulse 68   Temp 99.3 °F (37.4 °C) (Axillary)   Resp 20   Ht 5' 6\" (1.676 m)   Wt 117 lb 15.1 oz (53.5 kg)   SpO2 92%   BMI 19.04 kg/m²     General appearance: drowsy  Head: dressing C/D  HENT: + NG  Respiratory:  Normal respiratory effort.  Clear lungs  Cardiovascular: Regular rate and rhythm with normal S1/S2   Abdomen: Soft, non-tender, non-distended   Neurologic: aphasic, right hemiplegia      Labs:   Recent Labs     03/24/21  1234 03/25/21  1740 03/26/21  0525   WBC 5.7 5.9 8.3   HGB 9.1* 9.0* 10.5*   HCT 27.1* 27.5* 31.8*    286 268     Recent Labs     03/24/21  1220 03/25/21  1740 03/26/21  0525   * 129* 125*   K 5.1 5.0 5.5*   CL 98* 93* 92*   CO2 26 27 21   BUN 19 21* 20   CREATININE 0.7 0.9 0.8   CALCIUM 8.7 8.8 9.1     Recent Labs     03/24/21  1220   AST 13*   ALT <5*   BILITOT 0.4   ALKPHOS 72     Recent Labs     03/24/21  1220   INR 1.13     Urinalysis:      Lab Results   Component Value Date    NITRU Negative 03/21/2021    WBCUA None seen 03/21/2021    BACTERIA Rare 03/21/2021 RBCUA 0-2 03/21/2021    BLOODU MODERATE 03/21/2021    SPECGRAV 1.020 03/21/2021    GLUCOSEU Negative 03/21/2021         Assessment/Plan:    Active Hospital Problems    Diagnosis    Subdural hematoma ([facility] [S06.5X9A]   Acute encephalopathy sec to ICH:  Worsening subdural hematoma, with acute and chronic components with increased mass-effect  Baseline dementia  Repeat CT head - Unchanged left cerebral convexity subdural hematoma with 8 mm rightward subfalcine herniation. Neurosurgery consulted - now s/p LEFT CRANIOTOMY FOR SUBDURAL HEMATOMA EVACUATION. Continue on Keppra 2 g IV twice daily, increased from 1 g twice daily, with episodes of recurrent seizures. Of note patient has a history of previous seizures. No antiplatelets or chemical DVT prophylaxis  Seizure and fall precautions  Will need PEG   Neurosurg. following         Recurrent seizures, breakthrough seizures  - Pxt appears to have hx of Generalized tonic-clonic seizures on Keppra  -Recurrent seizures likely in the setting of subdural hematoma, with acute and chronic components with increased mass-effect  -Also noted with hyponatremia, although per discussion with neuro, not low enough to be causing her seizures. -Treat hyponatremia:-We will repeat serum sodium level, and if < 120, we will need to consider hypertonic saline.   -Dose of Keppra increased to 2 g twice daily  -May need continuous EEG monitoring with adjustments of AEDs. Will discuss with neurology  - Check urinalysis and cx  - Seizure and aspiration precautions.   - Neurology consulted         Hyponatremia  Possibly hypovolemic hyponatremia from low oral intake.  Also possible: cerebral salt wasting related to intracranial process  -Check urine lytes, TSH, serum osmol.  -Treat hyponatremia:-We will repeat serum sodium level, and if < 120, we will need to consider hypertonic saline, especially in the setting of neuro issues/seizures  - Will monitor Na q4-6 hr for now  -Nephrology consult        Acute resp failure with hypoxia  - Likely sec to seizures/benzodiazepines  - CXR: stable. Repeat in 1-2 days leann if develops SIRS features to r/o aspiration  - consider empiric antibx if unexplained SIRS features. - Supplemental oxygen  - Asp precautions. Acute urinary retention  Bladder scanned for 660 cc  Place [place]  Send urine for UA and Cx  Place on Flomax  TOV in several days if/when activity improves. Severe Protein calorie malnutrition:  Body mass index is 19.04 kg/m². NG placed/ tolerating tf.  Still very lethargic and not safe fpr po intake. Will need peg- GI consulted. Left hip pain and echymosis:  Likely sec to fall. Not on any blood thinners  Repeat xray of hip negative.         CAD/Ischemic cardiomyopathy   Ejection fraction of 30-35%, moderate MR and moderate to severe AR  -Was on aspirin and Plavix, which were discontinued during previous hospitalization  Continue on statin  Continue ACEI  Continue BB: monitor HR.         HTN: uncontrolled  Started amlodipine, Coreg  Continue lisinopril  Monitor HR as had some sinus sacha earlier. Monitor Bps and adjust meds as needed: Currently has been high in the setting of recurrent seizures.   -As needed hydralazine.       History of MGUS-follows with hematology-oncology      Dementia  - Reportedly at baseline, patient has underlying dementia and typically oriented to self, place and situation.  - Delirium protocol          DVT Prophylaxis: SCDs  Diet: Diet NPO, After Midnight  Code Status: Full Code    PT/OT Eval Status: 14 and 15 respectively: SNF planned. Code status: code status changed to DNR-CCA. Disposition - SNF with PEG is discharge plan. Remains in pxt pending progress: we will need to control seizures. cEEG monitoring with med adjustment. Mitchell Laureen Eileen who is NoK and he is agreeable w/ the plan and placement as outlined above  Pall Med following: code status changed to [facility].        Annabella Preston Alex MD

## 2021-03-26 NOTE — PLAN OF CARE
Problem: Falls - Risk of:  Goal: Will remain free from falls  Description: Will remain free from falls  3/26/2021 0728 by Patricia Jimenez RN  Outcome: Ongoing  3/26/2021 0217 by Boni Castle RN  Outcome: Ongoing  Note: Hourly rounding on patient for needs. Non-skid socks on, bed in lowest position and locked. Bedside table, personal belongs, and nurse call light within reach. Instructed patient to use call light for assistance. Bed alarm on, camera in use for safety. Floor clear of clutter. Patient remains free of falls at this time. Problem: Pain:  Goal: Pain level will decrease  Description: Pain level will decrease  3/26/2021 0728 by Patricia Jimenez RN  Outcome: Ongoing  3/26/2021 0217 by Boni Castle RN  Outcome: Ongoing  Note: Patient resting in bed comfortably w/eyes closed and respirations greater than 10/min. Patient does not exhibit any objective s/s of pain.

## 2021-03-26 NOTE — PROGRESS NOTES
Speech Language Pathology    Chart reviewed - noted plan for PEG today and events overnight. D/W RN. Will hold ST at this time given pt status (rapid response this AM, NPO for procedure). Will re-attempt as pt appropriate to participate and schedule permits.     Wilmer Perez MA CCC-SLP; [id number]  Speech-Language Pathologist  Pager # [phone]  Phone # [phone]  Office # [phone]

## 2021-03-26 NOTE — PROGRESS NOTES
Rapid response called at 0550, patient noted to have twitching to R side of face, blood dripping from mouth. Non-responsive to verbal and tactile stimulation. RT NG suction patinet w/thick yellow secretions. IV ativan administered and orders changed to give IV Keppra now. Patient post-ictal at this time, resting in bed w/eyes closed and respirations greater than 18/min.

## 2021-03-26 NOTE — SIGNIFICANT EVENT
Around 5:50 AM a rapid response was called and the ICU team arrived. The patient who is postop from a craniotomy was found to be seizing in bed. Her eyes were fixated on the right side and she had visible twitching of the right side of her face. She was completely unresponsive. Her vitals were temperature of nine 9.3, pulse of 107, blood pressure of 200/82, oxygen saturation at 91% on 3 L nasal cannula. Her blood glucose was 130. Ativan 4 mg was ordered and administered at 5:55 AM.  The seizure resolved with the Ativan and her morning dose of p.o. Keppra scheduled at 9 AM was started early via IV route. She continued to be nonconversant post Ativan most likely secondary to postictal state. Her subsequent vitals were stable and showed a blood pressure of 155/69, pulse of 68, oxygen saturation of 93% on 5 L. She did sound mildly coarse likely secondary to secretions which were suctioned out. Afterwards her breathing sounded much clearer. The patient's oxygen saturation was fine, there were no signs of drooling or inability to protect the airway and nothing to indicate she was retaining PCO2 hence she was not intubated.

## 2021-03-26 NOTE — PROGRESS NOTES
Patient had another seizure. Messaged left with Felix for Dr. Kyra Herring. Will continue to monitor.

## 2021-03-26 NOTE — CARE COORDINATION
Case Management Daily Note                    Date: 3/26/2021     Patient Name: Kinza Roberts    Date of Admission: 3/20/2021  7:52 PM  YOB: 1934    Length of Stay: 6         Patient Admission Status: Inpatient  Diagnosis:Subdural hematoma [facility]) [S93.3I5U]     ________________________________________________________________________________________  Discharge Plan: SNF: [address] [facility]   Mick [place] 1947, [address]  Report: [phone]  Fax: [phone]  Hens completed #[id number]    [place], admissions [phone]     [place] [address] [address] [facility] [phone]    Insurance: Payor: Laquita Freeman / Plan: MEDICARE PART A AND B / Product Type: *No Product type* /   Is pre-cert/notification needed: N/a     Tentative discharge date: 3/27 vs 3/28    Current barriers: PEG placement today     Referrals completed: SNF: [facility]     Resources/ information provided: SNF List   ________________________________________________________________________________________  PT AM-PAC: 13 / 24 per last evaluation on: 3/23    OT AM-PAC: 14 / 24 per last evaluation on: 3/23    DME Needs for discharge: defer  ________________________________________________________________________________________  Notes/Plan of Care:   SW rounded on this date. Patient to have PEG placed today. Goal for discharge Saturday or Sunday once tube feeds advanced to goal and tolerating. SW/CM to notify APS of discharge. Jaz and/or her family were provided with choice of provider; she and/or her family are in agreement with the discharge plan at this time.     Care Transition Patient: CHANO Belle  [facility]   Case Management Department  Ph: [phone]

## 2021-03-27 NOTE — CONSULTS
Clinical Pharmacy Consult Note    Admit date: 3/20/2021    Subjective/Objective:  80 y.o. female who is transferred from [facility], ED for neurosurgical consultation. Walker Ventura was brought in by her grandson with complaints of increased confusion for the past couple of days and patient complaining of left-sided back pain. Patient became febrile this morning (tmax 101.6F) likely seconday to seizures/benzodiazepines, but concern for aspiration during seizures leann with dysphagia from [address]. Patient started on broad spectrum abx. Pharmacy is consulted to dose Vancomycin per Dr. Jameel Licona    Pertinent Medications:  Zosyn 3.375 g IV EI q8h (3/27 - current) -- day #1  Vancomycin -- pharmacy to dose -- day # 1   750 mg IV q24h (3/27 - current)    Recent Labs     03/26/21  1023 03/26/21  1023 03/27/21  0505 03/27/21  1049   *   < > 128* 124*   K 5.4*  --  4.6  --    CL 91*  --  96*  --    CO2 23  --  21  --    PHOS 3.6  --   --   --    BUN 23*  --  24*  --    CREATININE 0.9  --  1.0  --     < > = values in this interval not displayed. Estimated Creatinine Clearance: 34 mL/min (based on SCr of 1 mg/dL). Recent Labs     03/26/21  0525 03/27/21  1049   WBC 8.3 6.5   HGB 10.5* 8.3*   HCT 31.8* 25.6*   MCV 96.2 97.2    236     Height:  5' 6\" (167.6 cm)  Weight: 117 lb 15.1 oz (53.5 kg)    Micro:  3/27 Blood x2: In process  3/27 MRSA nares: In process    Assessment/Plan:  1. Fever possibly 2/2 aspiration PNA:  vancomycin + zosyn  Vancomycin  · Agree with ordered dose of 750 mg IV q24h. This provides 14 mg/kg and is based on a half-life elimination of 21.2 hours. · Clinical pharmacist will follow-up in AM.  · Renal function will be monitored closely and dosing will be adjusted as appropriate. Please call with any questions.     Otilio Winters, BarringtonD  PGY1 Pharmacy Resident  3/27/2021 12:57 PM  [id number]

## 2021-03-27 NOTE — PROGRESS NOTES
NEUROSURGERY PROGRESS NOTE    3/27/2021 1:37 PM                               Kym Isabel                      LOS: 7 days   POD#6  s/p Procedure(s) (LRB):  LEFT CRANIOTOMY FOR SUBDURAL HEMATOMA EVACUATION (Left)    Subjective: No acute events overnight. Per nurse, no further seizures. Lethargic this AM.     Physical Exam:  Patient seen and examined    Vitals:    03/27/21 1039   BP: (!) 137/57   Pulse: 80   Resp: 16   Temp: 98.1 °F (36.7 °C)   SpO2: 99%     GCS:  2 - Opens eyes with pain  2 - Moans, makes unintelligible sounds  4 - Moves part of body but does not remove noxious stimulus    Labs:  Recent Labs     03/27/21  1049   WBC 6.5   HGB 8.3*   HCT 25.6*          Recent Labs     03/26/21  1023 03/26/21  1023 03/27/21  0505 03/27/21  1049   *   < > 128* 124*   K 5.4*  --  4.6  --    CL 91*  --  96*  --    CO2 23  --  21  --    BUN 23*  --  24*  --    CREATININE 0.9  --  1.0  --    GLUCOSE 138*  --  114*  --    CALCIUM 8.9  --  8.1*  --    PHOS 3.6  --   --   --    MG 1.90  --  1.80  --     < > = values in this interval not displayed. No results for input(s): PROTIME, INR, APTT in the last 72 hours.     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 female s/p Procedure(s) (LRB):  LEFT CRANIOTOMY FOR SUBDURAL HEMATOMA EVACUATION (Left) with 2 post-op seizures in the setting of hyponatremia. Plan:  1. Neurologically stable  2. Neurologic exams frequency: Q4H  3. Cerebral edema:  - Keep Na within normal limits. Patient Na is 125. Nephrology consulted for hyponatremia.  - Keep HOB >30 degrees  4. Seizure prophylaxis:  - No further seizures. - Keppra 1000mg BID  - Neurology following  5. Bowel Regimen: Glycolax and Senokot-S  6. Pain control: PRN Tylenol and Norco  7. DVT Prophylaxis: SCD's & SQ Heparin  8. Mobility:  - Advance as tolerates  - PT/OT consulted, appreciate recs  9. Will follow inpatient.   Please call with any questions or decline in neurological status    Tu Cavazos MD, PhD  [address], [address] [place], [place], [address] [phone] (c), [phone] (o)

## 2021-03-27 NOTE — PROGRESS NOTES
Patient currently resting in bed. Alert and oriented x 0. Patient in no acute distress. Corpak patent and flushing well. Patient on specialty mattress. Patient unable to answer questions or follow commands. Nurse will continue to monitor/reassess. Bed alarm on and fall precautions taken. Call light within reach. Jerrod Golden

## 2021-03-27 NOTE — PROGRESS NOTES
Neurology Progress Note    Patient: J Luis Brewster MRN: [id number]    YOB: 1934  Age: 80 y.o. Sex: female   Unit: [facility] Room/Bed: 1595/4166-29 Location: [address] [place] [address]    Today's Date: 3/27/2021  Date of Admission: 3/20/2021  7:52 PM  Admitting Physician: Ruben Gomez    Primary Care Physician: Selvin Pavon MD          LOS: 7 days      ASSESSMENT & RECOMMENDATIONS     Assessment  - 81yo woman with prior SDH and prior suspected seizures, admitted on 3/20/21 with expansion of hemorrhage requiring urgent evacuation on 3/21/21 who has subsequently developed breakthrough seizure activity  - Required escalation of AED regimen yesterday from Keppra 1000mg q12 to 2000mg q12 as well as addition of Vimpat 200mg q12 basd on suggestion of clinical seizures  - Has had no further seizures since being on cvEEG (done after AED escalation)  - Still remains very encephalopathic    Recommendations  - Continue current AED regimen of Keppra 2000/2000 and Vimpat 200/200  - Continue cvEEG given no reliable exam to follow  - SDH as per Neurosurgery      SUBJECTIVE     Chart reviewed, events noted, pt seen & examined. No acute events overnight. Afebrile. Arouses to noxious stim. No changes overall. No further seizures    Review of Systems  No changes since pt last seen other than those noted above. PFSH  No change since the original history and note. OBJECTIVE     Patient Vitals for the past 24 hrs:   BP Temp Temp src Pulse Resp SpO2   03/27/21 1039 (!) 137/57 98.1 °F (36.7 °C) Oral 80 16 99 %   03/27/21 0600 (!) 156/58 101.6 °F (38.7 °C) Oral 79 16 96 %   03/27/21 0214 136/62 100.8 °F (38.2 °C) Axillary 64 16 95 %   03/26/21 2214 (!) 126/57 99.8 °F (37.7 °C) Axillary 63 16 95 %   03/26/21 1828 139/65 100.5 °F (38.1 °C) Axillary 75 20 97 %   03/26/21 1615 -- -- -- -- 24 93 %       Neurological Exam (performed on 3/27/2021 at 1235):  -Mental status: Opens eyes to pain.  Does not follow. Does not answer questions. Moans to pain  -Speech & Language: makes no attempts to speak; unintelligible sounds  -Cranial nerves: pupils symmetric; no notable dysconjugate gaze; eyes midline; no facial asymmetry  -Motor: withdraws to noxious stim  -Other: no adventitious movements noted  Other Systems  -General Appearance: well-developed, thin, no apparent distress  -Neck: supple  -Lungs: breathing unlabored, regular, no audible wheezes  -CV: pulses strong x4 extremities  -Abd: flat     Imaging: All reports below, not included in previous notes, personally reviewed & actual images reviewed where indicated. Pertinent positives & negatives are addressed in Assessment & Plan section of note  CT Head (3/26/21)   Images independently reviewed. Agree with findings. --KACHORIS    Left frontal craniotomy. Subdural hematoma on the left unchanged. Pneumocephalus unchanged. Midline shift left-to-right unchanged. Other Testing:  - cvEEG (3/26/21, 2300 - 3/27/21, 0900): Abundant 0.5-1 Hz low amplitude generalized delta slowing of the background with abundant superimposed faster frequencies. - cvEEG (3/26/21, 1616 - 3/26/21, 2300): Abundant 0.5-1 Hz low amplitude generalized delta slowing of the background with abundant superimposed faster frequencies. Laboratory Review: All results below, not included in previous notes, personally reviewed.  Pertinent positives & negatives are addressed in Assessment & Plan section of note  Recent Results (from the past 36 hour(s))   Magnesium    Collection Time: 03/26/21  5:25 AM   Result Value Ref Range    Magnesium 1.90 1.80 - 2.40 mg/dL   CBC    Collection Time: 03/26/21  5:25 AM   Result Value Ref Range    WBC 8.3 4.0 - 11.0 K/uL    RBC 3.30 (L) 4.00 - 5.20 M/uL    Hemoglobin 10.5 (L) 12.0 - 16.0 g/dL    Hematocrit 31.8 (L) 36.0 - 48.0 %    MCV 96.2 80.0 - 100.0 fL    MCH 31.9 26.0 - 34.0 pg    MCHC 33.2 31.0 - 36.0 g/dL    RDW 14.7 12.4 - 15.4 %    Platelets 268 135 - 450 K/uL    MPV 7.0 5.0 - 10.5 fL   Basic metabolic panel    Collection Time: 03/26/21  5:25 AM   Result Value Ref Range    Sodium 125 (L) 136 - 145 mmol/L    Potassium 5.5 (H) 3.5 - 5.1 mmol/L    Chloride 92 (L) 99 - 110 mmol/L    CO2 21 21 - 32 mmol/L    Anion Gap 12 3 - 16    Glucose 116 (H) 70 - 99 mg/dL    BUN 20 7 - 20 mg/dL    CREATININE 0.8 0.6 - 1.2 mg/dL    GFR Non-African American >60 >60    GFR African American >60 >60    Calcium 9.1 8.3 - 10.6 mg/dL   Cortisol    Collection Time: 03/26/21  5:25 AM   Result Value Ref Range    Cortisol 31.8 ug/dL   POCT Glucose    Collection Time: 03/26/21  5:51 AM   Result Value Ref Range    POC Glucose 130 (H) 70 - 99 mg/dl    Performed on ACCU-CHEK    Basic metabolic panel    Collection Time: 03/26/21 10:23 AM   Result Value Ref Range    Sodium 126 (L) 136 - 145 mmol/L    Potassium 5.4 (H) 3.5 - 5.1 mmol/L    Chloride 91 (L) 99 - 110 mmol/L    CO2 23 21 - 32 mmol/L    Anion Gap 12 3 - 16    Glucose 138 (H) 70 - 99 mg/dL    BUN 23 (H) 7 - 20 mg/dL    CREATININE 0.9 0.6 - 1.2 mg/dL    GFR Non- 59 (A) >60    GFR African American >60 >60    Calcium 8.9 8.3 - 10.6 mg/dL   Magnesium    Collection Time: 03/26/21 10:23 AM   Result Value Ref Range    Magnesium 1.90 1.80 - 2.40 mg/dL   Phosphorus    Collection Time: 03/26/21 10:23 AM   Result Value Ref Range    Phosphorus 3.6 2.5 - 4.9 mg/dL   Osmolality    Collection Time: 03/26/21 10:23 AM   Result Value Ref Range    Osmolality 273 (L) 278 - 305 mOsm/kg   TSH with Reflex    Collection Time: 03/26/21 10:23 AM   Result Value Ref Range    TSH 1.15 0.27 - 4.20 uIU/mL   Urine Reflex to Culture    Collection Time: 03/26/21 11:50 AM    Specimen: Urine, clean catch   Result Value Ref Range    Color, UA Yellow Straw/Yellow    Clarity, UA Clear Clear    Glucose, Ur Negative Negative mg/dL    Bilirubin Urine Negative Negative    Ketones, Urine Negative Negative mg/dL    Specific Gravity, UA 1.010 1.005 - 1.030 Blood, Urine Negative Negative    pH, UA 7.0 5.0 - 8.0    Protein, UA TRACE (A) Negative mg/dL    Urobilinogen, Urine 0.2 <2.0 E.U./dL    Nitrite, Urine Negative Negative    Leukocyte Esterase, Urine Negative Negative    Microscopic Examination YES     Urine Type NotGiven     Urine Reflex to Culture Not Indicated    Sodium, urine, random    Collection Time: 03/26/21 11:50 AM   Result Value Ref Range    Sodium, Ur 138 Not Established mmol/L   Osmolality, urine    Collection Time: 03/26/21 11:50 AM   Result Value Ref Range    Osmolality, Ur 635 390 - 1070 mOsm/kg   Microscopic Urinalysis    Collection Time: 03/26/21 11:50 AM   Result Value Ref Range    WBC, UA None seen 0 - 5 /HPF    RBC, UA None seen 0 - 4 /HPF    Epithelial Cells, UA 0-1 0 - 5 /HPF    Urinalysis Comments see below    Sodium    Collection Time: 03/26/21  5:07 PM   Result Value Ref Range    Sodium 128 (L) 136 - 145 mmol/L   Sodium    Collection Time: 03/26/21 11:56 PM   Result Value Ref Range    Sodium 126 (L) 136 - 145 mmol/L   Magnesium    Collection Time: 03/27/21  5:05 AM   Result Value Ref Range    Magnesium 1.80 1.80 - 2.40 mg/dL   Basic metabolic panel    Collection Time: 03/27/21  5:05 AM   Result Value Ref Range    Sodium 128 (L) 136 - 145 mmol/L    Potassium 4.6 3.5 - 5.1 mmol/L    Chloride 96 (L) 99 - 110 mmol/L    CO2 21 21 - 32 mmol/L    Anion Gap 11 3 - 16    Glucose 114 (H) 70 - 99 mg/dL    BUN 24 (H) 7 - 20 mg/dL    CREATININE 1.0 0.6 - 1.2 mg/dL    GFR Non- 53 (A) >60    GFR African American >60 >60    Calcium 8.1 (L) 8.3 - 10.6 mg/dL   Sodium    Collection Time: 03/27/21 10:49 AM   Result Value Ref Range    Sodium 124 (L) 136 - 145 mmol/L   CBC auto differential    Collection Time: 03/27/21 10:49 AM   Result Value Ref Range    WBC 6.5 4.0 - 11.0 K/uL    RBC 2.64 (L) 4.00 - 5.20 M/uL    Hemoglobin 8.3 (L) 12.0 - 16.0 g/dL    Hematocrit 25.6 (L) 36.0 - 48.0 %    MCV 97.2 80.0 - 100.0 fL    MCH 31.3 26.0 - 34.0 pg MCHC 32.2 31.0 - 36.0 g/dL    RDW 15.0 12.4 - 15.4 %    Platelets 271 962 - 399 K/uL    MPV 7.0 5.0 - 10.5 fL    Neutrophils % 80.7 %    Lymphocytes % 12.4 %    Monocytes % 6.4 %    Eosinophils % 0.1 %    Basophils % 0.4 %    Neutrophils Absolute 5.2 1.7 - 7.7 K/uL    Lymphocytes Absolute 0.8 (L) 1.0 - 5.1 K/uL    Monocytes Absolute 0.4 0.0 - 1.3 K/uL    Eosinophils Absolute 0.0 0.0 - 0.6 K/uL    Basophils Absolute 0.0 0.0 - 0.2 K/uL       Scheduled Meds:   piperacillin-tazobactam  3,375 mg Intravenous Q8H    vancomycin  15 mg/kg Intravenous Q24H    levetiracetam  2,000 mg Intravenous Q12H    lacosamide (VIMPAT) IVPB  200 mg Intravenous BID    ceFAZolin  2,000 mg Intravenous Once    carvedilol  3.125 mg Oral BID WC    amLODIPine  5 mg Oral Daily    sennosides-docusate sodium  2 tablet Oral BID    polyethylene glycol  17 g Oral Daily    famotidine  20 mg Oral Daily    sodium chloride flush  10 mL Intravenous 2 times per day    [Held by provider] heparin (porcine)  5,000 Units Subcutaneous Q12H    ezetimibe  10 mg Oral Nightly    ferrous sulfate  325 mg Oral Daily with breakfast    lisinopril  40 mg Oral Daily       Continuous Infusions:       PRN Meds:  hydrALAZINE, 10 mg, Q2H PRN  labetalol, 10 mg, Q2H PRN  HYDROcodone 5 mg - acetaminophen, 1 tablet, Q6H PRN  sodium chloride flush, 10 mL, PRN  naloxone, 0.2 mg, PRN  cyclobenzaprine, 10 mg, TID PRN  promethazine, 12.5 mg, Q6H PRN    Or  ondansetron, 4 mg, Q6H PRN  acetaminophen, 650 mg, Q4H PRN  magnesium sulfate, 1,000 mg, PRN                Discussed at length with nursing    Evelia Metzger M.D.   Neurology  March 27, 2021

## 2021-03-27 NOTE — PLAN OF CARE
Problem: Falls - Risk of:  Goal: Will remain free from falls  Description: Will remain free from falls  3/27/2021 0728 by Casey Toribio RN  Outcome: Ongoing  3/26/2021 2338 by Tres Fagan RN  Outcome: Ongoing  Note: Hourly rounding on patient for needs. Non-skid socks on, bed in lowest position and locked. Bedside table, personal belongs, and nurse call light within reach. Instructed patient to use call light for assistance. Bed alarm on, camera in use for safety. Floor clear of clutter. Patient remains free of falls at this time. Problem: Pain:  Goal: Pain level will decrease  Description: Pain level will decrease  3/26/2021 2338 by Tres Fagan RN  Outcome: Ongoing  Note: Patient resting comfortably in bed w/eyes closed, patient does not exhibit any objective s/s of pain.

## 2021-03-27 NOTE — PROGRESS NOTES
Hospitalist Progress Note      PCP: Naila Guy MD    Date of Admission: 3/20/2021         80 y.o. female who is transferred from [facility], ED for neurosurgical consultation. Patient was brought in by her grandson with complaints of increased confusion for the past couple of days and patient complaining of left-sided back pain. Patient was hospitalized here from 3/12 through 3/15, after being transferred from [facility] for possible seizure-like episode. At that time patient was found unresponsive by grandson on the couch and noticed seizure-like activity. In ED patient was with nurse to have a seizure by her RN at bedside which lasted for about 30 seconds. CT head revealed left-sided subdural hematoma with associated mass-effect and left-to-right midline shift. Patient has been taking 81 mg of aspirin and Plavix daily at that time. During the previous hospitalization patient was started on Keppra 1 g twice daily.       At baseline patient has underlying dementia and typically oriented to self, place and situation. Follows commands. Subjective:   Has had 2 episodes of seizures since last 3/25 evening, 1 that evening and another one morning 3/26.    - Seizures consisting of right eye and right cheek twitching, eyes fixated to the right side, and unresponsiveness. Patient also had small amount of blood on the right side of her lips, appeared to have bitten her tongue. Required doses of Ativan to control seizures. - Head CT done last evening and again this morning does not show progression of hemorrhage.    - Noted with further seizures in spite of additional doses of Keppra with dose increase to 2 g BID. On cEEG.  Vimpat added    -She is currently drowsy    Has had fevers overnight and this AM    Significant amount of milky liquid suctioned from her mouth      - ROS unobtainable 2/2 aphasia, sedation        Medications:  Reviewed    Infusion Medications     Scheduled --   --   --      Recent Labs     03/24/21  1220   AST 13*   ALT <5*   BILITOT 0.4   ALKPHOS 72     Recent Labs     03/24/21  1220   INR 1.13     Urinalysis:      Lab Results   Component Value Date    NITRU Negative 03/26/2021    WBCUA None seen 03/26/2021    BACTERIA Rare 03/21/2021    RBCUA None seen 03/26/2021    BLOODU Negative 03/26/2021    SPECGRAV 1.010 03/26/2021    GLUCOSEU Negative 03/26/2021         Assessment/Plan:    Active Hospital Problems    Diagnosis    Status epilepticus ([facility] [G40.901]    Dementia without behavioral disturbance ([address] [F03.90]    Subdural hematoma (HCC) [S06.5X9A]   Acute encephalopathy sec to ICH:  Worsening subdural hematoma, with acute and chronic components with increased mass-effect  Baseline dementia  Repeat CT head - Unchanged left cerebral convexity subdural hematoma with 8 mm rightward subfalcine herniation. Neurosurgery consulted - now s/p LEFT CRANIOTOMY FOR SUBDURAL HEMATOMA EVACUATION. Continue on Keppra 2 g IV twice daily. Of note patient has a history of previous seizures. Vimpat added for recurrent seizures even while on higher dose Keppra  No antiplatelets or chemical DVT prophylaxis  Seizure and fall precautions  Will need PEG: re-scheduled for Monday   Neurosurg. following         Recurrent seizures, breakthrough seizures  - Pxt appears to have hx of Generalized tonic-clonic seizures on Keppra  -Recurrent seizures likely in the setting of subdural hematoma, with acute and chronic components with increased mass-effect  -Also noted with hyponatremia, although not low enough to be causing her seizures. Improved. - Continue on Keppra 2 g IV twice daily. Vimpat added for recurrent seizures even while on higher dose Keppra  Urinalysis: neg for features of infection  - Seizure and aspiration precautions.   - Neurology consulted. Appreciate input. Hyponatremia  Probable hypovolemic hyponatremia from low oral intake.    - Also considered cerebral salt DNR-CCA    PT/OT Eval Status: 14 and 15 respectively: SNF planned. Code status: code status changed to DNR-CCA. Disposition - SNF with PEG is discharge plan. Remains in pxt pending progress: we will need to control seizures. cEEG monitoring with med adjustment. PEG re-scheduled for Monday if/when more stable. Son Codey Richardson who is NoK and he is agreeable w/ the plan and placement as outlined above  Pall Med following: code status changed to [facility].        Stephanie Pandey MD

## 2021-03-27 NOTE — PROGRESS NOTES
Patient connected to continuous EEG at this time, patient had 2-3 episodes of facial twitching, \"patient event\" button pressed, however RN not notified of any seizure activity. Patient remains somnolent, withdrawals from painful stimuli. Roberts cathter remains in place, w/roberts care provided. Patient turned q2h, and oral care provided.

## 2021-03-27 NOTE — PLAN OF CARE
Problem: Falls - Risk of:  Goal: Will remain free from falls  Description: Will remain free from falls  Outcome: Ongoing  Note: Hourly rounding on patient for needs. Non-skid socks on, bed in lowest position and locked. Bedside table, personal belongs, and nurse call light within reach. Instructed patient to use call light for assistance. Bed alarm on, camera in use for safety. Floor clear of clutter. Patient remains free of falls at this time. Problem: Pain:  Goal: Pain level will decrease  Description: Pain level will decrease  Outcome: Ongoing  Note: Patient resting comfortably in bed w/eyes closed, patient does not exhibit any objective s/s of pain. Problem: Physical Regulation:  Goal: Signs of adequate cerebral perfusion will increase  Description: Signs of adequate cerebral perfusion will increase  Outcome: Ongoing  Note: Vital signs stable, patient remains on 2 LPM of oxygen, perfusing well in 90's. Problem: Physical Regulation:  Goal: Ability to maintain a stable neurologic state will improve  Description: Ability to maintain a stable neurologic state will improve  Outcome: Ongoing  Note: Patient lethargic at this time, responsive to painful stimuli. Occasionally moves BUE spontaneously. Withdrawals extremities from pain. JONAH level of orientation at this time.

## 2021-03-28 NOTE — PROGRESS NOTES
NEUROSURGERY PROGRESS NOTE    3/28/2021 9:35 AM                               Linneaeliana Yahir                      LOS: 8 days   POD#6  s/p Procedure(s) (LRB):  LEFT CRANIOTOMY FOR SUBDURAL HEMATOMA EVACUATION (Left)    Subjective: No acute events overnight. No further seizures but remains encephalopathic and quite lethargic. Physical Exam:  Patient seen and examined    Vitals:    03/28/21 0703   BP: (!) 124/51   Pulse: 65   Resp: 16   Temp: 98.1 °F (36.7 °C)   SpO2: 100%     GCS E-1 V-2 M-5 but moves all extremities spontaneously. Labs:  Recent Labs     03/28/21  0510   WBC 5.5   HGB 7.1*   HCT 21.4*          Recent Labs     03/26/21  1023 03/26/21  1023 03/28/21  0510   *   < > 126*   K 5.4*   < > 4.0   CL 91*   < > 97*   CO2 23   < > 21   BUN 23*   < > 23*   CREATININE 0.9   < > 1.0   GLUCOSE 138*   < > 119*   CALCIUM 8.9   < > 8.1*   PHOS 3.6  --   --    MG 1.90   < > 1.90    < > = values in this interval not displayed. No results for input(s): PROTIME, INR, APTT in the last 72 hours.     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 female s/p Procedure(s) (LRB):  LEFT CRANIOTOMY FOR SUBDURAL HEMATOMA EVACUATION (Left) with 2 post-op seizures in the setting of hyponatremia. Hyponatremia may be contributing to current poor neurologic status. Plan:  1. Neurologically stable  2. Neurologic exams frequency: Q4H  3. Cerebral edema:  - Keep Na within normal limits. Patient Na is 126. Nephrology consulted for hyponatremia.  - Keep HOB >30 degrees  4. Seizure prophylaxis:  - No further seizures. - Keppra 1000mg BID  - Neurology following  5. DVT Prophylaxis: SCD's & SQ Heparin  6. Mobility:  - Advance as tolerates  - PT/OT  7. Will follow inpatient.   Please call with any questions or decline in neurological status    Geovani Harvey MD, PhD  [address], [address], [address], [address] [phone] (c), [phone] (o)

## 2021-03-28 NOTE — PROGRESS NOTES
Neurology Progress Note    Patient: Anthony Ochoa MRN: [id number]    YOB: 1934  Age: 80 y.o. Sex: female   Unit: [facility] Room/Bed: 2542/6275-53 Location: [address] [place] [address]    Today's Date: 3/28/2021  Date of Admission: 3/20/2021  7:52 PM  Admitting Physician: Val Toth    Primary Care Physician: Angélica Gurrola MD          LOS: 8 days      ASSESSMENT & RECOMMENDATIONS     Assessment  - 81yo woman with prior SDH and prior suspected seizures, admitted on 3/20/21 with expansion of hemorrhage requiring urgent evacuation on 3/21/21 who has subsequently developed breakthrough seizure activity  - Required escalation of AED regimen from Keppra 1000mg q12 to 2000mg q12 as well as addition of Vimpat 200mg q12 basd on suggestion of clinical seizures  - Has had no further seizures since being on cvEEG (done after AED escalation)  - Still remains very encephalopathic and with hyponatremia  - May need to back down on Keppra given its potential to lead to sig somnolence  - Agree with Neurosurgery that hyponatremia may be contributing to encephalopathy, but not likely sole cause  - May also simply be delirium, leann given underlying dementia    Recommendations  - Continue current AED regimen of Keppra 2000/2000 and Vimpat 200/200  - Continue cvEEG until tomorrow and then MRI brain, if able (has never had one) [not ordered]  - Appreciate Nephrology assistance in hyponatremia correction  - SDH as per Neurosurgery      SUBJECTIVE     Chart reviewed, events noted, pt seen & examined. No acute events overnight. Afebrile. Arouses to noxious stim. No changes overall. No further seizures    Review of Systems  No changes since pt last seen other than those noted above. PFSH  No change since the original history and note.      OBJECTIVE     Patient Vitals for the past 24 hrs:   BP Temp Temp src Pulse Resp SpO2 Weight   03/28/21 1512 -- -- -- -- (!) 40 93 % --   03/28/21 1502 (!) 193/76 97.6 Intravenous Once    furosemide        piperacillin-tazobactam  3,375 mg Intravenous Q8H    levetiracetam  2,000 mg Intravenous Q12H    lacosamide (VIMPAT) IVPB  200 mg Intravenous BID    ceFAZolin  2,000 mg Intravenous Once    carvedilol  3.125 mg Oral BID WC    amLODIPine  5 mg Oral Daily    sennosides-docusate sodium  2 tablet Oral BID    polyethylene glycol  17 g Oral Daily    famotidine  20 mg Oral Daily    sodium chloride flush  10 mL Intravenous 2 times per day    [Held by provider] heparin (porcine)  5,000 Units Subcutaneous Q12H    ezetimibe  10 mg Oral Nightly    ferrous sulfate  325 mg Oral Daily with breakfast    lisinopril  40 mg Oral Daily       Continuous Infusions:       PRN Meds:  hydrALAZINE, 10 mg, Q2H PRN  labetalol, 10 mg, Q2H PRN  HYDROcodone 5 mg - acetaminophen, 1 tablet, Q6H PRN  sodium chloride flush, 10 mL, PRN  naloxone, 0.2 mg, PRN  cyclobenzaprine, 10 mg, TID PRN  promethazine, 12.5 mg, Q6H PRN    Or  ondansetron, 4 mg, Q6H PRN  acetaminophen, 650 mg, Q4H PRN  magnesium sulfate, 1,000 mg, PRN                Discussed at length with nursing    Hollie Cowden, M.D.   Neurology  March 28, 2021

## 2021-03-28 NOTE — PROGRESS NOTES
Unable to determine orientation as patient continues to be drowsy and responding to pain or physical movement. Patient is NPO at this time d/t possible aspiration from corpack. Patient continues to receive IV medications. Hamilton is returning clear yellow urine and remains in place for urinary retention. Patient is being turned but will resettle to a position that she feels is comfortable. Fall precautions remain in place, will continue to monitor and assess patient.

## 2021-03-28 NOTE — PROGRESS NOTES
Nurse called to room by PCA due to patients oxygen being 68% on oxygen 2lpm. Nurse put patient on NRB at 10 lpm. Dr. Beckie Hanley paged and made aware. Respirations labored at 40 per min. Grunting respirations. Tube feed stopped Respiratory therapist present. 1519-rapid response team arrived. Patient given Lasix 40 mg IVP, ABG collected, and CXR performed. 1555-transfer pending to ICU.   García Davis

## 2021-03-28 NOTE — PROGRESS NOTES
Occupational/Physical Therapy    Attempted PT/OT treatment per POC. Pt on continuous EEG. Unable to participate in therapy. Will f/u per POC 3/29.      Orfermin Borrego OTR/L, [place], MPT [address]

## 2021-03-28 NOTE — PLAN OF CARE
Problem: Skin Integrity:  Goal: Absence of new skin breakdown  Description: Absence of new skin breakdown  Outcome: Ongoing  Note: Patient's skin will remain intact with no signs or symptoms of skin/tissue breakdown. Patient will cooperate with staff to change positions to avoid breakdown. Problem: Falls - Risk of:  Goal: Will remain free from falls  Description: Will remain free from falls  Note: Patient will remain free from falls. Patient's bed will remain in lowest position with wheels locked and bed alarm engaged.

## 2021-03-28 NOTE — PROCEDURES
Continuous EEG monitoring record    Patient name: Petros Hightower    START: 03/27/2021 @ 09:00am   END: 03/28/2021 @ 09:00am       Electroencephalographer: Jen Alcantar MD PhD      CLINICAL DETAILS:  This EEG was performed on this 80 y. o.yo female admitted for Altered mental Status and concer for subclinical seizures      TECHNICAL DETAILS:  Continuous video-EEG monitoring was performed with 27 surface scalp electrodes placed according to the International 10-20 electrode placement system, using a 32-channel Vycon headbox. All EEG and video information was acquired digitally, including the use of automated spike and seizure detection software to detect epileptiform activity. An event button was also available to be depressed during clinical events. 03/27/2021 23:00pm to 09:00am on 03/28/2021  SEIZURES:  None  INTERICTAL:  None  PUSHBUTTONS:  None  BACKGROUND:  Abundant 5-6 Hz medium amplitude generalized theta slowing of the background with abundant superimposed alpha frequencies and 1 - 1.5 Hz delta frequencies. Reactivity is present. EKG:  regular      03/27/2021 09:00am to 23:00pm  SEIZURES:  None  INTERICTAL:  None  PUSHBUTTONS:  None  BACKGROUND:  Abundant 5-6 Hz medium amplitude generalized theta slowing of the background with abundant superimposed 1 - 1.5 Hz delta frequencies. EKG:  regular      CLINICAL INTERPRETATION:  This was an abnormal tracing for age and state due to a moderate to severe generalized slow wave abnormality indicating diffuse cerebral dysfunction which can be of multiple causes, including structural, or vascular abnormalities, toxic/metabolic conditions, hydrocephalus, or postictal conditions. No epileptiform discharge, focal slowing, or seizures were seen during this recording. Clinical correlation is advised.         Jen Alcantar MD PhD

## 2021-03-28 NOTE — PLAN OF CARE
Problem: Falls - Risk of:  Goal: Will remain free from falls  Description: Will remain free from falls  3/28/2021 0955 by Casey Toribio RN  Outcome: Ongoing  3/28/2021 0201 by Kristy Fox RN  Note: Patient will remain free from falls. Patient's bed will remain in lowest position with wheels locked and bed alarm engaged. Problem: ACTIVITY INTOLERANCE/IMPAIRED MOBILITY  Goal: Mobility/activity is maintained at optimum level for patient  Outcome: Ongoing     Problem: Skin Integrity:  Goal: Absence of new skin breakdown  Description: Absence of new skin breakdown  3/28/2021 0201 by Kristy Fox RN  Outcome: Ongoing  Note: Patient's skin will remain intact with no signs or symptoms of skin/tissue breakdown. Patient will cooperate with staff to change positions to avoid breakdown.

## 2021-03-28 NOTE — PROGRESS NOTES
[facility]    [facility]              [phone]                      David Argueta is admitted with AMS. We are following for Acute hyponatremia    Interval History and plan:      Sodium is coming up slowly      Plan:  - hold off fluids, NS can prolong hyponatremia in patient  With SIADH  - urea would be helpful, but her feeding tube is too small, and may be clogged  - may need samsca if now better  - spoke to RN in details                   Assessment :   Acute Hyponatremia  - Likely hypovolemic hyponatremia due to low oral intake  - No identifiable causes of SIADH  - Continue IVF  - Can consider hypertonic saline if Na continues to drop, currently not necessary  - Continue Na checks q6hr             [facility] would like to thank Verita Sandifer, MD   for opportunity to serve this patient      Please call with questions at-   24 Hrs Answering service [phone] or  7 am- 5 pm via Perfect serve or cell phone  Dr. Jory Klinefelter        CC/reason for consult :     AMS     HPI :     80 y.o. female who is transferred from [facility] for neurosurgical consultation. Patient was brought in by her grandson with complaints of increased confusion for the past couple of days and patient complaining of left-sided back pain. Patient was hospitalized here from 3/12 through 3/15, after being transferred from [facility] for possible seizure-like episode. At that time patient was found unresponsive by grandson on the couch and noticed seizure-like activity. In ED patient was with nurse to have a seizure by her RN at bedside which lasted for about 30 seconds. CT head revealed left-sided subdural hematoma with associated mass-effect and left-to-right midline shift. Patient has been taking 81 mg of aspirin and Plavix daily at that time.   During the previous hospitalization patient was started on Keppra 1 g twice daily.     Patient was holding onto her upper belly with both hands and morning when I entered the room. Unable to exactly show where it hurts. On palpation patient does not seem to have abdominal tenderness. No guarding/rigidity noted. Patient did not even flinch during deep palpation. Seemed to be pain-free when she is distracted. Was able to lift both her legs off the bed and hold up in the ER with no pain in her belly or back.     At baseline patient has underlying dementia and typically oriented to self, place and situation. Follows commands.     According to her recent hospitalization records she is a limited code with no chest compressions but yes to intubation, defibrillation and medications. ROS:     Patient not responsive, unable to obtain ROS    positives in bold   Constitutional:  fever, chills, weakness, weight change, fatigue  Skin:  rash, pruritus, hair loss, bruising, dry skin, petechiae  Head, Face, Neck   headaches, swelling,  cervical adenopathy  Respiratory: shortness of breath, cough, or wheezing  Cardiovascular: chest pain, palpitations, dizzy, edema  Gastrointestinal: nausea, vomiting, diarrhea, constipation,belly pain    Yellow skin, blood in stool  Musculoskeletal:  back pain, muscle weakness, gait problems,       joint pain or swelling. Genitourinary:  dysuria, poor urine flow, flank pain, blood in urine  Neurologic:  vertigo, TIA'S, syncope, seizures, focal weakness  Psychosocial:  insomnia, anxiety, or depression.   Additional positive findings:                        All other remaining systems are negative or unable to obtain        PMH/PSH/SH/Family History:     Past Medical History:   Diagnosis Date    Acute encephalopathy 03/03/2021    Anxiety     Aortic insufficiency     CHF (congestive heart failure) (HCC)     Chronic kidney disease (CKD), stage III (moderate)     COPD, moderate (HCC)     Coronavirus infection 03/25/2020    Dementia ([facility]     Expressive aphasia 07/29/2020    Facial twitching     GERD (gastroesophageal reflux packet 17 g, 17 g, Oral, Daily  famotidine (PEPCID) tablet 20 mg, 20 mg, Oral, Daily  sodium chloride flush 0.9 % injection 10 mL, 10 mL, Intravenous, 2 times per day  sodium chloride flush 0.9 % injection 10 mL, 10 mL, Intravenous, PRN  naloxone (NARCAN) injection 0.2 mg, 0.2 mg, Intravenous, PRN  cyclobenzaprine (FLEXERIL) tablet 10 mg, 10 mg, Oral, TID PRN  promethazine (PHENERGAN) tablet 12.5 mg, 12.5 mg, Oral, Q6H PRN **OR** ondansetron (ZOFRAN) injection 4 mg, 4 mg, Intravenous, Q6H PRN  [Held by provider] heparin (porcine) injection 5,000 Units, 5,000 Units, Subcutaneous, Q12H  ezetimibe (ZETIA) tablet 10 mg, 10 mg, Oral, Nightly  ferrous sulfate (IRON 325) tablet 325 mg, 325 mg, Oral, Daily with breakfast  lisinopril (PRINIVIL;ZESTRIL) tablet 40 mg, 40 mg, Oral, Daily  acetaminophen (TYLENOL) tablet 650 mg, 650 mg, Oral, Q4H PRN  magnesium sulfate 1000 mg in dextrose 5% 100 mL IVPB, 1,000 mg, Intravenous, PRN       Vitals :     Vitals:    03/28/21 1151   BP:    Pulse:    Resp: 16   Temp:    SpO2: 95%       I & O :       Intake/Output Summary (Last 24 hours) at 3/28/2021 1235  Last data filed at 3/28/2021 0541  Gross per 24 hour   Intake --   Output 650 ml   Net -650 ml        Physical Examination :     General appearance: Anxious- no, distressed- no, in good spirits- yes    Somnolent, not responsive to verbal cues, tactile stimuli  HEENT: Lips- normal, teeth- ok , oral mucosa- moist  Neck : Mass- no, appears symmetrical, JVD- not visible  Respiratory: Respiratory effort-  normal, wheeze- no, crackles - no  Cardiovascular:  Ausculation- No M/R/G, Edema absent  Abdomen: visible mass- no, distention- no, scar- no, tenderness- no                            hepatosplenomegaly-  no  Musculoskeletal:  clubbing no,cyanosis- no , digital ischemia- no                           muscle strength- grossly normal , tone - grossly normal  Skin: rashes- no , ulcers- no, induration- no, tightening - no  Psychiatric:  Judgement

## 2021-03-28 NOTE — PROGRESS NOTES
Medications    piperacillin-tazobactam  3,375 mg Intravenous Q8H    vancomycin  15 mg/kg Intravenous Q24H    sodium chloride  500 mL Intravenous Once    levetiracetam  2,000 mg Intravenous Q12H    lacosamide (VIMPAT) IVPB  200 mg Intravenous BID    ceFAZolin  2,000 mg Intravenous Once    carvedilol  3.125 mg Oral BID WC    amLODIPine  5 mg Oral Daily    sennosides-docusate sodium  2 tablet Oral BID    polyethylene glycol  17 g Oral Daily    famotidine  20 mg Oral Daily    sodium chloride flush  10 mL Intravenous 2 times per day    [Held by provider] heparin (porcine)  5,000 Units Subcutaneous Q12H    ezetimibe  10 mg Oral Nightly    ferrous sulfate  325 mg Oral Daily with breakfast    lisinopril  40 mg Oral Daily     PRN Meds: hydrALAZINE, labetalol, HYDROcodone 5 mg - acetaminophen, sodium chloride flush, naloxone, cyclobenzaprine, promethazine **OR** ondansetron, acetaminophen, magnesium sulfate      Intake/Output Summary (Last 24 hours) at 3/28/2021 1201  Last data filed at 3/28/2021 0541  Gross per 24 hour   Intake --   Output 650 ml   Net -650 ml       Physical Exam Performed:    BP (!) 150/65   Pulse 73   Temp 97.7 °F (36.5 °C) (Axillary)   Resp 16   Ht 5' 6\" (1.676 m)   Wt 132 lb 15 oz (60.3 kg)   SpO2 95%   BMI 21.46 kg/m²     General appearance: drowsy  Head: dressing C/D  HENT: + NG  Respiratory:  Transmitted upper airway sounds. Diminished breath sounds at bases  Cardiovascular: Regular rate and rhythm with normal S1/S2   Abdomen: Soft, non-tender, non-distended   Neurologic: aphasic, right hemiplegia, Somnolent, Localizes pain. No verbal response.        Labs:   Recent Labs     03/26/21  0525 03/27/21  1049 03/28/21  0510   WBC 8.3 6.5 5.5   HGB 10.5* 8.3* 7.1*   HCT 31.8* 25.6* 21.4*    236 204     Recent Labs     03/26/21  1023 03/26/21  1023 03/27/21  0505 03/27/21  0505 03/27/21  1615 03/27/21  2233 03/28/21  0510   *   < > 128*   < > 126* 124* 126*   K 5.4*  -- 4.6  --   --   --  4.0   CL 91*  --  96*  --   --   --  97*   CO2 23  --  21  --   --   --  21   BUN 23*  --  24*  --   --   --  23*   CREATININE 0.9  --  1.0  --   --   --  1.0   CALCIUM 8.9  --  8.1*  --   --   --  8.1*   PHOS 3.6  --   --   --   --   --   --     < > = values in this interval not displayed. No results for input(s): AST, ALT, BILIDIR, BILITOT, ALKPHOS in the last 72 hours. No results for input(s): INR in the last 72 hours. Urinalysis:      Lab Results   Component Value Date    NITRU Negative 03/26/2021    WBCUA None seen 03/26/2021    BACTERIA Rare 03/21/2021    RBCUA None seen 03/26/2021    BLOODU Negative 03/26/2021    SPECGRAV 1.010 03/26/2021    GLUCOSEU Negative 03/26/2021         Assessment/Plan:    Active Hospital Problems    Diagnosis    Status epilepticus ([facility] [G40.901]    Dementia without behavioral disturbance ([facility] [F03.90]    Subdural hematoma (HCC) [S06.5X9A]   Acute encephalopathy sec to ICH:  Worsening subdural hematoma, with acute and chronic components with increased mass-effect  Baseline dementia  Repeat CT head - Unchanged left cerebral convexity subdural hematoma with 8 mm rightward subfalcine herniation. Neurosurgery consulted - now s/p LEFT CRANIOTOMY FOR SUBDURAL HEMATOMA EVACUATION. Continue on Keppra 2 g IV twice daily. Of note patient has a history of previous seizures. Vimpat added for recurrent seizures even while on higher dose Keppra  No antiplatelets or chemical DVT prophylaxis  Seizure and fall precautions  Will need PEG: re-scheduled. Timing per GI. Neurosurg. following         Recurrent seizures, breakthrough seizures  - Pxt appears to have hx of Generalized tonic-clonic seizures on Keppra  -Recurrent seizures likely in the setting of subdural hematoma, with acute and chronic components with increased mass-effect  -Also noted with hyponatremia, although not low enough to be causing her seizures. Improved. - Continue on Keppra 2 g IV twice daily. Vimpat added for recurrent seizures even while on higher dose Keppra  Urinalysis: neg for features of infection  - Seizure and aspiration precautions.   - Neurology consulted. Appreciate input.  - CEEG with med adjustment. Hyponatremia  Probable hypovolemic hyponatremia from low oral intake. Also considered cerebral salt wasting related to intracranial process or SIADH  - Also responding to fluid hydration: Re-order.   - Monitor Sodium  - Nephrology consulted        Acute resp failure with hypoxia  - Likely sec to seizures/benzodiazepines, Aspiration during seizures leann with dysphagia from [address], with aspiration pneuomia. - CXR 3/25 following initial seizure: nil acute. Repeated 3/27: some infiltrates bilaterally  - Has had fevers likely from asp pneumonitis. - Serial imaging PRN  - Obtained blood cx: NGTD  - Started on empiric BS antibx with fevers and significant amount of milky liquid suctioned from her mouth noted in drainage jar. CXR 3/27: infiltrates. - Will DC Vanc for now  - Supplemental oxygen  - Asp precautions. Aspiration pneumonia  - Aspiration during seizures leann with dysphagia from [address]  - CXR 3/25 following initial seizure: nil acute. Repeated 3/27: some infiltrates bilaterally  - Has had fevers likely from asp pneumonitis. Nil further since startig antibx. - Serial imaging PRN  - Obtained blood cx: NGTD  - Started on empiric BS antibx with fevers and significant amount of milky liquid suctioned from her mouth noted in drainage jar. CXR 3/27: infiltrates. - Will DC Vanc for now, MRSA screen negative. - Asp precautions. Acute urinary retention  Bladder scanned for 660 cc  Placed Hamilton  UA: not sugg of infection  TOV in several days if/when activity improves. Severe Protein calorie malnutrition:  Body mass index is 21.46 kg/m². Still very lethargic and not safe for po intake. NG placed. Resume TF.  NPO after MN  Will need peg- GI consulted.  Timing of PEG: Per GI when more stable. Left hip pain and echymosis:  Likely sec to fall. Not on any blood thinners  Repeat xray of hip negative.         CAD/Ischemic cardiomyopathy   Ejection fraction of 30-35%, moderate MR and moderate to severe AR  -Was on aspirin and Plavix, which were discontinued during previous hospitalization  Continue on statin  Continue ACEI  Continue BB: monitor HR.         HTN: uncontrolled  Started amlodipine, Coreg  Continue lisinopril  Monitor HR as had some sinus sacha earlier. Monitor Bps and adjust meds as needed: Currently has been high in the setting of recurrent seizures.   -As needed hydralazine.       History of MGUS-follows with hematology-oncology      Dementia  - Reportedly at baseline, patient has underlying dementia and typically oriented to self, place and situation.  - Delirium protocol          DVT Prophylaxis: SCDs  Diet: Diet NPO Time Specified  Code Status: DNR-CCA    PT/OT Eval Status: 14 and 15 respectively: SNF planned. Disposition   Remains in pxt pending progress; cEEG monitoring with med adjustment.    Low threshold to return to ICU with precarious resp status  Pall Med following      Tish Guzman MD

## 2021-03-28 NOTE — PROGRESS NOTES
have abdominal tenderness. No guarding/rigidity noted. Patient did not even flinch during deep palpation. Seemed to be pain-free when she is distracted. Was able to lift both her legs off the bed and hold up in the ER with no pain in her belly or back.     At baseline patient has underlying dementia and typically oriented to self, place and situation. Follows commands.     According to her recent hospitalization records she is a limited code with no chest compressions but yes to intubation, defibrillation and medications. ROS:     Patient not responsive, unable to obtain ROS    positives in bold   Constitutional:  fever, chills, weakness, weight change, fatigue  Skin:  rash, pruritus, hair loss, bruising, dry skin, petechiae  Head, Face, Neck   headaches, swelling,  cervical adenopathy  Respiratory: shortness of breath, cough, or wheezing  Cardiovascular: chest pain, palpitations, dizzy, edema  Gastrointestinal: nausea, vomiting, diarrhea, constipation,belly pain    Yellow skin, blood in stool  Musculoskeletal:  back pain, muscle weakness, gait problems,       joint pain or swelling. Genitourinary:  dysuria, poor urine flow, flank pain, blood in urine  Neurologic:  vertigo, TIA'S, syncope, seizures, focal weakness  Psychosocial:  insomnia, anxiety, or depression.   Additional positive findings:                        All other remaining systems are negative or unable to obtain        PMH/PSH/SH/Family History:     Past Medical History:   Diagnosis Date    Acute encephalopathy 03/03/2021    Anxiety     Aortic insufficiency     CHF (congestive heart failure) (HCC)     Chronic kidney disease (CKD), stage III (moderate)     COPD, moderate (HCC)     Coronavirus infection 03/25/2020    Dementia ([facility]     Expressive aphasia 07/29/2020    Facial twitching     GERD (gastroesophageal reflux disease)     Hair loss     wears wig- unknown cause    Hx of falling     Hyperlipidemia     Hypertension     Ischemic cardiomyopathy     EF 55% 03/15/2021    Mild cognitive impairment with memory loss 07/25/2019    NSTEMI (non-ST elevated myocardial infarction) ([facility]     Seasonal allergies     Subdural hematoma ([facility] 03/12/2021       Past Surgical History:   Procedure Laterality Date    COLONOSCOPY      COLONOSCOPY  06/22/2015    diverticulosis    CRANIOTOMY Left 3/21/2021    LEFT CRANIOTOMY FOR SUBDURAL HEMATOMA EVACUATION performed by Yobani Bourne. Storm Gray MD at [facility] Left 07/30/2020    LEFT HIP HEMIARTHROPLASTY performed by Melissa Bell MD at [address] [place] [address]          reports that she has never smoked. She has never used smokeless tobacco. She reports that she does not drink alcohol or use drugs. family history includes Other in her father.          Medication:     Current Facility-Administered Medications: piperacillin-tazobactam (ZOSYN) 3,375 mg in dextrose 5 % 100 mL IVPB extended infusion (mini-bag), 3,375 mg, Intravenous, Q8H  vancomycin (VANCOCIN) 750 mg in dextrose 5 % 250 mL IVPB, 15 mg/kg, Intravenous, Q24H  0.9 % sodium chloride bolus, 500 mL, Intravenous, Once  hydrALAZINE (APRESOLINE) injection 10 mg, 10 mg, Intravenous, Q2H PRN  labetalol (NORMODYNE;TRANDATE) injection 10 mg, 10 mg, Intravenous, Q2H PRN  levETIRAcetam (KEPPRA) 2,000 mg in sodium chloride 0.9 % 150 mL IVPB, 2,000 mg, Intravenous, Q12H  lacosamide (VIMPAT) 200 mg in dextrose 5 % 70 mL IVPB, 200 mg, Intravenous, BID  ceFAZolin (ANCEF) 2000 mg in dextrose 3 % 50 mL IVPB (duplex), 2,000 mg, Intravenous, Once  carvedilol (COREG) tablet 3.125 mg, 3.125 mg, Oral, BID WC  amLODIPine (NORVASC) tablet 5 mg, 5 mg, Oral, Daily  HYDROcodone-acetaminophen (NORCO) 5-325 MG per tablet 1 tablet, 1 tablet, Oral, Q6H PRN  sennosides-docusate sodium (SENOKOT-S) 8.6-50 MG tablet 2 tablet, 2 tablet, Oral, BID  polyethylene glycol (GLYCOLAX) packet 17 g, 17 g, Oral, Daily  famotidine (PEPCID) tablet 20 mg, 20 mg, Oral, Daily  sodium chloride flush 0.9 % injection 10 mL, 10 mL, Intravenous, 2 times per day  sodium chloride flush 0.9 % injection 10 mL, 10 mL, Intravenous, PRN  naloxone (NARCAN) injection 0.2 mg, 0.2 mg, Intravenous, PRN  cyclobenzaprine (FLEXERIL) tablet 10 mg, 10 mg, Oral, TID PRN  promethazine (PHENERGAN) tablet 12.5 mg, 12.5 mg, Oral, Q6H PRN **OR** ondansetron (ZOFRAN) injection 4 mg, 4 mg, Intravenous, Q6H PRN  [Held by provider] heparin (porcine) injection 5,000 Units, 5,000 Units, Subcutaneous, Q12H  ezetimibe (ZETIA) tablet 10 mg, 10 mg, Oral, Nightly  ferrous sulfate (IRON 325) tablet 325 mg, 325 mg, Oral, Daily with breakfast  lisinopril (PRINIVIL;ZESTRIL) tablet 40 mg, 40 mg, Oral, Daily  acetaminophen (TYLENOL) tablet 650 mg, 650 mg, Oral, Q4H PRN  magnesium sulfate 1000 mg in dextrose 5% 100 mL IVPB, 1,000 mg, Intravenous, PRN       Vitals :     Vitals:    03/27/21 1846   BP: (!) 112/51   Pulse: 69   Resp: 16   Temp: 99.2 °F (37.3 °C)   SpO2: 100%       I & O :       Intake/Output Summary (Last 24 hours) at 3/27/2021 2102  Last data filed at 3/27/2021 1812  Gross per 24 hour   Intake 2345.8 ml   Output 775 ml   Net 1570.8 ml        Physical Examination :     General appearance: Anxious- no, distressed- no, in good spirits- yes    Somnolent, not responsive to verbal cues, tactile stimuli  HEENT: Lips- normal, teeth- ok , oral mucosa- moist  Neck : Mass- no, appears symmetrical, JVD- not visible  Respiratory: Respiratory effort-  normal, wheeze- no, crackles - no  Cardiovascular:  Ausculation- No M/R/G, Edema absent  Abdomen: visible mass- no, distention- no, scar- no, tenderness- no                            hepatosplenomegaly-  no  Musculoskeletal:  clubbing no,cyanosis- no , digital ischemia- no                           muscle strength- grossly normal , tone - grossly normal  Skin: rashes- no , ulcers- no, induration- no, tightening - no  Psychiatric:  Judgement and insight- normal           AAO X 3  Additional finding:      LABS:     Recent Labs     03/25/21  1740 03/26/21  0525 03/27/21  1049   WBC 5.9 8.3 6.5   HGB 9.0* 10.5* 8.3*   HCT 27.5* 31.8* 25.6*    268 236     Recent Labs     03/26/21  0525 03/26/21  1023 03/26/21  1023 03/27/21  0505 03/27/21  1049 03/27/21  1615   * 126*   < > 128* 124* 126*   K 5.5* 5.4*  --  4.6  --   --    CL 92* 91*  --  96*  --   --    CO2 21 23  --  21  --   --    BUN 20 23*  --  24*  --   --    CREATININE 0.8 0.9  --  1.0  --   --    GLUCOSE 116* 138*  --  114*  --   --    MG 1.90 1.90  --  1.80  --   --    PHOS  --  3.6  --   --   --   --     < > = values in this interval not displayed.

## 2021-03-29 NOTE — PROGRESS NOTES
Hospitalist Progress Note      PCP: Harsh Gonzales MD    Date of Admission: 3/20/2021         80 y.o. female who is transferred from [facility], ED for neurosurgical consultation. Patient was brought in by her grandson with complaints of increased confusion for the past couple of days and patient complaining of left-sided back pain. Patient was hospitalized here from 3/12 through 3/15, after being transferred from [facility] for possible seizure-like episode. At that time patient was found unresponsive by grandson on the couch and noticed seizure-like activity. In ED patient was with nurse to have a seizure by her RN at bedside which lasted for about 30 seconds. CT head revealed left-sided subdural hematoma with associated mass-effect and left-to-right midline shift. Patient has been taking 81 mg of aspirin and Plavix daily at that time. During the previous hospitalization patient was started on Keppra 1 g twice daily.     At baseline patient has underlying dementia and typically oriented to self, place and situation. Follows commands. Subjective:     - ROS unobtainable 2/2 aphasia, sedation. Afebrile. Bradycardic earlier today, Coreg was held. She continues to have some bradycardia. EKG is pending.      Medications:  Reviewed    Infusion Medications     Scheduled Medications    piperacillin-tazobactam  3,375 mg Intravenous Q8H    levetiracetam  2,000 mg Intravenous Q12H    lacosamide (VIMPAT) IVPB  200 mg Intravenous BID    carvedilol  3.125 mg Oral BID WC    amLODIPine  5 mg Oral Daily    sennosides-docusate sodium  2 tablet Oral BID    polyethylene glycol  17 g Oral Daily    famotidine  20 mg Oral Daily    sodium chloride flush  10 mL Intravenous 2 times per day    [Held by provider] heparin (porcine)  5,000 Units Subcutaneous Q12H    ezetimibe  10 mg Oral Nightly    ferrous sulfate  325 mg Oral Daily with breakfast    lisinopril  40 mg Oral Daily     PRN Meds: OLANZapine, hydrALAZINE, HYDROcodone 5 mg - acetaminophen, sodium chloride flush, naloxone, cyclobenzaprine, promethazine **OR** ondansetron, acetaminophen, magnesium sulfate      Intake/Output Summary (Last 24 hours) at 3/29/2021 1553  Last data filed at 3/29/2021 1500  Gross per 24 hour   Intake 30 ml   Output 4500 ml   Net -4470 ml       Physical Exam Performed:    BP (!) 145/55   Pulse 52   Temp 97.4 °F (36.3 °C) (Oral)   Resp 20   Ht 5' 6\" (1.676 m)   Wt 131 lb 13.4 oz (59.8 kg)   SpO2 91%   BMI 21.28 kg/m²     General appearance: drowsy  Head: dressing C/D  HENT: + NG  Respiratory:  Transmitted upper airway sounds. Diminished breath sounds at bases  Cardiovascular: Regular rate and rhythm with normal S1/S2   Abdomen: Soft, non-tender, non-distended   Neurologic: aphasic, right hemiplegia, Somnolent, Localizes pain. No verbal response. Labs:   Recent Labs     03/28/21  0510 03/28/21  1530 03/29/21  1054   WBC 5.5 13.0* 8.7   HGB 7.1* 8.8* 8.5*   HCT 21.4* 27.5* 25.0*    335 297     Recent Labs     03/28/21  0510 03/28/21  1530 03/29/21  1054 03/29/21  1435   * 123* 132* 130*   K 4.0 4.5 3.5  --    CL 97* 94* 95*  --    CO2 21 19* 22  --    BUN 23* 23* 25*  --    CREATININE 1.0 0.9 0.8  --    CALCIUM 8.1* 8.5 8.4  --    PHOS  --  4.3  --   --      No results for input(s): AST, ALT, BILIDIR, BILITOT, ALKPHOS in the last 72 hours. No results for input(s): INR in the last 72 hours.   Urinalysis:      Lab Results   Component Value Date    NITRU Negative 03/26/2021    WBCUA None seen 03/26/2021    BACTERIA Rare 03/21/2021    RBCUA None seen 03/26/2021    BLOODU Negative 03/26/2021    SPECGRAV 1.010 03/26/2021    GLUCOSEU Negative 03/26/2021         Assessment/Plan:    Active Hospital Problems    Diagnosis    Hyponatremia [E87.1]    Encephalopathy acute [G93.40]    Status epilepticus ([address] [G40.901]    Dementia without behavioral disturbance ([address] [F03.90]    Subdural hematoma ([address] [S06.5X9A]   Acute encephalopathy sec to ICH:  Worsening subdural hematoma, with acute and chronic components with increased mass-effect  Baseline dementia  Repeat CT head - Unchanged left cerebral convexity subdural hematoma with 8 mm rightward subfalcine herniation. Neurosurgery consulted - now s/p LEFT CRANIOTOMY FOR SUBDURAL HEMATOMA EVACUATION. Continue on Keppra 2 g IV twice daily. Of note patient has a history of previous seizures. Vimpat added for recurrent seizures even while on higher dose Keppra  No antiplatelets or chemical DVT prophylaxis  Seizure and fall precautions  Will need PEG: re-scheduled. Timing per GI. Neurosurg. following    Recurrent seizures, breakthrough seizures  - Pxt appears to have hx of Generalized tonic-clonic seizures on Keppra  -Recurrent seizures likely in the setting of subdural hematoma, with acute and chronic components with increased mass-effect  -Also noted with hyponatremia, although not low enough to be causing her seizures. Improved. - Continue on Keppra 2 g IV twice daily. Vimpat added for recurrent seizures even while on higher dose Keppra  Urinalysis: neg for features of infection  - Seizure and aspiration precautions.   - Neurology consulted. Appreciate input.  - CEEG with med adjustment. Bradycardia. BPs stable  Noted briefly yesterday but more severe today, Coreg held however still bradycardic. TSH previously checked, ok  No fevers or leukocytosis  Continue to hold Coreg  Monitor and replace electrolytes  EKG ordered, pending  Hold Zyprexa  Cardiology consulted    Hyponatremia  Probable hypovolemic hyponatremia from low oral intake. Also considered cerebral salt wasting related to intracranial process or SIADH  - Nephrology following    Acute resp failure with hypoxia  - Likely sec to seizures/benzodiazepines, Aspiration during seizures leann with dysphagia from [address], with aspiration pneumonia. - CXR 3/25 following initial seizure: nil acute.  Repeated 3/27: some infiltrates bilaterally  - Has had fevers likely from asp pneumonitis. - Serial imaging PRN  - Obtained blood cx: NGTD  - Started on empiric BS antibx with fevers and significant amount of milky liquid suctioned from her mouth noted in drainage jar. CXR 3/27: infiltrates. - Continues on Zosyn  - Supplemental oxygen  - Asp precautions. Aspiration pneumonia  - Aspiration during seizures leann with dysphagia from [address]  - CXR 3/25 following initial seizure: nil acute. Repeated 3/27: some infiltrates bilaterally  - Has had fevers likely from asp pneumonitis. Nil further since startig antibx. - Serial imaging PRN  - Obtained blood cx: NGTD  - Started on empiric BS antibx with fevers and significant amount of milky liquid suctioned from her mouth noted in drainage jar. CXR 3/27: infiltrates. - Will DC Vanc for now, MRSA screen negative. - Asp precautions. Acute urinary retention  Bladder scanned for 660 cc  Placed Hamilton  UA: not sugg of infection  Voiding trial today    Severe Protein calorie malnutrition:  Body mass index is 21.28 kg/m². Still very lethargic and not safe for po intake. NG placed. Resume TF. Will need peg- GI consulted. Timing of PEG: Per GI when more stable. Left hip pain and echymosis:  Likely sec to fall. Not on any blood thinners  Repeat xray of hip negative. CAD/Ischemic cardiomyopathy   Ejection fraction of 30-35%, moderate MR and moderate to severe AR  -Was on aspirin and Plavix, which were discontinued during previous hospitalization  Continue on statin  Continue ACEI  ON BB at home, held for bradycardia    HTN: uncontrolled  Started amlodipine, Coreg  Continue lisinopril  Coreg held due to recurrence of bradycardia  Monitor Bps and adjust meds as needed: Currently has been high in the setting of recurrent seizures.   -As needed hydralazine.     History of MGUS-follows with hematology-oncology      Dementia  - Reportedly at baseline, patient has underlying dementia and typically oriented to self, place and situation.  - Delirium protocol          DVT Prophylaxis: SCDs  Diet: Diet Tube Feed Continuous/Cyclic w/ Diet  Code Status: DNR-CCA    PT/OT Eval Status: 14 and 15 respectively: SNF planned. Disposition   Remains in pxt pending progress; cEEG monitoring with med adjustment.    Low threshold to return to ICU with precarious resp status  Pall Med following      [place], DO

## 2021-03-29 NOTE — PROGRESS NOTES
Speech Language Pathology  Facility/Department: [facility] 5T ORTHO/NEURO  Dysphagia Daily Treatment Note     NAME: Dylan Hernandez  : 1934  MRN: [id number]    Patient Diagnosis(es):   Patient Active Problem List    Diagnosis Date Noted    Hyponatremia     Encephalopathy acute     Status epilepticus ([address]     Dementia without behavioral disturbance ([address]     Abnormal EKG 03/15/2021    CAD (coronary artery disease) 03/15/2021    Subdural hematoma ([address] 2021    Ischemic cardiomyopathy     NSTEMI (non-ST elevated myocardial infarction) ([address] 2020    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 ([facility] 2020    Confusion     Closed displaced fracture of left femoral neck (HCC) 2020    MGUS (monoclonal gammopathy of unknown significance) 2020    Mixed hyperlipidemia 2020    Clonic hemifacial spasm, bilateral 2019    Gastroesophageal reflux disease without esophagitis 2019    GEORGES (generalized anxiety disorder) 2018    Benign essential hypertension 2014    Aortic insufficiency 2013    Pneumonia 2012     Allergies: Allergies   Allergen Reactions    Quinidine Hives    Aminoglycosides      Pt unsure    Levofloxacin      unknown    Neomycin      unknown    Simvastatin      Unknown to pt     Recent Chest Xray: (2021)  Impression   Clear lungs.  Cardiomegaly.  No acute cardiopulmonary abnormality.      Recent CT Head: (2021)      Impression       1.  Unchanged left cerebral convexity subdural hematoma with 8 mm rightward subfalcine herniation.      Recent CT Head: (2021)  Impression       Postoperative changes noted with placement of drainage catheter into the left subdural hematoma with postoperative change from craniotomy in the superior frontal lobe noted.       The subdural hemorrhage is slightly decreased from prior study with interval development of postoperative air as above.     Degree of mild left to right shift has diminished slightly from prior exam.        Previous MBS - n/a  Chart reviewed  Medical Diagnosis: subdural hematoma  Treatment Diagnosis: dysphagia    BSE Impression 3/22/21  Patient presents with suspected oropharyngeal dysphagia. Unable to complete oral motor exam, pt with inconsistent command following. Limited verblizations. Assessed tolerance ice chips and thin liquids via tsp/straw; pt with positive oral acceptance, mildly prolonged oral prep, suspect delayed/reduced swallow onset/laryngeal elevation, good oral clearance, consistent cough/throat clear with all PO. Recommend make NPO, consider temporary alternative means nutrition/hydration, frequent oral care, ice chips PRN. Will continue to follow. MBS results - not appropriate at this time    Pain:  No indication of pain through any means    Current Diet : NPO    Treatment:  Pt seen bedside to address the following goals:  Goal 1: Patient will tolerate repeat BSE.  3/23; new order received, pt already on caseload. D/w RN who states pt appropriate for re-assessment. Pt appeared uncomfortable throughout session, with constant movement attempting to get weight off left hip area. Rn reports pt had pain medication 15 min prior. Pt with minimal participation, verbalized only 1 x, stating, \"please wait a minute. \" No other verbalizations noted, pt very slow to respond to tasks, with intermittent attention to tasks presented. Pt accepted limited amount of PO, including 2 ice chips, 2 tsps of water, single sip of water via cup edge and straw. Pt exhibited one instance of mild cough after sip of water, no other coughing or throat clear noted. Not able to assess vocal quality due to pt with no verbalizations with the exception of one time. No participation with attempts at further evaluation. As assessment very limited, not able to safely recommend diet.   Recommend oral care, ice chips or small sips of water for comfort with re-assessment as pt participates. RN will page SLP if appropriate later this date  Cont goal  3/24: pt alert, sitting up in bed, does not respond to questions or commands. Intermittent verbalizations stating, \"please wait a minute. \"    Pt took trial and then declined another trial for several minutes, then would take one more. This pattern was repeated throughout session, over 15 minutes, pt consumed 4 ounces of water via tsp/cup and straw and 3 tsp of puree. Intermittent throat clear noted, no coughing, when vocalizing no change noted. Pt declined trials of solids, therefore not able to recommend a solid consistency. As this was behavior last session and again this date, concern would be pt ability to maintain nutrition. However research shows feeding tubes are not indicated for pts with advanced dementia, though pt does have new neuro deficits at this time. Recommend trial full liquids with close monitoring for s/s of aspiration and consider calorie count. Goal met    New goal:  2-  Pt will consume PO safely without signs or symptoms of aspiration  3/25: pt sitting up in bed, tray at bedside. Pt again continually moving around in bed, in what appears to be attempts at getting comfortable. Pt accepted limited PO again this date, however consumed in less time. Pt consumed pudding x 3, jello x 1 and several sips of water via straw. Pt exhibited no overt signs of aspiration, not able to assess vocal quality as remained non verbal.  Pt did exhibit mild anterior loss with pudding that liquified. Pt attempting to verbalize but does not appear able to initiate. Pt did not respond to questions or follow commands. Per chart, pt having PEG placed 3/26. Cont as appropriate  3/29: RN states pt a little more awake, okay to attempt re-assessment. No verbal responses to questions or following of commands. Pt opened eyes to verbal stim and opened mouth to touch of spoon.  Dropped small ice chip in oral cavity, pt contained without anterior spillage and with time swallow noted. Presented water via tsp/cup and straw, pt attempted to bite down on all, had to remove for pt safety. Recommend cont NPO, small amounts of ice chips per RN only,  if pt alert enough  Cont goal    Patient/Family/Caregiver Education:  Pt educated to purpose of visit, does not indicate comprehension    Compensatory Strategies:  n/a     Plan:  Continued daily Dysphagia treatment with goals per  plan of care. Diet recommendations: cont NPO  Oral care / small amounts of ice chips if alert enough for the comfort of pt, health and hydration of oropharyngeal mucosa and swallow stimulation (per RN only)  DC recommendation: ongoing treatment indicated. Treatment: 11  D/W nursing Bell Cristobal  Needs met prior to leaving room, call button in reach. Michael Bonds M.S./[facility]SLP #[id number]  Pg.  # [phone]  If patient is discharged prior to next treatment, this note will serve as the discharge summary

## 2021-03-29 NOTE — PROGRESS NOTES
encephalopathy, Anxiety, Aortic insufficiency, CHF (congestive heart failure) ([address] Chronic kidney disease (CKD), stage III (moderate), COPD, moderate ([address] Coronavirus infection, Dementia ([address] Expressive aphasia, Facial twitching, GERD (gastroesophageal reflux disease), Hair loss, Hx of falling, Hyperlipidemia, Hypertension, Ischemic cardiomyopathy, Mild cognitive impairment with memory loss, NSTEMI (non-ST elevated myocardial infarction) ([address] Seasonal allergies, and Subdural hematoma ([address] has a past surgical history that includes Ovary removal; Colonoscopy; Colonoscopy (06/22/2015); hip surgery (Left, 07/30/2020); and craniotomy (Left, 3/21/2021). Restrictions  Position Activity Restriction  Other position/activity restrictions: up with assist, ambulation  Subjective   General  Chart Reviewed: Yes  Additional Pertinent Hx: Pt admitted to ED with SDH s/p fall 10 days prior. LEFT CRANIOTOMY FOR SUBDURAL HEMATOMA EVACUATION on 3/21. PMHx includes: Acute encephalopathy (03/03/2021), Anxiety, Aortic insufficiency, CHF (congestive heart failure) ([address] Chronic kidney disease (CKD), stage III (moderate), COPD, moderate ([address] Coronavirus infection (03/25/2020), Dementia ([address] Expressive aphasia (07/29/2020), Facial twitching, GERD (gastroesophageal reflux disease), Hair loss, falling, Hyperlipidemia, Hypertension, Ischemic cardiomyopathy, Mild cognitive impairment with memory loss (07/25/2019), NSTEMI (non-ST elevated myocardial infarction) ([address] Seasonal allergies, and Subdural hematoma ([address] (03/12/2021). Response to previous treatment: Patient with no complaints from previous session  Family / Caregiver Present: No  Referring Practitioner: RICARDO Dennis  Diagnosis: SDH  Subjective  Subjective: Pt supine in bed upon entry, continuous EEG, RN approval for EOB. Pt agreeable to therapy session. Pt on 1L O2, vitals WNL throughout therapy session.   General Comment  Comments: Pt supine to sit Dependent x 2. Pt sit EOB ~8 min CGA-Max A with posterior/right lateral lean. Pt command follow x 2 (nodded no to pain, and with \"Give me a high 5\" pt brought RUE up and anteriorly). Pt sit to supine Dependent x 2. Pt with PROM LUE 10 wrist flexion/extension, pt pulled away for elbow flexion/extension, PROM LUE 10 elbow flexion/extension and 10 wrist flexion/extension. Call light in reach, bed alarm on and RN notified.   Vital Signs  Patient Currently in Pain: Denies(pt shook head \"no\" when asked if she had pain)   Orientation  Orientation  Overall Orientation Status: (pt non verbal during session and unable to assess)  Objective             Balance  Sitting Balance: Maximum assistance(CGA - Max A)  Bed mobility  Supine to Sit: 2 Person assistance;Dependent/Total  Sit to Supine: 2 Person assistance;Dependent/Total  Scooting: Dependent/Total;2 Person assistance(supine to [facility])  Transfers  Sit to stand: Unable to assess(not safe to attempt)                       Cognition  Overall Cognitive Status: Exceptions  Arousal/Alertness: Inconsistent responses to stimuli  Following Commands: Inconsistently follows commands  Memory: (unable to assess)  Problem Solving: (unable to assess)  Insights: Not aware of deficits  Initiation: Requires cues for all  Sequencing: Requires cues for all                                         Plan   Plan  Times per week: 5-7  Times per day: Daily  Current Treatment Recommendations: Strengthening, Balance Training, Functional Mobility Training, Endurance Training, Self-Care / ADL, Neuromuscular Re-education  G-Code     OutComes Score                                                  AM-PAC Score        AM-[facility] Inpatient Daily Activity Raw Score: 6 (03/29/21 1458)  AM-PAC Inpatient ADL T-Scale Score : 17.07 (03/29/21 1458)  ADL Inpatient CMS 0-100% Score: 100 (03/29/21 1458)  ADL Inpatient CMS G-Code Modifier : CN (03/29/21 1458)    Goals  Short term goals  Time Frame for Short term goals: by dc  Short term goal 1: Pt will complete LB dressing with mod A - ongoing, not addressed 3/29  Short term goal 2: Pt will complete toileting with min A - ongoing, not addressed 3/29 (roberts cath)  Short term goal 3: Pt will complete multi step grooming task with CGA - ongoing, not addressed 3/29  Short term goal 4: Pt will complete B UE HEP x 20 reps for increased tolerance - ongoing, not met 3/29  Patient Goals   Patient goals : to go home       Therapy Time   Individual Concurrent Group Co-treatment   Time In 2535         Time Out 1440         Minutes 24         Timed Code Treatment Minutes: [address] [facility], [address] [place] [address]

## 2021-03-29 NOTE — PLAN OF CARE
No falls noted thus far this shift, bed in lowest position, alarm on, non-skid socks on, call light within reach, hourly checks, safety maintained, will continue to monitor. Call light within reach. Problem: Falls - Risk of:  Goal: Will remain free from falls  Description: Will remain free from falls  Outcome: Ongoing     No acute distress noted, respiration rate has been between 26-32 at times, on supplemental oxygen. Problem: Pain:  Goal: Control of acute pain  Description: Control of acute pain  Outcome: Ongoing     Pt has been very active in the bed, pulling at wires, lines, and tubes. Sitter was with patient for a time, pt has now appeared to fall to sleep and is more relaxed     Problem: ACTIVITY INTOLERANCE/IMPAIRED MOBILITY  Goal: Mobility/activity is maintained at optimum level for patient  Outcome: Ongoing     Pt has been non-verbal complicating assessments as you get no response when attempting to assess her.     Problem: COMMUNICATION IMPAIRMENT  Goal: Ability to express needs and understand communication  Outcome: Not Met This Shift

## 2021-03-29 NOTE — PROGRESS NOTES
Events over weekend noted. Still on supplemental Oxygen and IV therapy for aspiration pneumonitis. Plan 1. Will hold on proceeding with PEG until Neurology/pulmonary status stabilized as Anesthesia clearance for PEG dependent upon this criteria. Resume TF until PEG placement. Will follow.

## 2021-03-29 NOTE — CARE COORDINATION
Case Management Daily Note                    Date: 3/29/2021     Patient Name: Estela Patel    Date of Admission: 3/20/2021  7:52 PM  YOB: 1934    Length of Stay: 9         Patient Admission Status: Inpatient  Diagnosis:Subdural hematoma [facility]) [J41.2C5X]     ________________________________________________________________________________________  Discharge Plan: SNF: [address] [place] [address]   Mick Ray [place] 1947, ROSSANA [address]  Report: [phone]  Fax: [phone]  Hens completed #[phone]    [facility] 5, admissions [phone]     [facility] [place] [facility] [address] [place] [phone]    Insurance: Payor: MEDICARE / Plan: MEDICARE PART A AND B / Product Type: *No Product type* /   Is pre-cert/notification needed: N/a     Tentative discharge date: TBD    Current barriers: Medical Clearance, PEG once able. Referrals completed: SNF: [facility]     Resources/ information provided: [facility] List   ________________________________________________________________________________________  PT AM-PAC: 15 / 24 per last evaluation on: 3/23    OT AM-PAC: 14 / 24 per last evaluation on: 3/23    DME Needs for discharge: defer  ________________________________________________________________________________________  Notes/Plan of Care:   SW rounded on this date. Patient moved [facility] from [facility]. Patient had rapid response on [facility]. Patient remains encephalopathic at this time. cEEG ongoing. PEG on hold pending medical progress with Neuro and Pulm. SW spoke with Lucie Bergman at [facility] APS this AM. SW provided update that patient is not medically ready and not able to be interviewed. SW to follow. GILLES/CM to notify APS of discharge. Jaz and/or her family were provided with choice of provider; she and/or her family are in agreement with the discharge plan at this time.     Care Transition Patient: CHANO Link  [facility]   Case Management

## 2021-03-29 NOTE — PLAN OF CARE
Problem: Falls - Risk of:  Goal: Will remain free from falls  Description: Will remain free from falls  3/29/2021 1611 by Brandon Jacobs  Outcome: Pt will remain free from accidental injury this shift. Pt has fall risk measures (fall risk bracelet, fall risk sign, fall risk cloth, non-slip socks, dome light on) in place. Pt bed is in low position, bed alarm on, bed wheels locked, call light within reach, bedside table within reach, chair wheels locked, chair alarm on. Problem: Pain:  Goal: Pain level will decrease  Description: Pain level will decrease  Outcome: Ongoing. Advanced dementia pain scale. Pain level currently 0, was given pain med earlier in shift for pain scaled at 6. Will continue to monitor. Problem: HEMODYNAMIC STATUS  Goal: Patient has stable vital signs and fluid balance  Outcome: Ongoing. HR low and braydcardic, MD aware, EKG ordered and cardio consult placed by MD.       Problem: COMMUNICATION IMPAIRMENT  Goal: Ability to express needs and understand communication  3/29/2021 1611 by Brandon Jacobs  Outcome: Ongoing Pt opens eyes to voice but speech incomprehensible when trying to talk.      Problem: Skin Integrity:  Goal: Absence of new skin breakdown  Description: Absence of new skin breakdown  Outcome: Met This Shift     Problem: Physical Regulation:  Goal: Ability to maintain a stable neurologic state will improve  Description: Ability to maintain a stable neurologic state will improve  Outcome: Ongoing

## 2021-03-29 NOTE — PROGRESS NOTES
NUTRITION ASSESSMENT  Admission Date: 3/20/2021     Type and Reason for Visit: Reassess, Consult    NUTRITION RECOMMENDATIONS:   1. Recommend increasing EN Standard Formula with Fiber  Jevity 1.2  gradually by 10 ml q4h until goal rate of 55 ml/hr is reached. 2. Recommend maintenance water flush of 30 ml q4h for tube integrity. Increase free water to 70 ml every 4 hours once Na+ WDL. 3. NPO per SLP. NUTRITION ASSESSMENT:  Nutritionally compromised as pt should remain NPO per SLP. TF was initiated, increased to goal then withheld in prep for PEG placement. When GI postponed PEG placement until respiratory status is stable, TF was started at a low rate and on 12-hour regimen. TF currently running at 30 ml/ hr which provides < 50% of this pt's needs. Noted \"No water boluses at this time per nephrology\" however minimal maintenance water flush of 30 ml q4h is essential for TF integrity. RD adjusting TF recommendations as new weight data is obtained and monitored. Pt now meets criteria for severe malnutrition and RD recommends increasing TF gradually and as tolerated until goal is reached. MD aware and provided a verbal order to adjust TF orders. RD will continue to monitor per [facility]. Due to current CDC guidelines recommending 6-ft distancing for social isolation for COVID19 prevention, in lieu of NFPE this dietitian was able to visibly assess signs and symptoms of severe malnutrition, as indicated below. Pt with evidence of severe malnutrition per visual losses of fat and muscle, along with decreased energy intake and weight loss.      MALNUTRITION ASSESSMENT  Context of Malnutrition: Acute Illness   Malnutrition Status: Severe malnutrition  Findings of the 6 clinical characteristics of malnutrition (Minimum of 2 out of 6 clinical characteristics is required to make the diagnosis of moderate or severe Protein Calorie Malnutrition based on AND/ASPEN Guidelines):  Energy Intake: Less than/equal to 50% of estimated energy requirements    Energy Intake Time: Greater than or equal to 7 days    Weight Loss %: Unable to assess  d/t fluid shifts  Weight loss Time: Greater than or equal to 3 months   Body Fat Loss: Moderate Loss  per visual   Body Fat Location: Orbital, Fat Overlying Ribs  and Buccal region   Body Muscle Loss: Moderate Loss  per visual   Body Muscle Loss Location: Clavicles    Fluid Accumulation: Unable to assess    Fluid Accumulation Location: Unable to assess     Strength: Not Performed; Not Measured     NUTRITION DIAGNOSIS   Problem: Problem #1: Inadequate oral intake   Etiology: Cognitive or neurological impairment  Signs & Symptoms: NPO status due to medical condition and Nutrition Support-EN    NUTRITION INTERVENTION  Food and/or Nutrient Delivery: Continue NPO, Modify Tube Feeding   Nutrition Education/Counseling: Education not appropriate Coordination of Nutrition Care: Continue to monitor while inpatient     NUTRITION MONITORING & EVALUATION:  Evaluation:Progress towards goal declining   Goals:Goals: pt will continue to tolerate EN at goal rate to meet 100% of nutrition needs.    Monitoring: I/O, Nutrition Progression , TF Intake , TF Tolerance  or Weight      OBJECTIVE DATA:  · Nutrition-Focused Physical Findings: thin; base line dementia; LBM 3/28  · Wounds Surgical Wound w/drain from craniotomy     Past Medical History:   Diagnosis Date    Acute encephalopathy 03/03/2021    Anxiety     Aortic insufficiency     CHF (congestive heart failure) (HCC)     Chronic kidney disease (CKD), stage III (moderate)     COPD, moderate (HCC)     Coronavirus infection 03/25/2020    Dementia [facility]     Expressive aphasia 07/29/2020    Facial twitching     GERD (gastroesophageal reflux disease)     Hair loss     wears wig- unknown cause    Hx of falling     Hyperlipidemia     Hypertension     Ischemic cardiomyopathy     EF 55% 03/15/2021    Mild cognitive impairment with memory loss 07/25/2019    NSTEMI (non-ST elevated myocardial infarction) (HCC)     Seasonal allergies     Subdural hematoma ([facility] 03/12/2021        ANTHROPOMETRICS  Current Height: 5' 6\" (167.6 cm)  Current Weight: 130 lb 11.7 oz (59.3 kg)   Admission weight: 110 lb (49.9 kg) STATED   Ideal Bodyweight 130 lb    Usual Bodyweight 130 lb per EMR  Weight Changes +15 lb since admission with fluid shifting       BMI BMI (Calculated): 21.1    Wt Readings from Last 50 Encounters:   03/20/21 110 lb (49.9 kg)   03/20/21 110 lb (49.9 kg)   03/12/21 110 lb (49.9 kg)   03/03/21 110 lb (49.9 kg)   12/27/20 115 lb 1.6 oz (52.2 kg)   09/25/20 106 lb (48.1 kg)   08/12/20 106 lb (48.1 kg)   08/03/20 135 lb (61.2 kg)   07/29/20 135 lb (61.2 kg)       COMPARATIVE STANDARDS  Estimated Total Kcals/Day : 25-30  First obtained weight (53.5 kg) 6867-4555 kcal    Estimated Total Protein (g/day) : 1.2-1.4 First obtained weight (53.5 kg) 64-75 g/day  Estimated Daily Total Fluid (ml/day): 4478-3735 mL per day     Food / Nutrition-Related History  Pre-Admission / Home Diet:  Pre-Admission/Home Diet: General   Home Supplements / Herbals:    none noted  Food Restrictions / Cultural Requests:    none noted    Current Nutrition Therapies   Diet Tube Feed Continuous/Cyclic w/ Diet     PO Intake: 0%  and NPO   PO Supplement: NPO   PO Supplement Intake: NPO  IVF: d/c'd     Current Tube Feeding (TF) Orders:  · Feeding Route: Nasogastric  · Formula: Standard w/Fiber  · Schedule: Continuous  · Additives/Modulars:    · Water Flushes: 30 ml q 4hr  · Current TF & Flush Orders Provides: Jev 1.2 @ 20 provides 480 ml TV, 576 kcal and 26 g of protein. · Goal TF & Flush Orders Provides: Jev 1.2 @ 55 ml/hr to provide 1320 ml total volume, 1584 kcal, 73 g protein and 1065 ml free water.        NUTRITION RISK LEVEL: Risk Level: High     Krys Ren, [address] [place] [address]  Ilya:  [phone]  Office:  [phone]

## 2021-03-29 NOTE — PLAN OF CARE
Nutrition Problem #1: Inadequate oral intake  Intervention: Food and/or Nutrient Delivery: Continue NPO, Modify Tube Feeding  Nutritional Goals: pt will continue to tolerate EN at goal rate to meet 100% of nutrition needs.

## 2021-03-29 NOTE — PROGRESS NOTES
room.  Unable to exactly show where it hurts. On palpation patient does not seem to have abdominal tenderness. No guarding/rigidity noted. Patient did not even flinch during deep palpation. Seemed to be pain-free when she is distracted. Was able to lift both her legs off the bed and hold up in the ER with no pain in her belly or back.     At baseline patient has underlying dementia and typically oriented to self, place and situation. Follows commands.     According to her recent hospitalization records she is a limited code with no chest compressions but yes to intubation, defibrillation and medications. ROS:     Patient not responsive, unable to obtain ROS    positives in bold   Constitutional:  fever, chills, weakness, weight change, fatigue  Skin:  rash, pruritus, hair loss, bruising, dry skin, petechiae  Head, Face, Neck   headaches, swelling,  cervical adenopathy  Respiratory: shortness of breath, cough, or wheezing  Cardiovascular: chest pain, palpitations, dizzy, edema  Gastrointestinal: nausea, vomiting, diarrhea, constipation,belly pain    Yellow skin, blood in stool  Musculoskeletal:  back pain, muscle weakness, gait problems,       joint pain or swelling. Genitourinary:  dysuria, poor urine flow, flank pain, blood in urine  Neurologic:  vertigo, TIA'S, syncope, seizures, focal weakness  Psychosocial:  insomnia, anxiety, or depression.   Additional positive findings:                        All other remaining systems are negative or unable to obtain        PMH/PSH/SH/Family History:     Past Medical History:   Diagnosis Date    Acute encephalopathy 03/03/2021    Anxiety     Aortic insufficiency     CHF (congestive heart failure) (HCC)     Chronic kidney disease (CKD), stage III (moderate)     COPD, moderate (HCC)     Coronavirus infection 03/25/2020    Dementia ([facility] [address]     Expressive aphasia 07/29/2020    Facial twitching     GERD (gastroesophageal reflux disease)     Hair loss     wears Daily  sodium chloride flush 0.9 % injection 10 mL, 10 mL, Intravenous, 2 times per day  sodium chloride flush 0.9 % injection 10 mL, 10 mL, Intravenous, PRN  naloxone (NARCAN) injection 0.2 mg, 0.2 mg, Intravenous, PRN  cyclobenzaprine (FLEXERIL) tablet 10 mg, 10 mg, Oral, TID PRN  promethazine (PHENERGAN) tablet 12.5 mg, 12.5 mg, Oral, Q6H PRN **OR** ondansetron (ZOFRAN) injection 4 mg, 4 mg, Intravenous, Q6H PRN  [Held by provider] heparin (porcine) injection 5,000 Units, 5,000 Units, Subcutaneous, Q12H  ezetimibe (ZETIA) tablet 10 mg, 10 mg, Oral, Nightly  ferrous sulfate (IRON 325) tablet 325 mg, 325 mg, Oral, Daily with breakfast  lisinopril (PRINIVIL;ZESTRIL) tablet 40 mg, 40 mg, Oral, Daily  acetaminophen (TYLENOL) tablet 650 mg, 650 mg, Oral, Q4H PRN  magnesium sulfate 1000 mg in dextrose 5% 100 mL IVPB, 1,000 mg, Intravenous, PRN       Vitals :     Vitals:    03/29/21 0825   BP:    Pulse:    Resp:    Temp:    SpO2: 93%       I & O :       Intake/Output Summary (Last 24 hours) at 3/29/2021 0836  Last data filed at 3/29/2021 0805  Gross per 24 hour   Intake 0 ml   Output 3675 ml   Net -3675 ml        Physical Examination :     General appearance: Anxious- no, distressed- no, in good spirits- yes    Somnolent, not responsive to verbal cues, tactile stimuli  HEENT: Lips- normal, teeth- ok , oral mucosa- moist  Neck : Mass- no, appears symmetrical, JVD- not visible  Respiratory: Respiratory effort-  normal, wheeze- no, crackles - no  Cardiovascular:  Ausculation- No M/R/G, Edema absent  Abdomen: visible mass- no, distention- no, scar- no, tenderness- no                            hepatosplenomegaly-  no  Musculoskeletal:  clubbing no,cyanosis- no , digital ischemia- no                           muscle strength- grossly normal , tone - grossly normal  Skin: rashes- no , ulcers- no, induration- no, tightening - no  Psychiatric:  Judgement and insight- normal           AAO X 3  Additional finding:      LABS: Recent Labs     03/27/21  1049 03/28/21  0510 03/28/21  1530   WBC 6.5 5.5 13.0*   HGB 8.3* 7.1* 8.8*   HCT 25.6* 21.4* 27.5*    204 335     Recent Labs     03/26/21  1023 03/26/21  1023 03/27/21  0505 03/27/21  0505 03/27/21  2233 03/28/21  0510 03/28/21  1530   *   < > 128*   < > 124* 126* 123*   K 5.4*  --  4.6  --   --  4.0 4.5   CL 91*  --  96*  --   --  97* 94*   CO2 23  --  21  --   --  21 19*   BUN 23*  --  24*  --   --  23* 23*   CREATININE 0.9  --  1.0  --   --  1.0 0.9   GLUCOSE 138*  --  114*  --   --  119* 182*   MG 1.90  --  1.80  --   --  1.90 2.10   PHOS 3.6  --   --   --   --   --  4.3    < > = values in this interval not displayed.

## 2021-03-29 NOTE — PROGRESS NOTES
CONTINUOUS EEG    Name:  Darren Weeks 89 Record Number:  [id number]  Age: 80 y.o. Gender: female  : 1934  Today's Date:  3/29/2021  Room:  [address] [place] [address]  Vital Signs   BP (!) 158/67   Pulse 58   Temp 98.1 °F (36.7 °C) (Oral)   Resp 22   Ht 5' 6\" (1.676 m)   Wt 131 lb 13.4 oz (59.8 kg)   SpO2 92%   BMI 21.28 kg/m²       Patient currently on continuous EEG monitoring. All EEG leads are currently in place with no current issues. Verified [facility] connection via team viewer. Checked in with patient RN for current plan of care. Comments: Continue monitoring. All leads within 10K limit. Electronically signed by Gabriele Romero on 3/29/2021 at 3:03 PM    Contacted [facility] with new password to connect to video monitor.

## 2021-03-29 NOTE — PROGRESS NOTES
Physical Therapy  Facility/Department: [place] [address] [facility]  Daily Treatment Note  NAME: Tigist Bermudez  : 1934  MRN: [id number]    Date of Service: 3/29/2021    Discharge Recommendations:  Tigist Bermudez scored a  on the AM-PAC short mobility form. Current research shows that an AM-PAC score of 17 or less is typically not associated with a discharge to the patient's home setting. Based on the patient's AM-PAC score and their current functional mobility deficits, it is recommended that the patient have 3-5 sessions per week of Physical Therapy at d/c to increase the patient's independence. Please see assessment section for further patient specific details. If patient discharges prior to next session this note will serve as a discharge summary. Please see below for the latest assessment towards goals. PT Equipment Recommendations  Equipment Needed: No  Other: Defer to next level of care    Assessment   Body structures, Functions, Activity limitations: Decreased functional mobility ; Decreased endurance;Decreased balance;Decreased strength;Decreased safe awareness; Increased pain  Assessment: Pt currently requires Max A x2 for bed mobility for sitting at the EOB. Pt requires CGA<>Max A at the EOB for trunk support and stability. Pt able to follow 2 one step commands throughout the session and requires hand over hand placement and PROM in supine. Pt will benefit from continued skilled PT to increase independence and decrease the burden of care. Continue POC to progress pt's mobility. Treatment Diagnosis: Decreased functional mobility  Prognosis: Good;Fair  PT Education: Goals;PT Role;Plan of Care  Patient Education: Pt will require reinforcement. Barriers to Learning: cognition  REQUIRES PT FOLLOW UP: Yes  Activity Tolerance  Activity Tolerance: Patient limited by cognitive status; Patient limited by pain  Activity Tolerance: Pt wincing at times, right LE demos some impulsive knee extension. Patient Diagnosis(es): There were no encounter diagnoses. has a past medical history of Acute encephalopathy, Anxiety, Aortic insufficiency, CHF (congestive heart failure) ([facility] [address] Chronic kidney disease (CKD), stage III (moderate), COPD, moderate ([facility] [address] Coronavirus infection, Dementia ([facility] [address] Expressive aphasia, Facial twitching, GERD (gastroesophageal reflux disease), Hair loss, Hx of falling, Hyperlipidemia, Hypertension, Ischemic cardiomyopathy, Mild cognitive impairment with memory loss, NSTEMI (non-ST elevated myocardial infarction) ([facility] [address] Seasonal allergies, and Subdural hematoma ([facility] [address] has a past surgical history that includes Ovary removal; Colonoscopy; Colonoscopy (06/22/2015); hip surgery (Left, 07/30/2020); and craniotomy (Left, 3/21/2021). Restrictions  Restrictions/Precautions  Restrictions/Precautions: General Precautions, Up as Tolerated  Position Activity Restriction  Other position/activity restrictions: up with assist, ambulation  Subjective   General  Chart Reviewed: Yes  Additional Pertinent Hx: 80 y.o. female who presents to the emergency department with some increased confusion. Pt found to have SDH. S/p LEFT CRANIOTOMY FOR SUBDURAL HEMATOMA EVACUATION on 3/21. Response To Previous Treatment: Patient unable to report, no changes reported from family or staff  Family / Caregiver Present: No  Referring Practitioner: John Schreiber  Subjective  Subjective: Pt supine in bed, minimally responsive to cues. Connected to continuous ECG.   Pain Screening  Patient Currently in Pain: Denies  Vital Signs  Patient Currently in Pain: Denies       Orientation  Orientation  Overall Orientation Status: Impaired  Orientation Level: (Pt unable to verbalize orientation questions.)  Cognition   Cognition  Overall Cognitive Status: Exceptions  Arousal/Alertness: Inconsistent responses to stimuli  Following Commands: Inconsistently follows commands  Attention Span: Difficulty attending to directions  Safety Judgement: Decreased awareness of need for safety  Insights: Not aware of deficits  Initiation: Requires cues for all  Sequencing: Requires cues for all  Objective   Bed mobility  Supine to Sit: 2 Person assistance;Dependent/Total  Sit to Supine: 2 Person assistance;Dependent/Total  Scooting: Dependent/Total;2 Person assistance  Comment: HOB elevated to 30 degrees, draw sheet used, extended time to complete, pt unable to assist with verbal cues. Transfers  Sit to Stand: Unable to assess  Stand to sit: Unable to assess  Comment: Unsafe to attempt d/t impaired cognition and fidgiting at the EOB. Ambulation  Ambulation?: No(Unsafe to attempt this date.)     Balance  Posture: Fair  Sitting - Static: Fair;+  Sitting - Dynamic: Fair  Comments: Pt demos CGA<>Max A at the EOB for 8 minutes. Pt demos increased sway to the right/left/anterior/posterior without increased control. Pt given posterior support d/t fatigue with sitting balance. Pt unsteady at times and able to follow two commands at the EOB. AAROM/PROM at the EOB with OT. Positioned for hips and knees at 90 degrees for proper sitting balance. Exercises  Knee Short Arc Quad: x10 BLE PROM  Ankle Pumps: x10 BLE PROM  Comments: Pt wincing at times, extended time to complete for comfort.       AM-PAC Score  AM-PAC Inpatient Mobility Raw Score : 11 (03/29/21 1530)  AM-PAC Inpatient T-Scale Score : 33.86 (03/29/21 1530)  Mobility Inpatient CMS 0-100% Score: 72.57 (03/29/21 1530)  Mobility Inpatient CMS G-Code Modifier : CL (03/29/21 1530)        Goals  Short term goals  Time Frame for Short term goals: d/c  Short term goal 1: sup<>sit supervision- Progressing- Max A x2  Short term goal 2: sit<>stand supervision with LRAD  Short term goal 3: amb 48' with LRAD and CGA  Patient Goals   Patient goals : none stated    Plan    Plan  Times per week: 5-7  Times per day: Daily  Current Treatment Recommendations: Strengthening, Balance Training, Gait Training, Stair training, Functional Mobility Training, Transfer Training, Endurance Training, Home Exercise Program, Safety Education & Training  Safety Devices  Type of devices:  All fall risk precautions in place, Bed alarm in place, Call light within reach, Left in bed, Patient at risk for falls, Nurse notified     Therapy Time   Individual Concurrent Group Co-treatment   Time In 1416         Time Out 1440         Minutes 24         Timed Code Treatment Minutes: 25 Minutes     Zhanna Ash, PT

## 2021-03-29 NOTE — PROGRESS NOTES
NEUROSURGERY PROGRESS NOTE    3/29/2021 7:24 AM                               Corona Lee                      LOS: 9 days   POD#8  s/p Procedure(s) (LRB):  LEFT CRANIOTOMY FOR SUBDURAL HEMATOMA EVACUATION (Left)    Subjective: Patient sitting up in bed with eyes closed upon entering the room. Patient had a Rapid Response called yesterday afternoon for increased oxygen requirements. No further seizures but remains encephalopathic and quite lethargic. Physical Exam:  Patient seen and examined    Vitals:    03/29/21 0459   BP: (!) 153/67   Pulse: 68   Resp: 28   Temp: 98.8 °F (37.1 °C)   SpO2: 96%     GCS:  3 - Opens eyes to loud noise or command  2 - Moans, makes unintelligible sounds  6 - Follows simple motor commands     General: Lethargic and fatigued  HENT: Corepak in place and surgical incision on left frontal scalp  Eyes: Optic discs: Not tested  Pulmonary: unlabored respiratory effort  Cardiovascular:  Warm well perfused.  No peripheral edema  Gastrointestinal: abdomen soft, NT, ND     Neurological:  Mental Status: Lethargic and minimally responsive  Attention: Follows simple commands (wiggle toes, squeeze hands, open eyes)  Language: JONAH 2/2 lethargy  Sensation: Grimaces to noxious tactile stimuli in all extremities  Coordination: JONAH 2/2 lethargy     Cranial Nerves:  II: Visual acuity not tested, denies new visual changes / diplopia  III, IV, VI: PERRL, 3 mm bilaterally, JONAH EOM 2/2 lethargy  V: Facial sensation intact bilaterally to touch  VII: Face symmetric  VIII: Hearing intact bilaterally to spoken voice  IX: JONAH Palate movement equal bilaterally 2/2 lethargy  XI: JONAH Shoulder shrug equal bilaterally 2/2 lethargy  XII: Tongue midline     Musculoskeletal:   Gait: Not tested   Assist devices: None   Tone: Normal  Motor strength: Exam limited 2/2 fatigue but moves all extremities to command     Incision: CDI     Radiological Findings:  Ct Head Wo Contrast  Result Date: 3/22/2021  Postoperative changes noted with placement of drainage catheter into the left subdural hematoma with postoperative change from craniotomy in the superior frontal lobe noted. The subdural hemorrhage is slightly decreased from prior study with interval development of postoperative air as above. Degree of mild left to right shift has diminished slightly from prior exam.       Ct Head Wo Contrast  Result Date: 3/25/2021  1. Slight reduction in the left sided subdural hematoma status post drain removal.  2.  Slightly improved mild left-to-right midline shift. 3.  Improving postoperative pneumocephalus.     Ct Head Wo Contrast  Result Date: 3/26/2021  Left frontal craniotomy. Subdural hematoma on the left unchanged. Pneumocephalus unchanged. Midline shift left-to-right unchanged. Labs:  Recent Labs     03/28/21  1530   WBC 13.0*   HGB 8.8*   HCT 27.5*          Recent Labs     03/28/21  1530   *   K 4.5   CL 94*   CO2 19*   BUN 23*   CREATININE 0.9   GLUCOSE 182*   CALCIUM 8.5   PHOS 4.3   MG 2.10       No results for input(s): PROTIME, INR, APTT in the last 72 hours.     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 female s/p Procedure(s) (LRB):  LEFT CRANIOTOMY FOR SUBDURAL HEMATOMA EVACUATION (Left) with 2 post-op seizures in the setting of hyponatremia. Hyponatremia may be contributing to current poor neurologic status. Plan:  1. Neurologically stable  2. Neurologic exams frequency: Q4H  3. Cerebral edema:  - Keep Na within normal limits. Patient Na is 123 as of 1530 on 3/28/2021. Nephrology following for hyponatremia.  - Keep HOB >30 degrees  4. Seizure prophylaxis:  - Neurology managing  - No seizures seen on cvEEG   - Keppra and Vimpat per Neurology recs  5. DVT Prophylaxis: SCD's & SQ Heparin  6. Mobility:  - Advance as tolerates  - PT/OT  7. Will follow inpatient. Please call with any questions or decline in neurological status    DISPO: Dispo timing to be determined by primary team once patient is medically stable for discharge.     Patient was discussed with Dr. Lucie Burden who agrees with above assessment and plan.     Electronically signed by DEBRA López CNP on 3/29/2021 at 7:35 AM  [phone]

## 2021-03-30 PROBLEM — E43 SEVERE MALNUTRITION (HCC): Chronic | Status: ACTIVE | Noted: 2021-01-01

## 2021-03-30 NOTE — PROGRESS NOTES
[facility]    [facility]              [phone]                      Crow Arredondo is admitted with AMS. We are following for Acute hyponatremia    Interval History and plan:      Sodium is at 133< 130  BP is stable and urine is 1750 ml      Plan:  NS can prolong hyponatremia in patient  With SIADH  - urea would be helpful, but her feeding tube is too small, and may be clogged    SP samsca with rapid correction. I gave D5W and she is now in range. Assessment :     Initial evaluation:    Acute Hyponatremia  - Likely hypovolemic hyponatremia due to low oral intake  - No identifiable causes of SIADH  - Continue IVF  - Can consider hypertonic saline if Na continues to drop, currently not necessary  - Continue Na checks q6hr           [facility] would like to thank Katarina Murdock DO   for opportunity to serve this patient      Please call with questions at-   24 Hrs Answering service [phone] or  7 am- 5 pm via Perfect serve or cell phone  Dr. Shilpa Mtz        CC/reason for consult :     AMS     HPI :     80 y.o. female who is transferred from [facility] for neurosurgical consultation. Patient was brought in by her grandson with complaints of increased confusion for the past couple of days and patient complaining of left-sided back pain. Patient was hospitalized here from 3/12 through 3/15, after being transferred from [address] [place] [address] for possible seizure-like episode. At that time patient was found unresponsive by grandson on the couch and noticed seizure-like activity. In ED patient was with nurse to have a seizure by her RN at bedside which lasted for about 30 seconds. CT head revealed left-sided subdural hematoma with associated mass-effect and left-to-right midline shift. Patient has been taking 81 mg of aspirin and Plavix daily at that time.   During the previous hospitalization patient was started on Keppra 1 g twice daily.     Patient was holding onto her upper belly with both hands and morning when I entered the room. Unable to exactly show where it hurts. On palpation patient does not seem to have abdominal tenderness. No guarding/rigidity noted. Patient did not even flinch during deep palpation. Seemed to be pain-free when she is distracted. Was able to lift both her legs off the bed and hold up in the ER with no pain in her belly or back.     At baseline patient has underlying dementia and typically oriented to self, place and situation. Follows commands.     According to her recent hospitalization records she is a limited code with no chest compressions but yes to intubation, defibrillation and medications. ROS:     Patient not responsive, unable to obtain ROS    positives in bold   Constitutional:  fever, chills, weakness, weight change, fatigue  Skin:  rash, pruritus, hair loss, bruising, dry skin, petechiae  Head, Face, Neck   headaches, swelling,  cervical adenopathy  Respiratory: shortness of breath, cough, or wheezing  Cardiovascular: chest pain, palpitations, dizzy, edema  Gastrointestinal: nausea, vomiting, diarrhea, constipation,belly pain    Yellow skin, blood in stool  Musculoskeletal:  back pain, muscle weakness, gait problems,       joint pain or swelling. Genitourinary:  dysuria, poor urine flow, flank pain, blood in urine  Neurologic:  vertigo, TIA'S, syncope, seizures, focal weakness  Psychosocial:  insomnia, anxiety, or depression.   Additional positive findings:                        All other remaining systems are negative or unable to obtain        PMH/PSH/SH/Family History:     Past Medical History:   Diagnosis Date    Acute encephalopathy 03/03/2021    Anxiety     Aortic insufficiency     CHF (congestive heart failure) (HCC)     Chronic kidney disease (CKD), stage III (moderate)     COPD, moderate (HCC)     Coronavirus infection 03/25/2020    D[facility]     Expressive aphasia 07/29/2020    Facial twitching     GERD (gastroesophageal reflux disease)     Hair loss     wears wig- unknown cause    Hx of falling     Hyperlipidemia     Hypertension     Ischemic cardiomyopathy     EF 55% 03/15/2021    Mild cognitive impairment with memory loss 07/25/2019    NSTEMI (non-ST elevated myocardial infarction) ([facility]     Seasonal allergies     Subdural hematoma ([facility] 03/12/2021       Past Surgical History:   Procedure Laterality Date    COLONOSCOPY      COLONOSCOPY  06/22/2015    diverticulosis    CRANIOTOMY Left 3/21/2021    LEFT CRANIOTOMY FOR SUBDURAL HEMATOMA EVACUATION performed by Ac Cheung. Bing Dotson MD at [address] [place] [address] Left 07/30/2020    LEFT HIP HEMIARTHROPLASTY performed by Viji Bonner MD at [address] [place] [address]          reports that she has never smoked. She has never used smokeless tobacco. She reports that she does not drink alcohol or use drugs. family history includes Other in her father.          Medication:     Current Facility-Administered Medications: piperacillin-tazobactam (ZOSYN) 3,375 mg in dextrose 5 % 100 mL IVPB extended infusion (mini-bag), 3,375 mg, Intravenous, Q8H  hydrALAZINE (APRESOLINE) injection 10 mg, 10 mg, Intravenous, Q2H PRN  levETIRAcetam (KEPPRA) 2,000 mg in sodium chloride 0.9 % 150 mL IVPB, 2,000 mg, Intravenous, Q12H  lacosamide (VIMPAT) 200 mg in dextrose 5 % 70 mL IVPB, 200 mg, Intravenous, BID  [Held by provider] carvedilol (COREG) tablet 3.125 mg, 3.125 mg, Oral, BID WC  amLODIPine (NORVASC) tablet 5 mg, 5 mg, Oral, Daily  HYDROcodone-acetaminophen (NORCO) 5-325 MG per tablet 1 tablet, 1 tablet, Oral, Q6H PRN  sennosides-docusate sodium (SENOKOT-S) 8.6-50 MG tablet 2 tablet, 2 tablet, Oral, BID  polyethylene glycol (GLYCOLAX) packet 17 g, 17 g, Oral, Daily  famotidine (PEPCID) tablet 20 mg, 20 mg, Oral, Daily  sodium chloride flush 0.9 % injection 10 mL, 10 mL, Intravenous, 2 times per day  sodium chloride flush 0.9 % injection 10 mL, 10 mL, Intravenous, PRN  naloxone (NARCAN) injection 0.2 mg, 0.2 mg, Intravenous, PRN  promethazine (PHENERGAN) tablet 12.5 mg, 12.5 mg, Oral, Q6H PRN **OR** ondansetron (ZOFRAN) injection 4 mg, 4 mg, Intravenous, Q6H PRN  [Held by provider] heparin (porcine) injection 5,000 Units, 5,000 Units, Subcutaneous, Q12H  ezetimibe (ZETIA) tablet 10 mg, 10 mg, Oral, Nightly  ferrous sulfate (IRON 325) tablet 325 mg, 325 mg, Oral, Daily with breakfast  lisinopril (PRINIVIL;ZESTRIL) tablet 40 mg, 40 mg, Oral, Daily  acetaminophen (TYLENOL) tablet 650 mg, 650 mg, Oral, Q4H PRN  magnesium sulfate 1000 mg in dextrose 5% 100 mL IVPB, 1,000 mg, Intravenous, PRN       Vitals :     Vitals:    03/30/21 0747   BP: (!) 133/46   Pulse: 51   Resp: 14   Temp: 97.4 °F (36.3 °C)   SpO2: 93%       I & O :       Intake/Output Summary (Last 24 hours) at 3/30/2021 0859  Last data filed at 3/30/2021 8560  Gross per 24 hour   Intake 337 ml   Output 1200 ml   Net -863 ml        Physical Examination :     General appearance: Anxious- no, distressed- no, in good spirits- yes    Somnolent, not responsive to verbal cues, tactile stimuli  HEENT: Lips- normal, teeth- ok , oral mucosa- moist  Neck : Mass- no, appears symmetrical, JVD- not visible  Respiratory: Respiratory effort-  normal, wheeze- no, crackles - no  Cardiovascular:  Ausculation- No M/R/G, Edema absent  Abdomen: visible mass- no, distention- no, scar- no, tenderness- no                            hepatosplenomegaly-  no  Musculoskeletal:  clubbing no,cyanosis- no , digital ischemia- no                           muscle strength- grossly normal , tone - grossly normal  Skin: rashes- no , ulcers- no, induration- no, tightening - no  Psychiatric:  Judgement and insight- normal           AAO X 3  Additional finding:      LABS:     Recent Labs     03/28/21  0510 03/28/21  1530 03/29/21  1054   WBC 5.5 13.0* 8.7   HGB 7.1* 8.8* 8.5*   HCT 21.4* 27.5* 25.0*    335 297     Recent Labs     03/28/21  1530 03/29/21  1054 03/29/21  1435 03/29/21  2154 03/30/21  0330   * 132* 130*  130* 133* 130*   K 4.5 3.5 3.3*  --  4.0   CL 94* 95* 98*  --  97*   CO2 19* 22 22  --  24   BUN 23* 25* 24*  --  22*   CREATININE 0.9 0.8 1.0  --  1.0   GLUCOSE 182* 182* 133*  --  334*   MG 2.10 1.90 2.10  --  2.40   PHOS 4.3  --  2.7  --   --

## 2021-03-30 NOTE — PROGRESS NOTES
Neurology Progress Note    Exam:  -Mental status: Eyes open spontaneously. Does not answer orientation questions. Appears confused but more alert than yesterday. Tracks voice.  -Speech & Language: mumbles. Not following any commands  -Cranial nerves: pupils symmetric; no notable dysconjugate gaze; eyes midline; no facial asymmetry  -Motor: moving all extremities symmetrically and minimally   -Other: no adventitious movements noted  Other Systems  -General Appearance: well-developed, well-nourished, no apparent distress  -Neck: supple  -Lungs: breathing unlabored, regular, no audible wheezes  -CV: pulses strong x 4 extremities  -Abd: flat    Labs:  Na-130 mmol/L  Creatinine - 1 mg/dL  WBC 8.7K  Platelet 239N  INR 6.46  Blood culture x 2 NGTD     Studies:  CT head 3/29/21  Evolving left cerebral convexity subdural hematoma with no significant interval change in size. Interval mild decreased in density.       Stable mild local mass effect and 3 mm midline shift to right. CT head 3/26/21: left frontal craniotomy; subdural hematoma on the left unchanged; pneumocephalus unchanged; midline shift left to right     3/29/2021-3/30/2021  Seizures - none  Push buttons - none  Background - EEG background is characterized by continuous generalized slow, intermittent focal slowing right posterior quadrant. CLINICAL INTERPRETATION: This is an abnormal video EEG study  1. Generalized background abnormalities indicative of an underlying diffuse encephalopathy of non-specific etiology   2. Intermittent focal slowing, right posterior quadrant, consistent with underlying focal neuronal dysfunction of non-specific etiology. cEEG  03/28/2021 22:00pm to 08:00am on 03/29/2021  SEIZURES:  None  INTERICTAL:  None  PUSHBUTTONS:  None  BACKGROUND:  Abundant 6-7 Hz medium amplitude generalized theta slowing of the background with abundant superimposed alpha frequencies and 1 - 1.5 Hz delta frequencies. Reactivity is present.

## 2021-03-30 NOTE — PLAN OF CARE
Problem: Falls - Risk of:  Goal: Will remain free from falls  Description: Will remain free from falls  3/30/2021 0429 by Yaa Boyle RN  Note: Fall risk assessment completed overnight. Pt scored as a high fall risk and is currently in seizure precautions. Pt in bed, bed alarm on, bed in lowest position with bed wheels locked. All 4 side rails padded, suction present in room, AVASYS monitor in place. Call light, bedside table within reach. Pt remains in restraints per MD order r/t pulling at lines. Will continue to monitor. Problem: Pain:  Goal: Control of acute pain  Description: Control of acute pain  Outcome: Ongoing  Note: Pt's pain assessed with the advanced dementia scale. Pt's pain rated as 10/10 overnight. Pt administered PRN NORCO (see eMAR). Upon reassessment pt in bedy, eyes closed, respiratory rate >10. Will continue to monitor. Problem: HEMODYNAMIC STATUS  Goal: Patient has stable vital signs and fluid balance  3/30/2021 0429 by Yaa Boyle RN  Outcome: Ongoing  Note: Thus far this shift pt has maintained stable vitals with no complaints of chest pain, lightheadedness/dizziness. Pt has experienced periods of shortness of breath when in pain due to mouth breathing. Those periods of shortness of breath have subsided as pt's pain has been reduced with PRN NORCO (see eMAR). Will continue to monitor. Problem: Skin Integrity:  Goal: Absence of new skin breakdown  Description: Absence of new skin breakdown  3/30/2021 0429 by Yaa Boyle RN  Outcome: Ongoing  Note: Xavier skin assessment completed overnight, pt scored a 12. Pt remains on specialty low air loss mattress per protocol. . Pt has remained incontinent of bowel and bladder overnight. Perineal care provided after each incontinent episode. Pt assisted to turn and reposition every 2 hours and as needed. Will continue to monitor.       Problem: Physical Regulation:  Goal: Ability to maintain a stable neurologic state will improve  Description: Ability to maintain a stable neurologic state will improve  3/30/2021 0425 by Ashkan Ramirez RN  Outcome: Ongoing  Note: Thus far this shift pt has not demonstrated any signs of seizure-like activity . Pt remains on continuous EEG. Pt has remained alert to speech, vocalizes with incomprehensible speech, moves away from painful stimuli. Will continue to monitor. Problem: Non-Violent Restraints  Goal: Removal from restraints as soon as assessed to be safe  Note: Thus far this shift pt has had recurrent periods of agitation/restlessness during which the pt attempts to pull at lines without any improvement with re-direction, repositioning, pain management, alarm, or any other interventions. Will continue to monitor.

## 2021-03-30 NOTE — PROGRESS NOTES
Pt's grandson Adiel Lao updated on pt's currents status and plan of care. All questions answered.  Electronically signed by Marcy Perry RN on 3/29/2021 at 9:21 PM

## 2021-03-30 NOTE — PROGRESS NOTES
Hospitalist Progress Note      PCP: Luis Rangel MD    Date of Admission: 3/20/2021     80 y.o. female who is transferred from [facility], ED for neurosurgical consultation. Patient was brought in by her grandson with complaints of increased confusion for the past couple of days and patient complaining of left-sided back pain. Patient was hospitalized here from 3/12 through 3/15, after being transferred from [facility] for possible seizure-like episode. At that time patient was found unresponsive by grandson on the couch and noticed seizure-like activity. In ED patient was with nurse to have a seizure by her RN at bedside which lasted for about 30 seconds. CT head revealed left-sided subdural hematoma with associated mass-effect and left-to-right midline shift. Patient has been taking 81 mg of aspirin and Plavix daily at that time. During the previous hospitalization patient was started on Keppra 1 g twice daily.     At baseline patient has underlying dementia and typically oriented to self, place and situation. Follows commands. Subjective:     - ROS unobtainable. No distress.      Medications:  Reviewed    Infusion Medications     Scheduled Medications    piperacillin-tazobactam  3,375 mg Intravenous Q8H    levetiracetam  2,000 mg Intravenous Q12H    lacosamide (VIMPAT) IVPB  200 mg Intravenous BID    [Held by provider] carvedilol  3.125 mg Oral BID WC    amLODIPine  5 mg Oral Daily    sennosides-docusate sodium  2 tablet Oral BID    polyethylene glycol  17 g Oral Daily    famotidine  20 mg Oral Daily    sodium chloride flush  10 mL Intravenous 2 times per day    [Held by provider] heparin (porcine)  5,000 Units Subcutaneous Q12H    ezetimibe  10 mg Oral Nightly    ferrous sulfate  325 mg Oral Daily with breakfast    lisinopril  40 mg Oral Daily     PRN Meds: hydrALAZINE, HYDROcodone 5 mg - acetaminophen, sodium chloride flush, naloxone, promethazine **OR** ondansetron, acetaminophen, magnesium sulfate      Intake/Output Summary (Last 24 hours) at 3/30/2021 0943  Last data filed at 3/30/2021 0752  Gross per 24 hour   Intake 337 ml   Output 1200 ml   Net -863 ml       Physical Exam Performed:    BP (!) 133/46   Pulse 51   Temp 97.4 °F (36.3 °C) (Axillary)   Resp 14   Ht 5' 6\" (1.676 m)   Wt 130 lb 11.7 oz (59.3 kg)   SpO2 93%   BMI 21.10 kg/m²     General appearance: drowsy  Head: dressing C/D  HENT: + NG  Respiratory:  Transmitted upper airway sounds. Diminished breath sounds at bases  Cardiovascular: Regular rate and rhythm with normal S1/S2   Abdomen: Soft, non-tender, non-distended   Neurologic: aphasic, right hemiplegia, Somnolent, Localizes pain. No verbal response. Labs:   Recent Labs     03/28/21  0510 03/28/21  1530 03/29/21  1054   WBC 5.5 13.0* 8.7   HGB 7.1* 8.8* 8.5*   HCT 21.4* 27.5* 25.0*    335 297     Recent Labs     03/28/21  1530 03/29/21  1054 03/29/21  1435 03/29/21  2154 03/30/21  0330   * 132* 130*  130* 133* 130*   K 4.5 3.5 3.3*  --  4.0   CL 94* 95* 98*  --  97*   CO2 19* 22 22  --  24   BUN 23* 25* 24*  --  22*   CREATININE 0.9 0.8 1.0  --  1.0   CALCIUM 8.5 8.4 8.8  --  8.3   PHOS 4.3  --  2.7  --   --      No results for input(s): AST, ALT, BILIDIR, BILITOT, ALKPHOS in the last 72 hours. No results for input(s): INR in the last 72 hours.   Urinalysis:      Lab Results   Component Value Date    NITRU Negative 03/26/2021    WBCUA None seen 03/26/2021    BACTERIA Rare 03/21/2021    RBCUA None seen 03/26/2021    BLOODU Negative 03/26/2021    SPECGRAV 1.010 03/26/2021    GLUCOSEU Negative 03/26/2021         Assessment/Plan:    Active Hospital Problems    Diagnosis    Severe malnutrition ([facility] [E43]    Hyponatremia [E87.1]    Encephalopathy acute [G93.40]    Status epilepticus ([facility] [G40.901]    Dementia without behavioral disturbance (HCC) [F03.90]    Subdural hematoma ([facility] [S06.5X9A]   Acute encephalopathy sec to ICH:  Worsening subdural hematoma, with acute and chronic components with increased mass-effect  Baseline dementia  Repeat CT head - Unchanged left cerebral convexity subdural hematoma with 8 mm rightward subfalcine herniation. Neurosurgery consulted - now s/p LEFT CRANIOTOMY FOR SUBDURAL HEMATOMA EVACUATION  No antiplatelets  Subq heparin ok with neurosurgery  Seizure and fall precautions  Will need PEG: re-scheduled. Timing per GI. Neurosurg. following    Recurrent seizures, breakthrough seizures  - Pxt appears to have hx of Generalized tonic-clonic seizures on Keppra  -Recurrent seizures likely in the setting of subdural hematoma, with acute and chronic components with increased mass-effect  -Also noted with hyponatremia, although not low enough to be causing her seizures. Improved. - Continue on Keppra 2 g IV twice daily. Vimpat added for recurrent seizures even while on higher dose Keppra  Urinalysis: neg for features of infection  - Seizure and aspiration precautions.   - Neurology consulted. Appreciate input.  - CEEG completed today  - MRI brain is pending    Bradycardia. BPs stable, HR now improving  Coreg held  TSH previously checked, ok  No fevers or leukocytosis  Monitor and replace electrolytes  Hold Zyprexa  Cardiology on board last week, hx of bradycardia noted. (Previously? Off AV blocking agents)  Continue to hold Coreg    Hyponatremia  Probable hypovolemic hyponatremia from low oral intake. Also considered cerebral salt wasting related to intracranial process or SIADH  - Nephrology following    Acute resp failure with hypoxia  - Likely sec to seizures/benzodiazepines, Aspiration during seizures leann with dysphagia from [address], with aspiration pneumonia. - CXR 3/25 following initial seizure: nil acute. Repeated 3/27: some infiltrates bilaterally  - Has had fevers likely from asp pneumonitis.     - Serial imaging PRN  - Obtained blood cx: NGTD  - Started on empiric BS antibx with fevers and 1.2); Nasogastric; 30; 55; 24  Code Status: DNR-CCA    PT/OT Eval Status: 14 and 15 respectively: SNF planned. Disposition   Remains in pxt pending progress; cEEG monitoring with med adjustment.    Low threshold to return to ICU with precarious resp status  Pall Med following      [place], DO

## 2021-03-30 NOTE — PROGRESS NOTES
CONTINUOUS EEG    Name:  Darren Wilsono 656 Record Number:  [id number]  Age: 80 y.o. Gender: female  : 1934  Today's Date:  3/30/2021  Room:  [address] [place] [address]  Vital Signs   BP (!) 133/46   Pulse 51   Temp 97.4 °F (36.3 °C) (Axillary)   Resp 14   Ht 5' 6\" (1.676 m)   Wt 130 lb 11.7 oz (59.3 kg)   SpO2 94%   BMI 21.10 kg/m²           Continuous EEG Testing Start Time:      Continuous EEG Testing End Time:   10:45 AM    Comments: Per, Ira Brown pt's test is completed. [facility] contacted via telephone number located on screen. Plan of Care: Removed all leads from pt's scalp.     Electronically signed by Yury Schultz on 3/30/2021 at 11:13 AM

## 2021-03-30 NOTE — PLAN OF CARE
Problem: Falls - Risk of:  Goal: Will remain free from falls  Description: Will remain free from falls  3/30/2021 1513 by Deyanira Alex RN  Note: Pt is a Fall Risk. See [facility] [place] Fall Risk Score. Pt bed in low position and side rails up. Call light and belongings in reach. Pt encouraged to call for assistance. Will continue with hourly rounds for PO intake, pain needs, toileting, and repositioning as needed. Problem: Pain:  Goal: Pain level will decrease  Description: Pain level will decrease  Note: Scoring pain using advanced dementia scale, no pain noted so far this shift     Problem: Skin Integrity:  Goal: Will show no infection signs and symptoms  Description: Will show no infection signs and symptoms  Note: Speciality mattress in use. Q2 turns remain in place, mir care provided as needed for incont episodes. Purewick applied to patient. No s/sx of infection noted this shift     Problem: Physical Regulation:  Goal: Signs of adequate cerebral perfusion will increase  Description: Signs of adequate cerebral perfusion will increase  Note: Seizure precautions remain in place, cont EEG discontinued. All 4 side rails up and padded, suction at bedside. IV Keppra and Vimpat ordered (see MAR for details). Will cont to monitor     Problem: Non-Violent Restraints  Goal: Removal from restraints as soon as assessed to be safe  3/30/2021 1604 by Deyanira Alex RN  Note: Pt remains in bilateral wrist restraints due to not being able to follow commands and pulling at lines/tubing. Restraint documentation performed q2hrs. Order good until 2030 tonight.  Will cont to monitor

## 2021-03-30 NOTE — CARE COORDINATION
CM following for discharge planning. Pt is from home, plan to to go to [facility] at discharge. Hens completed #[id number]    Per SW previous note, Oneal Gaines at [facility] [phone] following as they received a referral on patient. Pt is currently in restraints, will need to be out of restraints for 24 hours before able to discharge to facility. Will need EMS transport at discharge. Pt is NPO, peg tube pending medical clearance. Pt currently on 6 L O2 per n/c. cvEEG discontinued this am. CM to continue to follow.      Conrad Lozoya RN, BSN, [facility]  Case Management Department  [phone]

## 2021-03-30 NOTE — PROGRESS NOTES
Pt's grandson Melissa Maximino updated on pt's current status and plan of care. All questions answered.  Electronically signed by Adela Pretty RN on 3/30/2021 at 6:52 AM

## 2021-03-30 NOTE — PROGRESS NOTES
Pt's incision site cleaned with soap & water, tolerated well.  No drainage, swelling, or redness noted

## 2021-03-30 NOTE — PROGRESS NOTES
development of postoperative air as above.       Degree of mild left to right shift has diminished slightly from prior exam.        Previous MBS - n/a  Chart reviewed  Medical Diagnosis: subdural hematoma  Treatment Diagnosis: dysphagia    BSE Impression 3/22/21  Patient presents with suspected oropharyngeal dysphagia. Unable to complete oral motor exam, pt with inconsistent command following. Limited verblizations. Assessed tolerance ice chips and thin liquids via tsp/straw; pt with positive oral acceptance, mildly prolonged oral prep, suspect delayed/reduced swallow onset/laryngeal elevation, good oral clearance, consistent cough/throat clear with all PO. Recommend make NPO, consider temporary alternative means nutrition/hydration, frequent oral care, ice chips PRN. Will continue to follow. MBS results - not appropriate at this time    Pain:  No indication of pain through any means    Current Diet : NPO    Treatment:  Pt seen bedside to address the following goals:  Goal 1: Patient will tolerate repeat BSE.  3/23; new order received, pt already on caseload. D/w RN who states pt appropriate for re-assessment. Pt appeared uncomfortable throughout session, with constant movement attempting to get weight off left hip area. Rn reports pt had pain medication 15 min prior. Pt with minimal participation, verbalized only 1 x, stating, \"please wait a minute. \" No other verbalizations noted, pt very slow to respond to tasks, with intermittent attention to tasks presented. Pt accepted limited amount of PO, including 2 ice chips, 2 tsps of water, single sip of water via cup edge and straw. Pt exhibited one instance of mild cough after sip of water, no other coughing or throat clear noted. Not able to assess vocal quality due to pt with no verbalizations with the exception of one time. No participation with attempts at further evaluation. As assessment very limited, not able to safely recommend diet.   Recommend oral care, ice chips or small sips of water for comfort with re-assessment as pt participates. RN will page SLP if appropriate later this date  Cont goal  3/24: pt alert, sitting up in bed, does not respond to questions or commands. Intermittent verbalizations stating, \"please wait a minute. \"    Pt took trial and then declined another trial for several minutes, then would take one more. This pattern was repeated throughout session, over 15 minutes, pt consumed 4 ounces of water via tsp/cup and straw and 3 tsp of puree. Intermittent throat clear noted, no coughing, when vocalizing no change noted. Pt declined trials of solids, therefore not able to recommend a solid consistency. As this was behavior last session and again this date, concern would be pt ability to maintain nutrition. However research shows feeding tubes are not indicated for pts with advanced dementia, though pt does have new neuro deficits at this time. Recommend trial full liquids with close monitoring for s/s of aspiration and consider calorie count. Goal met  3/29: re-assessment s/p seizure activity:  RN states pt a little more awake, okay to attempt re-assessment. No verbal responses to questions or following of commands. Pt opened eyes to verbal stim and opened mouth to touch of spoon. Dropped small ice chip in oral cavity, pt contained without anterior spillage and with time swallow noted. Presented water via tsp/cup and straw, pt attempted to bite down on all, had to remove for pt safety. Recommend cont NPO, small amounts of ice chips per RN only,  if pt alert enough  Cont   3/30: pt alert to voice, positioned fully upright 90 degrees with RN. Pt did not follow commands or respond verbally to any questions. Presented ice chips, tsp water and 1/8 tsp puree. Pt accepted ice chip, however anterior loss of liquid noted. Pt did not demonstrate labial seal around spoon for tsp of water, anterior loss noted again.   Pt did not demonstrate any movement to take puree from spoon, no awareness of labial residue. Pt not appropriate for PO at this time. Cont goal    New goal:  2-  Pt will consume PO safely without signs or symptoms of aspiration  3/25: pt sitting up in bed, tray at bedside. Pt again continually moving around in bed, in what appears to be attempts at getting comfortable. Pt accepted limited PO again this date, however consumed in less time. Pt consumed pudding x 3, jello x 1 and several sips of water via straw. Pt exhibited no overt signs of aspiration, not able to assess vocal quality as remained non verbal.  Pt did exhibit mild anterior loss with pudding that liquified. Pt attempting to verbalize but does not appear able to initiate. Pt did not respond to questions or follow commands. Per chart, pt having PEG placed 3/26. Goal on hold    Patient/Family/Caregiver Education:  Pt educated to purpose of visit, does not indicate comprehension    Compensatory Strategies:  n/a     Plan:  Continued daily Dysphagia treatment with goals per  plan of care. Diet recommendations: cont NPO  Oral care / small amounts of ice chips if alert enough for the comfort of pt, health and hydration of oropharyngeal mucosa and swallow stimulation (per RN only)  DC recommendation: ongoing treatment indicated. Treatment: 14  D/W nursing   Needs met prior to leaving room, call button in reach. Wilian Frank M.S./CCC-SLP #[id number]  Pg.  # [phone]  If patient is discharged prior to next treatment, this note will serve as the discharge summary

## 2021-03-30 NOTE — PROGRESS NOTES
CONTINUOUS VIDEO EEG MONITORING    DATE OF SERVICE: 3/29/2021 08:00 TO 3/30/2021 10:51    NAME: Landy Arango   YOB: 1934  SEX: female  MEDICAL RECORD YMADIHA:[id number]    EEG-CCTV study:   The patient is a 80 y.o.y old female monitored at the request of the team for altered mental status and concern for subclinical seizures. EEG-VIDEO MONITORING METHODOLOGY:   Time-locked EEG-video monitoring was performed using the 32-channel [employer] monitoring system. Analyses of the monitoring data were performed using the following techniques:   1. Review of the relevant EEG-video data. 2. Review of events detected by the computer system in detail. 3. Review of clinical seizures, with both detailed review of EEG and video and playback using multiple montages. A variety of referential and bipolar montages were used. CLINICAL AND EEG ANALYSIS:  Video EEG recording was reviewed in real time by a technologist, and then reviewed at least twice a day when available on the  with maximum possible sampling. Results of the monitoring were related to the treating team frequently throughout the study, at least once a day to help guide treatment via verbal or written communication as brief notes or direct text messages or emails. Updates and response to treatment was communicated as requested by the requesting physician or the team.    3/29/2021-3/30/2021    Seizures - none  Push buttons - none  Background - EEG background is characterized by continuous generalized slow, intermittent focal slowing right posterior quadrant. CLINICAL INTERPRETATION: This is an abnormal video EEG study  1. Generalized background abnormalities indicative of an underlying diffuse encephalopathy of non-specific etiology   2. Intermittent focal slowing, right posterior quadrant, consistent with underlying focal neuronal dysfunction of non-specific etiology.

## 2021-03-30 NOTE — PROGRESS NOTES
Pt with increased secretions and increased work of breathing. Pt NT suctioned which revealed what appeared to be tube feed. On-call hospitalist notified. Communication received to decrease tube feed to 30mL/hr and to continue to monitor.  Electronically signed by Uriel Moreland RN on 3/30/2021 at 12:05 AM

## 2021-03-30 NOTE — PROGRESS NOTES
Pt bladder scanned per order. Bladder scan revealed 250mL urine in bladder. Pt has had 1 very large incontinent episode thus far this shift. On-call hospitalist notified. Will continue to monitor.  Electronically signed by Uriel Moreland RN on 3/30/2021 at 3:21 AM

## 2021-03-30 NOTE — PROGRESS NOTES
Pt's Na 133. On-call nephrology notified. Will continue to monitor.  Electronically signed by Prince Catracho RN on 3/29/2021 at 11:10 PM

## 2021-03-30 NOTE — PROGRESS NOTES
Pt's grandchild Jazmín Faye called and voicemail left with callback number. Will continue to monitor.  Electronically signed by Hulen Cockayne, RN on 3/29/2021 at 8:25 PM

## 2021-03-30 NOTE — PROGRESS NOTES
NEUROSURGERY PROGRESS NOTE    3/30/2021 9:41 AM                               Winter Garcia                      LOS: 10 days   POD#9  s/p Procedure(s) (LRB):  LEFT CRANIOTOMY FOR SUBDURAL HEMATOMA EVACUATION (Left)    Subjective: Patient sitting up in bed with eyes closed upon entering the room. No acute events overnight. No seizure activity seen on cvEEG. Patient more alert this morning than yesterday. Physical Exam:  Patient seen and examined    Vitals:    03/30/21 0747   BP: (!) 133/46   Pulse: 51   Resp: 14   Temp: 97.4 °F (36.3 °C)   SpO2: 93%     GCS:  3 - Opens eyes to loud noise or command  2 - Moans, makes unintelligible sounds  6 - Follows simple motor commands     General: Lethargic and fatigued  HENT: Corepak in place and surgical incision on left frontal scalp  Eyes: Optic discs: Not tested  Pulmonary: unlabored respiratory effort  Cardiovascular:  Warm well perfused.  No peripheral edema  Gastrointestinal: abdomen soft, NT, ND     Neurological:  Mental Status: Lethargic but JONAH orientation 2/2 only moans  Attention: Follows simple commands (wiggle toes, squeeze hands, open eyes)  Language: JONAH 2/2 lethargy  Sensation: Grimaces to noxious tactile stimuli in all extremities  Coordination: JONAH 2/2 lethargy     Cranial Nerves:  II: Visual acuity not tested, denies new visual changes / diplopia  III, IV, VI: PERRL, 3 mm bilaterally, JONAH EOM 2/2 lethargy  V: Facial sensation intact bilaterally to touch  VII: Face symmetric  VIII: Hearing intact bilaterally to spoken voice  IX: JONAH Palate movement equal bilaterally 2/2 lethargy  XI: JONAH Shoulder shrug equal bilaterally 2/2 lethargy  XII: Tongue midline     Musculoskeletal:   Gait: Not tested   Assist devices: None   Tone: Normal  Motor strength: Exam limited 2/2 fatigue but moves all extremities to command anti gravity with good strength     Incision: CDI     Radiological Findings:  Ct Head Wo Contrast  Result Date: 3/22/2021  Postoperative changes noted with placement of drainage catheter into the left subdural hematoma with postoperative change from craniotomy in the superior frontal lobe noted. The subdural hemorrhage is slightly decreased from prior study with interval development of postoperative air as above. Degree of mild left to right shift has diminished slightly from prior exam.       Ct Head Wo Contrast  Result Date: 3/25/2021  1. Slight reduction in the left sided subdural hematoma status post drain removal.  2.  Slightly improved mild left-to-right midline shift. 3.  Improving postoperative pneumocephalus.     Ct Head Wo Contrast  Result Date: 3/26/2021  Left frontal craniotomy. Subdural hematoma on the left unchanged. Pneumocephalus unchanged. Midline shift left-to-right unchanged. Labs:  Recent Labs     03/29/21  1054   WBC 8.7   HGB 8.5*   HCT 25.0*          Recent Labs     03/29/21  1435 03/29/21  1435 03/30/21  0330   *  130*   < > 130*   K 3.3*  --  4.0   CL 98*  --  97*   CO2 22  --  24   BUN 24*  --  22*   CREATININE 1.0  --  1.0   GLUCOSE 133*  --  334*   CALCIUM 8.8  --  8.3   PHOS 2.7  --   --    MG 2.10  --  2.40    < > = values in this interval not displayed. No results for input(s): PROTIME, INR, APTT in the last 72 hours.     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 female s/p Procedure(s) (LRB):  LEFT CRANIOTOMY FOR SUBDURAL HEMATOMA EVACUATION (Left) with 2 post-op seizures in the setting of hyponatremia. Hyponatremia may be contributing to current poor neurologic status. Plan:  1. Neurologically stable  2. Neurologic exams frequency: Q4H  3. Cerebral edema:  - Keep Na within normal limits. Patient Na is 130 as of 0330 on 3/30/2021. Nephrology following for hyponatremia.  - Keep HOB >30 degrees  4. Seizure prophylaxis:  - Neurology managing  - cvEEG discontinued 2/2 no seizures seen on cvEEG for 48 hours  - Keppra and Vimpat per Neurology recs  5. DVT Prophylaxis: SCD's & SQ Heparin  6. Mobility:  - Advance as tolerates  - PT/OT  7. Will follow inpatient. Please call with any questions or decline in neurological status    DISPO: Dispo timing to be determined by primary team once patient is medically stable for discharge.     Patient was discussed with Dr. Maria Esther Coyle who agrees with above assessment and plan.     Electronically signed by DEBRA Hudson CNP on 3/30/2021 at 9:41 AM  [phone]

## 2021-03-31 NOTE — PROGRESS NOTES
Palliative Care Chart Review  and Check in Note:     NAME:  Brenda Alanis  Admit Date: 3/20/2021  Hospital Day:  Hospital Day: 12   Current Code status: DNR-CCA    Palliative care is continuing to following Ms. Elli Sahu for symptom management,  and goals of care discussion as needed. Patient's chart reviewed today 3/31/21. Pt is restless while in restraints. Called son Marvin Velazco and gave him an update on pt's status. We discussed goals of care and he wants to continue aggressive care at this time, in hopes that pt will improve over the coming days. We agreed to continue the discussion depending on pt's status. We also discussed pain/symptom management and while understanding potential risks of pain meds, he wants to continue treating her pain with norco as needed. He will be by to visit pt tomorrow. D/w ABHISHEK Weiss and Dr. Toro Jacobs. The following are the currently established goals/code status, and Symptom management. Goals of care: Son wants to give pt more time to see if she will improve. Code status: DNR-CCA    Discharge plan:  To be determined    Vanessa Branham NP  Palliative Care  [phone]

## 2021-03-31 NOTE — PROGRESS NOTES
At shift changed, pt has large amout of BM all over bed, NG tube shelter out. Head to toe bathed provided with oncoming and offgoing RNs.

## 2021-03-31 NOTE — PROGRESS NOTES
[facility] NEPHROLOGY    [facility]              [phone]                      Chun Sparks is admitted with AMS. We are following for Acute hyponatremia    Interval History and plan:      Sodium is at 133< 130> 137  Urine is 750 ml  BP is elevated      Plan:  - Has SIADH  - urea would be helpful, but her feeding tube is too small, and may be clogged  - SP Samsca and sodium is better  - continue amlodipine , lisinopril and add clonidine patch  - stop sodium monitoring      Assessment :     Initial evaluation:    Acute Hyponatremia  - Likely hypovolemic hyponatremia due to low oral intake  - No identifiable causes of SIADH  - Continue IVF  - Can consider hypertonic saline if Na continues to drop, currently not necessary  - Continue Na checks q6hr           [facility] Nephrology would like to thank [facility], DO   for opportunity to serve this patient      Please call with questions at-   24 Hrs Answering service [phone] or  7 am- 5 pm via Perfect serve or cell phone  Dr. Kenji Goins        CC/reason for consult :     AMS     HPI :     80 y.o. female who is transferred from [facility], ED for neurosurgical consultation. Patient was brought in by her grandson with complaints of increased confusion for the past couple of days and patient complaining of left-sided back pain. Patient was hospitalized here from 3/12 through 3/15, after being transferred from [facility] for possible seizure-like episode. At that time patient was found unresponsive by grandson on the couch and noticed seizure-like activity. In ED patient was with nurse to have a seizure by her RN at bedside which lasted for about 30 seconds. CT head revealed left-sided subdural hematoma with associated mass-effect and left-to-right midline shift. Patient has been taking 81 mg of aspirin and Plavix daily at that time.   During the previous hospitalization patient was started on Keppra 1 g twice daily.     Patient was holding onto her upper belly with both hands and morning when I entered the room. Unable to exactly show where it hurts. On palpation patient does not seem to have abdominal tenderness. No guarding/rigidity noted. Patient did not even flinch during deep palpation. Seemed to be pain-free when she is distracted. Was able to lift both her legs off the bed and hold up in the ER with no pain in her belly or back.     At baseline patient has underlying dementia and typically oriented to self, place and situation. Follows commands.     According to her recent hospitalization records she is a limited code with no chest compressions but yes to intubation, defibrillation and medications. ROS:     Patient not responsive, unable to obtain ROS    positives in bold   Constitutional:  fever, chills, weakness, weight change, fatigue  Skin:  rash, pruritus, hair loss, bruising, dry skin, petechiae  Head, Face, Neck   headaches, swelling,  cervical adenopathy  Respiratory: shortness of breath, cough, or wheezing  Cardiovascular: chest pain, palpitations, dizzy, edema  Gastrointestinal: nausea, vomiting, diarrhea, constipation,belly pain    Yellow skin, blood in stool  Musculoskeletal:  back pain, muscle weakness, gait problems,       joint pain or swelling. Genitourinary:  dysuria, poor urine flow, flank pain, blood in urine  Neurologic:  vertigo, TIA'S, syncope, seizures, focal weakness  Psychosocial:  insomnia, anxiety, or depression.   Additional positive findings:                        All other remaining systems are negative or unable to obtain        PMH/PSH/SH/Family History:     Past Medical History:   Diagnosis Date    Acute encephalopathy 03/03/2021    Anxiety     Aortic insufficiency     CHF (congestive heart failure) (HCC)     Chronic kidney disease (CKD), stage III (moderate)     COPD, moderate [facility]     Coronavirus infection 03/25/2020    Dementia ([facility]     Expressive aphasia 07/29/2020    Facial 03/30/21  0330 03/30/21  1730 03/30/21  2320 03/31/21  0426   *   < > 130*  130*   < > 130* 133* 136 137   K 4.5   < > 3.3*  --  4.0  --   --  3.8   CL 94*   < > 98*  --  97*  --   --  100   CO2 19*   < > 22  --  24  --   --  25   BUN 23*   < > 24*  --  22*  --   --  16   CREATININE 0.9   < > 1.0  --  1.0  --   --  0.7   GLUCOSE 182*   < > 133*  --  334*  --   --  162*   MG 2.10   < > 2.10  --  2.40  --   --  2.10   PHOS 4.3  --  2.7  --   --   --   --   --     < > = values in this interval not displayed.

## 2021-03-31 NOTE — PROGRESS NOTES
NEUROSURGERY PROGRESS NOTE    3/31/2021 9:14 AM                               Marquis Sellers                      LOS: 11 days   POD#10  s/p Procedure(s) (LRB):  LEFT CRANIOTOMY FOR SUBDURAL HEMATOMA EVACUATION (Left)    Subjective: Patient sitting up in bed with eyes closed upon entering the room. No acute events overnight. Patient more alert this morning than yesterday. Physical Exam:  Patient seen and examined    Vitals:    03/31/21 0748   BP: (!) 185/60   Pulse: 68   Resp: 18   Temp: 97.8 °F (36.6 °C)   SpO2: 94%     GCS:  3 - Opens eyes to loud noise or command  2 - Moans, makes unintelligible sounds  6 - Follows simple motor commands     General: Lethargic and fatigued  HENT: Corepak in place and surgical incision on left frontal scalp  Eyes: Optic discs: Not tested  Pulmonary: unlabored respiratory effort  Cardiovascular:  Warm well perfused.  No peripheral edema  Gastrointestinal: abdomen soft, NT, ND     Neurological:  Mental Status: Lethargic but JONAH orientation 2/2 only moans  Attention: Follows simple commands (wiggle toes, squeeze hands, open eyes)  Language: JONAH 2/2 lethargy  Sensation: Grimaces to noxious tactile stimuli in all extremities  Coordination: JONAH 2/2 lethargy     Cranial Nerves:  II: Visual acuity not tested, denies new visual changes / diplopia  III, IV, VI: PERRL, 3 mm bilaterally, JONAH EOM 2/2 lethargy  V: Facial sensation intact bilaterally to touch  VII: Face symmetric  VIII: Hearing intact bilaterally to spoken voice  IX: JONAH Palate movement equal bilaterally 2/2 lethargy  XI: JONAH Shoulder shrug equal bilaterally 2/2 lethargy  XII: Tongue midline     Musculoskeletal:   Gait: Not tested   Assist devices: None   Tone: Normal  Motor strength: Exam limited 2/2 fatigue but moves all extremities to command anti gravity with good strength     Incision: CDI     Radiological Findings:  Ct Head Wo Contrast  Result Date: 3/22/2021  Postoperative changes noted with placement of drainage catheter into the left subdural hematoma with postoperative change from craniotomy in the superior frontal lobe noted. The subdural hemorrhage is slightly decreased from prior study with interval development of postoperative air as above. Degree of mild left to right shift has diminished slightly from prior exam.       Ct Head Wo Contrast  Result Date: 3/25/2021  1. Slight reduction in the left sided subdural hematoma status post drain removal.  2.  Slightly improved mild left-to-right midline shift. 3.  Improving postoperative pneumocephalus.     Ct Head Wo Contrast  Result Date: 3/26/2021  Left frontal craniotomy. Subdural hematoma on the left unchanged. Pneumocephalus unchanged. Midline shift left-to-right unchanged. Labs:  Recent Labs     03/29/21  1054   WBC 8.7   HGB 8.5*   HCT 25.0*          Recent Labs     03/29/21  1435 03/29/21  1435 03/31/21  0426   *  130*   < > 137   K 3.3*   < > 3.8   CL 98*   < > 100   CO2 22   < > 25   BUN 24*   < > 16   CREATININE 1.0   < > 0.7   GLUCOSE 133*   < > 162*   CALCIUM 8.8   < > 8.6   PHOS 2.7  --   --    MG 2.10   < > 2.10    < > = values in this interval not displayed. No results for input(s): PROTIME, INR, APTT in the last 72 hours.     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 female s/p Procedure(s) (LRB):  LEFT CRANIOTOMY FOR SUBDURAL HEMATOMA EVACUATION (Left) with 2 post-op seizures in the setting of hyponatremia. Plan:  1. Neurologically stable  2. Neurologic exams frequency: Q4H  3. Cerebral edema:  - Keep Na within normal limits. Patient Na is 137 as of 0426 on 3/31/2021. Nephrology following for hyponatremia.  - Keep HOB >30 degrees  4. Seizure prophylaxis:  - Neurology managing  - Keppra and Vimpat per Neurology recs  5. DVT Prophylaxis: SCD's & SQ Heparin  6. Mobility:  - Advance as tolerates  - PT/OT  7. Will follow inpatient. Please call with any questions or decline in neurological status    DISPO: Dispo timing to be determined by primary team once patient is medically stable for discharge.     Patient was discussed with Dr. Noreen Tolentino who agrees with above assessment and plan.     Electronically signed by DEBRA Mcmillan CNP on 3/31/2021 at 9:14 AM  [phone]

## 2021-03-31 NOTE — PROGRESS NOTES
Occupational Therapy  Facility/Department: [place] [address] [facility]  Daily Treatment Note  NAME: Winter Garcia  : 1934  MRN: [id number]    Date of Service: 3/31/2021    Discharge Recommendations: Winter Garcia scored a 6/24 on the AM-PAC ADL Inpatient form. Current research shows that an AM-PAC score of 17 or less is typically not associated with a discharge to the patient's home setting. Based on the patient's AM-PAC score and their current ADL deficits, it is recommended that the patient have 3-5 sessions per week of Occupational Therapy at d/c to increase the patient's independence. Please see assessment section for further patient specific details. If patient discharges prior to next session this note will serve as a discharge summary. Please see below for the latest assessment towards goals. OT Equipment Recommendations  Other: defer to next setting    Assessment   Performance deficits / Impairments: Decreased functional mobility ; Decreased endurance;Decreased coordination;Decreased ADL status; Decreased balance;Decreased strength  Assessment: Pt continues to be non verbal during therapy session and only opened eyes briefly 2-3x. Pt not following commands for majority of session despite maximal VC and tactile cues. Pt sat on EOB for ~10 min and flucuates between min-max A. Pt stood at [address] stedy 2x with mod A x 2 for initial transfer and only min A x 1 for standing balance. Pt cont to be signficantly below baseline and would benefit from ongoing inpatien therapy services at discharge to maximize independence. Will cont to follow on acute OT POC.   Treatment Diagnosis: decreased ADLs and transfers secondary to SDH  Prognosis: Fair;Guarded  OT Education: OT Role;Orientation  Patient Education: pt does not verbalize understanding or display alertness  Barriers to Learning: cognition  REQUIRES OT FOLLOW UP: Yes  Activity Tolerance  Activity Tolerance: Patient limited by fatigue  Activity Tolerance: pt signficantly fatigued this date with overal decreased arousal and alertness; pt opened eyes 2-3 x during session  Safety Devices  Safety Devices in place: Yes  Type of devices: All fall risk precautions in place; Bed alarm in place;Call light within reach; Left in bed;Nurse notified  Restraints  Initially in place: Yes  Restraints: soft bilateral wrist retraints donned at beginning of session; doffed during session with RN permission and redonned at end of session; RN aware         Patient Diagnosis(es): There were no encounter diagnoses. has a past medical history of Acute encephalopathy, Anxiety, Aortic insufficiency, CHF (congestive heart failure) ([address] Chronic kidney disease (CKD), stage III (moderate), COPD, moderate ([address] Coronavirus infection, Dementia ([address] Expressive aphasia, Facial twitching, GERD (gastroesophageal reflux disease), Hair loss, Hx of falling, Hyperlipidemia, Hypertension, Ischemic cardiomyopathy, Mild cognitive impairment with memory loss, NSTEMI (non-ST elevated myocardial infarction) ([address] Seasonal allergies, and Subdural hematoma ([address] has a past surgical history that includes Ovary removal; Colonoscopy; Colonoscopy (06/22/2015); hip surgery (Left, 07/30/2020); and craniotomy (Left, 3/21/2021). Restrictions  Restrictions/Precautions  Restrictions/Precautions: General Precautions, Up as Tolerated  Position Activity Restriction  Other position/activity restrictions: up with assist, ambulation  Subjective   General  Chart Reviewed: Yes  Additional Pertinent Hx: Pt admitted to ED with SDH s/p fall 10 days prior. LEFT CRANIOTOMY FOR SUBDURAL HEMATOMA EVACUATION on 3/21.  PMHx includes: Acute encephalopathy (03/03/2021), Anxiety, Aortic insufficiency, CHF (congestive heart failure) ([address] Chronic kidney disease (CKD), stage III (moderate), COPD, moderate ([address] Coronavirus infection (03/25/2020), Dementia ([address] Expressive aphasia (07/29/2020), Facial twitching, GERD (gastroesophageal reflux disease), Hair loss, falling, Hyperlipidemia, Hypertension, Ischemic cardiomyopathy, Mild cognitive impairment with memory loss (2019), NSTEMI (non-ST elevated myocardial infarction) ([facility] Seasonal allergies, and Subdural hematoma ([facility] (2021). Response to previous treatment: Patient with no complaints from previous session  Family / Caregiver Present: No  Referring Practitioner: RICARDO Multani  Diagnosis: SDH  Subjective  Subjective: Pt lying supine in bed upon OT arrival. Pt sleeping and alerting with maximal verbal cues and light sternal rub with lights on. Pt did not make any meaningful verbalizations throughout session and is minimally responsive. General Comment  Comments: . Orientation  Orientation  Orientation Level: Unable to assess  Objective    ADL  Feeding: NPO(feeding tube)  Grooming: Dependent/Total;Increased time to complete(total A to wipe face with wash cloth and brush/clean hair despite maximal verbal cues and HH technique to encourage participation)  LE Dressing: Dependent/Total(total A to doff/don socks)  Toileting: Dependent/Total(total A from bed level)        Balance  Sitting Balance: Maximum assistance(flucuated between min A-max A with posterior lean; tolerated sitting for 10 min on edge of bed)  Standing Balance: Minimal assistance  Standing Balance  Time: 2 trials on 1 min  Activity: stance at naomy stedy  Comment: performed 2x for 1 min with min A at [address] stedy while holding onto with BUE- pt arousal increased to safe level but pt still not opening eyes or verbally engaging in conversation  Functional Mobility  Functional Mobility Comments: not safe to perform this date  Bed mobility  Supine to Sit: 2 Person assistance;Dependent/Total  Sit to Supine: 2 Person assistance;Dependent/Total  Scootin Person assistance;Dependent/Total  Transfers  Sit to stand: 2 Person assistance; Moderate assistance  Stand to sit: Moderate assistance;2 Person assistance  Transfer Comments: mod A x 2 from edge of bed with naomy stedy on first trial and from [address] on second trial                       Cognition  Overall Cognitive Status: Exceptions  Arousal/Alertness: Inconsistent responses to stimuli  Following Commands: Inconsistently follows commands  Attention Span: Difficulty attending to directions  Safety Judgement: Decreased awareness of need for safety  Insights: Not aware of deficits  Initiation: Requires cues for all  Sequencing: Requires cues for all  Cognition Comment: pt minimally responsive this date despite maximal efforts           Positioning  Bed Positioning Type: Lower extremity; Upper extremity  Bed Postion Comment: BUE supported; heels elevated from bed           LUE AROM (degrees)  LUE General AROM: difficult to assess 2/2 cog; pt not performing despite maximal VC, pt able to place hands on naomy stedy requring ~90 degrees of shoulder flexion  RUE AROM (degrees)  RUE General AROM: difficult to assess 2/2 cog; pt not performing despite maximal VC, pt able to place hands on naomy stedy requring ~90 degrees of shoulder flexion                 Plan   Plan  Times per week: 5-7  Times per day: Daily  Current Treatment Recommendations: Strengthening, Balance Training, Functional Mobility Training, Endurance Training, Self-Care / ADL, Neuromuscular Re-education  G-Code     OutComes Score                                                  AM-PAC Score        AM-[facility] Inpatient Daily Activity Raw Score: 6 (03/31/21 1641)  AM-PAC Inpatient ADL T-Scale Score : 17.07 (03/31/21 1641)  ADL Inpatient CMS 0-100% Score: 100 (03/31/21 1641)  ADL Inpatient CMS G-Code Modifier : CN (03/31/21 1641)    Goals  Short term goals  Time Frame for Short term goals: by dc  Short term goal 1: Pt will complete LB dressing with mod A - ongoing  Short term goal 2: Pt will complete toileting with min A - ongoing, not addressed 3/31 (roberts cath)  Short term goal 3: Pt will complete multi step grooming task with CGA - ongoing  Short term goal 4: Pt will complete B UE HEP x 20 reps for increased tolerance - ongoing  Patient Goals   Patient goals : to go home       Therapy Time   Individual Concurrent Group Co-treatment   Time In 1450         Time Out 1520         Minutes 30         Timed Code Treatment Minutes: [address], OT

## 2021-03-31 NOTE — PROGRESS NOTES
Hospitalist Progress Note      PCP: Eleutreio Miranda MD    Date of Admission: 3/20/2021     80 y.o. female who is transferred from [facility] for neurosurgical consultation. Patient was brought in by her grandson with complaints of increased confusion for the past couple of days and patient complaining of left-sided back pain. Patient was hospitalized here from 3/12 through 3/15, after being transferred from [facility] for possible seizure-like episode. At that time patient was found unresponsive by grandson on the couch and noticed seizure-like activity. In ED patient was with nurse to have a seizure by her RN at bedside which lasted for about 30 seconds. CT head revealed left-sided subdural hematoma with associated mass-effect and left-to-right midline shift. Patient has been taking 81 mg of aspirin and Plavix daily at that time. During the previous hospitalization patient was started on Keppra 1 g twice daily.     At baseline patient has underlying dementia and typically oriented to self, place and situation. Follows commands. Subjective:     - ROS unobtainable. No distress. Appears to nod a little bit to questions.      Medications:  Reviewed    Infusion Medications     Scheduled Medications    piperacillin-tazobactam  3,375 mg Intravenous Q8H    levetiracetam  2,000 mg Intravenous Q12H    lacosamide (VIMPAT) IVPB  200 mg Intravenous BID    [Held by provider] carvedilol  3.125 mg Oral BID WC    amLODIPine  5 mg Oral Daily    sennosides-docusate sodium  2 tablet Oral BID    polyethylene glycol  17 g Oral Daily    famotidine  20 mg Oral Daily    sodium chloride flush  10 mL Intravenous 2 times per day    heparin (porcine)  5,000 Units Subcutaneous Q12H    ezetimibe  10 mg Oral Nightly    ferrous sulfate  325 mg Oral Daily with breakfast    lisinopril  40 mg Oral Daily     PRN Meds: hydrALAZINE, HYDROcodone 5 mg - acetaminophen, sodium chloride flush, naloxone, promethazine **OR** ondansetron, acetaminophen, magnesium sulfate      Intake/Output Summary (Last 24 hours) at 3/31/2021 0878  Last data filed at 3/31/2021 0319  Gross per 24 hour   Intake 2160 ml   Output 750 ml   Net 1410 ml       Physical Exam Performed:    BP (!) 185/60   Pulse 68   Temp 97.8 °F (36.6 °C) (Axillary)   Resp 18   Ht 5' 6\" (1.676 m)   Wt 131 lb 2.8 oz (59.5 kg)   SpO2 94%   BMI 21.17 kg/m²     General appearance: drowsy  Head: dressing C/D  HENT: + NG  Respiratory:  Transmitted upper airway sounds. Diminished breath sounds at bases  Cardiovascular: Regular rate and rhythm with normal S1/S2   Abdomen: Soft, non-tender, non-distended   Neurologic: aphasic, right hemiplegia, Somnolent, Localizes pain. No verbal response. Labs:   Recent Labs     03/28/21  1530 03/29/21  1054   WBC 13.0* 8.7   HGB 8.8* 8.5*   HCT 27.5* 25.0*    297     Recent Labs     03/28/21  1530 03/28/21  1530 03/29/21  1435 03/29/21  1435 03/30/21  0330 03/30/21  1730 03/30/21  2320 03/31/21  0426   *   < > 130*  130*   < > 130* 133* 136 137   K 4.5   < > 3.3*  --  4.0  --   --  3.8   CL 94*   < > 98*  --  97*  --   --  100   CO2 19*   < > 22  --  24  --   --  25   BUN 23*   < > 24*  --  22*  --   --  16   CREATININE 0.9   < > 1.0  --  1.0  --   --  0.7   CALCIUM 8.5   < > 8.8  --  8.3  --   --  8.6   PHOS 4.3  --  2.7  --   --   --   --   --     < > = values in this interval not displayed. No results for input(s): AST, ALT, BILIDIR, BILITOT, ALKPHOS in the last 72 hours. No results for input(s): INR in the last 72 hours.   Urinalysis:      Lab Results   Component Value Date    NITRU Negative 03/30/2021    WBCUA 0-2 03/30/2021    BACTERIA Rare 03/30/2021    RBCUA 3-4 03/30/2021    BLOODU TRACE-INTACT 03/30/2021    SPECGRAV 1.020 03/30/2021    GLUCOSEU Negative 03/30/2021       Assessment/Plan:    Active Hospital Problems    Diagnosis    Severe malnutrition ([address] [E43]    Hyponatremia [E87.1]    Encephalopathy acute [G93.40]    Status epilepticus ([address] [G40.901]    Dementia without behavioral disturbance (HCC) [F03.90]    Subdural hematoma (HCC) [S06.5X9A]   Acute encephalopathy sec to ICH:  Worsening subdural hematoma, with acute and chronic components with increased mass-effect  Baseline dementia  Repeat CT head - Unchanged left cerebral convexity subdural hematoma with 8 mm rightward subfalcine herniation. Neurosurgery consulted - now s/p LEFT CRANIOTOMY FOR SUBDURAL HEMATOMA EVACUATION  No antiplatelets  Subq heparin ok with neurosurgery    Recurrent seizures, breakthrough seizures  - Pxt appears to have hx of Generalized tonic-clonic seizures on Keppra  -Recurrent seizures likely in the setting of subdural hematoma, with acute and chronic components with increased mass-effect  -Also noted with hyponatremia, although not low enough to be causing her seizures. Improved. - Continue on Keppra 2 g IV twice daily. Vimpat added for recurrent seizures even while on higher dose Keppra  Urinalysis: neg for features of infection  - Seizure and aspiration precautions.   - Neurology consulted. Appreciate input.  - CEEG completed  - MRI brain with small punctate area of stroke. Discussed with neuro, unlikely to be contributing to current presentation. Could consider further stroke work-up but it is unlikely to      Bradycardia. HR now improving  Coreg held  TSH previously checked, ok  No fevers or leukocytosis  Monitor and replace electrolytes  Hold Zyprexa  Cardiology on board last week, hx of bradycardia noted. (Previously? Off AV blocking agents)  Continue to hold Coreg    Hyponatremia, resolved  Probable hypovolemic hyponatremia from low oral intake.  Also considered cerebral salt wasting related to intracranial process or SIADH  - Nephrology following    Acute resp failure with hypoxia  - Likely sec to seizures/benzodiazepines, Aspiration during seizures leann with dysphagia from [address], with aspiration pneumonia. Aspiration pneumonia  - Aspiration during seizures leann with dysphagia from [address]  - CXR 3/25 following initial seizure: nil acute. Repeated 3/27: some infiltrates bilaterally  - Has had fevers likely from asp pneumonitis. Nil further since startig antibx. - Obtained blood cx: NGTD  - Started on empiric BS antibx with fevers and significant amount of milky liquid suctioned from her mouth noted in drainage jar. CXR 3/27: infiltrates. - Will DC Vanc for now, MRSA screen negative. - Asp precautions  - Continues on Zosyn day 4    Acute urinary retention  Bladder scanned for 660 cc  UA: not sugg of infection  [place] is out    Severe Protein calorie malnutrition:  Body mass index is 21.17 kg/m². Still very lethargic and not safe for po intake. NG placed. Resume TF. Will need peg- GI consulted. Timing of PEG: Per GI when more stable. Left hip pain and echymosis:  Likely sec to fall. Not on any blood thinners  Repeat xray of hip negative. CAD/Ischemic cardiomyopathy   Ejection fraction of 30-35%, moderate MR and moderate to severe AR  -Was on aspirin and Plavix, which were discontinued during previous hospitalization  Continue on statin  Continue ACEI  BB held for bradycardia    HTN: uncontrolled  Continue lisinopril  Coreg held due to recurrence of bradycardia  Monitor Bps and adjust meds as needed: Currently has been high in the setting of recurrent seizures. Amlodipine increased, clonidine patch added    History of MGUS-follows with hematology-oncology    Dementia  - Reportedly at baseline, patient has underlying dementia and typically oriented to self, place and situation.  - Delirium protocol      DVT Prophylaxis: SCDs, Subq heparin  Diet: DIET TUBE FEED CONTINUOUS/CYCLIC NPO; STANDARD WITH FIBER (Jevity 1.2); Nasogastric; 30; 55; 24  Code Status: DNR-CCA    PT/OT Eval Status: 14 and 15 respectively: SNF planned.       Disposition   Remains in pxt pending progress  Low threshold to return to ICU with precarious resp status  Pall Med following  Plan to discuss goals of care again with family     Naif Cherry DO

## 2021-03-31 NOTE — PROGRESS NOTES
Physical Therapy  Facility/Department: [place] [address] [facility]  Daily Treatment Note  NAME: Minor Members  : 1934  MRN: [id number]    Date of Service: 3/31/2021    Discharge Recommendations:  Minor Members scored a 7/24 on the AM-PAC short mobility form. Current research shows that an AM-PAC score of 17 or less is typically not associated with a discharge to the patient's home setting. Based on the patient's AM-PAC score and their current functional mobility deficits, it is recommended that the patient have 3-5 sessions per week of Physical Therapy at d/c to increase the patient's independence. Please see assessment section for further patient specific details. If patient discharges prior to next session this note will serve as a discharge summary. Please see below for the latest assessment towards goals. Patient would benefit from continued therapy after discharge   PT Equipment Recommendations  Equipment Needed: No  Other: Defer to next level of care    Assessment   Body structures, Functions, Activity limitations: Decreased functional mobility ; Decreased endurance;Decreased balance;Decreased strength;Decreased safe awareness; Increased pain  Assessment: Pt continues to require max A x 2 for bed mobility & up to max A for sitting balance. Able to  [facility] stedy today. Pt remains limited by poor cognition. Safety concerns for pt to return home upon D/C. Strongly recommend further inpt PT. Will follow per plan of care. Treatment Diagnosis: Decreased functional mobility  Prognosis: Fair  PT Education: PT Role;Plan of Care  Patient Education: Pt demonstrates no learning & will require reinforcement. Barriers to Learning: cognition  REQUIRES PT FOLLOW UP: Yes  Activity Tolerance  Activity Tolerance: Patient limited by cognitive status; Patient limited by fatigue     Patient Diagnosis(es): There were no encounter diagnoses.      has a past medical history of Acute encephalopathy, Anxiety, Aortic insufficiency, CHF (congestive heart failure) ([facility] [address] Chronic kidney disease (CKD), stage III (moderate), COPD, moderate ([facility] [address] Coronavirus infection, Dementia ([facility] [address] Expressive aphasia, Facial twitching, GERD (gastroesophageal reflux disease), Hair loss, Hx of falling, Hyperlipidemia, Hypertension, Ischemic cardiomyopathy, Mild cognitive impairment with memory loss, NSTEMI (non-ST elevated myocardial infarction) ([facility] [address] Seasonal allergies, and Subdural hematoma ([facility] [address] has a past surgical history that includes Ovary removal; Colonoscopy; Colonoscopy (2015); hip surgery (Left, 2020); and craniotomy (Left, 3/21/2021). Restrictions  Restrictions/Precautions  Restrictions/Precautions: General Precautions, Up as Tolerated  Position Activity Restriction  Other position/activity restrictions: up with assist, ambulation  Subjective   General  Chart Reviewed: Yes  Additional Pertinent Hx: 80 y.o. female who presents to the emergency department with some increased confusion. Pt found to have SDH. S/p LEFT CRANIOTOMY FOR SUBDURAL HEMATOMA EVACUATION on 3/21. Family / Caregiver Present: No  Referring Practitioner: John Jean  Subjective  Subjective: Pt supine in bed, lethargic. Eyes closed. Pt opened eyes only briefly with max cues. Pt non-verbal & not following commands.   General Comment  Comments: pt in bilat wrist restraints  Pain Screening  Patient Currently in Pain: (unable to assess)  Vital Signs  Patient Currently in Pain: (unable to assess)                Objective   Bed mobility  Supine to Sit: 2 Person assistance;Dependent/Total  Sit to Supine: 2 Person assistance;Dependent/Total  Scootin Person assistance;Dependent/Total  Transfers  Sit to Stand: 2 Person Assistance(mod A x 2 from bed to [facility])  Stand to sit: Moderate Assistance  Comment: pt unsafe for transfer to chair d/t lethargy & poor cognition  Ambulation  Ambulation?: No(unable)     Balance  Sitting - Static: Fair;Poor(varied from min to max A. pt tending to lean anteriorly.)  Standing - Static: Fair(pt stood in naomy stedy for ~1 min x 2 & min A/CGA.)  Exercises  Heelslides: x 10 bilat  Hip Abduction: x 10 bilat  Ankle Pumps: x 10 bilat  Comments: mostly PROM, occ AAROM. bilat gastroc stretches 30 sec x 2.                                                                              AM-PAC Score  AM-PAC Inpatient Mobility Raw Score : 7 (03/31/21 1703)  AM-PAC Inpatient T-Scale Score : 26.42 (03/31/21 1703)  Mobility Inpatient CMS 0-100% Score: 92.36 (03/31/21 1703)  Mobility Inpatient CMS G-Code Modifier : CM (03/31/21 1703)          Goals  Short term goals  Time Frame for Short term goals: d/c  Short term goal 1: supine<->sit mod A x 1-2  Short term goal 2: sit EOB with CGA for 5 min  Short term goal 3: sit<->stand min A x 1-2  Patient Goals   Patient goals : none stated    Plan    Plan  Times per week: 5-7  Times per day: Daily  Current Treatment Recommendations: Strengthening, Balance Training, Gait Training, Stair training, Functional Mobility Training, Transfer Training, Endurance Training, Home Exercise Program, Safety Education & Training  Safety Devices  Type of devices: Call light within reach, Bed alarm in place, Left in bed, Nurse notified  Restraints  Initially in place: Yes  Restraints: bilat wrist     Therapy Time   Individual Concurrent Group Co-treatment   Time In [facility], PT

## 2021-03-31 NOTE — PLAN OF CARE
Problem: Falls - Risk of:  Goal: Will remain free from falls  Description: Will remain free from falls  3/31/2021 1321 by Karen Cat RN  Note: Pt is a Fall Risk. See [facility] Fall Risk Score. Pt bed in low position and side rails up. Call light and belongings in reach. Pt encouraged to call for assistance. Will continue with hourly rounds for PO intake, pain needs, toileting, and repositioning as needed. Problem: Pain:  Goal: Pain level will decrease  Description: Pain level will decrease  3/31/2021 1321 by Karen Cat RN  Note: Scoring pain using advanced dementia scale. PRN Norco ordered but unable to give due to no NG tube and pt being NPO. MD notified, one time dose of ativan ordered (see MAR for details). Will cont to monitor     Problem: HEMODYNAMIC STATUS  Goal: Patient has stable vital signs and fluid balance  3/31/2021 1321 by Karen Cat RN  Note: Patient's BP has been elevated so far this shift, IV hydralazine given as needed. HR remains NRS, SpO2 >90% on 3-5L via nasal cannula so far this shift. I/O being monitored, roberts catheter in place. Problem: Skin Integrity:  Goal: Will show no infection signs and symptoms  Description: Will show no infection signs and symptoms  3/31/2021 1321 by Karen Cat RN  Note: Speciality mattress in use. Q2 turns remain in place, mir care including roberts care provided as needed for incont episodes. Purewick applied to patient. No s/sx of infection noted this shift     Problem: Safety:  Goal: Ability to remain free from injury will improve  Description: Ability to remain free from injury will improve  3/31/2021 1321 by Karen Cat RN  Note: Seizure precautions remain in place, cont EEG discontinued. All 4 side rails up and padded, suction at bedside. IV Keppra and Vimpat ordered (see MAR for details).  Will cont to monitor     Problem: Non-Violent Restraints  Goal: Removal from restraints as soon as assessed to be safe  3/31/2021 1321 by Shana Ace Priscilla Gupta RN  Note: Pt remains in bilateral wrist restraints due to not being able to follow commands and pulling at lines/tubing. Restraint documentation performed q2hrs. Order expires 2030 tonight.  Will cont to monitor

## 2021-03-31 NOTE — PROGRESS NOTES
Speech Language Pathology  Dysphagia - attempt      Chart reviewed, d/w RN. Pt in pain at this time, Rn attempting to address. RN will page or will check back later this date as pt able to participate and schedule allows      Mj Parker M.S./[facility]-SLP #[id number]  Pg.  # [phone]

## 2021-03-31 NOTE — PROGRESS NOTES
Perfect serve on call nephrologist, Dr. Arlene Pickering, regarding pt's sodium level of 136, no new orders at this time

## 2021-03-31 NOTE — CARE COORDINATION
Case Management Assessment           Daily Note                 Date/ Time of Note: 3/31/2021 3:22 PM         Note completed by: Sammie Garcias    Patient Name: Corona Lee  YOB: 1934    Diagnosis:Subdural hematoma [facility]) [S06.5X9A]  Patient Admission Status: Inpatient    Date of Admission:3/20/2021  7:52 PM Length of Stay: 11 GLOS:      Current Plan of Care: in restraints, NG to tube feeds, vimpat, keppra, IV abx, 4L O2  ________________________________________________________________________________________  PT AM-PAC: 11 / 24 per last evaluation on: 3.29    OT AM-PAC: 6 / 24 per last evaluation on: 3.29    DME Needs for discharge: defer  ________________________________________________________________________________________  Discharge Plan: SNF: [facility]   Latoya #[id number]    Tentative discharge date: TBD    Current barriers to discharge: medical clearance        ________________________________________________________________________________________  Case Management Notes:  CM spoke with palliative care. They are going to reach out to family again today for goals of care. Pt not medically stable for discharge at this time. [facility] SNF at discharge. Hens completed #[id number]    Per SW david Zazueta at [facility] as they received a referral on patient. Jaz and her family were provided with choice of provider; she and her family are in agreement with the discharge plan.     Care Transition Patient: Marybel Garcias RN  [facility]  Case Management Department  Ph: [phone]  Fax: [phone]

## 2021-03-31 NOTE — PLAN OF CARE
Problem: Falls - Risk of:  Goal: Will remain free from falls  Description: Will remain free from falls  Outcome: Ongoing   Pt remains without falls during shift. Pt does not attempt to get OOB alone. call light within reach. Safety precautions in place include non slip footwear, fall armband, towel at foot of bed, bed and chair alarms. Bed in lowest position, wheels locked, rails up 2 /4. Continue with current care. Problem: Pain:  Goal: Pain level will decrease  Description: Pain level will decrease  Outcome: Ongoing   PRN Drew given per [facility] utilizing Advanced Dementia pain assessment, on re assessment, pt asleep with RR greater than 12. Problem: HEMODYNAMIC STATUS  Goal: Patient has stable vital signs and fluid balance  Outcome: Ongoing     Problem: Nutrition  Goal: Optimal nutrition therapy  Outcome: Ongoing   On TF @ goal rate. Problem: Non-Violent Restraints  Goal: Removal from restraints as soon as assessed to be safe  3/31/2021 0549 by Ewelina Parker RN  Outcome: Ongoing   On bilateral wrist restraints for pulling lines and wires. Problem: Skin Integrity:  Goal: Will show no infection signs and symptoms  Description: Will show no infection signs and symptoms  3/31/2021 0549 by Ewelina Parker RN  Outcome: Ongoing   Q2 turn, on specialty mattress.

## 2021-03-31 NOTE — PROGRESS NOTES
Neurology Progress Note    Exam:  -Mental status: Eyes open spontaneously. Does not answer orientation questions. Appears confused but more alert than yesterday. Tracks voice.  -Speech & Language: mumbles. Not following any commands  -Cranial nerves: pupils symmetric; no notable dysconjugate gaze; eyes midline; no facial asymmetry  -Motor: moving all extremities symmetrically and minimally   -Other: no adventitious movements noted  Other Systems  -General Appearance: well-developed, well-nourished, no apparent distress  -Neck: supple  -Lungs: breathing unlabored, regular, no audible wheezes  -CV: pulses strong x 4 extremities  -Abd: flat    Labs:  Na-137 mmol/L  Creatinine - 0.7 mg/dL  WBC 8.7K  Platelet 386F  INR 9.16  Blood culture x 2 NGTD     Studies:  MRI brain  Stable left cerebral convexity subdural hematoma. The signal characteristics suggest a late subacute hematoma. There is mild local mass effect without significant midline shift.       Punctate recent infarct in the left temporal occipital region.       Mild-to-moderate chronic small vessel ischemic change and mild atrophy     CT head 3/29/21  Evolving left cerebral convexity subdural hematoma with no significant interval change in size. Interval mild decreased in density.       Stable mild local mass effect and 3 mm midline shift to right. CT head 3/26/21: left frontal craniotomy; subdural hematoma on the left unchanged; pneumocephalus unchanged; midline shift left to right     3/29/2021-3/30/2021  Seizures - none  Push buttons - none  Background - EEG background is characterized by continuous generalized slow, intermittent focal slowing right posterior quadrant. CLINICAL INTERPRETATION: This is an abnormal video EEG study  1. Generalized background abnormalities indicative of an underlying diffuse encephalopathy of non-specific etiology   2.   Intermittent focal slowing, right posterior quadrant, consistent with underlying focal neuronal

## 2021-03-31 NOTE — PROGRESS NOTES
Speech Language Pathology  Dysphagia - attempt    Attempted again to see pt, however pt did not respond to verbal or tactile stim, kept eyes closed. Placed ice chip with gloved hand on lips as well as with spoon. Pt exhibited no awareness, no attempt to take from spoon. Will follow up as pt able and schedule allows    Fletcher Recinos M.S./[facility]-SLP #[id number]  Pg.  # [phone]

## 2021-04-01 NOTE — PROGRESS NOTES
development of postoperative air as above.       Degree of mild left to right shift has diminished slightly from prior exam.        Previous MBS - n/a  Chart reviewed  Medical Diagnosis: subdural hematoma  Treatment Diagnosis: dysphagia    BSE Impression 3/22/21  Patient presents with suspected oropharyngeal dysphagia. Unable to complete oral motor exam, pt with inconsistent command following. Limited verblizations. Assessed tolerance ice chips and thin liquids via tsp/straw; pt with positive oral acceptance, mildly prolonged oral prep, suspect delayed/reduced swallow onset/laryngeal elevation, good oral clearance, consistent cough/throat clear with all PO. Recommend make NPO, consider temporary alternative means nutrition/hydration, frequent oral care, ice chips PRN. Will continue to follow. MBS results - not appropriate at this time    Pain:  No indication of pain through any means    Current Diet : NPO    Treatment:  Pt seen bedside to address the following goals:  Goal 1: Patient will tolerate repeat BSE.  3/23; new order received, pt already on caseload. D/w RN who states pt appropriate for re-assessment. Pt appeared uncomfortable throughout session, with constant movement attempting to get weight off left hip area. Rn reports pt had pain medication 15 min prior. Pt with minimal participation, verbalized only 1 x, stating, \"please wait a minute. \" No other verbalizations noted, pt very slow to respond to tasks, with intermittent attention to tasks presented. Pt accepted limited amount of PO, including 2 ice chips, 2 tsps of water, single sip of water via cup edge and straw. Pt exhibited one instance of mild cough after sip of water, no other coughing or throat clear noted. Not able to assess vocal quality due to pt with no verbalizations with the exception of one time. No participation with attempts at further evaluation. As assessment very limited, not able to safely recommend diet.   Recommend oral any movement to take puree from spoon, no awareness of labial residue. Pt not appropriate for PO at this time. Cont goal  4/1: pt more alert, opened eyes when request made, though did not follow any commands or verbalize. Attempted presentations of ice chips, water via tsp/cup, straw and puree. Pt did not actively take bolus from spoon or cup, no labial seal around cup /spoon or straw, anterior loss of bolus noted. Placed ice chip in oral cavity, no movement/propulsion or mastication occurred. Cont NPO with tube feeds  Cont goal as appropriate    New goal:  2-  Pt will consume PO safely without signs or symptoms of aspiration  3/25: pt sitting up in bed, tray at bedside. Pt again continually moving around in bed, in what appears to be attempts at getting comfortable. Pt accepted limited PO again this date, however consumed in less time. Pt consumed pudding x 3, jello x 1 and several sips of water via straw. Pt exhibited no overt signs of aspiration, not able to assess vocal quality as remained non verbal.  Pt did exhibit mild anterior loss with pudding that liquified. Pt attempting to verbalize but does not appear able to initiate. Pt did not respond to questions or follow commands. Per chart, pt having PEG placed 3/26. Goal dc    Patient/Family/Caregiver Education:  Pt educated to purpose of visit, does not indicate comprehension    Compensatory Strategies:  n/a     Plan:  Continued daily Dysphagia treatment with goals per  plan of care. Diet recommendations: cont NPO  Oral care / small amounts of ice chips if alert enough for the comfort of pt, health and hydration of oropharyngeal mucosa and swallow stimulation (per RN only)  DC recommendation: ongoing treatment indicated. Treatment: 11  D/W nursing Sandra  Needs met prior to leaving room, call button in reach. Mj Parker M.S./[facility]-SLP #[id number]  Pg.  # [phone]  If patient is discharged prior to next treatment, this note will serve as the

## 2021-04-01 NOTE — PROGRESS NOTES
[facility] NEPHROLOGY    [facility]              [phone]                      Shantal Palmer is admitted with AMS. We are following for Acute hyponatremia    Interval History and plan:      Sodium is at 133< 130> 137> 133  Urine is 2050 ml  BP is elevated      Plan:  - Has SIADH  - urea would be helpful, but her feeding tube is too small, and may be clogged  - SP Samsca and sodium is better  - continue amlodipine , lisinopril and added clonidine patch       Assessment :     Initial evaluation:    Acute Hyponatremia  - Likely hypovolemic hyponatremia due to low oral intake  - No identifiable causes of SIADH  - Continue IVF  - Can consider hypertonic saline if Na continues to drop, currently not necessary  - Continue Na checks q6hr           [facility] Nephrology would like to thank Lisa Haji DO   for opportunity to serve this patient      Please call with questions at-   24 Hrs Answering service [phone] or  7 am- 5 pm via Perfect serve or cell phone  Dr. Temo Bosch        CC/reason for consult :     AMS     HPI :     80 y.o. female who is transferred from [facility] ED for neurosurgical consultation. Patient was brought in by her grandson with complaints of increased confusion for the past couple of days and patient complaining of left-sided back pain. Patient was hospitalized here from 3/12 through 3/15, after being transferred from [facility] for possible seizure-like episode. At that time patient was found unresponsive by grandson on the couch and noticed seizure-like activity. In ED patient was with nurse to have a seizure by her RN at bedside which lasted for about 30 seconds. CT head revealed left-sided subdural hematoma with associated mass-effect and left-to-right midline shift. Patient has been taking 81 mg of aspirin and Plavix daily at that time.   During the previous hospitalization patient was started on Keppra 1 g twice daily.     Patient was holding onto her upper belly with both hands and morning when I entered the room. Unable to exactly show where it hurts. On palpation patient does not seem to have abdominal tenderness. No guarding/rigidity noted. Patient did not even flinch during deep palpation. Seemed to be pain-free when she is distracted. Was able to lift both her legs off the bed and hold up in the ER with no pain in her belly or back.     At baseline patient has underlying dementia and typically oriented to self, place and situation. Follows commands.     According to her recent hospitalization records she is a limited code with no chest compressions but yes to intubation, defibrillation and medications. ROS:     Patient not responsive, unable to obtain ROS    positives in bold   Constitutional:  fever, chills, weakness, weight change, fatigue  Skin:  rash, pruritus, hair loss, bruising, dry skin, petechiae  Head, Face, Neck   headaches, swelling,  cervical adenopathy  Respiratory: shortness of breath, cough, or wheezing  Cardiovascular: chest pain, palpitations, dizzy, edema  Gastrointestinal: nausea, vomiting, diarrhea, constipation,belly pain    Yellow skin, blood in stool  Musculoskeletal:  back pain, muscle weakness, gait problems,       joint pain or swelling. Genitourinary:  dysuria, poor urine flow, flank pain, blood in urine  Neurologic:  vertigo, TIA'S, syncope, seizures, focal weakness  Psychosocial:  insomnia, anxiety, or depression.   Additional positive findings:                        All other remaining systems are negative or unable to obtain        PMH/PSH/SH/Family History:     Past Medical History:   Diagnosis Date    Acute encephalopathy 03/03/2021    Anxiety     Aortic insufficiency     CHF (congestive heart failure) (HCC)     Chronic kidney disease (CKD), stage III (moderate)     COPD, moderate (HCC)     Coronavirus infection 03/25/2020    D[facility] [address]     Expressive aphasia 07/29/2020    Facial twitching     GERD (gastroesophageal reflux disease)     Hair loss     wears wig- unknown cause    Hx of falling     Hyperlipidemia     Hypertension     Ischemic cardiomyopathy     EF 55% 03/15/2021    Mild cognitive impairment with memory loss 07/25/2019    NSTEMI (non-ST elevated myocardial infarction) ([facility] [address]     Seasonal allergies     Subdural hematoma ([facility] [address] 03/12/2021       Past Surgical History:   Procedure Laterality Date    COLONOSCOPY      COLONOSCOPY  06/22/2015    diverticulosis    CRANIOTOMY Left 3/21/2021    LEFT CRANIOTOMY FOR SUBDURAL HEMATOMA EVACUATION performed by Deborah Wood. Leonard England MD at [address] [place] [address] Left 07/30/2020    LEFT HIP HEMIARTHROPLASTY performed by Paul Pepe MD at [address] [place] [address]          reports that she has never smoked. She has never used smokeless tobacco. She reports that she does not drink alcohol or use drugs. family history includes Other in her father.          Medication:     Current Facility-Administered Medications: cloNIDine (CATAPRES) 0.2 MG/24HR 1 patch, 1 patch, Transdermal, Weekly  amLODIPine (NORVASC) tablet 10 mg, 10 mg, Oral, Daily  piperacillin-tazobactam (ZOSYN) 3,375 mg in dextrose 5 % 100 mL IVPB extended infusion (mini-bag), 3,375 mg, Intravenous, Q8H  hydrALAZINE (APRESOLINE) injection 10 mg, 10 mg, Intravenous, Q2H PRN  levETIRAcetam (KEPPRA) 2,000 mg in sodium chloride 0.9 % 150 mL IVPB, 2,000 mg, Intravenous, Q12H  lacosamide (VIMPAT) 200 mg in dextrose 5 % 70 mL IVPB, 200 mg, Intravenous, BID  sennosides-docusate sodium (SENOKOT-S) 8.6-50 MG tablet 2 tablet, 2 tablet, Oral, BID  polyethylene glycol (GLYCOLAX) packet 17 g, 17 g, Oral, Daily  famotidine (PEPCID) tablet 20 mg, 20 mg, Oral, Daily  sodium chloride flush 0.9 % injection 10 mL, 10 mL, Intravenous, 2 times per day  sodium chloride flush 0.9 % injection 10 mL, 10 mL, Intravenous, PRN  naloxone (NARCAN) injection 0.2 mg, 0.2 mg, Intravenous, PRN  promethazine (PHENERGAN) tablet 12.5 mg, 12.5 mg, Oral, Q6H PRN **OR** ondansetron (ZOFRAN) injection 4 mg, 4 mg, Intravenous, Q6H PRN  heparin (porcine) injection 5,000 Units, 5,000 Units, Subcutaneous, Q12H  ezetimibe (ZETIA) tablet 10 mg, 10 mg, Oral, Nightly  ferrous sulfate (IRON 325) tablet 325 mg, 325 mg, Oral, Daily with breakfast  lisinopril (PRINIVIL;ZESTRIL) tablet 40 mg, 40 mg, Oral, Daily  acetaminophen (TYLENOL) tablet 650 mg, 650 mg, Oral, Q4H PRN  magnesium sulfate 1000 mg in dextrose 5% 100 mL IVPB, 1,000 mg, Intravenous, PRN       Vitals :     Vitals:    04/01/21 0717   BP: (!) 157/56   Pulse: 66   Resp: 20   Temp: 96.8 °F (36 °C)   SpO2: 93%       I & O :       Intake/Output Summary (Last 24 hours) at 4/1/2021 0919  Last data filed at 4/1/2021 0549  Gross per 24 hour   Intake 528 ml   Output 2050 ml   Net -1522 ml        Physical Examination :     General appearance: Anxious- no, distressed- no, in good spirits- yes    Somnolent, not responsive to verbal cues, tactile stimuli  HEENT: Lips- normal, teeth- ok , oral mucosa- moist  Neck : Mass- no, appears symmetrical, JVD- not visible  Respiratory: Respiratory effort-  normal, wheeze- no, crackles - no  Cardiovascular:  Ausculation- No M/R/G, Edema absent  Abdomen: visible mass- no, distention- no, scar- no, tenderness- no                            hepatosplenomegaly-  no  Musculoskeletal:  clubbing no,cyanosis- no , digital ischemia- no                           muscle strength- grossly normal , tone - grossly normal  Skin: rashes- no , ulcers- no, induration- no, tightening - no  Psychiatric:  Judgement and insight- normal           AAO X 3  Additional finding:      LABS:     Recent Labs     03/29/21  1054   WBC 8.7   HGB 8.5*   HCT 25.0*        Recent Labs     03/29/21  1435 03/29/21  1435 03/30/21  0330 03/30/21  0330 03/30/21  2320 03/31/21  0426 04/01/21  0438   *  130*   < > 130*   < > 136 137 133*   K 3.3*  --  4.0  --   --  3.8 4.2   CL 98*  --  97*  --   -- 100 98*   CO2 22  --  24  --   --  25 25   BUN 24*  --  22*  --   --  16 14   CREATININE 1.0  --  1.0  --   --  0.7 0.6   GLUCOSE 133*  --  334*  --   --  162* 164*   MG 2.10  --  2.40  --   --  2.10 2.00   PHOS 2.7  --   --   --   --   --   --     < > = values in this interval not displayed.

## 2021-04-01 NOTE — PROCEDURES
CONTINUOUS VIDEO EEG MONITORING    DATE OF SERVICE: 3/31/2021 08:00 TO 3/30/2021 10:51    NAME: [facility]   YOB: 1934  SEX: female  MEDICAL RECORD [id number]    EEG-CCTV study:   The patient is a 80 y.o.y old female monitored at the request of the team for altered mental status and concern for subclinical seizures. EEG-VIDEO MONITORING METHODOLOGY:   Time-locked EEG-video monitoring was performed using the 32-channel Tweet Category monitoring system. Analyses of the monitoring data were performed using the following techniques:   1. Review of the relevant EEG-video data. 2. Review of events detected by the computer system in detail. 3. Review of clinical seizures, with both detailed review of EEG and video and playback using multiple montages. A variety of referential and bipolar montages were used. CLINICAL AND EEG ANALYSIS:  Video EEG recording was reviewed in real time by a technologist, and then reviewed at least twice a day when available on the  with maximum possible sampling. Results of the monitoring were related to the treating team frequently throughout the study, at least once a day to help guide treatment via verbal or written communication as brief notes or direct text messages or emails. Updates and response to treatment was communicated as requested by the requesting physician or the team.    3/31/2021-3/30/2021    Seizures - none  Push buttons - none  Background - EEG background is characterized by continuous generalized slow, intermittent focal slowing right posterior quadrant. CLINICAL INTERPRETATION: This is an abnormal video EEG study  1. Generalized background abnormalities indicative of an underlying diffuse encephalopathy of non-specific etiology   2. Intermittent focal slowing, right posterior quadrant, consistent with underlying focal neuronal dysfunction of non-specific etiology.

## 2021-04-01 NOTE — PROGRESS NOTES
NEUROSURGERY PROGRESS NOTE    4/1/2021 9:55 AM                               Petros Hightower                      LOS: 12 days   POD#11  s/p Procedure(s) (LRB):  LEFT CRANIOTOMY FOR SUBDURAL HEMATOMA EVACUATION (Left)    Subjective: Patient sitting up in bed upon entering the room. No acute events overnight. Patient neurologically stable. Physical Exam:  Patient seen and examined    Vitals:    04/01/21 0717   BP: (!) 157/56   Pulse: 66   Resp: 20   Temp: 96.8 °F (36 °C)   SpO2: 93%     GCS:  3 - Opens eyes to loud noise or command  2 - Moans, makes unintelligible sounds  6 - Follows simple motor commands     General: Lethargic and fatigued  HENT: Corepak in place and surgical incision on left frontal scalp  Eyes: Optic discs: Not tested  Pulmonary: unlabored respiratory effort  Cardiovascular:  Warm well perfused.  No peripheral edema  Gastrointestinal: abdomen soft, NT, ND     Neurological:  Mental Status: Lethargic but JONAH orientation 2/2 only moans  Attention: Follows simple commands (wiggle toes, squeeze hands, open eyes)  Language: JONAH 2/2 lethargy  Sensation: Grimaces to noxious tactile stimuli in all extremities  Coordination: JONAH 2/2 lethargy     Cranial Nerves:  II: Visual acuity not tested, denies new visual changes / diplopia  III, IV, VI: PERRL, 3 mm bilaterally, JONAH EOM 2/2 lethargy  V: Facial sensation intact bilaterally to touch  VII: Face symmetric  VIII: Hearing intact bilaterally to spoken voice  IX: JONAH Palate movement equal bilaterally 2/2 lethargy  XI: JONAH Shoulder shrug equal bilaterally 2/2 lethargy  XII: Tongue midline     Musculoskeletal:   Gait: Not tested   Assist devices: None   Tone: Normal  Motor strength: Exam limited 2/2 fatigue but moves all extremities to command anti gravity with good strength     Incision: CDI     Radiological Findings:  Ct Head Wo Contrast  Result Date: 3/22/2021  Postoperative changes noted with placement of drainage catheter into the left subdural hematoma Gastroesophageal reflux disease without esophagitis 04/09/2019    GEORGES (generalized anxiety disorder) 07/02/2018    Benign essential hypertension 04/23/2014    Aortic insufficiency 02/18/2013    Pneumonia 01/28/2012       Assessment:  Patient is a 80 y.o. female s/p Procedure(s) (LRB):  LEFT CRANIOTOMY FOR SUBDURAL HEMATOMA EVACUATION (Left) with 2 post-op seizures in the setting of hyponatremia. Plan:  1. Neurologically stable  2. Neurologic exams frequency: Q4H  3. Cerebral edema:  - Keep Na within normal limits. Patient Na is 133 as of 0438 on 4/1/2021. Nephrology following for hyponatremia.  - Keep HOB >30 degrees  4. Seizure prophylaxis:  - Neurology managing  - Keppra and Vimpat per Neurology recs  5. DVT Prophylaxis: SCD's & SQ Heparin  6. Mobility:  - Advance as tolerates  - PT/OT  7. Will follow inpatient. Please call with any questions or decline in neurological status    DISPO: Dispo timing to be determined by primary team once patient is medically stable for discharge.     Patient was discussed with Dr. Hank Castelan who agrees with above assessment and plan.     Electronically signed by DEBRA Stroud CNP on 4/1/2021 at 9:55 AM  [phone]

## 2021-04-01 NOTE — PLAN OF CARE
Problem: Falls - Risk of:  Goal: Will remain free from falls  Description: Will remain free from falls  Note: Pt free from injury or falls at this time, fall precautions in place, bed in low position, side rail up x2, Spear Fall Risk: High (45 and higher), bed alarm on, reoriented to room and call light, reminded not to get up without assistance, call light in reach, will continue to monitor. Avasys monitor and bilateral wrist restraints in place for safety. Problem: HEMODYNAMIC STATUS  Goal: Patient has stable vital signs and fluid balance  Note: Pt remains hemodynamically stable at this time. Vss. Denies chest pain, sob, lightheadedness, dizziness, or palpitations. Strict I/Os maintained with daily weights. NSR on tele. SpO2 remains >92% on room air . Will continue to monitor. Problem: Non-Violent Restraints  Goal: No harm/injury to patient while restraints in use  Outcome: Ongoing  Note: Pt remains in bilateral soft wrist restraints due poor safety awareness and pulling at lines/tubes/equipments. Visual checks every  hour and restraint need/assessment re-evaluated every 2 hours per hospital policy. See restraint flowsheet. Goal is for patient to remain free of physical, harm/injury. Will continue to monitor.

## 2021-04-01 NOTE — CARE COORDINATION
Case Management Assessment           Daily Note                 Date/ Time of Note: 4/1/2021 3:31 PM         Note completed by: Marina Gillis    Patient Name: Petros Hightower  YOB: 1934    Diagnosis:Subdural hematoma [facility]) [M53.0P5Q]  Patient Admission Status: Inpatient    Date of Admission:3/20/2021  7:52 PM Length of Stay: 12 GLOS:      Current Plan of Care: NG to tube feeds, in restraints, PEG on hold until more stable  ________________________________________________________________________________________  PT AM-PAC: 7 / 24 per last evaluation on: 3.31    OT AM-PAC: 6 / 24 per last evaluation on: 3.31    DME Needs for discharge: defer  ________________________________________________________________________________________  Discharge Plan: SNF: [facility]    Tentative discharge date: TBD    Current barriers to discharge: medical clearance      ________________________________________________________________________________________  Case Management Notes: Patient admitted for SDH s/p Crani. Reagan Fitch is involved and to be notified upon discharge. Plans are to d/c to SNF at [facility] once medically stable, Hens done. Jaz and her family were provided with choice of provider; she and her family are in agreement with the discharge plan.     Care Transition Patient: No    Marina Bleak, RN  [facility]  Case Management Department  Ph: [phone]  Fax: [phone]

## 2021-04-01 NOTE — PROGRESS NOTES
NUTRITION ASSESSMENT  Admission Date: 3/20/2021     Type and Reason for Visit: Reassess    NUTRITION RECOMMENDATIONS:   1. Recommend continuing with EN Standard Formula with Fiber  Jevity 1.2  at goal rate of 55 ml/hr. 2. Recommend continuing with maintenance water flush of 30 ml q4h for tube integrity. 3. NPO per SLP. NUTRITION ASSESSMENT:  Nutritionally compromised as pt should remain NPO per SLP. Pt is tolerating TF at goal and proving 100% of pt's nutritional needs. Brief EN interruption noted when pt pulled NGT and TF withheld d/t aspiration risk. GI to be reconsulted for PEG placement now that Pt is more alert and weaning oxygen. Na+ remains low and TF regimen continues to provide minimal water content. Nursing indicated no concern r/t nutrition. RD will continue to monitor per [facility]. Due to current CDC guidelines recommending 6-ft distancing for social isolation for COVID19 prevention, in lieu of NFPE this dietitian was able to visibly assess signs and symptoms of severe malnutrition, as indicated below. Pt with evidence of severe malnutrition per visual losses of fat and muscle, along with decreased energy intake and weight loss. MALNUTRITION ASSESSMENT  Context of Malnutrition: Acute Illness   Malnutrition Status: Severe malnutrition  Findings of the 6 clinical characteristics of malnutrition (Minimum of 2 out of 6 clinical characteristics is required to make the diagnosis of moderate or severe Protein Calorie Malnutrition based on AND/ASPEN Guidelines):  Energy Intake: Less than/equal to 50% of estimated energy requirements    Energy Intake Time: Greater than or equal to 7 days    Weight Loss %: Unable to assess  d/t fluid shifts  Weight loss Time: Greater than or equal to 3 months   Body Fat Loss: Moderate Loss  per visual   Body Fat Location: Orbital, Fat Overlying Ribs  and Buccal region   Body Muscle Loss:  Moderate Loss  per visual   Body Muscle Loss Location: Clavicles    Fluid admission with fluid shifting       BMI BMI (Calculated): 21.3    Wt Readings from Last 50 Encounters:   03/20/21 110 lb (49.9 kg)   03/20/21 110 lb (49.9 kg)   03/12/21 110 lb (49.9 kg)   03/03/21 110 lb (49.9 kg)   12/27/20 115 lb 1.6 oz (52.2 kg)   09/25/20 106 lb (48.1 kg)   08/12/20 106 lb (48.1 kg)   08/03/20 135 lb (61.2 kg)   07/29/20 135 lb (61.2 kg)       COMPARATIVE STANDARDS  Estimated Total Kcals/Day : 25-30  First obtained weight (53.5 kg) 4848-5698 kcal    Estimated Total Protein (g/day) : 1.2-1.4 First obtained weight (53.5 kg) 64-75 g/day  Estimated Daily Total Fluid (ml/day): 0213-7274 mL per day     Food / Nutrition-Related History  Pre-Admission / Home Diet:  Pre-Admission/Home Diet: General   Home Supplements / Herbals:    none noted  Food Restrictions / Cultural Requests:    none noted    Current Nutrition Therapies   DIET TUBE FEED CONTINUOUS/CYCLIC NPO; STANDARD WITH FIBER (Jevity 1.2); Nasogastric; 30; 55; 24     PO Intake: 0%  and NPO   PO Supplement: NPO   PO Supplement Intake: NPO  IVF: d/c'd     Current Tube Feeding (TF) Orders:  · Feeding Route: Nasogastric  · Formula: Standard w/Fiber  · Schedule: Continuous  · Additives/Modulars:    · Water Flushes: 30 ml q 4hr  · Current TF & Flush Orders Provides: at goal  · Goal TF & Flush Orders Provides: Jev 1.2 @ 55 ml/hr to provide 1320 ml total volume, 1584 kcal, 73 g protein and 1065 ml free water.        NUTRITION RISK LEVEL: Risk Level: High     Toñito Salazar LD  [place]:  [phone]  Office:  [phone]

## 2021-04-01 NOTE — PLAN OF CARE
Nutrition Problem #1: Inadequate oral intake  Intervention: Food and/or Nutrient Delivery: Continue NPO, Continue Current Tube Feeding  Nutritional Goals: pt will continue to tolerate EN at goal rate to meet 100% of nutrition needs.

## 2021-04-01 NOTE — PROGRESS NOTES
NG tube placement to 55 cm confirmed via xray. Order to use in place. Jevity 1.2 restarted at 55 ml/hr with 30 ml q 4 hour h2o flushes. Bilateral wrist restraints in place, hob >30 degrees. avasys monitor in room for safety and seizure precautions. Will continue to monitor.

## 2021-04-02 NOTE — CARE COORDINATION
Patient admitted for SDH s/p Crani. Ashwini Zieglereliana is involved and to be notified upon discharge. Plans are to d/c to SNF at [facility] once medically stable, Hens done. CM will continue to follow patient until discharge.  Electronically signed by Sofía Karimi RN on 4/2/2021 at 1:42 PM

## 2021-04-02 NOTE — PROGRESS NOTES
sulfate      Intake/Output Summary (Last 24 hours) at 4/2/2021 0856  Last data filed at 4/2/2021 0748  Gross per 24 hour   Intake 3705.83 ml   Output 2050 ml   Net 1655.83 ml       Physical Exam Performed:    BP (!) 143/54   Pulse 56   Temp 97.8 °F (36.6 °C) (Oral)   Resp 18   Ht 5' 6\" (1.676 m)   Wt 131 lb 10.5 oz (59.7 kg)   SpO2 95%   BMI 21.25 kg/m²     General appearance: drowsy  Head: dressing C/D  HENT: + NG  Respiratory:  Transmitted upper airway sounds. Diminished breath sounds at bases  Cardiovascular: Regular rate and rhythm with normal S1/S2   Abdomen: Soft, non-tender, non-distended   Neurologic: aphasic, right hemiplegia, Somnolent, Localizes pain. No verbal response. Labs:   No results for input(s): WBC, HGB, HCT, PLT in the last 72 hours. Recent Labs     03/31/21  0426 04/01/21  0438 04/02/21  0423    133* 132*   K 3.8 4.2 4.4    98* 97*   CO2 25 25 24   BUN 16 14 18   CREATININE 0.7 0.6 0.8   CALCIUM 8.6 8.8 8.8     No results for input(s): AST, ALT, BILIDIR, BILITOT, ALKPHOS in the last 72 hours. No results for input(s): INR in the last 72 hours. Urinalysis:      Lab Results   Component Value Date    NITRU Negative 03/30/2021    WBCUA 0-2 03/30/2021    BACTERIA Rare 03/30/2021    RBCUA 3-4 03/30/2021    BLOODU TRACE-INTACT 03/30/2021    SPECGRAV 1.020 03/30/2021    GLUCOSEU Negative 03/30/2021       Assessment/Plan:    Active Hospital Problems    Diagnosis    Severe malnutrition ([facility] [facility] [E43]    Hyponatremia [E87.1]    Encephalopathy acute [G93.40]    Status epilepticus ([facility] [address] [G40.901]    Dementia without behavioral disturbance (HCC) [F03.90]    Subdural hematoma (HCC) [S06.5X9A]   Acute encephalopathy sec to ICH:  Worsening subdural hematoma, with acute and chronic components with increased mass-effect  Baseline dementia  Repeat CT head - Unchanged left cerebral convexity subdural hematoma with 8 mm rightward subfalcine herniation.   Neurosurgery consulted - now s/p LEFT CRANIOTOMY FOR SUBDURAL HEMATOMA EVACUATION  No antiplatelets  Subq heparin ok with neurosurgery, continue    Recurrent seizures, breakthrough seizures  - Pxt appears to have hx of Generalized tonic-clonic seizures on Keppra  -Recurrent seizures likely in the setting of subdural hematoma, with acute and chronic components with increased mass-effect  -Also noted with hyponatremia, although not low enough to be causing her seizures. Improved. - Continue on Keppra 2 g IV twice daily. Vimpat added for recurrent seizures even while on higher dose Keppra  Urinalysis: neg for features of infection  - Seizure and aspiration precautions.   - Neurology consulted. Appreciate input.  - CEEG completed  - MRI brain with small punctate area of stroke. Discussed with neuro, unlikely to be contributing to current presentation.   - On telemetry, statin, cannot take antiplatelet  - Tx for HTN, other risk factor modification  - Limited echo with bubble    Bradycardia. HR now improving  Coreg held  TSH previously checked, ok  No fevers or leukocytosis  Monitor and replace electrolytes  Hold Zyprexa  Cardiology on board last week, hx of bradycardia noted. (Previously? Off AV blocking agents)  Continue to hold Coreg    Hyponatremia, resolved  Probable hypovolemic hyponatremia from low oral intake. Also considered cerebral salt wasting related to intracranial process or SIADH  - Nephrology following    Acute resp failure with hypoxia  - Likely sec to seizures/benzodiazepines, Aspiration during seizures leann with dysphagia from [address], with aspiration pneumonia. Improving on Zosyn 3L -> 1 L -> room air    Aspiration pneumonia  - Aspiration during seizures leann with dysphagia from [address]  - CXR 3/25 following initial seizure: nil acute. Repeated 3/27: some infiltrates bilaterally  - Has had fevers likely from asp pneumonitis. Nil further since startig antibx.     - Obtained blood cx: NGTD  - Started on empiric BS antibx with fevers and significant amount of milky liquid suctioned from her mouth noted in drainage jar. CXR 3/27: infiltrates. - Will DC Vanc for now, MRSA screen negative. - Asp precautions  - Continues on Zosyn day 6    Acute urinary retention  Bladder scanned for 660 cc  UA: not sugg of infection  [place] is out    Severe Protein calorie malnutrition:  Body mass index is 21.25 kg/m². Still very lethargic and not safe for po intake. NG placed. Resume TF. Will need peg- GI consulted. Timing of PEG: Per GI when more stable. Plan for PEG on Monday    Left hip pain and echymosis:  Likely sec to fall. Not on any blood thinners  Repeat xray of hip negative. CAD/Ischemic cardiomyopathy   Ejection fraction of 30-35%, moderate MR and moderate to severe AR  -Was on aspirin and Plavix, which were discontinued during previous hospitalization  Continue on statin  Continue ACEI  BB held for bradycardia    HTN: uncontrolled  Continue lisinopril  Coreg held due to recurrence of bradycardia  Monitor Bps and adjust meds as needed: Currently has been high in the setting of recurrent seizures. Amlodipine increased, clonidine patch added    History of MGUS-follows with hematology-oncology    Dementia  - Reportedly at baseline patient has underlying dementia but typically oriented to self, place and situation.  - Delirium protocol      Leg swelling, mild, more apparent on R side  Has been on heparin subq however long hospital stay, at risk for clot  Dopplers LE - negative for DVT    DVT Prophylaxis: SCDs, Subq heparin  Diet: DIET TUBE FEED CONTINUOUS/CYCLIC NPO; STANDARD WITH FIBER (Jevity 1.2); Nasogastric; 30; 55; 24  Code Status: DNR-CCA    PT/OT Eval Status: 14 and 15 respectively: SNF planned. Disposition   Remains in pxt pending progress  Pall Med following  Plan to discuss goals of care again with family - family still wants to continue care, PEG.      Serafin Phillips DO

## 2021-04-02 NOTE — PROGRESS NOTES
[facility] NEPHROLOGY    [facility]              [phone]                      Kaley Vazquez is admitted with AMS. We are following for Acute hyponatremia    Interval History and plan:      Sodium is at 133< 130> 137> 132  Urine is 2450 ml  BP is elevated      Plan:  - Has SIADH  -  Start urea packet   - SP Samsca and sodium is stable  - continue amlodipine , lisinopril and added clonidine patch       Assessment :     Initial evaluation:    Acute Hyponatremia  - Likely hypovolemic hyponatremia due to low oral intake  - No identifiable causes of SIADH  - got samsca  - start urea packets           [facility] would like to thank Dima Hansen DO   for opportunity to serve this patient      Please call with questions at-   24 Hrs Answering service [phone] or  7 am- 5 pm via Perfect serve or cell phone  Dr. Abril Dhillon        CC/reason for consult :     AMS     HPI :     80 y.o. female who is transferred from [facility], ED for neurosurgical consultation. Patient was brought in by her grandson with complaints of increased confusion for the past couple of days and patient complaining of left-sided back pain. Patient was hospitalized here from 3/12 through 3/15, after being transferred from [facility] for possible seizure-like episode. At that time patient was found unresponsive by grandson on the couch and noticed seizure-like activity. In ED patient was with nurse to have a seizure by her RN at bedside which lasted for about 30 seconds. CT head revealed left-sided subdural hematoma with associated mass-effect and left-to-right midline shift. Patient has been taking 81 mg of aspirin and Plavix daily at that time. During the previous hospitalization patient was started on Keppra 1 g twice daily.     Patient was holding onto her upper belly with both hands and morning when I entered the room. Unable to exactly show where it hurts.   On palpation patient does not seem to have or use drugs. family history includes Other in her father.          Medication:     Current Facility-Administered Medications: ferrous sulfate 300 (60 Fe) MG/5ML syrup 300 mg, 300 mg, Per NG tube, Daily  famotidine (PEPCID) tablet 20 mg, 20 mg, Per NG tube, Daily  levETIRAcetam (KEPPRA) 100 MG/ML solution 2,000 mg, 2,000 mg, Per NG tube, BID  lacosamide (VIMPAT) 10 MG/ML oral solution 200 mg, 200 mg, Per NG tube, BID  cloNIDine (CATAPRES) 0.2 MG/24HR 1 patch, 1 patch, Transdermal, Weekly  amLODIPine (NORVASC) tablet 10 mg, 10 mg, Oral, Daily  piperacillin-tazobactam (ZOSYN) 3,375 mg in dextrose 5 % 100 mL IVPB extended infusion (mini-bag), 3,375 mg, Intravenous, Q8H  hydrALAZINE (APRESOLINE) injection 10 mg, 10 mg, Intravenous, Q2H PRN  sennosides-docusate sodium (SENOKOT-S) 8.6-50 MG tablet 2 tablet, 2 tablet, Oral, BID  polyethylene glycol (GLYCOLAX) packet 17 g, 17 g, Oral, Daily  sodium chloride flush 0.9 % injection 10 mL, 10 mL, Intravenous, 2 times per day  sodium chloride flush 0.9 % injection 10 mL, 10 mL, Intravenous, PRN  naloxone (NARCAN) injection 0.2 mg, 0.2 mg, Intravenous, PRN  promethazine (PHENERGAN) tablet 12.5 mg, 12.5 mg, Oral, Q6H PRN **OR** ondansetron (ZOFRAN) injection 4 mg, 4 mg, Intravenous, Q6H PRN  heparin (porcine) injection 5,000 Units, 5,000 Units, Subcutaneous, Q12H  ezetimibe (ZETIA) tablet 10 mg, 10 mg, Oral, Nightly  lisinopril (PRINIVIL;ZESTRIL) tablet 40 mg, 40 mg, Oral, Daily  acetaminophen (TYLENOL) tablet 650 mg, 650 mg, Oral, Q4H PRN  magnesium sulfate 1000 mg in dextrose 5% 100 mL IVPB, 1,000 mg, Intravenous, PRN       Vitals :     Vitals:    04/02/21 0747   BP: (!) 143/54   Pulse: 56   Resp: 18   Temp: 97.8 °F (36.6 °C)   SpO2: 95%       I & O :       Intake/Output Summary (Last 24 hours) at 4/2/2021 0926  Last data filed at 4/2/2021 0748  Gross per 24 hour   Intake 3705.83 ml   Output 2050 ml   Net 1655.83 ml        Physical Examination :     General appearance: Somnolent, not responsive to verbal cues, tactile stimuli  HEENT: Lips- normal, teeth- ok , oral mucosa- moist  Neck : Mass- no, appears symmetrical, JVD- not visible  Respiratory: Respiratory effort-  normal, wheeze- no, crackles - no  Cardiovascular: Ausculation- No M/R/G, Edema absent  Abdomen: visible mass- no, distention- no, scar- no, tenderness- no                            hepatosplenomegaly-  no  Musculoskeletal:  clubbing no,cyanosis- no , digital ischemia- no                           muscle strength- grossly normal , tone - grossly normal  Skin: rashes- no , ulcers- no, induration- no, tightening - no  Psychiatric:  Not obtained  Additional finding:      LABS:     No results for input(s): WBC, HGB, HCT, PLT in the last 72 hours.   Recent Labs     03/31/21  0426 04/01/21  0438 04/02/21  0423    133* 132*   K 3.8 4.2 4.4    98* 97*   CO2 25 25 24   BUN 16 14 18   CREATININE 0.7 0.6 0.8   GLUCOSE 162* 164* 174*   MG 2.10 2.00 2.10

## 2021-04-02 NOTE — PROGRESS NOTES
Speech Language Pathology  Facility/Department: [facility] 5T ORTHO/NEURO  Dysphagia Daily Treatment Note     NAME: Marva Santana  : 1934  MRN: [id number]    Patient Diagnosis(es):   Patient Active Problem List    Diagnosis Date Noted    Severe malnutrition ([facility] [address] 2021    Hyponatremia     Encephalopathy acute     Status epilepticus (HCC)     Dementia without behavioral disturbance (HCC)     Abnormal EKG 03/15/2021    CAD (coronary artery disease) 03/15/2021    Subdural hematoma ([address] 2021    Ischemic cardiomyopathy     NSTEMI (non-ST elevated myocardial infarction) ([address] 2020    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 ([facility] [address] 2020    Confusion     Closed displaced fracture of left femoral neck (HCC) 2020    MGUS (monoclonal gammopathy of unknown significance) 2020    Mixed hyperlipidemia 2020    Clonic hemifacial spasm, bilateral 2019    Gastroesophageal reflux disease without esophagitis 2019    GEORGES (generalized anxiety disorder) 2018    Benign essential hypertension 2014    Aortic insufficiency 2013    Pneumonia 2012     Allergies: Allergies   Allergen Reactions    Quinidine Hives    Aminoglycosides      Pt unsure    Levofloxacin      unknown    Neomycin      unknown    Simvastatin      Unknown to pt     Recent Chest Xray: (2021)  Impression   Clear lungs.  Cardiomegaly.  No acute cardiopulmonary abnormality.      Recent CT Head: (2021)      Impression       1.  Unchanged left cerebral convexity subdural hematoma with 8 mm rightward subfalcine herniation.      Recent CT Head: (2021)  Impression       Postoperative changes noted with placement of drainage catheter into the left subdural hematoma with postoperative change from craniotomy in the superior frontal lobe noted.       The subdural hemorrhage is slightly decreased from prior study with interval development of postoperative air as above.       Degree of mild left to right shift has diminished slightly from prior exam.        Previous MBS - n/a  Chart reviewed  Medical Diagnosis: subdural hematoma  Treatment Diagnosis: dysphagia    BSE Impression 3/22/21  Patient presents with suspected oropharyngeal dysphagia. Unable to complete oral motor exam, pt with inconsistent command following. Limited verblizations. Assessed tolerance ice chips and thin liquids via tsp/straw; pt with positive oral acceptance, mildly prolonged oral prep, suspect delayed/reduced swallow onset/laryngeal elevation, good oral clearance, consistent cough/throat clear with all PO. Recommend make NPO, consider temporary alternative means nutrition/hydration, frequent oral care, ice chips PRN. Will continue to follow. MBS results - not appropriate at this time    Pain:  No indication of pain through any means    Current Diet : NPO    Treatment:  Pt seen bedside to address the following goals:  Goal 1: Patient will tolerate repeat BSE.  3/23; new order received, pt already on caseload. D/w RN who states pt appropriate for re-assessment. Pt appeared uncomfortable throughout session, with constant movement attempting to get weight off left hip area. Rn reports pt had pain medication 15 min prior. Pt with minimal participation, verbalized only 1 x, stating, \"please wait a minute. \" No other verbalizations noted, pt very slow to respond to tasks, with intermittent attention to tasks presented. Pt accepted limited amount of PO, including 2 ice chips, 2 tsps of water, single sip of water via cup edge and straw. Pt exhibited one instance of mild cough after sip of water, no other coughing or throat clear noted. Not able to assess vocal quality due to pt with no verbalizations with the exception of one time. No participation with attempts at further evaluation. As assessment very limited, not able to safely recommend diet.   Recommend oral care, ice chips or small sips of water for comfort with re-assessment as pt participates. RN will page SLP if appropriate later this date  Cont goal  3/24: pt alert, sitting up in bed, does not respond to questions or commands. Intermittent verbalizations stating, \"please wait a minute. \"    Pt took trial and then declined another trial for several minutes, then would take one more. This pattern was repeated throughout session, over 15 minutes, pt consumed 4 ounces of water via tsp/cup and straw and 3 tsp of puree. Intermittent throat clear noted, no coughing, when vocalizing no change noted. Pt declined trials of solids, therefore not able to recommend a solid consistency. As this was behavior last session and again this date, concern would be pt ability to maintain nutrition. However research shows feeding tubes are not indicated for pts with advanced dementia, though pt does have new neuro deficits at this time. Recommend trial full liquids with close monitoring for s/s of aspiration and consider calorie count. Goal met  3/29: re-assessment s/p seizure activity:  RN states pt a little more awake, okay to attempt re-assessment. No verbal responses to questions or following of commands. Pt opened eyes to verbal stim and opened mouth to touch of spoon. Dropped small ice chip in oral cavity, pt contained without anterior spillage and with time swallow noted. Presented water via tsp/cup and straw, pt attempted to bite down on all, had to remove for pt safety. Recommend cont NPO, small amounts of ice chips per RN only,  if pt alert enough  Cont   3/30: pt alert to voice, positioned fully upright 90 degrees with RN. Pt did not follow commands or respond verbally to any questions. Presented ice chips, tsp water and 1/8 tsp puree. Pt accepted ice chip, however anterior loss of liquid noted. Pt did not demonstrate labial seal around spoon for tsp of water, anterior loss noted again.   Pt did not demonstrate any movement to take puree from spoon, no awareness of labial residue. Pt not appropriate for PO at this time. Cont goal  4/1: pt more alert, opened eyes when request made, though did not follow any commands or verbalize. Attempted presentations of ice chips, water via tsp/cup, straw and puree. Pt did not actively take bolus from spoon or cup, no labial seal around cup /spoon or straw, anterior loss of bolus noted. Placed ice chip in oral cavity, no movement/propulsion or mastication occurred. Cont NPO with tube feeds  Cont goal as appropriate  4/2: pt alertness cont to improve, though did not follow commands or verbalize/communicate during session. Pt with positive acceptance of PO this date. Pt consumed ice chips, water and puree via tsp, attempts via cup and straw. Pt consumed water via tsp and applesauce with no cough/throat clearing, not able to assess vocal quality as no vocalizations noted. Mild anterior loss of liquid noted, unless spoon kept in oral cavity until completely removed. Very mild lingual residue, cleared with liquid wash. Pt was not able to achieve adequate labial seal around cup or initially around straw, with anterior loss of bolus occurring. With additional attempt from straw, pt demonstrated labial seal though was not able to draw liquid up from straw. Recommend trial full liquids via tsp only with close monitoring for s/s of aspiration or respiratory decline. Pt would benefit from instrumental exam, though not able to fully participate at this time  Goal met    New goal: restart goal 4/2/21  2-  Pt will consume PO safely without signs or symptoms of aspiration  3/25: pt sitting up in bed, tray at bedside. Pt again continually moving around in bed, in what appears to be attempts at getting comfortable. Pt accepted limited PO again this date, however consumed in less time. Pt consumed pudding x 3, jello x 1 and several sips of water via straw.   Pt exhibited no overt signs

## 2021-04-02 NOTE — PROGRESS NOTES
from craniotomy in the superior frontal lobe noted. The subdural hemorrhage is slightly decreased from prior study with interval development of postoperative air as above. Degree of mild left to right shift has diminished slightly from prior exam.       Ct Head Wo Contrast  Result Date: 3/25/2021  1. Slight reduction in the left sided subdural hematoma status post drain removal.  2.  Slightly improved mild left-to-right midline shift. 3.  Improving postoperative pneumocephalus.     Ct Head Wo Contrast  Result Date: 3/26/2021  Left frontal craniotomy. Subdural hematoma on the left unchanged. Pneumocephalus unchanged. Midline shift left-to-right unchanged. Labs:  No results for input(s): WBC, HGB, HCT, PLT in the last 72 hours. Recent Labs     04/02/21  0423   *   K 4.4   CL 97*   CO2 24   BUN 18   CREATININE 0.8   GLUCOSE 174*   CALCIUM 8.8   MG 2.10       No results for input(s): PROTIME, INR, APTT in the last 72 hours.     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 female s/p Procedure(s) (LRB):  LEFT CRANIOTOMY FOR SUBDURAL HEMATOMA EVACUATION (Left) with 2 post-op seizures in the setting of hyponatremia. Plan:  1. Neurologically stable  2. Neurologic exams frequency: Q4H  3. Cerebral edema:  - Keep Na within normal limits. Patient Na is 132 as of 0423 on 4/2/2021. Nephrology following for hyponatremia.  - Keep HOB >30 degrees  4. Seizure prophylaxis:  - Neurology managing  - Keppra and Vimpat per Neurology recs  5. DVT Prophylaxis: SCD's & SQ Heparin  6. Mobility:  - Advance as tolerates  - PT/OT  7. Remove staples and sutures from scalp today. 8. Will follow peripherally while inpatient. Please call with any questions or decline in neurological status    DISPO: Dispo timing to be determined by primary team once patient is medically stable for discharge.     Patient was discussed with Dr. Tanner Canchola who agrees with above assessment and plan.     Electronically signed by DEBRA Mairee CNP on 4/2/2021 at 6:38 AM  [phone]

## 2021-04-02 NOTE — PROGRESS NOTES
Palliative Care Chart Review  and Check in Note:     NAME:  Mario Mathis  Admit Date: 3/20/2021  Hospital Day:  Hospital Day: 14   Current Code status: DNR-CCA    Palliative care is continuing to following Ms. Sena Shoemaker for symptom management,  and goals of care discussion as needed. Patient's chart reviewed today 4/2/21. Pt is alert and following commands. She remains nonverbal and NPO. Called son Kayla Kitchen, gave him an update on pt's status. He is hopeful about pt's improvement and wants to proceed with PEG placement Monday. The following are the currently established goals/code status, and Symptom management. Goals of care: Son hopes pt will continue to improve back to an acceptable quality of life. He wants to proceed with PEG placement. Code status: DNR-CCA    Discharge plan: D/c to SNF when medically ready. PEG planned for Monday.     Reno Rodriguez NP  [address]

## 2021-04-02 NOTE — PLAN OF CARE
Problem: Falls - Risk of:  Goal: Will remain free from falls  Description: Will remain free from falls  4/2/2021 0102 by Rina Cruz RN  Outcome: Met This Shift  No falls this shift, bed in lowest position, bed alarm on, non skid socks on, call light within reach, hourly checks, safety maintained, will continue to monitor.

## 2021-04-03 NOTE — PLAN OF CARE
Problem: HEMODYNAMIC STATUS  Goal: Patient has stable vital signs and fluid balance  Outcome: Ongoing  Note: Pt's HR went as low as 26. Pt alert and HR now in 46s. Notified oncall hospitalist. No new orders. Problem: Non-Violent Restraints  Goal: Removal from restraints as soon as assessed to be safe  Note: Pt has NG tube and roberts catheter and continues to pull at lines. Will continue to monitor. Problem: Physical Regulation:  Goal: Ability to maintain a stable neurologic state will improve  Description: Ability to maintain a stable neurologic state will improve  Outcome: Ongoing  Note: Pt's alert and oriented to person and place. Problem: Falls - Risk of:  Goal: Will remain free from falls  Description: Will remain free from falls  Note: Fall precautions in place. Bed alarm is on and in lowest position. Rounding on and turning pt Q2 hours. Will continue to monitor.

## 2021-04-03 NOTE — PLAN OF CARE
Problem: Falls - Risk of:  Goal: Will remain free from falls  Description: Will remain free from falls  4/3/2021 1321 by Shanae Saba RN  Outcome: Ongoing  Note: Patient resting quietly in bed at this time. Bed in low position, alarm armed. Call light within reach, avasys monitor in place. Problem: COMMUNICATION IMPAIRMENT  Goal: Ability to express needs and understand communication  Outcome: Ongoing  Note: Patient able to communicate needs appropriately thus far this shift.

## 2021-04-03 NOTE — PROGRESS NOTES
Hospitalist Progress Note      PCP: Klever Padgett MD    Date of Admission: 3/20/2021     80 y.o. female who is transferred from [facility], ED for neurosurgical consultation. Patient was brought in by her grandson with complaints of increased confusion for the past couple of days and patient complaining of left-sided back pain. Patient was hospitalized here from 3/12 through 3/15, after being transferred from [facility] for possible seizure-like episode. At that time patient was found unresponsive by grandson on the couch and noticed seizure-like activity. In ED patient was with nurse to have a seizure by her RN at bedside which lasted for about 30 seconds. CT head revealed left-sided subdural hematoma with associated mass-effect and left-to-right midline shift. Patient has been taking 81 mg of aspirin and Plavix daily at that time. During the previous hospitalization patient was started on Keppra 1 g twice daily.     At baseline patient has underlying dementia and typically oriented to self, place and situation. Follows commands. Subjective:     She is responding to questions. She knows she is at [facility]. She denies pain or difficulty breathing. No belly pain.      Medications:  Reviewed    Infusion Medications     Scheduled Medications    urea  15 g Per G Tube Daily    ferrous sulfate  300 mg Per NG tube Daily    famotidine  20 mg Per NG tube Daily    levETIRAcetam  2,000 mg Per NG tube BID    lacosamide  200 mg Per NG tube BID    cloNIDine  1 patch Transdermal Weekly    amLODIPine  10 mg Oral Daily    piperacillin-tazobactam  3,375 mg Intravenous Q8H    sennosides-docusate sodium  2 tablet Oral BID    polyethylene glycol  17 g Oral Daily    sodium chloride flush  10 mL Intravenous 2 times per day    heparin (porcine)  5,000 Units Subcutaneous Q12H    ezetimibe  10 mg Oral Nightly    lisinopril  40 mg Oral Daily     PRN Meds: perflutren lipid microspheres, hydrALAZINE, sodium chloride flush, naloxone, promethazine **OR** ondansetron, acetaminophen, magnesium sulfate      Intake/Output Summary (Last 24 hours) at 4/3/2021 0845  Last data filed at 4/3/2021 1677  Gross per 24 hour   Intake 1364 ml   Output 2150 ml   Net -786 ml       Physical Exam Performed:    BP (!) 141/48   Pulse 58   Temp 97.2 °F (36.2 °C) (Oral)   Resp 19   Ht 5' 6\" (1.676 m)   Wt 134 lb 11.2 oz (61.1 kg)   SpO2 96%   BMI 21.74 kg/m²     General appearance: drowsy  Head: dressing C/D  HENT: + NG  Respiratory:  Transmitted upper airway sounds. Diminished breath sounds at bases  Cardiovascular: Regular rate and rhythm with normal S1/S2   Abdomen: Soft, non-tender, non-distended   Neurologic: aphasic, right hemiplegia, Somnolent, Localizes pain. No verbal response. Labs:   No results for input(s): WBC, HGB, HCT, PLT in the last 72 hours. Recent Labs     04/01/21  0438 04/02/21  0423 04/03/21  0443   * 132* 131*   K 4.2 4.4 4.6   CL 98* 97* 96*   CO2 25 24 27   BUN 14 18 30*   CREATININE 0.6 0.8 0.7   CALCIUM 8.8 8.8 8.8     No results for input(s): AST, ALT, BILIDIR, BILITOT, ALKPHOS in the last 72 hours. No results for input(s): INR in the last 72 hours.   Urinalysis:      Lab Results   Component Value Date    NITRU Negative 03/30/2021    WBCUA 0-2 03/30/2021    BACTERIA Rare 03/30/2021    RBCUA 3-4 03/30/2021    BLOODU TRACE-INTACT 03/30/2021    SPECGRAV 1.020 03/30/2021    GLUCOSEU Negative 03/30/2021       Assessment/Plan:    Active Hospital Problems    Diagnosis    Severe malnutrition ([facility] [E43]    Hyponatremia [E87.1]    Encephalopathy acute [G93.40]    Status epilepticus ([facility] [facility] [G40.901]    Dementia without behavioral disturbance (HCC) [F03.90]    Subdural hematoma (HCC) [S06.5X9A]   Acute encephalopathy sec to ICH:  Worsening subdural hematoma, with acute and chronic components with increased mass-effect  Baseline dementia  Repeat CT head - Unchanged left cerebral convexity subdural hematoma with 8 mm rightward subfalcine herniation. Neurosurgery consulted - now s/p LEFT CRANIOTOMY FOR SUBDURAL HEMATOMA EVACUATION  No antiplatelets  Subq heparin ok with neurosurgery, continue    Recurrent seizures, breakthrough seizures  - Pxt appears to have hx of Generalized tonic-clonic seizures on Keppra  -Recurrent seizures likely in the setting of subdural hematoma, with acute and chronic components with increased mass-effect  -Also noted with hyponatremia, although not low enough to be causing her seizures. Improved. - Continue on Keppra 2 g IV twice daily. Vimpat added for recurrent seizures even while on higher dose Keppra  Urinalysis: neg for features of infection  - Seizure and aspiration precautions.   - Neurology consulted. Appreciate input.  - CEEG completed  - MRI brain with small punctate area of stroke. Discussed with neuro, unlikely to be contributing to current presentation.   - On telemetry, statin, cannot take antiplatelet  - Tx for HTN, other risk factor modification  - Limited echo with bubble    Bradycardia. HR now improving  Coreg held  TSH previously checked, ok  No fevers or leukocytosis  Monitor and replace electrolytes  Hold Zyprexa  Cardiology on board last week, hx of bradycardia noted. (Previously? Off AV blocking agents)  Continue to hold Coreg    Hyponatremia, resolved  Probable hypovolemic hyponatremia from low oral intake. Also considered cerebral salt wasting related to intracranial process or SIADH  - Nephrology following    Acute resp failure with hypoxia  - Likely sec to seizures/benzodiazepines, Aspiration during seizures leann with dysphagia from [address], with aspiration pneumonia. Improving on Zosyn 3L -> 1 L -> room air    Aspiration pneumonia  - Aspiration during seizures leann with dysphagia from [address]  - CXR 3/25 following initial seizure: nil acute. Repeated 3/27: some infiltrates bilaterally  - Has had fevers likely from asp pneumonitis.  Nil further since startig antibx. - Obtained blood cx: NGTD  - Started on empiric BS antibx with fevers and significant amount of milky liquid suctioned from her mouth noted in drainage jar. CXR 3/27: infiltrates. - Will DC Vanc for now, MRSA screen negative. - Asp precautions  - Continues on Zosyn day 6    Acute urinary retention  Bladder scanned for 660 cc  UA: not sugg of infection  [place] is out    Severe Protein calorie malnutrition:  Body mass index is 21.74 kg/m². Still very lethargic and not safe for po intake. NG placed. Resume TF. Will need peg- GI consulted. Timing of PEG: Per GI when more stable. Plan for PEG on Monday - if needed, patient significantly improved since overnight, will ask SLP to reassess in am.     Left hip pain and echymosis:  Likely sec to fall. Not on any blood thinners  Repeat xray of hip negative. CAD/Ischemic cardiomyopathy   Ejection fraction of 30-35%, moderate MR and moderate to severe AR  -Was on aspirin and Plavix, which were discontinued during previous hospitalization  Continue on statin  Continue ACEI  BB held for bradycardia    HTN: uncontrolled  Continue lisinopril  Coreg held due to recurrence of bradycardia  Monitor Bps and adjust meds as needed: Currently has been high in the setting of recurrent seizures. Amlodipine increased, clonidine patch added    History of MGUS-follows with hematology-oncology    Dementia  - Reportedly at baseline patient has underlying dementia but typically oriented to self, place and situation.  - Delirium protocol      Leg swelling, mild, more apparent on R side  Has been on heparin subq however long hospital stay, at risk for clot  Dopplers LE - negative for DVT    DVT Prophylaxis: SCDs, Subq heparin  Diet: DIET TUBE FEED CONTINUOUS/CYCLIC NPO; STANDARD WITH FIBER (Jevity 1.2); Nasogastric; 30; 55; 24  Diet NPO, After Midnight  Code Status: DNR-CCA    PT/OT Eval Status: 14 and 15 respectively: SNF planned.       Disposition   Remains in [facility] pending progress  Pall Med following  Plan to discuss goals of care again with family - family still wants to continue care, PEG. Patient having significant improvement today, will have SLP reassess.     Surya Alston, DO

## 2021-04-04 NOTE — PROGRESS NOTES
Hospitalist Progress Note      PCP: Preet Munoz MD    Date of Admission: 3/20/2021     80 y.o. female who is transferred from [facility], ED for neurosurgical consultation. Patient was brought in by her grandson with complaints of increased confusion for the past couple of days and patient complaining of left-sided back pain. Patient was hospitalized here from 3/12 through 3/15, after being transferred from [facility] for possible seizure-like episode. At that time patient was found unresponsive by grandson on the couch and noticed seizure-like activity. In ED patient was with nurse to have a seizure by her RN at bedside which lasted for about 30 seconds. CT head revealed left-sided subdural hematoma with associated mass-effect and left-to-right midline shift. Patient has been taking 81 mg of aspirin and Plavix daily at that time. During the previous hospitalization patient was started on Keppra 1 g twice daily.     At baseline patient has underlying dementia and typically oriented to self, place and situation. Follows commands. Subjective:     She wakes easily but not answering questions as well today.      Medications:  Reviewed    Infusion Medications     Scheduled Medications    urea  15 g Per G Tube Daily    ferrous sulfate  300 mg Per NG tube Daily    famotidine  20 mg Per NG tube Daily    levETIRAcetam  2,000 mg Per NG tube BID    lacosamide  200 mg Per NG tube BID    cloNIDine  1 patch Transdermal Weekly    amLODIPine  10 mg Oral Daily    sennosides-docusate sodium  2 tablet Oral BID    polyethylene glycol  17 g Oral Daily    sodium chloride flush  10 mL Intravenous 2 times per day    heparin (porcine)  5,000 Units Subcutaneous Q12H    ezetimibe  10 mg Oral Nightly    lisinopril  40 mg Oral Daily     PRN Meds: perflutren lipid microspheres, hydrALAZINE, sodium chloride flush, naloxone, promethazine **OR** ondansetron, acetaminophen, magnesium LEFT CRANIOTOMY FOR SUBDURAL HEMATOMA EVACUATION  No antiplatelets  Subq heparin ok with neurosurgery, continue    Recurrent seizures, breakthrough seizures  - Pxt appears to have hx of Generalized tonic-clonic seizures on Keppra  -Recurrent seizures likely in the setting of subdural hematoma, with acute and chronic components with increased mass-effect  -Also noted with hyponatremia, although not low enough to be causing her seizures. Improved. - Continue on Keppra 2 g IV twice daily. Vimpat added for recurrent seizures even while on higher dose Keppra  Urinalysis: neg for features of infection  - Seizure and aspiration precautions.   - Neurology consulted. Appreciate input.  - CEEG completed  - MRI brain with small punctate area of stroke. Discussed with neuro, unlikely to be contributing to current presentation.   - On telemetry, statin, cannot take antiplatelet  - Tx for HTN, other risk factor modification  - Limited echo with bubble: EF 50-55%, no shunt    Bradycardia. HR now improving  Coreg held  TSH previously checked, ok  No fevers or leukocytosis  Monitor and replace electrolytes  Hold Zyprexa  Cardiology on board last week, hx of bradycardia noted. (Previously? Off AV blocking agents)  Continue to hold Coreg  Worsening bradycardia, plan for PEG tomorrow, will ask Cardiology to see    Hyponatremia, resolved  Probable hypovolemic hyponatremia from low oral intake. Also considered cerebral salt wasting related to intracranial process or SIADH  - Nephrology following    Acute resp failure with hypoxia  - Likely sec to seizures/benzodiazepines, Aspiration during seizures leann with dysphagia from [address], with aspiration pneumonia. Improving on Zosyn 3L -> 1 L -> room air    Aspiration pneumonia  - Aspiration during seizures leann with dysphagia from [address]  - CXR 3/25 following initial seizure: nil acute. Repeated 3/27: some infiltrates bilaterally  - Has had fevers likely from asp pneumonitis.  Nil further since startig antibx. - Obtained blood cx: NGTD  - Started on empiric BS antibx with fevers and significant amount of milky liquid suctioned from her mouth noted in drainage jar. CXR 3/27: infiltrates. - Will DC Vanc for now, MRSA screen negative. - Asp precautions  - Completed treatment with Zosyn    Acute urinary retention  Bladder scanned for 660 cc  UA: not sugg of infection  [place] is out    Severe Protein calorie malnutrition:  Body mass index is 21.74 kg/m². Still very lethargic and not safe for po intake. NG placed. Resume TF. Will need peg- GI consulted. Timing of PEG: Per GI when more stable. Left hip pain and echymosis:  Likely sec to fall. Not on any blood thinners  Repeat xray of hip negative. CAD/Ischemic cardiomyopathy   Prior ejection fraction of 30-35%, moderate MR and moderate to severe AR  -Was on aspirin and Plavix, which were discontinued during previous hospitalization  -Repeat Echo EF improved 50-55% 4/3/21  Continue on statin  Continue ACEI  BB held for bradycardia    HTN: uncontrolled  Continue lisinopril  Coreg held due to recurrence of bradycardia  Monitor Bps and adjust meds as needed: Currently has been high in the setting of recurrent seizures. Amlodipine increased, clonidine patch added    History of MGUS-follows with hematology-oncology    Dementia  - Reportedly at baseline patient has underlying dementia but typically oriented to self, place and situation.  - Delirium protocol      Leg swelling, mild, more apparent on R side  Has been on heparin subq however long hospital stay, at risk for clot  Dopplers LE - negative for DVT    DVT Prophylaxis: SCDs, Subq heparin  Diet: DIET TUBE FEED CONTINUOUS/CYCLIC NPO; STANDARD WITH FIBER (Jevity 1.2); Nasogastric; 30; 55; 24  Diet NPO, After Midnight  Code Status: DNR-CCA    PT/OT Eval Status: 14 and 15 respectively: SNF planned.       Disposition   Remains in pxt pending progress  Pall Med following  Plan to discuss goals

## 2021-04-04 NOTE — PROGRESS NOTES
Patient's POC/son in to visit. Updated on patient's condition and treatment plan. All questions answered.

## 2021-04-04 NOTE — PROGRESS NOTES
Speech Language Pathology  Facility/Department: [facility] 5T ORTHO/NEURO  Dysphagia Daily Treatment Note     NAME: Justine Barney  : 1934  MRN: [id number]    Patient Diagnosis(es):   Patient Active Problem List    Diagnosis Date Noted    Severe malnutrition ([facility] [address] 2021    Hyponatremia     Encephalopathy acute     Status epilepticus (HCC)     Dementia without behavioral disturbance (HCC)     Abnormal EKG 03/15/2021    CAD (coronary artery disease) 03/15/2021    Subdural hematoma ([address] 2021    Ischemic cardiomyopathy     NSTEMI (non-ST elevated myocardial infarction) ([address] 2020    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 ([facility] [address] 2020    Confusion     Closed displaced fracture of left femoral neck (HCC) 2020    MGUS (monoclonal gammopathy of unknown significance) 2020    Mixed hyperlipidemia 2020    Clonic hemifacial spasm, bilateral 2019    Gastroesophageal reflux disease without esophagitis 2019    GEORGES (generalized anxiety disorder) 2018    Benign essential hypertension 2014    Aortic insufficiency 2013    Pneumonia 2012     Allergies: Allergies   Allergen Reactions    Quinidine Hives    Aminoglycosides      Pt unsure    Levofloxacin      unknown    Neomycin      unknown    Simvastatin      Unknown to pt     Recent Chest Xray: (2021)  Impression   Clear lungs.  Cardiomegaly.  No acute cardiopulmonary abnormality.      Recent CT Head: (2021)      Impression       1.  Unchanged left cerebral convexity subdural hematoma with 8 mm rightward subfalcine herniation.      Recent CT Head: (2021)  Impression       Postoperative changes noted with placement of drainage catheter into the left subdural hematoma with postoperative change from craniotomy in the superior frontal lobe noted.       The subdural hemorrhage is slightly decreased from prior study with interval development of postoperative air as above.       Degree of mild left to right shift has diminished slightly from prior exam.        Previous MBS - n/a  Chart reviewed  Medical Diagnosis: subdural hematoma  Treatment Diagnosis: dysphagia    BSE Impression 3/22/21  Patient presents with suspected oropharyngeal dysphagia. Unable to complete oral motor exam, pt with inconsistent command following. Limited verblizations. Assessed tolerance ice chips and thin liquids via tsp/straw; pt with positive oral acceptance, mildly prolonged oral prep, suspect delayed/reduced swallow onset/laryngeal elevation, good oral clearance, consistent cough/throat clear with all PO. Recommend make NPO, consider temporary alternative means nutrition/hydration, frequent oral care, ice chips PRN. Will continue to follow. MBS results - not appropriate at this time, pt with limited acceptance of PO    Pain:  No indication of pain through any means    Current Diet : NPO + TF (recs were for full liquids via tsp on 4/2, continue to recommend trial FULL / THIN LIQUID via tsp)    Treatment:  Pt seen bedside to address the following goals:  Goal 1: Patient will tolerate repeat BSE.  3/23; new order received, pt already on caseload. D/w RN who states pt appropriate for re-assessment. Pt appeared uncomfortable throughout session, with constant movement attempting to get weight off left hip area. Rn reports pt had pain medication 15 min prior. Pt with minimal participation, verbalized only 1 x, stating, \"please wait a minute. \" No other verbalizations noted, pt very slow to respond to tasks, with intermittent attention to tasks presented. Pt accepted limited amount of PO, including 2 ice chips, 2 tsps of water, single sip of water via cup edge and straw. Pt exhibited one instance of mild cough after sip of water, no other coughing or throat clear noted.  Not able to assess vocal quality due to pt with no verbalizations with the exception of one loss of liquid noted. Pt did not demonstrate labial seal around spoon for tsp of water, anterior loss noted again. Pt did not demonstrate any movement to take puree from spoon, no awareness of labial residue. Pt not appropriate for PO at this time. Cont goal  4/1: pt more alert, opened eyes when request made, though did not follow any commands or verbalize. Attempted presentations of ice chips, water via tsp/cup, straw and puree. Pt did not actively take bolus from spoon or cup, no labial seal around cup /spoon or straw, anterior loss of bolus noted. Placed ice chip in oral cavity, no movement/propulsion or mastication occurred. Cont NPO with tube feeds  Cont goal as appropriate  4/2: pt alertness cont to improve, though did not follow commands or verbalize/communicate during session. Pt with positive acceptance of PO this date. Pt consumed ice chips, water and puree via tsp, attempts via cup and straw. Pt consumed water via tsp and applesauce with no cough/throat clearing, not able to assess vocal quality as no vocalizations noted. Mild anterior loss of liquid noted, unless spoon kept in oral cavity until completely removed. Very mild lingual residue, cleared with liquid wash. Pt was not able to achieve adequate labial seal around cup or initially around straw, with anterior loss of bolus occurring. With additional attempt from straw, pt demonstrated labial seal though was not able to draw liquid up from straw. Recommend trial full liquids via tsp only with close monitoring for s/s of aspiration or respiratory decline. Pt would benefit from instrumental exam, though not able to fully participate at this time  Goal met    New goal: restart goal 4/2/21  2-  Pt will consume PO safely without signs or symptoms of aspiration  3/25: pt sitting up in bed, tray at bedside. Pt again continually moving around in bed, in what appears to be attempts at getting comfortable.  Pt accepted limited PO again assist  Small bites / sips  Aggressive oral care with suction kit     Plan:  Continued daily Dysphagia treatment with goals per  plan of care. · Diet recommendations: Trial full / thin liquids via tsp only - if any s/s of aspiration emerge, or there is respiratory decline,  make NPO until further evaluated by SLP  · AGGRESSIVE oral care 3-5 x / day with suction kit  · Instrumental exam as appropriate and pt able to participate fully    Recommend speech / language evaluation  DC recommendation: ongoing treatment indicated. Treatment: 15 min  D/W nursing [facility]  Needs met prior to leaving room, call button in reach. Wilmer Perez MA CCC-SLP; [id number]  Speech-Language Pathologist  Pager # [phone]  Phone # [phone]  Office # [phone]    If patient is discharged prior to next session, this note will serve as discharge summary.

## 2021-04-04 NOTE — PLAN OF CARE
Problem: HEMODYNAMIC STATUS  Goal: Patient has stable vital signs and fluid balance  Outcome: Ongoing  Note: Pt HR remains SB this shift. VS otherwise stable. Vitals:    04/04/21 0043   BP: (!) 129/40   Pulse: (!) 47   Resp: 16   Temp: 97.8 °F (36.6 °C)   SpO2: 98%   ;     Problem: Physical Regulation:  Goal: Signs of adequate cerebral perfusion will increase  Description: Signs of adequate cerebral perfusion will increase  Outcome: Ongoing  Note: Seizure precautions in place for pt safety this shift. Problem: Non-Violent Restraints  Goal: Removal from restraints as soon as assessed to be safe  Note: Pt reassessed q2 this shift and pt unable to verbalize the importance of not pulling at lines/tubes in order to safely remove restraints.

## 2021-04-04 NOTE — PROGRESS NOTES
[facility]    [facility]              [phone]                      Akil Contreras is admitted with AMS. We are following for Acute hyponatremia    Interval History and plan:      Sodium is at 133< 130> 137> 134  Urine is 2100 ml  BP is better      Plan:  - Has SIADH  - continue urea packet   - SP Samsca and sodium is stable  - continue amlodipine , lisinopril and added clonidine patch       Assessment :     Initial evaluation:    Acute Hyponatremia  - Likely hypovolemic hyponatremia due to low oral intake  - No identifiable causes of SIADH  - got samsca  - start urea packets           [facility] would like to thank Clay Bruno DO   for opportunity to serve this patient      Please call with questions at-   24 Hrs Answering service [phone] or  7 am- 5 pm via Perfect serve or cell phone  Dr. Walter Tucker        CC/reason for consult :     AMS     HPI :     80 y.o. female who is transferred from [facility], ED for neurosurgical consultation. Patient was brought in by her grandson with complaints of increased confusion for the past couple of days and patient complaining of left-sided back pain. Patient was hospitalized here from 3/12 through 3/15, after being transferred from [facility] for possible seizure-like episode. At that time patient was found unresponsive by grandson on the couch and noticed seizure-like activity. In ED patient was with nurse to have a seizure by her RN at bedside which lasted for about 30 seconds. CT head revealed left-sided subdural hematoma with associated mass-effect and left-to-right midline shift. Patient has been taking 81 mg of aspirin and Plavix daily at that time. During the previous hospitalization patient was started on Keppra 1 g twice daily.     Patient was holding onto her upper belly with both hands and morning when I entered the room. Unable to exactly show where it hurts.   On palpation patient does not seem to have abdominal tenderness. No guarding/rigidity noted. Patient did not even flinch during deep palpation. Seemed to be pain-free when she is distracted. Was able to lift both her legs off the bed and hold up in the ER with no pain in her belly or back.     At baseline patient has underlying dementia and typically oriented to self, place and situation. Follows commands.     According to her recent hospitalization records she is a limited code with no chest compressions but yes to intubation, defibrillation and medications. ROS:     Patient not responsive, unable to obtain ROS    positives in bold                    All other remaining systems are negative or unable to obtain        PMH/PSH/SH/Family History:     Past Medical History:   Diagnosis Date    Acute encephalopathy 03/03/2021    Anxiety     Aortic insufficiency     CHF (congestive heart failure) (HCC)     Chronic kidney disease (CKD), stage III (moderate)     COPD, moderate ([address]     Coronavirus infection 03/25/2020    D[facility]     Expressive aphasia 07/29/2020    Facial twitching     GERD (gastroesophageal reflux disease)     Hair loss     wears wig- unknown cause    Hx of falling     Hyperlipidemia     Hypertension     Ischemic cardiomyopathy     EF 55% 03/15/2021    Mild cognitive impairment with memory loss 07/25/2019    NSTEMI (non-ST elevated myocardial infarction) ([address]     Seasonal allergies     Subdural hematoma ([address] 03/12/2021       Past Surgical History:   Procedure Laterality Date    COLONOSCOPY      COLONOSCOPY  06/22/2015    diverticulosis    CRANIOTOMY Left 3/21/2021    LEFT CRANIOTOMY FOR SUBDURAL HEMATOMA EVACUATION performed by Mary Ann Hilton. Piper Child MD at [facility] Left 07/30/2020    LEFT HIP HEMIARTHROPLASTY performed by Ginna Moses MD at [address] [place] [address]          reports that she has never smoked.  She has never used smokeless tobacco. She reports that she does not drink alcohol

## 2021-04-05 NOTE — H&P
History and Physical / Pre-Sedation Assessment    Muna Batista is a 80 y.o. female who presents today for EGD procedure. PMHx:    Past Medical History:   Diagnosis Date    Acute encephalopathy 03/03/2021    Anxiety     Aortic insufficiency     CHF (congestive heart failure) (HCC)     Chronic kidney disease (CKD), stage III (moderate)     COPD, moderate (HCC)     Coronavirus infection 03/25/2020    Dementia ([facility]     Expressive aphasia 07/29/2020    Facial twitching     GERD (gastroesophageal reflux disease)     Hair loss     wears wig- unknown cause    Hx of falling     Hyperlipidemia     Hypertension     Ischemic cardiomyopathy     EF 55% 03/15/2021    Mild cognitive impairment with memory loss 07/25/2019    NSTEMI (non-ST elevated myocardial infarction) ([facility]     Seasonal allergies     Subdural hematoma ([facility] 03/12/2021       Medications:    Prior to Admission medications    Medication Sig Start Date End Date Taking? Authorizing Provider   levETIRAcetam (KEPPRA) 1000 MG tablet Take 1 tablet by mouth 2 times daily 3/15/21   Zaire Thomas MD   ALPRAZolam Pamela Webster) 0.25 MG tablet Take 0.125 mg by mouth 2 times daily.     Historical Provider, MD   lisinopril (PRINIVIL;ZESTRIL) 20 MG tablet Take 40 mg by mouth daily    Historical Provider, MD   ferrous sulfate (IRON 325) 325 (65 Fe) MG tablet Take 325 mg by mouth daily (with breakfast)    Historical Provider, MD   Cyanocobalamin 2500 MCG SUBL Place 2,500 mcg under the tongue daily    Historical Provider, MD   metoprolol succinate (TOPROL XL) 25 MG extended release tablet Take 1 tablet by mouth daily 12/27/20   DEBRA Otto CNP   atorvastatin (LIPITOR) 80 MG tablet Take 1 tablet by mouth nightly 12/27/20   EDBRA Otto CNP   vitamin D (CHOLECALCIFEROL) 25 MCG (1000 UT) TABS tablet Take 1,000 Units by mouth daily    Historical Provider, MD   Loratadine (CLARITIN PO) Take 10 mg by mouth daily     Historical Provider, MD ezetimibe (ZETIA) 10 MG tablet Take 10 mg by mouth nightly     Historical Provider, MD       Allergies: Allergies   Allergen Reactions    Quinidine Hives    Aminoglycosides      Pt unsure    Levofloxacin      unknown    Neomycin      unknown    Simvastatin      Unknown to pt       PSHx:    Past Surgical History:   Procedure Laterality Date    COLONOSCOPY      COLONOSCOPY  06/22/2015    diverticulosis    CRANIOTOMY Left 3/21/2021    LEFT CRANIOTOMY FOR SUBDURAL HEMATOMA EVACUATION performed by Garret Mcleod. Octavio Dsouza MD at [address] Left 07/30/2020    LEFT HIP HEMIARTHROPLASTY performed by Coretta Tierney MD at [address] Hx:    Social History     Socioeconomic History    Marital status:       Spouse name: Not on file    Number of children: Not on file    Years of education: Not on file    Highest education level: Not on file   Occupational History    Not on file   Social Needs    Financial resource strain: Not on file    Food insecurity     Worry: Not on file     Inability: Not on file    Transportation needs     Medical: Not on file     Non-medical: Not on file   Tobacco Use    Smoking status: Never Smoker    Smokeless tobacco: Never Used   Substance and Sexual Activity    Alcohol use: No    Drug use: No    Sexual activity: Not on file   Lifestyle    Physical activity     Days per week: Not on file     Minutes per session: Not on file    Stress: Not on file   Relationships    Social connections     Talks on phone: Not on file     Gets together: Not on file     Attends Zoroastrian service: Not on file     Active member of club or organization: Not on file     Attends meetings of clubs or organizations: Not on file     Relationship status: Not on file    Intimate partner violence     Fear of current or ex partner: Not on file     Emotionally abused: Not on file     Physically abused: Not on file     Forced sexual activity: Not on file   Other Topics Concern    Not on file   Social History Narrative    Not on file       Family Hx:   Family History   Problem Relation Age of Onset    Other Father         \"black Lung\"       Physical Exam:  Vital Signs: BP (!) 128/53   Pulse 63   Temp 97.6 °F (36.4 °C) (Axillary)   Resp 18   Ht 5' 6\" (1.676 m)   Wt 139 lb 1.8 oz (63.1 kg)   SpO2 95%   BMI 22.45 kg/m²    Pulmonary: Normal  Cardiac: Normal  Abdomen: Normal    Pre-Procedure Assessment / Plan:  ASA Classification: Class 2 - A normal healthy patient with mild systemic disease  Level of Sedation Plan: Deep sedation   Mallampati Score: II (soft palate, uvula, fauces visible)  Post Procedure plan: Return to same level of care    . I assessed the patient and find that the patient is in satisfactory condition to proceed with the planned procedure and sedation plan. Risks/benefits/alternatives of procedure discussed with patient and any present family members. Risks including, but not limited to: bleeding, perforation, post polypectomy syndrome, splenic injury, need for additional procedures or surgery, risks of anesthesia. Patient understands it is their responsibility to call office for pathology results if they do not hear from my office within 1-2 weeks. All questions answered.     Hervey Dubin, MD  4/5/2021

## 2021-04-05 NOTE — PROGRESS NOTES
Speech Language Pathology  Dysphagia-     Chart reviewed, noted pt NPO for possible PEG today. Will follow up as pt able and schedule allows      Gemma Chu M.S./[facility]-SLP #[id number]  Pg.  # [phone]

## 2021-04-05 NOTE — PROGRESS NOTES
[facility]    [facility]              [phone]                      Du Donovan is admitted with AMS. We are following for Acute hyponatremia    Interval History and plan:      Sodium is at 133< 130> 137> 134>129  Urine is 1125 ml  BP is better      Plan:  - Has SIADH  - increase urea packet   - continue amlodipine , lisinopril . Assessment :     Initial evaluation:    Acute Hyponatremia  - Likely hypovolemic hyponatremia due to low oral intake  - No identifiable causes of SIADH  - got samsca  - start urea packets           [facility] would like to thank Billie Xiong MD   for opportunity to serve this patient      Please call with questions at-   24 Hrs Answering service [phone] or  7 am- 5 pm via Perfect serve or cell phone  Dr. Eloina Mcgill        CC/reason for consult :     AMS     HPI :     80 y.o. female who is transferred from [facility], ED for neurosurgical consultation. Patient was brought in by her grandson with complaints of increased confusion for the past couple of days and patient complaining of left-sided back pain. Patient was hospitalized here from 3/12 through 3/15, after being transferred from [facility] for possible seizure-like episode. At that time patient was found unresponsive by grandson on the couch and noticed seizure-like activity. In ED patient was with nurse to have a seizure by her RN at bedside which lasted for about 30 seconds. CT head revealed left-sided subdural hematoma with associated mass-effect and left-to-right midline shift. Patient has been taking 81 mg of aspirin and Plavix daily at that time. During the previous hospitalization patient was started on Keppra 1 g twice daily.     Patient was holding onto her upper belly with both hands and morning when I entered the room. Unable to exactly show where it hurts. On palpation patient does not seem to have abdominal tenderness. No guarding/rigidity noted. Patient did not even flinch during deep palpation. Seemed to be pain-free when she is distracted. Was able to lift both her legs off the bed and hold up in the ER with no pain in her belly or back.     At baseline patient has underlying dementia and typically oriented to self, place and situation. Follows commands.     According to her recent hospitalization records she is a limited code with no chest compressions but yes to intubation, defibrillation and medications. ROS:     Patient not responsive, unable to obtain ROS    positives in bold                    All other remaining systems are negative or unable to obtain        PMH/PSH/SH/Family History:     Past Medical History:   Diagnosis Date    Acute encephalopathy 03/03/2021    Anxiety     Aortic insufficiency     CHF (congestive heart failure) (HCC)     Chronic kidney disease (CKD), stage III (moderate)     COPD, moderate ([address]     Coronavirus infection 03/25/2020    Dementia [facility]     Expressive aphasia 07/29/2020    Facial twitching     GERD (gastroesophageal reflux disease)     Hair loss     wears wig- unknown cause    Hx of falling     Hyperlipidemia     Hypertension     Ischemic cardiomyopathy     EF 55% 03/15/2021    Mild cognitive impairment with memory loss 07/25/2019    NSTEMI (non-ST elevated myocardial infarction) ([address]     Seasonal allergies     Subdural hematoma ([address] 03/12/2021       Past Surgical History:   Procedure Laterality Date    COLONOSCOPY      COLONOSCOPY  06/22/2015    diverticulosis    CRANIOTOMY Left 3/21/2021    LEFT CRANIOTOMY FOR SUBDURAL HEMATOMA EVACUATION performed by Galdino Colin. Cynthia Parks MD at [address] [place] [address] Left 07/30/2020    LEFT HIP HEMIARTHROPLASTY performed by Dale Benito MD at [address] [place] [address]          reports that she has never smoked. She has never used smokeless tobacco. She reports that she does not drink alcohol or use drugs.     family history includes Other in her

## 2021-04-05 NOTE — PROGRESS NOTES
SANDRA palacios served to Dr. Ju Ruiz. Per Dr. Ju Ruiz, sunil to resume tube feeds and medication administration through [facility].

## 2021-04-05 NOTE — CONSULTS
Internal Medicine PGY- 3 Resident History & Physical      PCP: Domi Botello MD    Date of Admission: 3/20/2021    Reason for Consult:  Bradycardia. History Of Present Illness:      Zuleyma Streeter is a 80 y.o. female w/ PMHX of ICH, recurrent seizure, CAD, hypertension, hyperlipidemia, mitral valve regurgitation, HTN, MGUS  who presented to [facility] who p/w AMS    Pt recently admitted for AMS 2/2 ICH, went home, increasing confusion, admitted for recurrent siezure 2/2 recent bleed. She had Left frontal craniotomy for evac of SDH (3/21), was in ICU, w/ seizures admitted baseline dementia with severe malnutrition. Plan is to place a PEG tube, he has been bradycardic, AV leticia blocking agents were being held, cardiology was asked to evaluate her. At home he is on Toprol 25 XL, here in the hospital briefly dipped down to 47. Her BP has been stable. She is not really responding to questions, so hx is limited. Past Medical History:        Diagnosis Date    Acute encephalopathy 03/03/2021    Anxiety     Aortic insufficiency     CHF (congestive heart failure) (HCC)     Chronic kidney disease (CKD), stage III (moderate)     COPD, moderate (HCC)     Coronavirus infection 03/25/2020    Dementia ([address]     Expressive aphasia 07/29/2020    Facial twitching     GERD (gastroesophageal reflux disease)     Hair loss     wears wig- unknown cause    Hx of falling     Hyperlipidemia     Hypertension     Ischemic cardiomyopathy     EF 55% 03/15/2021    Mild cognitive impairment with memory loss 07/25/2019    NSTEMI (non-ST elevated myocardial infarction) ([address]     Seasonal allergies     Subdural hematoma ([address] 03/12/2021       Past Surgical History:          Procedure Laterality Date    COLONOSCOPY      COLONOSCOPY  06/22/2015    diverticulosis    CRANIOTOMY Left 3/21/2021    LEFT CRANIOTOMY FOR SUBDURAL HEMATOMA EVACUATION performed by Felipe Zambrano.  Trino Carbajal MD at [address] [address] SURGERY Left 07/30/2020    LEFT HIP HEMIARTHROPLASTY performed by Yuliana Chicas MD at [address] [place] [address]         Medications Prior to Admission:      Prior to Admission medications    Medication Sig Start Date End Date Taking? Authorizing Provider   levETIRAcetam (KEPPRA) 1000 MG tablet Take 1 tablet by mouth 2 times daily 3/15/21   Carmencita Hernandez MD   ALPRAZolam [address]) 0.25 MG tablet Take 0.125 mg by mouth 2 times daily. Historical Provider, MD   lisinopril (PRINIVIL;ZESTRIL) 20 MG tablet Take 40 mg by mouth daily    Historical Provider, MD   ferrous sulfate (IRON 325) 325 (65 Fe) MG tablet Take 325 mg by mouth daily (with breakfast)    Historical Provider, MD   Cyanocobalamin 2500 MCG SUBL Place 2,500 mcg under the tongue daily    Historical Provider, MD   metoprolol succinate (TOPROL XL) 25 MG extended release tablet Take 1 tablet by mouth daily 12/27/20   DEBRA Rosario - CNP   atorvastatin (LIPITOR) 80 MG tablet Take 1 tablet by mouth nightly 12/27/20   DEBRA Rosario - CNP   vitamin D (CHOLECALCIFEROL) 25 MCG (1000 UT) TABS tablet Take 1,000 Units by mouth daily    Historical Provider, MD   Loratadine (CLARITIN PO) Take 10 mg by mouth daily     Historical Provider, MD   ezetimibe (ZETIA) 10 MG tablet Take 10 mg by mouth nightly     Historical Provider, MD       Allergies:  Quinidine, Aminoglycosides, Levofloxacin, Neomycin, and Simvastatin    Social History:    TOBACCO:   reports that she has never smoked. She has never used smokeless tobacco.  ETOH:   reports no history of alcohol use. History:          Problem Relation Age of Onset    Other Father         \"black Lung\"       REVIEW OF SYSTEMS:   Pertinentpositives as noted in the HPI. All other systems reviewed and negative.   ROS: Review of Systems   Unable to perform ROS: Mental status change       PHYSICALEXAM PERFORMED:    BP (!) 131/50   Pulse 63   Temp 97.7 °F (36.5 °C) (Axillary)   Resp 18   Ht 5' 6\" (1.676 m) 1. Congestive changes with worsening of interstitial edema in the right hemithorax with vascular redistribution   2. Nasogastric feeding tube in the stomach      XR ABDOMEN (KUB) (SINGLE AP VIEW)   Final Result      1. Feeding tube tip in stomach. 2. Airspace disease right lung, possible pneumonia. CT HEAD WO CONTRAST   Final Result      Postoperative changes noted with placement of drainage catheter into the left subdural hematoma with postoperative change from craniotomy in the superior frontal lobe noted. The subdural hemorrhage is slightly decreased from prior study with interval development of postoperative air as above. Degree of mild left to right shift has diminished slightly from prior exam.                XR HIP LEFT (2-3 VIEWS)   Final Result   Impression:    No acute osseous injury. Left total hip arthroplasty with mild chronic loosening along the acetabulum. CT HEAD WO CONTRAST   Final Result      1. Unchanged left cerebral convexity subdural hematoma with 8 mm rightward subfalcine herniation. ASSESSMENT & PLAN:    Sinus Bradycardia:  - Cont to hold BB, AV leticia blocking agents  - Tele  - No acute intervention needed at this time    Systolic HF w/ recovered EF: Not in acute exacerbation  - Hold home BB  - Cont home Lisinopril   - I/O, daily wts    CAD:  - Cont to hold home AP agents d/t brain bleed    ICH:  - NSGY following    Malnutrition:  - s/p PEG Tube placement today    I will discuss the patient with MD Sonja Salazar MD  Internal Medicine Resident, PGY-3  Contact via [employer]  I have seen,interviewed and examined the patient with the resident. Pertinent medical data and imaging studies reviewed.   Refer to the residents note for details of clinical findings  I agree with his assessment and plan with following addendum:    Sick Sinus Syndrome  Longest sinus pause with junctional escape was about 8.5 seconds by 13:30  Off BB / AVB  Would be benefited by PPM  No family at bedside  Further course depending on long term goals of care    Aren Recinos MD   Cardiac Electrophysiology  [facility] [phone]

## 2021-04-05 NOTE — ANESTHESIA PRE PROCEDURE
Department of Anesthesiology  Preprocedure Note       Name:  Dylan Hernandez   Age:  80 y.o.  :  1934                                          MRN:  [id number]         Date:  2021      Surgeon: Sincere Brownlee):  Tomeka Celaya MD    Procedure: Procedure(s):  PERCUTANEOUS ENDOSCOPIC GASTROSTOMY TUBE PLACEMENT    Medications prior to admission:   Prior to Admission medications    Medication Sig Start Date End Date Taking? Authorizing Provider   levETIRAcetam (KEPPRA) 1000 MG tablet Take 1 tablet by mouth 2 times daily 3/15/21   Girard Claude, MD   ALPRAZolam Thurmon Dice) 0.25 MG tablet Take 0.125 mg by mouth 2 times daily.     Historical Provider, MD   lisinopril (PRINIVIL;ZESTRIL) 20 MG tablet Take 40 mg by mouth daily    Historical Provider, MD   ferrous sulfate (IRON 325) 325 (65 Fe) MG tablet Take 325 mg by mouth daily (with breakfast)    Historical Provider, MD   Cyanocobalamin 2500 MCG SUBL Place 2,500 mcg under the tongue daily    Historical Provider, MD   metoprolol succinate (TOPROL XL) 25 MG extended release tablet Take 1 tablet by mouth daily 20   DEBRA Gonzalez CNP   atorvastatin (LIPITOR) 80 MG tablet Take 1 tablet by mouth nightly 20   DEBRA Gonzalez CNP   vitamin D (CHOLECALCIFEROL) 25 MCG (1000 UT) TABS tablet Take 1,000 Units by mouth daily    Historical Provider, MD   Loratadine (CLARITIN PO) Take 10 mg by mouth daily     Historical Provider, MD   ezetimibe (ZETIA) 10 MG tablet Take 10 mg by mouth nightly     Historical Provider, MD       Current medications:    Current Facility-Administered Medications   Medication Dose Route Frequency Provider Last Rate Last Admin    urea (URE-NA) packet 15 g  15 g Per G Tube BID Papo Page MD        ceFAZolin (ANCEF) 1,000 mg in dextrose 5 % 50 mL IVPB (mini-bag)  1,000 mg Intravenous Once Ajith TONEY  mL/hr at 21 1232 1,000 mg at 21 1232    perflutren lipid microspheres (DEFINITY) Oral Q4H PRN Jerry Zarate MD   650 mg at 03/31/21 1813    magnesium sulfate 1000 mg in dextrose 5% 100 mL IVPB  1,000 mg Intravenous PRN Jerry Zarate MD           Allergies:     Allergies   Allergen Reactions    Quinidine Hives    Aminoglycosides      Pt unsure    Levofloxacin      unknown    Neomycin      unknown    Simvastatin      Unknown to pt       Problem List:    Patient Active Problem List   Diagnosis Code    Pneumonia J18.9    Closed displaced fracture of left femoral neck (HCC) S72.002A    MGUS (monoclonal gammopathy of unknown significance) D47.2    Benign essential hypertension I10    Aortic insufficiency I35.1    Clonic hemifacial spasm, 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    GEORGES (generalized anxiety disorder) F41.1    Gastroesophageal reflux disease without esophagitis K21.9    Mixed hyperlipidemia E78.2    NSTEMI (non-ST elevated myocardial infarction) ([facility] I21.4    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 ([facility] F10.27    Confusion R41.0    Ischemic cardiomyopathy I25.5    Subdural hematoma (HCC) S06.5X9A    Abnormal EKG R94.31    CAD (coronary artery disease) I25.10    Status epilepticus ([facility] G40.901    Dementia without behavioral disturbance ([facility]) F03.90    Hyponatremia E87.1    Encephalopathy acute G93.40    Severe malnutrition (HCC) E43       Past Medical History:        Diagnosis Date    Acute encephalopathy 03/03/2021    Anxiety     Aortic insufficiency     CHF (congestive heart failure) ([facility])     Chronic kidney disease (CKD), stage III (moderate)     COPD, moderate (HCC)     Coronavirus infection 03/25/2020    Dementia ([facility]     Expressive aphasia 07/29/2020    Facial twitching     GERD (gastroesophageal reflux disease)     Hair loss     wears wig- unknown cause    Hx of falling     Hyperlipidemia     Hypertension     Ischemic cardiomyopathy     EF 55% 03/15/2021    Mild cognitive impairment with memory loss 07/25/2019    NSTEMI (non-ST elevated myocardial infarction) [facility]     Seasonal allergies     Subdural hematoma ([facility] 03/12/2021       Past Surgical History:        Procedure Laterality Date    COLONOSCOPY      COLONOSCOPY  06/22/2015    diverticulosis    CRANIOTOMY Left 3/21/2021    LEFT CRANIOTOMY FOR SUBDURAL HEMATOMA EVACUATION performed by Stanislaw Panda. Jeane Barrow MD at [address] [place] [address] Left 07/30/2020    LEFT HIP HEMIARTHROPLASTY performed by Jake Munoz MD at [address] [place] [address]         Social History:    Social History     Tobacco Use    Smoking status: Never Smoker    Smokeless tobacco: Never Used   Substance Use Topics    Alcohol use: No                                Counseling given: Not Answered      Vital Signs (Current):   Vitals:    04/05/21 0335 04/05/21 0503 04/05/21 0840 04/05/21 1138   BP: (!) 147/53  (!) 131/50 (!) 128/53   Pulse: 51  63 63   Resp: 18  18 18   Temp: 96.4 °F (35.8 °C)  97.7 °F (36.5 °C) 97.6 °F (36.4 °C)   TempSrc: Axillary  Axillary Axillary   SpO2: 98%  96% 95%   Weight:  139 lb 1.8 oz (63.1 kg)     Height:                                                  BP Readings from Last 3 Encounters:   04/05/21 (!) 128/53   03/21/21 (!) 157/70   03/20/21 (!) 173/52       NPO Status: Time of last liquid consumption: 0000                        Time of last solid consumption: 0000                        Date of last liquid consumption: 04/05/21                        Date of last solid food consumption: 04/04/21    BMI:   Wt Readings from Last 3 Encounters:   04/05/21 139 lb 1.8 oz (63.1 kg)   03/20/21 110 lb (49.9 kg)   03/12/21 110 lb (49.9 kg)     Body mass index is 22.45 kg/m².     CBC:   Lab Results   Component Value Date    WBC 8.7 03/29/2021    RBC 2.65 03/29/2021    HGB 8.5 03/29/2021    HCT 25.0 03/29/2021    MCV 94.2 03/29/2021    RDW 15.0 03/29/2021     03/29/2021       CMP:   Lab Results   Component Value Date     04/05/2021    K 5.1 04/05/2021    K 5.0 03/25/2021    CL 95 04/05/2021    CO2 24 04/05/2021    BUN 33 04/05/2021    CREATININE 0.8 04/05/2021    GFRAA >60 04/05/2021    GFRAA >60 01/29/2012    AGRATIO 1.2 03/24/2021    LABGLOM >60 04/05/2021    GLUCOSE 136 04/05/2021    PROT 6.4 03/24/2021    PROT 7.0 01/29/2012    CALCIUM 9.6 04/05/2021    BILITOT 0.4 03/24/2021    ALKPHOS 72 03/24/2021    AST 13 03/24/2021    ALT <5 03/24/2021       POC Tests: No results for input(s): POCGLU, POCNA, POCK, POCCL, POCBUN, POCHEMO, POCHCT in the last 72 hours. Coags:   Lab Results   Component Value Date    PROTIME 13.1 03/24/2021    INR 1.13 03/24/2021    APTT 27.8 03/22/2021       HCG (If Applicable): No results found for: PREGTESTUR, PREGSERUM, HCG, HCGQUANT     ABGs:   Lab Results   Component Value Date    PHART 7.425 03/28/2021    PO2ART 154.0 03/28/2021    WOZ7TIO 36.7 03/28/2021    UHD1WNX 24 03/28/2021    BEART -0.1 03/28/2021    G6MGSOVV 100 03/28/2021        Type & Screen (If Applicable):  No results found for: LABABO, LABRH    Drug/Infectious Status (If Applicable):  No results found for: HIV, HEPCAB    COVID-19 Screening (If Applicable):   Lab Results   Component Value Date    COVID19 Not Detected 03/12/2021    COVID19 Not Detected 03/04/2021           Anesthesia Evaluation  Patient summary reviewed and Nursing notes reviewed  Airway: Mallampati: II  TM distance: >3 FB   Neck ROM: full  Mouth opening: > = 3 FB Dental: normal exam         Pulmonary:normal exam  breath sounds clear to auscultation  (+) pneumonia: no interval change,  COPD: mild,            Patient did not smoke on day of surgery.                  Cardiovascular:    (+) hypertension:, past MI: no interval change, CAD: no interval change, CHF: no interval change,         Rhythm: regular  Rate: normal           Beta Blocker:  Not on Beta Blocker         Neuro/Psych:   (+) neuromuscular disease:, psychiatric history: stable with treatment            GI/Hepatic/Renal:   (+) GERD: well controlled, Endo/Other: Negative Endo/Other ROS                    Abdominal:       Abdomen: soft. Vascular: negative vascular ROS. Anesthesia Plan      MAC     ASA 4       Induction: intravenous. MIPS: Postoperative opioids intended and Prophylactic antiemetics administered. Anesthetic plan and risks discussed with patient. Use of blood products discussed with patient whom consented to blood products. Plan discussed with attending and CRNA.     Attending anesthesiologist reviewed and agrees with Pre Eval content              Renée Dan DO   4/5/2021

## 2021-04-05 NOTE — ANESTHESIA POSTPROCEDURE EVALUATION
Department of Anesthesiology  Postprocedure Note    Patient: Marta Sinclair  MRN: [id number]  YOB: 1934  Date of evaluation: 4/5/2021  Time:  3:13 PM     Procedure Summary     Date: 04/05/21 Room / Location: [address] [place] [address] Esperanza Abdalla [address] / The [address] [place] [address],[address]    Anesthesia Start: 1666 Anesthesia Stop: 2857    Procedure: PERCUTANEOUS ENDOSCOPIC GASTROSTOMY TUBE PLACEMENT (N/A ) Diagnosis:       Malnutrition, unspecified type ([facility]      (MALNUTRITION)    Surgeons: Willa Hackett MD Responsible Provider: Michel Barber DO    Anesthesia Type: MAC ASA Status: 4          Anesthesia Type: MAC    Ozzie Phase I: Ozzie Score: 9    Ozzie Phase II: Ozzie Score: 10    Last vitals: Reviewed and per EMR flowsheets.        Anesthesia Post Evaluation    Patient location during evaluation: bedside  Patient participation: complete - patient participated  Level of consciousness: awake  Pain score: 0  Airway patency: patent  Nausea & Vomiting: no nausea and no vomiting  Complications: no  Cardiovascular status: blood pressure returned to baseline  Respiratory status: acceptable  Hydration status: euvolemic

## 2021-04-05 NOTE — BRIEF OP NOTE
Brief Postoperative Note      Patient: Winter Garcia  YOB: 1934  MRN: [id number]    Date of Procedure: 4/5/2021    Pre-Op Diagnosis: MALNUTRITION    Post-Op Diagnosis: Same       Procedure(s):  PERCUTANEOUS ENDOSCOPIC GASTROSTOMY TUBE PLACEMENT    Surgeon(s):  Marlee Malin MD    Assistant:  * No surgical staff found *    Anesthesia: Monitor Anesthesia Care    Estimated Blood Loss (mL): Minimal    Complications: None    Specimens:   * No specimens in log *    Implants:  * No implants in log *      Drains:   NG/OG/NJ/NE Tube Left nostril (Active)   Surrounding Skin Dry; Intact 04/04/21 2130   Securement device Yes 04/04/21 2130   Status Clamped 04/04/21 2357   Placement Verified by External Catheter Length 04/04/21 2130   NG/OG/NJ/NE External Measurement (cm) 55 cm 04/04/21 2130   Drainage Appearance None 04/04/21 2130   Tube Feeding Standard with Fiber 04/04/21 2130   Tube Feeding Status Stopped 04/04/21 2357   Rate/Schedule 55 mL/hr 04/04/21 2130   Tube Feeding Intake (mL) 117 ml 04/04/21 2357   Free Water Flush (mL) 60 mL 04/04/21 2357   Free Water Rate 30 q4h 04/04/21 2130   Residual Volume (ml) 0 ml 04/04/21 2130   Output (mL) 0 ml 04/04/21 2130       Gastrostomy/Enterostomy/Jejunostomy Percutaneous Endoscopic Gastrostomy (PEG) LUQ 1 20 fr (Active)   Catheter Position (cm marking) 2.5 cm 04/05/21 1251   Surrounding Skin Dry; Intact 04/05/21 1251   Dressing Status Clean;Dry 04/05/21 1251   Dressing Type Other (Comment) 04/05/21 1251       Urethral Catheter (Active)   Catheter Indications Acute urinary retention/obstruction 04/05/21 1138   Securement Device Date Changed 03/30/21 04/05/21 1138   Site Assessment No urethral drainage 04/05/21 1138   Urine Color Yellow 04/05/21 1138   Urine Appearance Clear 04/05/21 1138   Urine Odor Other (Comment) 04/05/21 1138   Output (mL) 300 mL 04/05/21 0504       [REMOVED] Closed/Suction Drain Left Scalp Other (Comment) 7 Chinese (Removed)   Site Description Other (Comment) 03/22/21 1635   Dressing Status Clean;Dry; Intact 03/22/21 1635   Drainage Appearance Bloody 03/22/21 1635   Status Open to gravity drainage 03/22/21 1635   Output (ml) 0 ml 03/23/21 0318       [REMOVED] NG/OG/NJ/NE Tube Nasogastric Left nostril (Removed)   Surrounding Skin Dry; Intact 03/30/21 0752   Securement device Yes 03/30/21 1100   Status Open to gravity drainage 03/29/21 1500   Placement Verified by Respiratory Status 03/29/21 1323   NG/OG/NJ/NE External Measurement (cm) 65 cm 03/30/21 1909   Tube Feeding Standard with Fiber 03/30/21 1100   Tube Feeding Status Continuous 03/30/21 2259   Rate/Schedule 55 mL/hr 03/30/21 2259   Tube Feeding Supplement Amount (mL) 100 03/29/21 1500   Tube Feeding Intake (mL) 231 ml 03/31/21 0319   Free Water Flush (mL) 30 mL 03/31/21 0319   Free Water Rate 30q4hr 03/30/21 1627       [REMOVED] Urethral Catheter Coude; Latex 16 fr (Removed)   $ Urethral catheter insertion $ Not inserted for procedure 03/26/21 1048   Catheter Indications Acute urinary retention/obstruction 03/29/21 1500   Site Assessment No urethral drainage 03/29/21 1500   Urine Color Sandra 03/29/21 1500   Urine Appearance Clear 03/29/21 1500   Output (mL) 650 mL 03/29/21 1500       [REMOVED] External Urinary Catheter (Removed)       [REMOVED] External Urinary Catheter (Removed)   Catheter changed  Yes 03/13/21 0600   Urine Color Yellow 03/13/21 0600   Urine Appearance Clear 03/13/21 0600   Output (mL) 300 mL 03/15/21 0548   Suction- Female Only 40 mmgHg continuous 03/13/21 0600   Placement Replaced 03/13/21 0600   Skin Assessment No Injury 03/15/21 0800       [REMOVED] External Urinary Catheter (Removed)   Catheter changed  No 03/30/21 1627   Suction- Female Only 40 mmgHg continuous 03/30/21 1627   Placement Initiated 03/30/21 1210   Skin Assessment No Injury 03/30/21 1627       Findings: 1. G tube placement 2. IV Fluids 3.  May use G tube today for medication and start G tube feedings in a.m. once active bowel sounds. Routine care G tube site. Keep abdominal binder over G tube site for 2 weeks. Ancef 1 gm IV 6 hrs post G tube.     Electronically signed by Brandi Sethi MD on 4/5/2021 at 12:59 PM

## 2021-04-05 NOTE — PROGRESS NOTES
Patient returned from endoscopy. PEG tube is in place. Dressing to abdomen is clean, dry and intact. Abdominal binder is on. Continuing with bilateral soft wrist restraints.

## 2021-04-05 NOTE — PROGRESS NOTES
Physical Therapy/Occupational Therapy    Hold note    Pt currently off floor for PEG tube. Will f/u per POC.      Namrata Ulloa, [address], MOT-OTR/L [id number]

## 2021-04-05 NOTE — PLAN OF CARE
Problem: Falls - Risk of:  Goal: Will remain free from falls  Description: Will remain free from falls  4/5/2021 1001 by Jaquan Esquivel RN  Outcome: Ongoing  Note: Standard fall precautions are in place. Call light and frequently used items are within pt's reach. Sarai/Sys monitor is in use. Bilateral soft wrist restraints are in place to prevent patient from pulling at invasive devices. Bed alarm is on. Problem: Non-Violent Restraints  Goal: Removal from restraints as soon as assessed to be safe  4/5/2021 1001 by Jaquan Esquivel RN  Outcome: Ongoing  Note: Will continue with bilateral soft wrist restraints as patient continues to pull at invasive devices when ROM is performed.

## 2021-04-05 NOTE — CARE COORDINATION
Case Management Assessment           Daily Note                 Date/ Time of Note: 4/5/2021 11:35 AM         Note completed by: Marco Manuel    Patient Name: Chiquis Sebastian  YOB: 1934    Diagnosis:Subdural hematoma [facility]) [B13.3R9P]  Patient Admission Status: Inpatient    Date of Admission:3/20/2021  7:52 PM Length of Stay: 16 GLOS:      Current Plan of Care: Peg tube today  ________________________________________________________________________________________  PT AM-PAC: 7 / 24 per last evaluation on: 3/31    OT AM-PAC: 6 / 24 per last evaluation on: 3/31    DME Needs for discharge: defer to facility  ________________________________________________________________________________________  Discharge Plan: SNF[facility]    Tentative discharge date: TBD    Current barriers to discharge:  Medical stability; peg placement    Referrals completed: Not Applicable    Resources/ information provided: Not indicated at this time  ________________________________________________________________________________________  Case Management Notes:  CM following. Peg placement today. Was not done on 3/26 due to recurrent seizures. Plan for [facility] when medically stable. Pt is currently in restraints and will need to be restraint free. CM will continue to follow. CM spoke with Denise Martínez at [address] [facility]; she is following and would like a phone call at discharge to notify of date and destination. [phone]. Jaz and her family were provided with choice of provider; she and her family are in agreement with the discharge plan.     Care Transition Patient: Marybel Manuel RN  [facility]  Case Management Department  Ph: [phone]

## 2021-04-05 NOTE — PROGRESS NOTES
RN attempted to call report to floor nurse, Janice Murphy, who is unable to take report at this time. Will await return phone call.

## 2021-04-05 NOTE — PLAN OF CARE
Problem: Nutrition  Goal: Optimal nutrition therapy  4/5/2021 1101 by Yanelis De Los Santos RD, LD  Outcome: Ongoing  Note: Nutrition Problem #1: Inadequate oral intake  Intervention: Food and/or Nutrient Delivery: Continue NPO, Continue Current Tube Feeding  Nutritional Goals: pt will continue to tolerate EN at goal rate to meet 100% of nutrition needs.

## 2021-04-05 NOTE — PROGRESS NOTES
NUTRITION ASSESSMENT  Admission Date: 3/20/2021     Type and Reason for Visit: Reassess    NUTRITION RECOMMENDATIONS:   1. Continue NPO for possible PEG placement. 2. As appropriate, recommend restarting EN Standard Formula with Fiber Jevity 1.2  at goal rate of 55 ml/hr. 3. Recommend continuing maintenance water flush of 30 ml q4h for tube integrity when TF restarted. NUTRITION ASSESSMENT:  TF stopped 4/4 for potential PEG placement. Pt previously tolerating TF @ goal. Na+ remains low, TF regimen continues to provide minimal water content. Pt did not respond to questions when visited. Due to current CDC guidelines recommending 6-ft distancing for social isolation for COVID19 prevention, in lieu of NFPE this dietitian was able to visibly assess signs and symptoms of severe malnutrition, as indicated below. Pt with evidence of severe malnutrition per visual losses of fat and muscle, along with decreased energy intake and weight loss. MALNUTRITION ASSESSMENT  Context of Malnutrition: Acute Illness   Malnutrition Status: Severe malnutrition  Findings of the 6 clinical characteristics of malnutrition (Minimum of 2 out of 6 clinical characteristics is required to make the diagnosis of moderate or severe Protein Calorie Malnutrition based on AND/ASPEN Guidelines):  Energy Intake: Less than/equal to 50% of estimated energy requirements    Energy Intake Time: Greater than or equal to 7 days    Weight Loss %: Unable to assess  d/t fluid shifts  Weight loss Time: Greater than or equal to 3 months   Body Fat Loss: Moderate Loss  per visual   Body Fat Location: Orbital, Fat Overlying Ribs  and Buccal region   Body Muscle Loss: Moderate Loss  per visual   Body Muscle Loss Location: Clavicles    Fluid Accumulation: Unable to assess    Fluid Accumulation Location: Unable to assess     Strength: Not Performed;  Not Measured     NUTRITION DIAGNOSIS   Problem: Problem #1: Inadequate oral intake   Etiology: Cognitive or neurological impairment  Signs & Symptoms: NPO status due to medical condition and Nutrition Support-EN    NUTRITION INTERVENTION  Food and/or Nutrient Delivery: Continue NPO, Continue Current Tube Feeding   Nutrition Education/Counseling: Education not indicated Coordination of Nutrition Care: Continue to monitor while inpatient     NUTRITION MONITORING & EVALUATION:  Evaluation:Progressing towards goal   Goals:Goals: pt will continue to tolerate EN at goal rate to meet 100% of nutrition needs. Monitoring: I/O, Nutrition Progression , Pertinent Labs , TF Intake , TF Tolerance  or Weight      OBJECTIVE DATA:  · Nutrition-Focused Physical Findings: base line dementia; LBM 4/5.  Na+ 133, possible PEG placement  · Wounds Surgical Wound w/drain from craniotomy     Past Medical History:   Diagnosis Date    Acute encephalopathy 03/03/2021    Anxiety     Aortic insufficiency     [facility]congestive [facility] failure[facility]     Chronic kidney disease (CKD), stage III (moderate)     COPD, moderate (HCC)     Coronavirus infection 03/25/2020    Dementia ([facility]     Expressive aphasia 07/29/2020    Facial twitching     GERD (gastroesophageal reflux disease)     Hair loss     wears wig- unknown cause    Hx of falling     Hyperlipidemia     Hypertension     Ischemic cardiomyopathy     EF 55% 03/15/2021    Mild cognitive impairment with memory loss 07/25/2019    NSTEMI (non-ST elevated myocardial infarction) ([facility]     Seasonal allergies     Subdural hematoma ([facility] 03/12/2021        ANTHROPOMETRICS  Current Height: 5' 6\" (167.6 cm)  Current Weight: 139 lb 1.8 oz (63.1 kg)   Admission weight: 110 lb (49.9 kg) STATED   Ideal Bodyweight 130 lb    Usual Bodyweight 130 lb per EMR  Weight Changes +15 lb since admission with fluid shifting       BMI BMI (Calculated): 22.5    Wt Readings from Last 50 Encounters:   03/20/21 110 lb (49.9 kg)   03/20/21 110 lb (49.9 kg)   03/12/21 110 lb (49.9 kg)   03/03/21 110 lb (49.9 kg)

## 2021-04-05 NOTE — PROGRESS NOTES
sulfate      Intake/Output Summary (Last 24 hours) at 4/5/2021 1759  Last data filed at 4/5/2021 1435  Gross per 24 hour   Intake 1123 ml   Output 975 ml   Net 148 ml       Physical Exam Performed:    BP (!) 143/54   Pulse 55   Temp 96.5 °F (35.8 °C) (Axillary)   Resp 18   Ht 5' 6\" (1.676 m)   Wt 139 lb 1.8 oz (63.1 kg)   SpO2 98%   BMI 22.45 kg/m²     General appearance: Chronically ill-appearing  Head: dressing C/D  Respiratory:  Transmitted upper airway sounds. Diminished breath sounds at bases  Cardiovascular: Regular rate and rhythm with normal S1/S2   Abdomen: Soft, non-tender, non-distended, PEG tube in place with dressing and abdominal binder  Neurologic: aphasic, right hemiplegia, Somnolent, Localizes pain. No verbal response. Labs:   No results for input(s): WBC, HGB, HCT, PLT in the last 72 hours. Recent Labs     04/03/21  0443 04/04/21  0453 04/05/21  0426   * 134* 129*   K 4.6 5.0 5.1   CL 96* 98* 95*   CO2 27 26 24   BUN 30* 21* 33*   CREATININE 0.7 0.8 0.8   CALCIUM 8.8 9.1 9.6     No results for input(s): AST, ALT, BILIDIR, BILITOT, ALKPHOS in the last 72 hours. No results for input(s): INR in the last 72 hours.   Urinalysis:      Lab Results   Component Value Date    NITRU Negative 03/30/2021    WBCUA 0-2 03/30/2021    BACTERIA Rare 03/30/2021    RBCUA 3-4 03/30/2021    BLOODU TRACE-INTACT 03/30/2021    SPECGRAV 1.020 03/30/2021    GLUCOSEU Negative 03/30/2021       Assessment/Plan:    Active Hospital Problems    Diagnosis    Severe malnutrition ([address] [E43]    Hyponatremia [E87.1]    Encephalopathy acute [G93.40]    Status epilepticus ([facility] [G40.901]    Dementia without behavioral disturbance (HCC) [F03.90]    Subdural hematoma (HCC) [S06.5X9A]     Acute encephalopathy sec to ICH:  Worsening subdural hematoma, with acute and chronic components with increased mass-effect  Baseline dementia  Repeat CT head - Unchanged left cerebral convexity subdural hematoma with 8 mm rightward subfalcine herniation. Neurosurgery consulted - now s/p LEFT CRANIOTOMY FOR SUBDURAL HEMATOMA EVACUATION  No antiplatelets  Subq heparin ok with neurosurgery, continue    Recurrent seizures, breakthrough seizures  Pxt appears to have hx of Generalized tonic-clonic seizures on Keppra  Recurrent seizures likely in the setting of subdural hematoma, with acute and chronic components with increased mass-effect  Also noted with hyponatremia, although not low enough to be causing her seizures. Improved. Continue on Keppra 2 g IV twice daily. Vimpat added for recurrent seizures even while on higher dose Keppra  Urinalysis: neg for features of infection  Seizure and aspiration precautions. Neurology consulted. Appreciate input. CEEG completed  MRI brain with small punctate area of stroke. Discussed with neuro, unlikely to be contributing to current presentation. On telemetry, statin, cannot take antiplatelet  Tx for HTN, other risk factor modification  Limited echo with bubble: EF 50-55%, no shunt    Bradycardia  Coreg held  TSH previously checked, ok  No fevers or leukocytosis  Monitor and replace electrolytes  Hold Zyprexa  Cardiology on board last week, hx of bradycardia noted. (Previously? Off AV blocking agents)  Patient had worsening bradycardia, cardiology consulted  Continue to hold Coreg  No acute intervention needed per cardiology    Hyponatremia, resolved  Probable hypovolemic hyponatremia from low oral intake. Also considered cerebral salt wasting related to intracranial process or SIADH  Monitor sodium, sodium 129 today  Continue urea packets  Nephrology following    Acute resp failure with hypoxia, resolved  Likely sec to seizures/benzodiazepines, Aspiration during seizures leann with dysphagia from [address], with aspiration pneumonia. Off oxygen.   Completed Zosyn course    Aspiration pneumonia, treated  Aspiration during seizures leann with dysphagia from [address]  CXR 3/25 following initial seizure: nil acute. Repeated 3/27: some infiltrates bilaterally  Has had fevers likely from asp pneumonitis. Nil further since startig antibx. Obtained blood cx: NGTD  Started on empiric BS antibx with fevers and significant amount of milky liquid suctioned from her mouth noted in drainage jar. CXR 3/27: infiltrates. Vancomycin discontinued, MRSA screen negative. Asp precautions  Completed treatment with Zosyn    Acute urinary retention, resolved  Bladder scanned for 660 cc  UA: not sugg of infection  [place] is out    Severe Protein calorie malnutrition:  Body mass index is 22.45 kg/m². Not safe for po intake. NG placed. Was on tube feed  Status post PEG tube placement today  Plan to resume tube feed tomorrow morning    Left hip pain and echymosis:  Likely sec to fall. Not on any blood thinners  Repeat xray of hip negative. CAD/Ischemic cardiomyopathy   Prior ejection fraction of 30-35%, moderate MR and moderate to severe AR  -Was on aspirin and Plavix, which were discontinued during previous hospitalization  Repeat Echo EF improved 50-55% 4/3/21  Continue on statin  Continue ACEI  BB held for bradycardia    HTN: uncontrolled  Continue lisinopril  Coreg held due to recurrence of bradycardia  Monitor Bps and adjust meds as needed: Currently has been high in the setting of recurrent seizures. Amlodipine increased    History of MGUS  Follows with hematology-oncology    Dementia  Reportedly at baseline patient has underlying dementia but typically oriented to self, place and situation. Delirium protocol      Leg swelling, mild, more apparent on R side  Has been on heparin subq however long hospital stay, at risk for clot  Dopplers LE - negative for DVT    DVT Prophylaxis: SCDs, Subq heparin  Diet: Diet NPO, After Midnight  Code Status: DNR-CCA    PT/OT Eval Status: 14 and 15 respectively: SNF planned.       Disposition: Pending staying free for 24 hours, placement    Avery Dodson MD

## 2021-04-05 NOTE — PLAN OF CARE
Problem: Falls - Risk of:  Goal: Will remain free from falls  Description: Will remain free from falls  Outcome: Ongoing  Note: Pt is a High fall risk. See [place] Doyle Fall Score and ABCDS Injury Risk assessments. + Screening for Orthostasis and/or + High Fall Risk per DANIEL/ABCDS: Explained fall risk precautions to pt and family and rationale behind their use to keep the patient safe. Pt bed is in low position, side rails up, call light and belongings are in reach. Fall wristband applied and present on pts wrist.  Bed alarm on. Pt encouraged to call for assistance. Will continue with hourly rounds for PO intake, pain needs, toileting and repositioning as needed. Problem: Non-Violent Restraints  Goal: Removal from restraints as soon as assessed to be safe  Outcome: Ongoing  Note: Patient continues to pull at invasive lines and devices despite multiple interventions (see restraint flowsheet) to prevent dislodgement of tubes/devices. Discontinuation criteria reviewed with patient, no evidence of learning identified at this time. Continue to monitor for discontinuation criteria. Goal: No harm/injury to patient while restraints in use  Outcome: Ongoing  Note: Bilateral soft wrist restraints remain in place to prevent dislodgement of invasive lines and devices. Restraint and safety checks performed per policy and procedure and documented as charted. No restraint related injuries identified. Continue with safety and restraint checks. Continue to monitor for discontinuation criteria.

## 2021-04-06 PROBLEM — Z95.0 PACEMAKER: Status: ACTIVE | Noted: 2021-01-01

## 2021-04-06 NOTE — PLAN OF CARE
Problem: Falls - Risk of:  Goal: Will remain free from falls  Outcome: Ongoing  Patient remains non verbal and unresponsive to verbal cues. No purposeful movements. Unable to follow commands. Alarm activated when patient in bed. Will continue to monitor for safety. Problem: Pain:  Description: Pain management should include both nonpharmacologic and pharmacologic interventions. Goal: Pain level will decrease  Outcome: Ongoing  Patient has rested comfortably this shift. Has had no visible signs of pain. Will continue to monitor and treat as needed. Problem: Safety:  Goal: Ability to remain free from injury will improve  Outcome: Ongoing  Patient has remained free of injury. Will continue to monitor. Problem: HEMODYNAMIC STATUS  Goal: Patient has stable vital signs and fluid balance  4/6/2021 1550 by Vesta Garcia RN  Outcome: Ongoing  Patient has remained in NSR on monitor. Occasionally brandied into the 30's when sleeping. Vital signs WNL. Will continue to monitor  4/6/2021 0521 by Bradley Saavedra RN  Outcome: Ongoing  Note: VSS this shift. Neuro checks unchanged. Will continue to monitor.

## 2021-04-06 NOTE — PLAN OF CARE
Problem: HEMODYNAMIC STATUS  Goal: Patient has stable vital signs and fluid balance  Outcome: Ongoing  Note: VSS this shift. Neuro checks unchanged. Will continue to monitor. Problem: Non-Violent Restraints  Goal: Removal from restraints as soon as assessed to be safe  Outcome: Ongoing  Note: Pt remains in soft bilateral wrist restraints this shift due to risk of pulling lines/new PEG tube. No evidence of injury assessed this shift, ROM completed Q2h. Will continue to monitor.

## 2021-04-06 NOTE — PROGRESS NOTES
Patient did not even flinch during deep palpation. Seemed to be pain-free when she is distracted. Was able to lift both her legs off the bed and hold up in the ER with no pain in her belly or back.     At baseline patient has underlying dementia and typically oriented to self, place and situation. Follows commands.     According to her recent hospitalization records she is a limited code with no chest compressions but yes to intubation, defibrillation and medications. ROS:     Patient not responsive, unable to obtain ROS    positives in bold                    All other remaining systems are negative or unable to obtain        PMH/PSH/SH/Family History:     Past Medical History:   Diagnosis Date    Acute encephalopathy 03/03/2021    Anxiety     Aortic insufficiency     CHF (congestive heart failure) (HCC)     Chronic kidney disease (CKD), stage III (moderate)     COPD, moderate ([address]     Coronavirus infection 03/25/2020    Dementia [facility]     Expressive aphasia 07/29/2020    Facial twitching     GERD (gastroesophageal reflux disease)     Hair loss     wears wig- unknown cause    Hx of falling     Hyperlipidemia     Hypertension     Ischemic cardiomyopathy     EF 55% 03/15/2021    Mild cognitive impairment with memory loss 07/25/2019    NSTEMI (non-ST elevated myocardial infarction) ([facility]     Seasonal allergies     Subdural hematoma ([address] 03/12/2021       Past Surgical History:   Procedure Laterality Date    COLONOSCOPY      COLONOSCOPY  06/22/2015    diverticulosis    CRANIOTOMY Left 3/21/2021    LEFT CRANIOTOMY FOR SUBDURAL HEMATOMA EVACUATION performed by Barry Rice.  Mindi Ambrose MD at [facility] N/A 4/5/2021    PERCUTANEOUS ENDOSCOPIC GASTROSTOMY TUBE PLACEMENT performed by Reyna Peters MD at [address] [facility] Left 07/30/2020    LEFT HIP HEMIARTHROPLASTY performed by Savita Alonzo MD at [address]          reports that Examination :     General appearance:     Somnolent, not responsive to verbal cues, tactile stimuli  HEENT: Lips- normal, teeth- ok , oral mucosa- moist  Neck : Mass- no, appears symmetrical, JVD- not visible  Respiratory: Respiratory effort-  normal, wheeze- no, crackles - no  Cardiovascular: Ausculation- No M/R/G, Edema absent  Abdomen: visible mass- no, distention- no, scar- no, tenderness- no                            hepatosplenomegaly-  no  Musculoskeletal:  clubbing no,cyanosis- no , digital ischemia- no                           muscle strength- grossly normal , tone - grossly normal  Skin: rashes- no , ulcers- no, induration- no, tightening - no  Psychiatric:  Not obtained  Additional finding:      LABS:     No results for input(s): WBC, HGB, HCT, PLT in the last 72 hours.   Recent Labs     04/04/21  0453 04/05/21  0426 04/06/21  0539   * 129* 133*   K 5.0 5.1 5.0   CL 98* 95* 97*   CO2 26 24 25   BUN 21* 33* 50*   CREATININE 0.8 0.8 1.0   GLUCOSE 157* 136* 150*

## 2021-04-06 NOTE — PROGRESS NOTES
Hospitalist Progress Note      PCP: Milagro Camacho MD    Date of Admission: 3/20/2021     80 y.o. female who is transferred from [facility], ED for neurosurgical consultation. Patient was brought in by her grandson with complaints of increased confusion for the past couple of days and patient complaining of left-sided back pain. Patient was hospitalized here from 3/12 through 3/15, after being transferred from [facility] for possible seizure-like episode. At that time patient was found unresponsive by grandson on the couch and noticed seizure-like activity. In ED patient was with nurse to have a seizure by her RN at bedside which lasted for about 30 seconds. CT head revealed left-sided subdural hematoma with associated mass-effect and left-to-right midline shift. Patient has been taking 81 mg of aspirin and Plavix daily at that time. During the previous hospitalization patient was started on Keppra 1 g twice daily.     At baseline patient has underlying dementia and typically oriented to self, place and situation. Follows commands. Subjective:     Awake but unable to follow commands. Pulling on leads and gown. Discussed pacemaker placement with son. Son wants to proceed with the procedure.     Medications:  Reviewed    Infusion Medications     Scheduled Medications    urea  15 g Per G Tube BID    ferrous sulfate  300 mg Per NG tube Daily    famotidine  20 mg Per NG tube Daily    levETIRAcetam  2,000 mg Per NG tube BID    lacosamide  200 mg Per NG tube BID    amLODIPine  10 mg Oral Daily    sennosides-docusate sodium  2 tablet Oral BID    polyethylene glycol  17 g Oral Daily    sodium chloride flush  10 mL Intravenous 2 times per day    heparin (porcine)  5,000 Units Subcutaneous Q12H    ezetimibe  10 mg Oral Nightly    lisinopril  40 mg Oral Daily     PRN Meds: perflutren lipid microspheres, hydrALAZINE, sodium chloride flush, naloxone, promethazine **OR** ondansetron, acetaminophen, magnesium sulfate      Intake/Output Summary (Last 24 hours) at 4/6/2021 0908  Last data filed at 4/6/2021 8532  Gross per 24 hour   Intake 450 ml   Output 875 ml   Net -425 ml       Physical Exam Performed:    BP (!) 121/49   Pulse 55   Temp 97.9 °F (36.6 °C) (Axillary)   Resp 18   Ht 5' 6\" (1.676 m)   Wt 139 lb 1.8 oz (63.1 kg)   SpO2 98%   BMI 22.45 kg/m²     General appearance: Chronically ill-appearing  Head: dressing C/D  Respiratory:  Transmitted upper airway sounds. Diminished breath sounds at bases  Cardiovascular: Regular rate and rhythm with normal S1/S2   Abdomen: Soft, non-tender, non-distended, PEG tube in place with dressing and abdominal binder  Neurologic: Awake, confused, unable to follow commands. Moving all extremities spontaneously. Labs:   No results for input(s): WBC, HGB, HCT, PLT in the last 72 hours. Recent Labs     04/04/21  0453 04/05/21  0426 04/06/21  0539   * 129* 133*   K 5.0 5.1 5.0   CL 98* 95* 97*   CO2 26 24 25   BUN 21* 33* 50*   CREATININE 0.8 0.8 1.0   CALCIUM 9.1 9.6 9.3     No results for input(s): AST, ALT, BILIDIR, BILITOT, ALKPHOS in the last 72 hours. No results for input(s): INR in the last 72 hours.   Urinalysis:      Lab Results   Component Value Date    NITRU Negative 03/30/2021    WBCUA 0-2 03/30/2021    BACTERIA Rare 03/30/2021    RBCUA 3-4 03/30/2021    BLOODU TRACE-INTACT 03/30/2021    SPECGRAV 1.020 03/30/2021    GLUCOSEU Negative 03/30/2021       Assessment/Plan:    Active Hospital Problems    Diagnosis    Severe malnutrition ([facility] [E43]    Hyponatremia [E87.1]    Encephalopathy acute [G93.40]    Status epilepticus ([facility] [G40.901]    Dementia without behavioral disturbance (HCC) [F03.90]    Subdural hematoma (HCC) [S06.5X9A]     Acute encephalopathy sec to ICH:  Worsening subdural hematoma, with acute and chronic components with increased mass-effect  Baseline dementia  Repeat CT head - Unchanged left cerebral convexity subdural hematoma with 8 mm rightward subfalcine herniation. Neurosurgery consulted - now s/p LEFT CRANIOTOMY FOR SUBDURAL HEMATOMA EVACUATION  No antiplatelets  Subq heparin ok with neurosurgery, continue    Recurrent seizures, breakthrough seizures  Pxt appears to have hx of Generalized tonic-clonic seizures on Keppra  Recurrent seizures likely in the setting of subdural hematoma, with acute and chronic components with increased mass-effect  Also noted with hyponatremia, although not low enough to be causing her seizures. Improved. Continue on Keppra 2 g IV twice daily. Vimpat added for recurrent seizures even while on higher dose Keppra  Urinalysis: neg for features of infection  Seizure and aspiration precautions. Neurology consulted. Appreciate input. CEEG completed  MRI brain with small punctate area of stroke. Discussed with neuro, unlikely to be contributing to current presentation. On telemetry, statin, cannot take antiplatelet  Tx for HTN, other risk factor modification  Limited echo with bubble: EF 50-55%, no shunt    Bradycardia  Coreg held  TSH previously checked, ok  No fevers or leukocytosis  Monitor and replace electrolytes  Hold Zyprexa  Cardiology on board last week, hx of bradycardia noted. (Previously? Off AV blocking agents)  Patient had worsening bradycardia, cardiology consulted  Continue to hold Coreg  Patient has sick sinus syndrome with junctional escape  Would benefit for pacemaker placement per EP  Discussed with son who would like to proceed with the procedure  Patient to have pacemaker placement later today. Hyponatremia, resolved  Probable hypovolemic hyponatremia from low oral intake.  Also considered cerebral salt wasting related to intracranial process or SIADH  Monitor sodium, sodium 129 today  Continue urea packets  Nephrology following    Acute resp failure with hypoxia, resolved  Likely sec to seizures/benzodiazepines, Aspiration during seizures leann with dysphagia from [address], with aspiration pneumonia. Off oxygen. Completed Zosyn course    Aspiration pneumonia, treated  Aspiration during seizures leann with dysphagia from [address]  CXR 3/25 following initial seizure: nil acute. Repeated 3/27: some infiltrates bilaterally  Has had fevers likely from asp pneumonitis. Nil further since startig antibx. Obtained blood cx: NGTD  Started on empiric BS antibx with fevers and significant amount of milky liquid suctioned from her mouth noted in drainage jar. CXR 3/27: infiltrates. Vancomycin discontinued, MRSA screen negative. Asp precautions  Completed treatment with Zosyn    Acute urinary retention, resolved  Bladder scanned for 660 cc  UA: not sugg of infection  [place] is out    Severe Protein calorie malnutrition:  Body mass index is 22.45 kg/m². Not safe for po intake. NG placed. Was on tube feed  Status post PEG tube placement today  Plan to resume tube feed today after pacemaker placement    Left hip pain and echymosis:  Likely sec to fall. Not on any blood thinners  Repeat xray of hip negative. CAD/Ischemic cardiomyopathy   Prior ejection fraction of 30-35%, moderate MR and moderate to severe AR  -Was on aspirin and Plavix, which were discontinued during previous hospitalization  Repeat Echo EF improved 50-55% 4/3/21  Continue on statin  Continue ACEI  BB held for bradycardia    HTN: uncontrolled  Continue lisinopril  Coreg held due to recurrence of bradycardia  Monitor Bps and adjust meds as needed: Currently has been high in the setting of recurrent seizures. Continue increased dose of amlodipine     History of MGUS  Follows with hematology-oncology    Dementia  Reportedly at baseline patient has underlying dementia but typically oriented to self, place and situation.   Delirium protocol      Leg swelling, mild, more apparent on R side  Has been on heparin subq however long hospital stay, at risk for clot  Dopplers LE - negative for DVT    DVT Prophylaxis: SCDs, Subq heparin  Diet: Diet NPO, After Midnight  Code Status: DNR-CCA    PT/OT Eval Status: 14 and 15 respectively: SNF planned.       Disposition: Pending pacemaker placement, cardiology recs, placement    Riaz Landeros MD

## 2021-04-06 NOTE — PRE SEDATION
Sedation Pre-Procedure Note    Patient Name: Judah December   YOB: 1934  Room/Bed: 2795/7581-61  Medical Record Number: [id number]  Date: 4/6/2021   Time: 4:18 PM       Indication: Sick sinus syndrome    Consent: I have discussed with the patient and/or the patient representative the indication, alternatives, and the possible risks and/or complications of the planned procedure and the anesthesia methods. The patient and/or patient representative appear to understand and agree to proceed. Vital Signs:   Vitals:    04/06/21 1035   BP: (!) 148/59   Pulse: 78   Resp: 16   Temp: 98 °F (36.7 °C)   SpO2: 94%       Past Medical History:   has a past medical history of Acute encephalopathy, Anxiety, Aortic insufficiency, CHF (congestive heart failure) (HCC), Chronic kidney disease (CKD), stage III (moderate), COPD, moderate ([address] Coronavirus infection, Dementia ([address] Expressive aphasia, Facial twitching, GERD (gastroesophageal reflux disease), Hair loss, Hx of falling, Hyperlipidemia, Hypertension, Ischemic cardiomyopathy, Mild cognitive impairment with memory loss, NSTEMI (non-ST elevated myocardial infarction) ([address] Seasonal allergies, and Subdural hematoma ([address] Past Surgical History:   has a past surgical history that includes Ovary removal; Colonoscopy; Colonoscopy (06/22/2015); hip surgery (Left, 07/30/2020); craniotomy (Left, 3/21/2021); and Gastrostomy tube placement (N/A, 4/5/2021).     Medications:   Scheduled Meds:    urea  15 g Per G Tube BID    ferrous sulfate  300 mg Per NG tube Daily    famotidine  20 mg Per NG tube Daily    levETIRAcetam  2,000 mg Per NG tube BID    lacosamide  200 mg Per NG tube BID    amLODIPine  10 mg Oral Daily    sennosides-docusate sodium  2 tablet Oral BID    polyethylene glycol  17 g Oral Daily    sodium chloride flush  10 mL Intravenous 2 times per day    [Held by provider] heparin (porcine)  5,000 Units Subcutaneous Q12H    ezetimibe  10 mg Oral Nightly    lisinopril  40 mg Oral Daily     Continuous Infusions:   PRN Meds: perflutren lipid microspheres, hydrALAZINE, sodium chloride flush, naloxone, promethazine **OR** ondansetron, acetaminophen, magnesium sulfate  Home Meds:   Prior to Admission medications    Medication Sig Start Date End Date Taking? Authorizing Provider   levETIRAcetam (KEPPRA) 1000 MG tablet Take 1 tablet by mouth 2 times daily 3/15/21   Joss Lowry MD   ALPRAZolam Kellie Plane) 0.25 MG tablet Take 0.125 mg by mouth 2 times daily. Historical Provider, MD   lisinopril (PRINIVIL;ZESTRIL) 20 MG tablet Take 40 mg by mouth daily    Historical Provider, MD   ferrous sulfate (IRON 325) 325 (65 Fe) MG tablet Take 325 mg by mouth daily (with breakfast)    Historical Provider, MD   Cyanocobalamin 2500 MCG SUBL Place 2,500 mcg under the tongue daily    Historical Provider, MD   metoprolol succinate (TOPROL XL) 25 MG extended release tablet Take 1 tablet by mouth daily 12/27/20   DEBRA Bray CNP   atorvastatin (LIPITOR) 80 MG tablet Take 1 tablet by mouth nightly 12/27/20   DEBRA Bray CNP   vitamin D (CHOLECALCIFEROL) 25 MCG (1000 UT) TABS tablet Take 1,000 Units by mouth daily    Historical Provider, MD   Loratadine (CLARITIN PO) Take 10 mg by mouth daily     Historical Provider, MD   ezetimibe (ZETIA) 10 MG tablet Take 10 mg by mouth nightly     Historical Provider, MD     Coumadin Use Last 7 Days:  no  Antiplatelet drug therapy use last 7 days: no  Other anticoagulant use last 7 days: no  Additional Medication Information: None      Pre-Sedation Documentation and Exam:   I have personally completed a history, physical exam & review of systems for this patient (see notes). Vital signs have been reviewed (see flow sheet for vitals).     Mallampati Airway Assessment:  Mallampati Class I - (soft palate, fauces, uvula & anterior/posterior tonsillar pillars are visible)    Prior History of Anesthesia Complications: none    ASA Classification:  Class 2 - A normal healthy patient with mild systemic disease    Sedation/ Anesthesia Plan:   intravenous sedation    Medications Planned:   midazolam (Versed) intravenously    Patient is an appropriate candidate for plan of sedation: yes    Electronically signed by Jose Enrique Reed MD on 4/6/2021 at 4:18 PM

## 2021-04-06 NOTE — PROGRESS NOTES
Speech Language Pathology    Attempted to see pt for dysphagia therapy. Reviewed chart. Spoke with RN. Pt too somnolent at this time to participate despite verbal/tactile/visual cues. Will re-attempt at later time as able.     Mariam Power M.A., [address] [facility]  Speech-Language Pathologist  Pager: [phone]

## 2021-04-06 NOTE — PROGRESS NOTES
Occupational Therapy/Physical Therapy  HOLD  Approached for therapy treatment. Per RN, pt is somnolent, unable to follow any commands. Not appropriate for therapy this morning. Will follow up per therapy plans of care.  Discussed d/c planning with CIARRA Matson, OT [id number]  Ranjeet Irvin, PT

## 2021-04-06 NOTE — PROCEDURES
[facility]     Electrophysiology Procedure Note       Date of Procedure: 4/6/2021  Patient's Name: J Luis Brewster  YOB: 1934   Medical Record Number: [id number]  Procedure Performed by: Bong Bates MD    Procedures performed:  · Selective venography of left upper extremity. · Insertion of MRI compatible right ventricular pacing lead under fluoroscopy. · Insertion of MRI compatible right atrial lead under fluoroscopy  · Insertion of a MRI compatible dual chamber Pacemaker. · Electronic analysis of lead and device. · Anesthesia: Local and Monitored Anesthesia Care  · An independent trained observer pushed medications at my direction. We monitored the patient's level of consciousness and vital signs/physiologic status throughout the procedure duration (see start and stop times below). · Level of sedation plan: Moderate sedation (conscious sedation) with intravenous Midazolam 1.25 mg    · Start time: 1522  · Stop time: 1605  · Mallampati airway assessment class: I  · ASA class: 1  · Estimated blood loss less than 20 cc    Indication of the procedure:       J Luis Brewster is a 80 y.o. female with symptomatic bradycardia. The patient is referred for pacemaker implantation due to non-reversible bradycardia secondary to sick sinus syndrome with symptoms of lightheadedness. Details of procedure: The patient was brought to the electrophysiology laboratory in stable condition. The patient was in a fasting and non-sedated state. The risks, benefits and alternatives of the procedure were discussed with the patient. The risks including, but not limited to, the risks of vascular injury, bleeding, infection, device malfunction, lead dislodgement, radiation exposure, injury to cardiac and surrounding structures (including pneumothorax), stroke, myocardial infarction and death were discussed in detail. The patient opted to proceed with the device implantation.  Written informed consent was signed and placed in the chart. Prophylactic antibiotic using Ancef 2 g IV was given. The patient was prepped and draped in sterile fashion. A timeout protocol was completed to identify the patient and the procedure being performed. IV sedation was provided with IV Versed and IV Fentanyl. The patient was monitored continuously with ECG, pulse oximetry, blood pressure monitoring, and direct observation. An incision was made in the left upper pectoral area in a transverse line roughly 1 cm from the clavicle after administration of lidocaine/bupivicaine combination. Using electrocautery and blunt dissection, a pocket was created. Central venous access into the left extrathoracic subclavian vein was obtained using the modified Seldinger technique. After central venous access was obtained, a sheath was placed in the axillary vein. A right ventricular lead was advanced into the RV septal position under fluoroscopic guidance and using a series of curved and straight stylets. The lead was actively fixated. After confirming appropriate function and no diaphragmatic stimulation at maximum output, the sheath was split and removed. The lead was secured to the underlying tissue using suture material.      A new sheath was advanced over a second previously placed wire into the vein. The passive fixation atrial lead was advanced to the right atrial appendage and placed under fluoroscopic guidance. After confirming appropriate function and no diaphragmatic stimulation at maximum output, the sheath was split and removed. The lead was secured to the underlying tissue using suture. The leads were then connected to the new pulse generator which was then placed into the pocket. Antibiotic solution along with saline flush was used to irrigate the pocket. The pocket was then closed using a 2-0 and 3-0 Vicryl subcutaneous layer and a subcuticular layer using 4-0 Vicryl.   The skin was covered with Steri-Strips and dressing. All sponge and needle counts were reported as correct at the end of the procedure. The patient tolerated the procedure well and there were no complications. Patient was transported to the holding area in stable condition. Device and Leads information:      Device was programmed to MVP with lower rate of 60 and upper tracking rate of 130. Impression:  Pre- and post-procedural diagnoses of sick sinus syndrome with symptoms of lightheadedness with successful implantation of a Medtronic 2-chamber PPM.     Plan:   The patient will be transferred to the floor and have usual post-implant care, including chest x-ray, intravenous antibiotic therapy, and interrogation of the device. From an EP perspective, if the interrogation and CXR tomorrow AM are showing appropriate functioning, parameters and placement, the patient may be discharged from the hospital. Follow-up will be a 1-week wound check with our nurse in the [facility] and a 1-month follow-up with Ms. Luis Perea. Thank you for allowing us to participate in the care of your patient. If you have any questions, please do not hesitate to contact me.     Brianda Serna MD   Cardiac Electrophysiology  [facility]

## 2021-04-06 NOTE — PROGRESS NOTES
Electrophysiology - PROGRESS NOTE    Admit Date: 3/20/2021     Chief Complaint: SB     Interval History:   Patient seen and examined and notes reviewed. Patient is being followed for SB. Patient had been hospitalized from 31-70-28-28 2/3/2015 after being transferred from [facility] for possible seizure-like episode, patient been found unresponsive by grandson on the couch and noted to have seizure-like activity, CT of the head revealed a left-sided subdural hematoma with associated mass-effect and left to right midline shunt, patient had been on daily aspirin and Plavix at that time, transferred to [facility] for neurosurgical consultation underwent a craniotomy and hematoma evacuation, antiplatelets have been on hold. Noted to have sinus bradycardia on the monitor.     In: 150 [NG/GT:150]  Out: 450    Wt Readings from Last 2 Encounters:   04/05/21 139 lb 1.8 oz (63.1 kg)   03/20/21 110 lb (49.9 kg)         Data:   Scheduled Meds:   Scheduled Meds:   urea  15 g Per G Tube BID    ferrous sulfate  300 mg Per NG tube Daily    famotidine  20 mg Per NG tube Daily    levETIRAcetam  2,000 mg Per NG tube BID    lacosamide  200 mg Per NG tube BID    amLODIPine  10 mg Oral Daily    sennosides-docusate sodium  2 tablet Oral BID    polyethylene glycol  17 g Oral Daily    sodium chloride flush  10 mL Intravenous 2 times per day    heparin (porcine)  5,000 Units Subcutaneous Q12H    ezetimibe  10 mg Oral Nightly    lisinopril  40 mg Oral Daily     Continuous Infusions:  PRN Meds:.perflutren lipid microspheres, hydrALAZINE, sodium chloride flush, naloxone, promethazine **OR** ondansetron, acetaminophen, magnesium sulfate  Continuous Infusions:    Intake/Output Summary (Last 24 hours) at 4/6/2021 0826  Last data filed at 4/6/2021 0605  Gross per 24 hour   Intake 450 ml   Output 875 ml   Net -425 ml       CBC:   Lab Results   Component Value Date    WBC 8.7 03/29/2021    HGB 8.5 03/29/2021     03/29/2021     BMP:  Lab Results   Component Value Date     04/06/2021    K 5.0 04/06/2021    K 5.0 03/25/2021    CL 97 04/06/2021    CO2 25 04/06/2021    BUN 50 04/06/2021    CREATININE 1.0 04/06/2021    GLUCOSE 150 04/06/2021     INR:   Lab Results   Component Value Date    INR 1.13 03/24/2021    INR 1.22 03/22/2021    INR 1.19 03/12/2021        CARDIAC LABS  ENZYMES:No results for input(s): CKMB, CKMBINDEX, TROPONINI in the last 72 hours. Invalid input(s): CKTOTAL;3  FASTING LIPID PANEL:  Lab Results   Component Value Date    HDL 27 04/03/2021    LDLCALC 53 04/03/2021    TRIG 89 04/03/2021     LIVER PROFILE:  Lab Results   Component Value Date    AST 13 03/24/2021    AST 15 03/20/2021    ALT <5 03/24/2021    ALT <5 03/20/2021       -----------------------------------------------------------------  Telemetry: Personally reviewed NSR, sinus bradycardia with some junctional arrhythmias    Objective:   Vitals: BP (!) 121/49   Pulse 55   Temp 97.9 °F (36.6 °C) (Axillary)   Resp 18   Ht 5' 6\" (1.676 m)   Wt 139 lb 1.8 oz (63.1 kg)   SpO2 98%   BMI 22.45 kg/m²   General appearance: alert, appears stated age and cooperative, No acute distress   Eyes: Conjunctiva and pupils normal and reactive  Skin: Skin color, texture, turgor normal. No rashes or ecchymosis.   Neck: no JVD, supple, symmetrical, trachea midline   Lungs: , no accessory muscle use, no respiratory distress  Heart: RRR  Abdomen: soft, non-tender; bowel sounds normal  Extremities: No edema, DP +  Psychiatric: normal insight and affect    Patient Active Problem List:     Pneumonia     Closed displaced fracture of left femoral neck (HCC)     MGUS (monoclonal gammopathy of unknown significance)     Benign essential hypertension     Aortic insufficiency     Clonic hemifacial spasm, bilateral     GEORGES (generalized anxiety disorder)     Gastroesophageal reflux disease without esophagitis     Mixed hyperlipidemia     NSTEMI (non-ST elevated myocardial infarction) ([facility] [address] [facility]     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 SB  2. Systolic CHF   3. CAD    79 y/o woman with a h/o HTN, HLD, MV regurg, MGUS, ICH, recurrent seizures, CAD who p/w AMS, and had a recent admission for AMS on 2/2/2021 due to an [address], was discharged home, had increasing confusion and admitted for recurrent seizure activity secondary to bleed, s/p left frontal craniotomy for evacuation of SDH on 3/21/2021 with baseline dementia with severe malnutrition, noted to be bradycardic, AV leticia agents held.      SB  - Continue to hold BB, AV leticia agents  - Keep on telemetry  - Dr. Holley Ford to discuss PPM with family today    Systolic HF w/ recovered EF  - EF  55%  - Hold BB  - Continue lisinopril    CAD  - Hold anitplatelets for now      Cj Garcias [address]

## 2021-04-07 NOTE — PROGRESS NOTES
Patients sister Roni Perdomo ([phone]) called and updated at this time. Plan of care discussed. All comments, questions and concerns addressed.       Electronically signed by Ama Jernigan RN on 4/6/2021 at 9:03 PM

## 2021-04-07 NOTE — PROGRESS NOTES
Speech Language Pathology    Attempted to see pt for dysphagia f/u. Noted pt now has order for regular texture diet / thin liquids. Discussed with Nuvia Khan, who requests hold, as pt too lethargic from procedure earlier. Also clarified recommendation to continue NPO with tube feedings. Will follow up as able/appropriate.     Wm Patel M.A., [address] [facility] ([phone]  Speech-Language Pathologist  Pager: [phone]

## 2021-04-07 NOTE — PLAN OF CARE
Problem: Falls - Risk of:  Goal: Will remain free from falls  4/7/2021 0140 by Maribell Wetzel RN  Outcome: Ongoing  Patient has remained free from falls during shift. Patient is unable to use call light, Avs Sys camera in place for increased safety. Spear Fall Risk; High (45& higher. Patient has non-skid socks on, fall precautions are in place, dome light illuminated. Bed is in lowest position, with alarm on, locked wheels, and call light is with in reach. Will continue to monitor. Problem: Pain:  Goal: Pain level will decrease  4/7/2021 0140 by Maribell Wetzel RN  Outcome: Ongoing  Patient has not appeared to be in any pain at this time. Will continue to monitor. Problem: HEMODYNAMIC STATUS  Goal: Patient has stable vital signs and fluid balance  4/7/2021 0140 by Maribell Wetzel RN  Outcome: Ongoing  Patient was admitted after a fall at home and was found to have a subdural hematoma with midline shift. Patient has been hemodynamically stable with stable vital signs and labs. Patients heart rate is; 60; NSR, on continuous telemetry monitoring. Patient denies any chest pain or SOB at this time. Will continue to monitor. Problem: Nutrition  Goal: Optimal nutrition therapy  4/7/2021 0140 by Maribell Wetzel RN  Outcome: Ongoing  Patient was restarted on tube feed after being NPO for pacemaker placement. Patient is currently at 20mL/hr with reaching a goal of 60mL/hr. Patient is currently tolerating tube feed without any signs of aspiration. Will continue to monitor. Problem: Skin Integrity:  Goal: Will show no infection signs and symptoms  4/7/2021 0140 by Maribell Wetzel RN  Outcome: Ongoing  Patient has shown no new signs of skin breakdown this shift. Xavier score completed this shift. Special mattress in place, and patient changed as needed. Patient turned Q2. Will continue to monitor.

## 2021-04-07 NOTE — PROGRESS NOTES
Occupational Therapy  Facility/Department: [place] [address] [facility]  Daily Treatment Note  NAME: Marva Santana  : 1934  MRN: [id number]    Date of Service: 2021    Discharge Recommendations:  Marva Santana scored a 6/24 on the AM-PAC ADL Inpatient form. Current research shows that an AM-PAC score of 17 or less is typically not associated with a discharge to the patient's home setting. Based on the patient's AM-PAC score and their current ADL deficits, it is recommended that the patient have 3-5 sessions per week of Occupational Therapy at d/c to increase the patient's independence. Please see assessment section for further patient specific details. If patient discharges prior to next session this note will serve as a discharge summary. Please see below for the latest assessment towards goals. OT Equipment Recommendations  Other: defer to next setting    Assessment   Performance deficits / Impairments: Decreased functional mobility ; Decreased endurance;Decreased coordination;Decreased ADL status; Decreased balance;Decreased strength;Decreased cognition  Assessment: Pt lethargic and  unable to follow commands for ROM or repositioning in bed. No pt initation of washing face despite hand over hand assist. Pt does purposefully move LEs and R UE in bed. Pt unable to meaningfully engage in therapy today, currently requires total assist for bed level ADLs, has tube feeds. Pt may benefit from trial OT tx at d/c pending her ability to participate  Treatment Diagnosis: decreased ADLs and transfers secondary to SDH  Prognosis: Guarded;Poor  REQUIRES OT FOLLOW UP: Yes  Activity Tolerance  Activity Tolerance: Treatment limited secondary to decreased cognition  Safety Devices  Type of devices: Bed alarm in place;Call light within reach; Left in bed;Nurse notified         Patient Diagnosis(es): There were no encounter diagnoses.       has a past medical history of Acute encephalopathy, Anxiety, Aortic insufficiency, CHF (congestive heart failure) (HCC), Chronic kidney disease (CKD), stage III (moderate), COPD, moderate ([address] Coronavirus infection, Dementia ([address] Expressive aphasia, Facial twitching, GERD (gastroesophageal reflux disease), Hair loss, Hx of falling, Hyperlipidemia, Hypertension, Ischemic cardiomyopathy, Mild cognitive impairment with memory loss, NSTEMI (non-ST elevated myocardial infarction) ([address] Seasonal allergies, and Subdural hematoma ([facility] [address] has a past surgical history that includes Ovary removal; Colonoscopy; Colonoscopy (06/22/2015); hip surgery (Left, 07/30/2020); craniotomy (Left, 3/21/2021); and Gastrostomy tube placement (N/A, 4/5/2021). Restrictions  Restrictions/Precautions  Restrictions/Precautions: General Precautions, Up as Tolerated  Position Activity Restriction  Other position/activity restrictions: up with assist, ambulation     Subjective   General  Chart Reviewed: Yes  Additional Pertinent Hx: Pt admitted to ED with SDH s/p fall 10 days prior. LEFT CRANIOTOMY FOR SUBDURAL HEMATOMA EVACUATION on 3/21. s/p PEG placement. 4/6 pacemaker placement   PMHx includes: Acute encephalopathy, Anxiety, Aortic insufficiency, CHF Chronic kidney disease, stage III, COPDCoronavirus infection, Dementia, Expressive aphasia, Facial twitching, GERD, Hair loss, falling, Hyperlipidemia, Hypertension, Ischemic cardiomyopathy, Mild cognitive impairment with memory loss, NSTEMI, Seasonal allergies, and Subdural hematoma  Response to previous treatment: Patient with no complaints from previous session  Family / Caregiver Present: No  Referring Practitioner: RICARDO Forrest  Diagnosis: SDH  Subjective  Subjective: Pt sleeping in bed, eyes closed, not responsive to VC/TC. Pt spontaneously moving B LE, R UE, L UE in sling and swath s/p pacemaker placement  General Comment  Comments: Francisco Craft Vital Signs  Patient Currently in Pain: (no outward sign of pain observed Simultaneous filing.  User may not have seen previous data.)          Objective    ADL  Grooming: Dependent/Total(wash face, no initation from pt despte maximal VC/TC, hand over hand)  LE Dressing: Dependent/Total(don socks)  Toileting: Dependent/Total(roberts catheter)              Bed mobility  Rolling to Left: Dependent/Total  Rolling to Right: Dependent/Total  Comment: no pt initiation for rolling in bed during repositioning, total assist to scoot up to [address]: Unresponsive to stimuli  Cognition Comment: eyes closed w/ no attempt to open during session, no following of simple commands despite tactile cues           Type of ROM/Therapeutic Exercise  Comment: PROM R UE all joints- not active engagement from pt (L UE in sling/swath)                    Plan   Plan  Times per week: 2-5x  Times per day: Daily  Current Treatment Recommendations: Strengthening, Balance Training, Functional Mobility Training, Endurance Training, Self-Care / ADL, Neuromuscular Re-education               AM-PAC Score        AM-[facility] Inpatient Daily Activity Raw Score: 6 (04/07/21 1020)  AM-PAC Inpatient ADL T-Scale Score : 17.07 (04/07/21 1020)  ADL Inpatient CMS 0-100% Score: 100 (04/07/21 1020)  ADL Inpatient CMS G-Code Modifier : CN (04/07/21 1020)    Goals  Short term goals  Time Frame for Short term goals: by dc  Short term goal 1: Pt will complete LB dressing with mod A - not met  Short term goal 2: Pt will complete toileting with min A - not met  Short term goal 3: Pt will complete multi step grooming task with CGA - not met  Short term goal 4: Pt will complete B UE HEP x 20 reps for increased tolerance - not met  Patient Goals   Patient goals : to go home       Therapy Time   Individual Concurrent Group Co-treatment   Time In 0920         Time Out 0929         Minutes 9         Timed Code Treatment Minutes: [address] [facility], OT

## 2021-04-07 NOTE — CARE COORDINATION
Jose Phone: [phone] Fax: [phone]    LOC at discharge: Skilled  MICHA Completed: Yes    Notification completed in HENS/PAS?:  Yes : CM has completed HENS online through secure website for SNF admission at [facility]. Document ID #: [id number]    IMM Completed:   Yes, Case management has presented and reviewed IMM letter #2 to the patient and/or family/ POA. Patient and/or family/POA verbalized understanding of their medicare rights and appeal process if needed. Patient and/or family/POA has signed, initialed and placed today's date (4.7.21) and time ([phone]) on IMM letter #2 on the the appropriate lines. Patient and/or family/POA, copy of letter offered and they are aware that this original copy of IMM letter #2 is available prior to discharge from the paper chart on the unit. Electronic documentation has been entered into epic for IMM letter #2 and original paper copy has been added to the paper chart at the nurses station.      Transportation:  Transportation PLAN for discharge: EMS transportation   Mode of Transport: Ambulance stretcher - BLS  Reason for medical transport: Bed confined: Meets the following criteria 1) unable to get out of bed without assistance or ambulate, 2) unable to safely sit up in a wheelchair, 3) unable to maintain erect seating position in a chair for time needed for transport  Name of [address]: [address]  Phone: [phone]  Time of Transport: 8:30pm    Transport form completed: Yes    Home Care:  [address] ordered at discharge: No  [address]: Not Applicable  Orders faxed: No    Durable Medical Equipment:  DME Provider: n/a  Equipment obtained during hospitalization: [address] and Respiratory Equipment:  Oxygen needed at discharge?: Not [address]: Not Applicable  Portable tank available for discharge?: Not Indicated    Dialysis:  Dialysis patient: No    Dialysis Center:  Not Applicable    Hospice Services:  Location: Not Applicable  Agency: Not Applicable    Consents signed: Not Indicated    Referrals made at [facility] for outpatient continued care:  Not Applicable    Additional CM Notes:  Patient will be discharging to SNF at [facility]. Orders faxed to [phone], nurse to call report to [phone]. Patient scheduled for transport via [address] at 8:30pm.  CM updated pt's son Nixon Ruvalcaba who is aware and agreeable to plan. CM called APS to update on pt's discharge plan to SNF. The Plan for Transition of Care is related to the following treatment goals of Subdural hematoma ([facility] [S06.5X9A]    The Patient and/or patient representative Lillie Dsouza and her family were provided with a choice of provider and agrees with the discharge plan Yes    Freedom of choice list was provided with basic dialogue that supports the patient's individualized plan of care/goals and shares the quality data associated with the providers.  Yes    Care Transitions patient: No    Anthony Robin RN  [facility] [employer]  Case Management Department  Ph: [phone]  Fax: [phone]

## 2021-04-07 NOTE — PROGRESS NOTES
Electrophysiology - PROGRESS NOTE    Admit Date: 3/20/2021     Chief Complaint: SB     Interval History:   Patient seen and examined and notes reviewed. Patient is being followed for SB. Patient had been hospitalized from 31-70-28-28 2/3/2015 after being transferred from [facility] for possible seizure-like episode, patient been found unresponsive by grandson on the couch and noted to have seizure-like activity, CT of the head revealed a left-sided subdural hematoma with associated mass-effect and left to right midline shunt, patient had been on daily aspirin and Plavix at that time, transferred to [facility] for neurosurgical consultation underwent a craniotomy and hematoma evacuation, antiplatelets have been on hold. Noted to have sinus bradycardia on the monitor. S/p dual ch MDT PPM (4/6/21, Dr. Darvin Lemus).      In: 773 [NG/GT:773]  Out: 750    Wt Readings from Last 2 Encounters:   04/05/21 139 lb 1.8 oz (63.1 kg)   03/20/21 110 lb (49.9 kg)         Data:   Scheduled Meds:   Scheduled Meds:   urea  15 g Per G Tube BID    ferrous sulfate  300 mg Per NG tube Daily    famotidine  20 mg Per NG tube Daily    levETIRAcetam  2,000 mg Per NG tube BID    lacosamide  200 mg Per NG tube BID    amLODIPine  10 mg Oral Daily    sennosides-docusate sodium  2 tablet Oral BID    polyethylene glycol  17 g Oral Daily    sodium chloride flush  10 mL Intravenous 2 times per day    [Held by provider] heparin (porcine)  5,000 Units Subcutaneous Q12H    ezetimibe  10 mg Oral Nightly    lisinopril  40 mg Oral Daily     Continuous Infusions:  PRN Meds:.perflutren lipid microspheres, hydrALAZINE, sodium chloride flush, naloxone, promethazine **OR** ondansetron, acetaminophen, magnesium sulfate  Continuous Infusions:    Intake/Output Summary (Last 24 hours) at 4/7/2021 0837  Last data filed at 4/7/2021 0615  Gross per 24 hour   Intake 773 ml   Output 750 ml   Net 23 ml       CBC:   Lab Results   Component Value Date    WBC 16.1 04/06/2021    HGB 10.1 04/06/2021     04/06/2021     BMP:  Lab Results   Component Value Date     04/07/2021    K 4.6 04/07/2021    K 5.0 03/25/2021     04/07/2021    CO2 25 04/07/2021    BUN 57 04/07/2021    CREATININE 0.9 04/07/2021    GLUCOSE 156 04/07/2021     INR:   Lab Results   Component Value Date    INR 1.20 04/06/2021    INR 1.13 03/24/2021    INR 1.22 03/22/2021        CARDIAC LABS  ENZYMES:No results for input(s): CKMB, CKMBINDEX, TROPONINI in the last 72 hours. Invalid input(s): CKTOTAL;3  FASTING LIPID PANEL:  Lab Results   Component Value Date    HDL 27 04/03/2021    LDLCALC 53 04/03/2021    TRIG 89 04/03/2021     LIVER PROFILE:  Lab Results   Component Value Date    AST 13 03/24/2021    AST 15 03/20/2021    ALT <5 03/24/2021    ALT <5 03/20/2021       -----------------------------------------------------------------  Telemetry: Personally reviewed NSR, sinus bradycardia with some junctional arrhythmias    Objective:   Vitals: BP (!) 107/37   Pulse 72   Temp 98.8 °F (37.1 °C) (Axillary)   Resp 16   Ht 5' 6\" (1.676 m)   Wt 139 lb 1.8 oz (63.1 kg)   SpO2 97%   BMI 22.45 kg/m²   General appearance: alert, appears stated age and cooperative, No acute distress   Eyes: Conjunctiva and pupils normal and reactive  Skin: Skin color, texture, turgor normal. No rashes or ecchymosis.   Neck: no JVD, supple, symmetrical, trachea midline   Lungs: , no accessory muscle use, no respiratory distress  Heart: RRR  Abdomen: soft, non-tender; bowel sounds normal  Extremities: No edema, DP +  Psychiatric: normal insight and affect    Patient Active Problem List:     Pneumonia     Closed displaced fracture of left femoral neck (HCC)     MGUS (monoclonal gammopathy of unknown significance)     Benign essential hypertension     Aortic insufficiency     Clonic hemifacial spasm, bilateral     GEORGES (generalized anxiety disorder)     Gastroesophageal reflux

## 2021-04-07 NOTE — PLAN OF CARE
Problem: Falls - Risk of:  Goal: Will remain free from falls  Outcome: Ongoing  Patient has no verbal response. Does not appear to understand nor is she able to follow verbal commands. Remains a total assist. In bed with alarm activated. Will continue to monitor for safety. Problem: Pain:  Description: Pain management should include both nonpharmacologic and pharmacologic interventions. Goal: Pain level will decrease  Outcome: Ongoing  Medicated for pain once this shift. Has rested comfortably since. Will continue to monitor. Problem: Nutrition  Goal: Optimal nutrition therapy  Outcome: Ongoing  PEG Tube feeding continues at goal rate of 60. Tolerating well.

## 2021-04-07 NOTE — PROGRESS NOTES
Palliative Care Chart Review  and Check in Note:     NAME:  Tigist Bermudez  Admit Date: 3/20/2021  Hospital Day:  Hospital Day: 23   Current Code status: DNR-CCA    Palliative care is continuing to following Ms. Price Phalen for symptom management,  and goals of care discussion as needed. Patient's chart reviewed today 4/7/21. Pt is slightly restless and not following commands. Called her son Joel Slaughter, confirmed DNR-CCA status and plan for d/c today. The following are the currently established goals/code status, and Symptom management. Goals of care: Son hopes pt will wake up and be more interactive over more time.     Code status: DNR-CCA    Discharge plan: D/c to SNF today    Mark Kinsey NP  [id number] [facility]  [phone]

## 2021-04-07 NOTE — DISCHARGE SUMMARY
Hospital Medicine Discharge Summary    Patient ID: Kym Isabel      Patient's PCP: Fuentes Dumont MD    Admit Date: 3/20/2021     Discharge Date:  04/07/21    Admitting Physician: Darshan Pierson MD     Discharge Physician: Stephanie Esparza MD     Discharge Diagnoses: Active Hospital Problems    Diagnosis Date Noted    Severe malnutrition ([address] [E43] 03/30/2021    Hyponatremia [E87.1]     Encephalopathy acute [G93.40]     Status epilepticus ([address] [G40.901]     Dementia without behavioral disturbance ([address] [F03.90]     Subdural hematoma ([address] [S06.5X9A] 03/12/2021       The patient was seen and examined on day of discharge and this discharge summary is in conjunction with any daily progress note from day of discharge. Hospital Course:  Patient was admitted and treated for following:    Acute encephalopathy sec to [address]:  Worsening subdural hematoma, with acute and chronic components with increased mass-effect  Baseline dementia  Patient was admitted and neurosurgery was consulted. Repeat CT head showed unchanged left cerebral convexity subdural hematoma with 8 mm rightward subfalcine herniation. Patient underwent left craniotomy for subdural hematoma evacuation. Neurosurgery recommended no antiplatelets and subcu heparin was okay to use.     Recurrent seizures, breakthrough seizures  Patient appeared to have Generalized tonic-clonic seizures on Keppra. Recurrent seizures likely in the setting of subdural hematoma, with acute and chronic components with increased mass-effect. Also noted with hyponatremia. Neurology was consulted. Keppra was continued and Vimpat was added. Seizure precautions were followed. Patient was placed on continuous video EEG and had no further seizures. MRI brain showed small punctate area of stroke which was unlikely to be contributing to recurrent seizures.   Patient is being discharged on Keppra and Vimpat    Hyponatremia, resolved  Probable hypovolemic hyponatremia from low oral intake. Also considered cerebral salt wasting related to intracranial process or SIADH. Nephrology was consulted. Sodium was monitored and urea packets were started. Sodium level stabilized. Patient to continue urea packets on discharge.     Acute resp failure with hypoxia, resolved  Likely sec to seizures/benzodiazepines, Aspiration during seizures leann with dysphagia from [address], with aspiration pneumonia. Was placed on oxygen and started on IV antibiotics. Oxygen sats were monitored and patient was able to be weaned off of oxygen.     Aspiration pneumonia, treated  Aspiration during seizures leann with dysphagia from [address]. CXR 3/25 following initial seizure no acute abnormality. Repeated 3/27: some infiltrates bilaterally. Patient was also having fevers. Blood cultures were sent and patient was started on antibiotics . MRSA screen was negative and blood culture showed no growth to date. Vancomycin was discontinued. Patient was continued on Zosyn and completed the course.     Acute urinary retention, resolved  Bladder scanned for 660 cc. UA was negative for infection. Hamilton was placed. Hamilton removed on discharge. Bradycardia  Coreg was held. TSH was checked and echo was ordered. Patient was continued on telemetry. Zyprexa was held. Cardiology was consulted. Patient was found to have sick sinus syndrome with junctional escape. Pacemaker was placed.     CAD/Ischemic cardiomyopathy   Prior ejection fraction of 30-35%, moderate MR and moderate to severe AR. Was on aspirin and Plavix, which were discontinued during previous hospitalization. Repeat Echo EF improved 50-55% 4/3/21. Patient was continued ACE inhibitor and statin. Beta-blocker was held due to bradycardia. Patient to continue ACE inhibitor and statin on discharge    HTN: uncontrolled  Coreg was held due to bradycardia. Lisinopril was continued.   Blood pressure was still high so amlodipine was added and the dose Studies    Radiology:   XR CHEST PORTABLE   Final Result   1. No pneumothorax. 2. Stable cardiomegaly. 3. Interval resolution of the previously seen pleural effusions. 4. Mild vascular congestion. XR ABDOMEN (KUB) (SINGLE AP VIEW)   Final Result      Feeding tube tip projects over the region of the gastric fundus/body. VL Extremity Venous Bilateral   Final Result      XR ABDOMEN (KUB) (SINGLE AP VIEW)   Final Result      Nonspecific bowel gas pattern. XR ABDOMEN (KUB) (SINGLE AP VIEW)   Final Result      Feeding tube as above. XR ABDOMEN (KUB) (SINGLE AP VIEW)   Final Result   1. Corpak feeding tube in the stomach in the left upper quadrant      MRI BRAIN W WO CONTRAST   Final Result      Stable left cerebral convexity subdural hematoma. The signal characteristics suggest a late subacute hematoma. There is mild local mass effect without significant midline shift. Punctate recent infarct in the left temporal occipital region. Mild-to-moderate chronic small vessel ischemic change and mild atrophy. XR ABDOMEN (KUB) (SINGLE AP VIEW)   Final Result     The Cor Huber tube descends from the esophagus into the region of the    stomach with the tip extending very low into the upper left pelvis. This    may be due to a very larger stomach or possibly a gastrojejunostomy. CT HEAD WO CONTRAST   Final Result      Evolving left cerebral convexity subdural hematoma with no significant interval change in size. Interval mild decreased in density. Stable mild local mass effect and 3 mm midline shift to right. XR CHEST PORTABLE   Final Result      Diffuse airspace disease in the right lung, and in the left mid to lower lung. Small left and moderate size right-sided pleural effusions. Cardiomegaly. No significant improvement in the chest from prior studies. XR CHEST PORTABLE   Final Result      Feeding tube is seen traversing the esophagus into the stomach. Cardiomegaly. Diffuse interstitial prominence. Increased by lateral airspace disease in the mid to lower lung zones, with small bilateral effusions. Findings are consistent with progressive infiltrates or pulmonary edema. Correlate clinically. CT HEAD WO CONTRAST   Final Result      Left frontal craniotomy. Subdural hematoma on the left unchanged. Pneumocephalus unchanged. Midline shift left-to-right unchanged. XR CHEST PORTABLE   Final Result      Stable appearance the chest.      CT HEAD WO CONTRAST   Final Result      1. Slight reduction in the left sided subdural hematoma status post drain removal.   2.  Slightly improved mild left-to-right midline shift. 3.  Improving postoperative pneumocephalus. XR CHEST PORTABLE   Final Result   1. Congestive changes with worsening of interstitial edema in the right hemithorax with vascular redistribution   2. Nasogastric feeding tube in the stomach      XR ABDOMEN (KUB) (SINGLE AP VIEW)   Final Result      1. Feeding tube tip in stomach. 2. Airspace disease right lung, possible pneumonia. CT HEAD WO CONTRAST   Final Result      Postoperative changes noted with placement of drainage catheter into the left subdural hematoma with postoperative change from craniotomy in the superior frontal lobe noted. The subdural hemorrhage is slightly decreased from prior study with interval development of postoperative air as above. Degree of mild left to right shift has diminished slightly from prior exam.                XR HIP LEFT (2-3 VIEWS)   Final Result   Impression:    No acute osseous injury. Left total hip arthroplasty with mild chronic loosening along the acetabulum. CT HEAD WO CONTRAST   Final Result      1. Unchanged left cerebral convexity subdural hematoma with 8 mm rightward subfalcine herniation.              Consults:     IP CONSULT TO NEUROSURGERY  IP CONSULT TO GI  IP CONSULT TO PALLIATIVE CARE  IP CONSULT TO NEUROLOGY  IP CONSULT TO NEPHROLOGY  IP CONSULT TO PHARMACY  IP CONSULT TO CARDIOLOGY    Disposition: Skilled nursing facility    Condition at Discharge: Stable    Discharge Instructions/Follow-up:  Monitor HR and blood pressure. Adjust medication as needed. Follow up with cardiology, nephrology, hematology, neurology and PCP. PEG care. Cont TF  PT/OT  Incisional Care: Open to air. Wash incision daily with warm soapy water or shower daily, and pat dry with clean dry towel. Code Status:  DNR-CCA     Activity: activity as tolerated    Diet: Tube feeds      Discharge Medications:     Current Discharge Medication List           Details   lacosamide (VIMPAT) 10 MG/ML SOLN oral solution 20 mLs by Per NG tube route 2 times daily for 30 days. Qty: 600 mL, Refills: 1    Associated Diagnoses: Status epilepticus ([address]      amLODIPine (NORVASC) 10 MG tablet Take 1 tablet by mouth daily  Qty: 30 tablet, Refills: 3      urea (URE-NA) 15 g PACK packet 15 g by Per G Tube route 2 times daily  Qty: 60 Package, Refills: 0      famotidine (PEPCID) 20 MG tablet 1 tablet by Per NG tube route daily  Qty: 60 tablet, Refills: 3              Details   ALPRAZolam (XANAX) 0.25 MG tablet Take 0.5 tablets by mouth 2 times daily for 3 days.   Qty: 3 tablet, Refills: 0    Associated Diagnoses: GEORGES (generalized anxiety disorder)              Details   levETIRAcetam (KEPPRA) 1000 MG tablet Take 1 tablet by mouth 2 times daily  Qty: 60 tablet, Refills: 3      lisinopril (PRINIVIL;ZESTRIL) 20 MG tablet Take 40 mg by mouth daily      ferrous sulfate (IRON 325) 325 (65 Fe) MG tablet Take 325 mg by mouth daily (with breakfast)      Cyanocobalamin 2500 MCG SUBL Place 2,500 mcg under the tongue daily      atorvastatin (LIPITOR) 80 MG tablet Take 1 tablet by mouth nightly  Qty: 30 tablet, Refills: 3      vitamin D (CHOLECALCIFEROL) 25 MCG (1000 UT) TABS tablet Take 1,000 Units by mouth daily      Loratadine (CLARITIN PO) Take 10 mg by mouth

## 2021-04-07 NOTE — PROGRESS NOTES
Physical Therapy  Facility/Department: [place] [address] [facility]  Daily Treatment Note  NAME: Landy Arango  : 1934  MRN: [id number]    Date of Service: 2021    Discharge Recommendations:Jaz Johnston scored a 6/24 on the AM-PAC short mobility form. Current research shows that an AM-PAC score of 17 or less is typically not associated with a discharge to the patient's home setting. Based on the patient's AM-PAC score and their current functional mobility deficits, it is recommended that the patient have 3-5 sessions per week of Physical Therapy at d/c to increase the patient's independence. Please see assessment section for further patient specific details. If patient discharges prior to next session this note will serve as a discharge summary. Please see below for the latest assessment towards goals. Patient would benefit from continued therapy after discharge        Assessment   Body structures, Functions, Activity limitations: Decreased functional mobility ; Decreased endurance;Decreased balance;Decreased strength;Decreased safe awareness; Increased pain  Assessment: Pt demo decreased mentation this am and  unable to participate in session at all. Unsafe to attempt EOB due to pt's poor mentation/ability to participate. Per CM notes, plan is for SNF at discharge. Pt may benefit trial of skilled IP PT if she is able to participate. Treatment Diagnosis: Decreased functional mobility due to SDH  PT Education: PT Role;Plan of Care  Patient Education: Pt demonstrates no learning & will require reinforcement. Activity Tolerance  Activity Tolerance: Patient limited by cognitive status; Patient limited by fatigue     Patient Diagnosis(es): There were no encounter diagnoses.      has a past medical history of Acute encephalopathy, Anxiety, Aortic insufficiency, CHF (congestive heart failure) ([facility] Chronic kidney disease (CKD), stage III (moderate), COPD, moderate ([facility] [facility] Coronavirus infection, Dementia ([facility] [facility] Expressive aphasia, Facial twitching, GERD (gastroesophageal reflux disease), Hair loss, Hx of falling, Hyperlipidemia, Hypertension, Ischemic cardiomyopathy, Mild cognitive impairment with memory loss, NSTEMI (non-ST elevated myocardial infarction) ([facility] Seasonal allergies, and Subdural hematoma ([facility] has a past surgical history that includes Ovary removal; Colonoscopy; Colonoscopy (2015); hip surgery (Left, 2020); craniotomy (Left, 3/21/2021); and Gastrostomy tube placement (N/A, 2021). Restrictions  Restrictions/Precautions  Restrictions/Precautions: General Precautions, Up as Tolerated  Position Activity Restriction  Other position/activity restrictions: up with assist, ambulation     Subjective   General  Chart Reviewed: Yes  Additional Pertinent Hx: 80 y.o. female who presents to the emergency department with some increased confusion. Pt found to have SDH. S/p LEFT CRANIOTOMY FOR SUBDURAL HEMATOMA EVACUATION on 3/21. Family / Caregiver Present: No  Subjective  Subjective: Pt supine in bed, lethargic. Eyes closed. Pt non-verbal & not following commands. Orientation  Orientation  Overall Orientation Status: Impaired(pt unable to verbalize this am; did not respond to name; pt lethargic)     Objective   Bed mobility  Rolling to Left: 2 Person assistance(dependent x2) with max vc/encouragement  Rolling to Right: 2 Person assistance(dependent x2) with max vc/encouragement  Scootin Person assistance;Dependent/Total(scoot up in bed; pt at 30 degrees HOB due to tube feed)              Exercises  Comments: mostly PROM, occ AAROM.   bilat gastroc stretches 30 sec x 2; pt unable to follow commands                               AM-PAC Score  AM-PAC Inpatient Mobility Raw Score : 6 (21 101)  AM-PAC Inpatient T-Scale Score : 23.55 (21 1014)  Mobility Inpatient CMS 0-100% Score: 100 (214)  Mobility Inpatient CMS G-Code Modifier : CN (21) Goals  Short term goals  Time Frame for Short term goals: d/c  Short term goal 1: supine<->sit mod A x 1-2  ongoing  Short term goal 2: sit EOB with CGA for 5 min  ongoing  Short term goal 3: sit<->stand min A x 1-2  ongoing  Patient Goals   Patient goals : none stated    Plan    Plan  Times per week: 2-5  Times per day: Daily  Current Treatment Recommendations: Strengthening, Balance Training, Gait Training, Stair training, Functional Mobility Training, Transfer Training, Endurance Training, Home Exercise Program, Safety Education & Training  Safety Devices  Type of devices: Nurse notified, Call light within reach, Bed alarm in place, Left in bed  Restraints  Initially in place: Yes  Restraints: bilat wrist     Therapy Time   Individual Concurrent Group Co-treatment   Time In 0905         Time Out 0928         Minutes 23           Timed Code Treatment Minutes:  23    Total Treatment Minutes:  23       Media , PT

## 2021-04-07 NOTE — PROGRESS NOTES
Discharged: Report given to Ade at [facility]. Patient to be discharged per ambulance transport at 2030.

## 2021-04-07 NOTE — PROGRESS NOTES
Device interrogation by company representative 4/7/21. .See interrogation for further details. 3/20/2021 - present (18 days)  The [address] op pacer check for dual PPM implant  4/6/21. Pacing (% of Time Since 07-Apr-2021)   <0.1 % (MVP On)  AP 47.7 %    No  arrhythmias recorded. No changes made. See PACEART report under Cardiology tab.

## 2021-04-07 NOTE — PROGRESS NOTES
[facility] NEPHROLOGY    [facility]              [phone]                      Rosalinda Avalos is admitted with AMS. We are following for Acute hyponatremia    Interval History and plan:      Sodium is at 133< 130> 137> 129>> 134  Urine is 750 ml  BP is better      Plan:  - Has SIADH  - continue urea packet   - continue amlodipine , lisinopril . Assessment :     Initial evaluation:    Acute Hyponatremia  - Likely hypovolemic hyponatremia due to low oral intake  - No identifiable causes of SIADH  - got samsca  - start urea packets           [address] [facility] would like to thank Errol Segura MD   for opportunity to serve this patient      Please call with questions at-   24 Hrs Answering service [phone] or  7 am- 5 pm via Perfect serve or cell phone  Dr. Jorgensen Like        CC/reason for consult :     AMS     HPI :     80 y.o. female who is transferred from [facility], ED for neurosurgical consultation. Patient was brought in by her grandson with complaints of increased confusion for the past couple of days and patient complaining of left-sided back pain. Patient was hospitalized here from 3/12 through 3/15, after being transferred from [facility] for possible seizure-like episode. At that time patient was found unresponsive by grandson on the couch and noticed seizure-like activity. In ED patient was with nurse to have a seizure by her RN at bedside which lasted for about 30 seconds. CT head revealed left-sided subdural hematoma with associated mass-effect and left-to-right midline shift. Patient has been taking 81 mg of aspirin and Plavix daily at that time. During the previous hospitalization patient was started on Keppra 1 g twice daily.     Patient was holding onto her upper belly with both hands and morning when I entered the room. Unable to exactly show where it hurts. On palpation patient does not seem to have abdominal tenderness. No guarding/rigidity noted. Patient did not even flinch during deep palpation. Seemed to be pain-free when she is distracted. Was able to lift both her legs off the bed and hold up in the ER with no pain in her belly or back.     At baseline patient has underlying dementia and typically oriented to self, place and situation. Follows commands.     According to her recent hospitalization records she is a limited code with no chest compressions but yes to intubation, defibrillation and medications. ROS:     Patient not responsive, unable to obtain ROS    positives in bold                    All other remaining systems are negative or unable to obtain        PMH/PSH/SH/Family History:     Past Medical History:   Diagnosis Date    Acute encephalopathy 03/03/2021    Anxiety     Aortic insufficiency     CHF (congestive heart failure) (HCC)     Chronic kidney disease (CKD), stage III (moderate)     COPD, moderate ([address]     Coronavirus infection 03/25/2020    [facility]     Expressive aphasia 07/29/2020    Facial twitching     GERD (gastroesophageal reflux disease)     Hair loss     wears wig- unknown cause    Hx of falling     Hyperlipidemia     Hypertension     Ischemic cardiomyopathy     EF 55% 03/15/2021    Mild cognitive impairment with memory loss 07/25/2019    NSTEMI (non-ST elevated myocardial infarction) ([address]     Seasonal allergies     Subdural hematoma ([address] 03/12/2021       Past Surgical History:   Procedure Laterality Date    COLONOSCOPY      COLONOSCOPY  06/22/2015    diverticulosis    CRANIOTOMY Left 3/21/2021    LEFT CRANIOTOMY FOR SUBDURAL HEMATOMA EVACUATION performed by Levy Anaya.  Kyra Mustafa MD at [address] [facility] N/A 4/5/2021    PERCUTANEOUS ENDOSCOPIC GASTROSTOMY TUBE PLACEMENT performed by Ernestina Lincoln MD at [address] [facility] Left 07/30/2020    LEFT HIP HEMIARTHROPLASTY performed by Carlos Hough MD at [address]          reports that she has never smoked. She has never used smokeless tobacco. She reports that she does not drink alcohol or use drugs. family history includes Other in her father.          Medication:     Current Facility-Administered Medications: sodium chloride flush 0.9 % injection 10 mL, 10 mL, Intravenous, 2 times per day  ceFAZolin (ANCEF) 2000 mg in dextrose 3 % 50 mL IVPB (duplex), 2,000 mg, Intravenous, Once  urea (URE-NA) packet 15 g, 15 g, Per G Tube, BID  perflutren lipid microspheres (DEFINITY) injection 1.65 mg, 1.5 mL, Intravenous, ONCE PRN  ferrous sulfate 300 (60 Fe) MG/5ML syrup 300 mg, 300 mg, Per NG tube, Daily  famotidine (PEPCID) tablet 20 mg, 20 mg, Per NG tube, Daily  levETIRAcetam (KEPPRA) 100 MG/ML solution 2,000 mg, 2,000 mg, Per NG tube, BID  lacosamide (VIMPAT) 10 MG/ML oral solution 200 mg, 200 mg, Per NG tube, BID  amLODIPine (NORVASC) tablet 10 mg, 10 mg, Oral, Daily  hydrALAZINE (APRESOLINE) injection 10 mg, 10 mg, Intravenous, Q2H PRN  sennosides-docusate sodium (SENOKOT-S) 8.6-50 MG tablet 2 tablet, 2 tablet, Oral, BID  polyethylene glycol (GLYCOLAX) packet 17 g, 17 g, Oral, Daily  sodium chloride flush 0.9 % injection 10 mL, 10 mL, Intravenous, 2 times per day  sodium chloride flush 0.9 % injection 10 mL, 10 mL, Intravenous, PRN  naloxone (NARCAN) injection 0.2 mg, 0.2 mg, Intravenous, PRN  promethazine (PHENERGAN) tablet 12.5 mg, 12.5 mg, Oral, Q6H PRN **OR** ondansetron (ZOFRAN) injection 4 mg, 4 mg, Intravenous, Q6H PRN  [Held by provider] heparin (porcine) injection 5,000 Units, 5,000 Units, Subcutaneous, Q12H  ezetimibe (ZETIA) tablet 10 mg, 10 mg, Oral, Nightly  lisinopril (PRINIVIL;ZESTRIL) tablet 40 mg, 40 mg, Oral, Daily  acetaminophen (TYLENOL) tablet 650 mg, 650 mg, Oral, Q4H PRN  magnesium sulfate 1000 mg in dextrose 5% 100 mL IVPB, 1,000 mg, Intravenous, PRN       Vitals :     Vitals:    04/07/21 0322   BP: (!) 107/37   Pulse: 72   Resp: 16   Temp: 98.8 °F (37.1 °C)   SpO2: 97% I & O :       Intake/Output Summary (Last 24 hours) at 4/7/2021 0913  Last data filed at 4/7/2021 0615  Gross per 24 hour   Intake 773 ml   Output 750 ml   Net 23 ml        Physical Examination :     General appearance:     Somnolent, not responsive to verbal cues, tactile stimuli  HEENT: Lips- normal, teeth- ok , oral mucosa- moist  Neck : Mass- no, appears symmetrical, JVD- not visible  Respiratory: Respiratory effort-  normal, wheeze- no, crackles - no  Cardiovascular:  Ausculation- No M/R/G, Edema absent  Abdomen: visible mass- no, distention- no, scar- no, tenderness- no                            hepatosplenomegaly-  no  Musculoskeletal:  clubbing no,cyanosis- no , digital ischemia- no                           muscle strength- grossly normal , tone - grossly normal  Skin: rashes- no , ulcers- no, induration- no, tightening - no  Psychiatric:  Not obtained  Additional finding:      LABS:     Recent Labs     04/06/21  1251   WBC 16.1*   HGB 10.1*   HCT 32.6*   *     Recent Labs     04/06/21  0539 04/06/21  1251 04/07/21  0721   * 134* 134*   K 5.0 5.1 4.6   CL 97* 98* 100   CO2 25 23 25   BUN 50* 65* 57*   CREATININE 1.0 1.1 0.9   GLUCOSE 150* 149* 156*

## 2021-04-09 PROBLEM — J96.01 ACUTE RESPIRATORY FAILURE WITH HYPOXIA (HCC): Status: ACTIVE | Noted: 2021-01-01

## 2021-04-09 NOTE — PROGRESS NOTES
Shift assessment completed and charted. Pt actively dying. PRN ativan and morphine given, see MAR. Bravo in place. F/C. Bed alarm on. Q2 turn. Pt unresponsive. Nonrebreather in place. Bed locked and in lowest position. Call light within reach. Pt denies any other needs at this time. Will continue to monitor.

## 2021-04-09 NOTE — ED NOTES
Dr. Pa Bernstein at bedside, nursing home called for report, son Vanessa Richard called and given update.       Maribeth Diego, RN  04/08/21 2111

## 2021-04-09 NOTE — CONSULTS
Palliative Care Initial Note  Palliative Care Admit date:  4/9/21  Reason for c/s:  Sx mgt    Advance Directives: It is uncertain if pt has AD's and she is unable to enlighten. In the absence of a HCPOA, pts son is viewed as her NOK. Pt had a 'limited' code status which has been further amended to [facility] to better reflect the goals of care. Plan of care/goals: At this juncture, the goal for this lady is comfort in the dying process. Have d/w shift RN and aware that son has not committed to coming into hospital though understands pt is actively, if not imminently, dying. Pt has comfort meds in place; she is cool to touch and having regular periods of apnea. We discussed use of morphine and lorazepam for pts breathing (RR rate into the 30-40's) or pain issues. Plan:  Comfort measures as pt is in the dying process. Son may not end up coming into hospital, he isn't \"sure,\" and the timing of pts journey may well not warrant hospice involvement.         Reason for consult:  ___ Advance Care Planning  _X_ Transition of Care Planning  ___ Psychosocial/Spiritual Support  ___ Symptom Management                                                                                                                                                                                                  Ally Solares RN

## 2021-04-09 NOTE — ED PROVIDER NOTES
[address] [facility]  ED  EMERGENCYDEPARTMENT ENCOUNTER      Pt Name: Kinza Roberts  MRN: [id number]  [place] 1934  Date of evaluation: 4/8/2021  Amarjit Ching MD    CHIEF COMPLAINT       Chief Complaint   Patient presents with    Altered Mental Status     pt is unresponsive and hypoxic, arrived at nursing home yesterday, report given to  EMS pt has (been like this all day)         HISTORY OF PRESENT ILLNESS   (Location/Symptom, Timing/Onset,Context/Setting, Quality, Duration, Modifying Factors, Severity)  Note limiting factors. Kinza Roberts is a 80 y.o. female with a history of dementia, CAD who presents to the emergency department for respiratory failure and altered mental status. Per nursing home nurse, she came in on her shift tonight and found patient to be hypoxic, nonresponsive found her vitals to be abnormal and called 911. She states that the  day nurse reports that patient has been like that all day, was unsure what her baseline was. -Patient was discharged from [facility] yesterday and arrived at their nursing nursing home. HPI    Nursing Notes were reviewed. REVIEW OF SYSTEMS    (2-9 systems for level 4, 10 or more for level 5)     Review of Systems   Unable to perform ROS: Mental status change       Except as noted above the remainder of the review of systems was reviewedand negative.        PAST MEDICAL HISTORY     Past Medical History:   Diagnosis Date    Acute encephalopathy 03/03/2021    Anxiety     Aortic insufficiency     CHF (congestive heart failure) (HCC)     Chronic kidney disease (CKD), stage III (moderate)     COPD, moderate (HCC)     Coronavirus infection 03/25/2020    Dementia ([facility]     Expressive aphasia 07/29/2020    Facial twitching     GERD (gastroesophageal reflux disease)     Hair loss     wears wig- unknown cause    Hx of falling     Hyperlipidemia     Hypertension     Ischemic cardiomyopathy     EF 55% 03/15/2021    Mild cognitive impairment with memory loss 07/25/2019    NSTEMI (non-ST elevated myocardial infarction) ([address]     Seasonal allergies     Subdural hematoma ([address] 03/12/2021         SURGICAL HISTORY       Past Surgical History:   Procedure Laterality Date    COLONOSCOPY      COLONOSCOPY  06/22/2015    diverticulosis    CRANIOTOMY Left 3/21/2021    LEFT CRANIOTOMY FOR SUBDURAL HEMATOMA EVACUATION performed by Joaquim [facility]. Trino Carbajal MD at [address] N/A 4/5/2021    PERCUTANEOUS ENDOSCOPIC GASTROSTOMY TUBE PLACEMENT performed by Maura Ordonez MD at [address] [facility] Left 07/30/2020    LEFT HIP HEMIARTHROPLASTY performed by Mika Jiménez MD at [address] [facility]       Previous Medications    ALPRAZOLAM (XANAX) 0.25 MG TABLET    Take 0.5 tablets by mouth 2 times daily for 3 days. AMLODIPINE (NORVASC) 10 MG TABLET    Take 1 tablet by mouth daily    ATORVASTATIN (LIPITOR) 80 MG TABLET    Take 1 tablet by mouth nightly    CYANOCOBALAMIN 2500 MCG SUBL    Place 2,500 mcg under the tongue daily    EZETIMIBE (ZETIA) 10 MG TABLET    Take 10 mg by mouth nightly     FAMOTIDINE (PEPCID) 20 MG TABLET    1 tablet by Per NG tube route daily    FERROUS SULFATE (IRON 325) 325 (65 FE) MG TABLET    Take 325 mg by mouth daily (with breakfast)    LACOSAMIDE (VIMPAT) 10 MG/ML SOLN ORAL SOLUTION    20 mLs by Per NG tube route 2 times daily for 30 days.     LEVETIRACETAM (KEPPRA) 1000 MG TABLET    Take 1 tablet by mouth 2 times daily    LISINOPRIL (PRINIVIL;ZESTRIL) 20 MG TABLET    Take 40 mg by mouth daily    LORATADINE (CLARITIN PO)    Take 10 mg by mouth daily     UREA (URE-NA) 15 G PACK PACKET    15 g by Per G Tube route 2 times daily    VITAMIN D (CHOLECALCIFEROL) 25 MCG (1000 UT) TABS TABLET    Take 1,000 Units by mouth daily       ALLERGIES     Quinidine, Aminoglycosides, Levofloxacin, Neomycin, and Simvastatin    FAMILY HISTORY       Family History Problem Relation Age of Onset    Other Father         \"black Lung\"          SOCIAL HISTORY       Social History     Socioeconomic History    Marital status:      Spouse name: Not on file    Number of children: Not on file    Years of education: Not on file    Highest education level: Not on file   Occupational History    Not on file   Social Needs    Financial resource strain: Not on file    Food insecurity     Worry: Not on file     Inability: Not on file    Transportation needs     Medical: Not on file     Non-medical: Not on file   Tobacco Use    Smoking status: Never Smoker    Smokeless tobacco: Never Used   Substance and Sexual Activity    Alcohol use: No    Drug use: No    Sexual activity: Not on file   Lifestyle    Physical activity     Days per week: Not on file     Minutes per session: Not on file    Stress: Not on file   Relationships    Social connections     Talks on phone: Not on file     Gets together: Not on file     Attends [employer] service: Not on file     Active member of club or organization: Not on file     Attends meetings of clubs or organizations: Not on file     Relationship status: Not on file    Intimate partner violence     Fear of current or ex partner: Not on file     Emotionally abused: Not on file     Physically abused: Not on file     Forced sexual activity: Not on file   Other Topics Concern    Not on file   Social History Narrative    Not on file       SCREENINGS    Shoaib Coma Scale  Eye Opening: None  Best Verbal Response: None  Best Motor Response: Flexion to pain  [place] Coma Scale Score: 5        PHYSICAL EXAM    (up to 7 for level 4, 8 ormore for level 5)     ED Triage Vitals   BP Temp Temp src Pulse Resp SpO2 Height Weight   04/08/21 2109 -- -- 04/08/21 2102 04/08/21 2102 04/08/21 2102 -- --   (!) 148/67   54 (!) 41 (!) 85 %         Physical Exam  Constitutional:       Appearance: She is cachectic. She is ill-appearing.       Interventions: Face mask in place. Comments: On nonrebreather, and respiratory distress, nonresponsive   HENT:      Head: Normocephalic and atraumatic. Mouth/Throat:      Mouth: Mucous membranes are dry. Eyes:      Pupils: Pupils are equal, round, and reactive to light. Cardiovascular:      Rate and Rhythm: Tachycardia present. Rhythm irregularly irregular. Pulmonary:      Effort: Tachypnea, accessory muscle usage and respiratory distress present. Breath sounds: Rhonchi and rales present. Neurological:      GCS: GCS eye subscore is 1. GCS verbal subscore is 1. GCS motor subscore is 1. Psychiatric:         Behavior: Behavior is uncooperative. DIAGNOSTIC RESULTS     EKG: All EKG's are interpreted by the Emergency Department Physicianwho either signs or Co-signs this chart in the absence of a cardiologist.    The Ekg interpreted by me shows  atrial fibrillation with a rate of 112  Axis is   Left axis  QTc is  507  Intervals and Durations are unremarkable. ST Segments: nonspecific changes  Changes seen from prior ekg 4/5/2021    RADIOLOGY:   Non-plain film images such as CT, Ultrasound and MRI are read by the radiologist. Plain radiographic images are visualized and preliminarily interpreted by the emergency physician with the below findings:      Interpretation per the Radiologist below, if available at the time of this note:    [address]   Final Result   1. New small to moderate volume acute ischemic infarct in the posterior left   middle cerebral artery territory. 2. Subacute left cerebral convexity subdural hematoma has not appreciably   changed in volume but has somewhat decreased in density since the prior study   as expected. No new intracranial hemorrhage is evident. 3. No midline shift or basal cistern effacement.          XR CHEST PORTABLE   Final Result   Improving right perihilar infiltrate               ED BEDSIDE ULTRASOUND:   Performed by ED Physician - none    LABS:  Labs Reviewed   CBC WITH AUTO DIFFERENTIAL - Abnormal; Notable for the following components:       Result Value    WBC 21.2 (*)     RBC 3.18 (*)     Hemoglobin 9.8 (*)     Hematocrit 30.0 (*)     RDW 16.8 (*)     Platelets 218 (*)     Neutrophils Absolute 17.8 (*)     Monocytes Absolute 1.7 (*)     All other components within normal limits    Narrative:     Performed at:  [place] [address] [employer]   Phone [phone]   COMPREHENSIVE METABOLIC PANEL W/ REFLEX TO MG FOR LOW K - Abnormal; Notable for the following components:    Sodium 135 (*)     Chloride 98 (*)     Glucose 282 (*)     BUN 74 (*)     GFR Non- 47 (*)     GFR African American 57 (*)     Albumin/Globulin Ratio 1.0 (*)     ALT 7 (*)     All other components within normal limits    Narrative:     Performed at:  [place] [address] [employer]   Phone [phone] - Abnormal; Notable for the following components:    Pro-BNP 3,188 (*)     All other components within normal limits    Narrative:     Performed at:  [place] [address] [employer]   Phone [phone]   BLOOD GAS, VENOUS - Abnormal; Notable for the following components:    pCO2, Te 38.9 (*)     pO2, Te 85.7 (*)     All other components within normal limits    Narrative:     Performed at:  [place] [address] [employer]   Phone [phone]   PROCALCITONIN - Abnormal; Notable for the following components:    Procalcitonin 0.25 (*)     All other components within normal limits    Narrative:     Performed at:  [place] [address] [employer]   Phone [phone]   TROPONIN    Narrative:     Performed at:  [address] [address]   Phone [phone]   LACTIC ACID, PLASMA    Narrative:     Performed at:  [facility]) - [facility]  [address] [place] [address],  [place], [facility] [address] [address]   Phone [phone]   URINE RT REFLEX TO CULTURE       All other labs were within normal range ornot returned as of this dictation. EMERGENCY DEPARTMENT COURSE and DIFFERENTIAL DIAGNOSIS/MDM:   Vitals:    Vitals:    04/08/21 2232 04/08/21 2234 04/08/21 2235 04/08/21 2238   BP:       Pulse: 113 121 115 66   Resp: 30 24  20   SpO2: (!) 78% (!) 72% (!) 70% (!) 76%         MDM    ED COURSE/MDM    -Kinza Roberts is a 80 y.o. female with a history of CAD, CHF, subdural hematoma, dementia who presents to ED for altered mental status and respiratory failure from nursing home. Unclear when patient had change of neuro status though the nurse states that she has been like this the entire day. Upon arrival patient was on a nonrebreather satting 70 to 80%. She comes with paperwork indicating DNR CC.  -Per chart review patient was initially admitted to [facility] for altered mental status was noted to have seizure-like activity was then transferred to [facility]. They found that patient had a large subdural and then was transferred to [facility] 3/20. Patient underwent craniotomy for evacuation. Seizure activity was noted and thought to be related to subdural hematoma. Had aspiration pneumonia which was treated with vancomycin and Zosyn with resolution of treatment. Patient was also found to have sick sinus syndrome with pacemaker placed 2 days ago. Patient was discharged yesterday to nursing home.  -Called son, who states that he last spoke with patient on Sunday and she was at her usual baseline, able to carry a conversation. I did update them on patient's status and that patient is currently respiratory failure and unresponsive. I did go over the DNR CC, he states that he is not aware of signing that the patient may have done so. does appear clinically dehydrated. -Discuss patient's presentation, recent history of discharge as well as lab work-up and new infarct with hospitalist who agrees to accept patient for admission. REASSESSMENT      Well appearing, non toxic, alert, oriented speaking in full sentences and hemodynamically stable upon discharge      CRITICAL CARE TIME   Total Critical Care time was 45 minutes, excluding separately reportableprocedures. There was a high probability of clinicallysignificant/life threatening deterioration in the patient's condition which required my urgent intervention. CONSULTS:  IP CONSULT TO HOSPITALIST    PROCEDURES:  Unless otherwise noted below, none     Procedures    FINAL IMPRESSION      1. Acute respiratory failure with hypoxia ([address]    2. Cerebral infarction, unspecified mechanism ([address]    3. Altered mental status, unspecified altered mental status type          DISPOSITION/PLAN   DISPOSITION Admitted 04/08/2021 11:46:42 PM      PATIENT REFERREDTO:  No follow-up provider specified.     DISCHARGE MEDICATIONS:  New Prescriptions    No medications on file          (Please note that portions of this note were completed with a voice recognition program.  Efforts were made to edit the dictations but occasionally wordsare mis-transcribed.)    Katty Ley MD (electronically signed)  Attending Emergency Physician            Katty Ley MD  04/08/21 0110

## 2021-04-09 NOTE — ED NOTES
Pt mouth full of white foam, suctioned.  Non-re breather replaced     Maribeth Diego, RN  04/08/21 9497

## 2021-04-09 NOTE — H&P
Hospital Medicine History & Physical      Patient:  Brenda Alanis  :   1934  MRN:   [id number]  Date of Service: 21    CHIEF COMPLAINT:  Altered mental status    HISTORY OF PRESENT ILLNESS:    Brenda Alanis is a 80 y.o. female. She presents from her SNF for depressed mental status and hypoxemia. She had been in this state all day. Her oncoming night shift nurse found her in this condition and activated EMS. She has been hospitalized twice recently. First she was hospitalized at [facility] 3/12 - 15/21 for a chronic left subdural hematoma accompanied by seizures. She was started on Keppra during that hospitalization. She was again hospitalized at [facility] 3/20 - 21. It was found that her left-sided subdural hematoma had acutely expanded. The hematoma caused 8mm rightward tissue shift with subfalcine herniation. She underwent craniotomy for hematoma evacuation. During her stay she also had recurring generalized seizures while on Keppra. Vimpat was added to her AED regimen. She also suffered aspiration pneumonia as a result of her seizures requiring initiation of supplemental O2 as well as IV antibiotics, namely zosyn. A PEG tube was placed for enteral nutrition. She was followed by the palliative care service for symptom management and goals of care while hospitalized. Her code status at the time of discharge was DNR-CCA. She is accompanied by a signed DNR election form indicating DNR-CCA status. I spoke with her son, Marvin Velazco. He has been involved in her care and decision making throughout the recent hospitalizations. He relates to me that she would not want any invasive or painful measures taken in order to prolong her life. Review of Systems:  All pertinent positives and negatives are as noted in the HPI section. All other systems were reviewed and are negative.     Past Medical History:   Diagnosis Date    Acute encephalopathy 2021    Anxiety     Aortic (PRINIVIL;ZESTRIL) 20 MG tablet Take 40 mg by mouth daily    Historical Provider, MD   ferrous sulfate (IRON 325) 325 (65 Fe) MG tablet Take 325 mg by mouth daily (with breakfast)    Historical Provider, MD   Cyanocobalamin 2500 MCG SUBL Place 2,500 mcg under the tongue daily    Historical Provider, MD   atorvastatin (LIPITOR) 80 MG tablet Take 1 tablet by mouth nightly 12/27/20   Skippy Lat, APRN - CNP   vitamin D (CHOLECALCIFEROL) 25 MCG (1000 UT) TABS tablet Take 1,000 Units by mouth daily    Historical Provider, MD   Loratadine (CLARITIN PO) Take 10 mg by mouth daily     Historical Provider, MD   ezetimibe (ZETIA) 10 MG tablet Take 10 mg by mouth nightly     Historical Provider, MD       Allergies:   Quinidine, Aminoglycosides, Levofloxacin, Neomycin, and Simvastatin    Social:   reports that she has never smoked. She has never used smokeless tobacco.   reports no history of alcohol use. Social History     Substance and Sexual Activity   Drug Use No       Family History   Problem Relation Age of Onset    Other Father         \"black Lung\"       PHYSICAL EXAM:  I performed this physical examination. Vitals:  Patient Vitals for the past 24 hrs:   BP Pulse Resp SpO2   04/08/21 2238  66 20 (!) 76 %   04/08/21 2235  115  (!) 70 %   04/08/21 2234  121 24 (!) 72 %   04/08/21 2232  113 30 (!) 78 %   04/08/21 2228  121 (!) 48 (!) 85 %   04/08/21 2226  123 (!) 45 (!) 84 %   04/08/21 2221  112 (!) 41 (!) 84 %   04/08/21 2213  123 (!) 48 (!) 83 %   04/08/21 2200 (!) 148/67 113 (!) 48 (!) 82 %   04/08/21 2125  115 (!) 35 (!) 78 %   04/08/21 2109 (!) 148/67      04/08/21 2104  118     04/08/21 2102  54 (!) 41 (!) 85 %     No intake or output data in the 24 hours ending 04/08/21 2332    Vent Settings:    15L/min O2 via NRB    GEN:  Appearance:  Frail elderly female . She is unresponsive and tachypneic. Level of Consciousness:  Unresponsive . Orientation:  N/A    NEURO: PERRL.   Corneal reflexes intact. OCR's not tested. Weak bilateral facial grimace w/ supraorbital notch pressure. Minimal limb movement response. No gag could be elicited. Weakly localizes and withdraws from noxious stimuli in all extremities. HEENT: Sclera anicteric.  no conjunctival chemosis. tacky mucus membranes. No specific or diagnostic oral lesions. Gurgling upper airway sounds    NECK:  no signs of meningismus. Jugular veins not distended. Carotid pulses  2+.  no cervical lymphadenopathy. no thyromegaly. CV:  irregular rhythm. normal S1 & mechnical S2. No audible murmur. no rub.  no gallop. Adventitious lung sounds obscure cardiac exam    PULM:  Chest excursion is symmetric. Breath sounds are coarse and rhonchorous throughout. AB:  Abdominal shape is normal.  Bowel sounds are absent. Epigastric PEG tube in situ  Generally soft to palpation. no tenderness is present. no involuntary guarding. no rebound guarding. EXTR:  Skin is cool. Capillary refill brisk. no specific or pathognomic rash. no clubbing. no pitting edema. no active wound or ulcer. LABS:  Lab Results   Component Value Date    WBC 21.2 (H) 04/08/2021    HGB 9.8 (L) 04/08/2021    HCT 30.0 (L) 04/08/2021    MCV 94.2 04/08/2021     (H) 04/08/2021     Lab Results   Component Value Date    CREATININE 1.1 04/08/2021    BUN 74 (H) 04/08/2021     (L) 04/08/2021    K 5.1 04/08/2021    CL 98 (L) 04/08/2021    CO2 26 04/08/2021     Lab Results   Component Value Date    ALT 7 (L) 04/08/2021    AST 21 04/08/2021    ALKPHOS 100 04/08/2021    BILITOT 0.3 04/08/2021     Lab Results   Component Value Date    CKTOTAL 46 03/25/2021    TROPONINI 0.01 04/08/2021     No results for input(s): PHART, WMM9YXO, PO2ART in the last 72 hours.     IMAGING:  Ct Head Wo Contrast    Result Date: 4/8/2021  EXAMINATION: CT OF THE HEAD WITHOUT CONTRAST  4/8/2021 9:59 pm TECHNIQUE: CT of the head was performed without the administration of intravenous contrast. Dose modulation, iterative reconstruction, and/or weight based adjustment of the mA/kV was utilized to reduce the radiation dose to as low as reasonably achievable. COMPARISON: 03/29/2021 HISTORY: ORDERING SYSTEM PROVIDED HISTORY: unresponsive TECHNOLOGIST PROVIDED HISTORY: Has a \"code stroke\" or \"stroke alert\" been called? ->No Reason for exam:->unresponsive Decision Support Exception->Emergency Medical Condition (MA) Reason for Exam: unresponsive Acuity: Unknown Type of Exam: Unknown FINDINGS: BRAIN/VENTRICLES: There is new small to moderate volume wedge-shaped hypodensity with loss of gray-white differentiation involving the posterior left insula adjacent left temporal and parietal lobes. There is no acute intracranial hemorrhage. Subacute lateral cerebral convexity subdural hematoma is not appreciably changed in size but has decreased in internal density somewhat since the prior study. There is no hydrocephalus. Midline is maintained. The basal cisterns are patent. ORBITS: The visualized portion of the orbits demonstrate no acute abnormality. SINUSES: The visualized paranasal sinuses and mastoid air cells demonstrate no acute abnormality. SOFT TISSUES/SKULL:  Changes of prior left parietal craniotomy. Overlying scalp swelling has resolved and skin staples have been removed. 1. New small to moderate volume acute ischemic infarct in the posterior left middle cerebral artery territory. 2. Subacute left cerebral convexity subdural hematoma has not appreciably changed in volume but has somewhat decreased in density since the prior study as expected. No new intracranial hemorrhage is evident. 3. No midline shift or basal cistern effacement.        Xr Chest Portable    Result Date: 4/8/2021  EXAMINATION: ONE XRAY VIEW OF THE CHEST 4/8/2021 6:16 pm COMPARISON: 04/07/2021 HISTORY: ORDERING SYSTEM PROVIDED HISTORY: respiratory failure TECHNOLOGIST PROVIDED HISTORY: Reason for exam:->respiratory failure Reason for Exam: ams; resp failure Acuity: Acute Type of Exam: Initial FINDINGS: Stable pacemaker leads. The cardiac size is normal.  Mildly improved right perihilar infiltrate. No  pleural effusions are seen. Pulmonary vascularity appears unchanged. There is mild ectasia of the thoracic aorta. There are degenerative changes in the spine . No acute bony abnormalities. The hilar structures are normal.     Improving right perihilar infiltrate       I directly reviewed all recent imaging studies as well as pertinent prior studies. Radiology reports may or may not be available at the time of my review. EKG:  New and pertinent prior tracings were directly reviewed. My interpretation is as follows:  paced    Active Hospital Problems    Diagnosis Date Noted    Acute respiratory failure with hypoxia ([facility] [address] [J96.01] 2021    Pacemaker-Medtronic [Z95.0] 2021    Secondary seizure disorder ([facility] [address] [G40.909]     Dementia without behavioral disturbance (HCC) [F03.90]     Subdural hematoma ([facility] [address] [S06.5X9A] 2021    Ischemic cardiomyopathy [I25.5]     Aspiration pneumonia ([facility] [address] [J69.0] 2012       ASSESSMENT & PLAN  Chronic Subdural Hematoma, Secondary seizure disorder  -  Keep HOB elevated. Avoid antiplatelet agents. -  Continue Keppra and Vimpat per PEG tube. -  Seizure precautions enacted. IV lorazepam made available prn for seizure . Acute resp failure with hypoxia, Aspiration pneumonia  -  Titrate respiratory support according to patient's needs and advanced care plan. Currently patient is requiring 15L/min O2 support. She would not desire intubation or ventilator support. -  Will resume IV zosyn 4.5g q8h empirically as I do suspect she has aspirated again. -  Will provide oral atropine drops prn to reduce airway secretions and morphine per PEG tube for pain and air hunger.     Sick sinus syndrome s/p PPM,  CAD w/ ischemic CM,  HTN  -  No active

## 2021-04-09 NOTE — PROGRESS NOTES
Hospitalist Progress Note      PCP: Nikolay Chaudhari MD    Date of Admission: 4/8/2021    Chief Complaint: Altered mental status    Hospital Course:  Garner Dandy is a 80 y.o. female. She presents from her SNF for depressed mental status and hypoxemia. Subjective: Unresponsive. Tachypneic. Appears to be \"guppy\" breathing. Goal is comfort for the patient. Ativan started. Medications:  Reviewed    Infusion Medications    sodium chloride       Scheduled Medications    lacosamide  200 mg PEG Tube BID    levETIRAcetam  1,000 mg Per G Tube BID    urea  15 g Per G Tube BID    famotidine  20 mg PEG Tube Daily    enoxaparin  40 mg Subcutaneous Daily    piperacillin-tazobactam  4,500 mg Intravenous Q8H     PRN Meds: sodium chloride flush, sodium chloride, acetaminophen **OR** acetaminophen, morphine, atropine, ondansetron, haloperidol, LORazepam      Intake/Output Summary (Last 24 hours) at 4/9/2021 1317  Last data filed at 4/9/2021 1033  Gross per 24 hour   Intake 0 ml   Output 75 ml   Net -75 ml       Physical Exam Performed:    /64   Pulse 54   Temp 97.5 °F (36.4 °C) (Oral)   Resp (!) 40   SpO2 100%     General appearance: Ill appearing, frail, female, who appears to be in respiratory distress. HEENT: Pupils equal, round, and reactive to light. Conjunctivae/corneas clear. Neck: Supple, with full range of motion. No jugular venous distention. Trachea midline. Respiratory:  Labored respirations. On NRB mask. Scattered rhonchi. Cardiovascular: Regular rate and rhythm with normal S1/S2 without murmurs, rubs or gallops. Abdomen: Soft, non-tender, non-distended with normal bowel sounds. Musculoskeletal: No clubbing, cyanosis or edema bilaterally. Full range of motion without deformity. Skin: Skin color, texture, turgor normal.  No rashes or lesions. Neurologic:  Neurovascularly intact without any focal sensory/motor deficits.  Cranial nerves: II-XII intact, grossly non-focal.  Psychiatric: Alert and oriented, thought content appropriate, normal insight  Capillary Refill: Brisk,< 3 seconds   Peripheral Pulses: +2 palpable, equal bilaterally       Labs:   Recent Labs     04/08/21 2108   WBC 21.2*   HGB 9.8*   HCT 30.0*   *     Recent Labs     04/07/21  0721 04/08/21 2108   * 135*   K 4.6 5.1    98*   CO2 25 26   BUN 57* 74*   CREATININE 0.9 1.1   CALCIUM 9.1 10.1     Recent Labs     04/08/21 2108   AST 21   ALT 7*   BILITOT 0.3   ALKPHOS 100     No results for input(s): INR in the last 72 hours. Recent Labs     04/08/21 2108   TROPONINI 0.01       Urinalysis:      Lab Results   Component Value Date    NITRU Negative 03/30/2021    WBCUA 0-2 03/30/2021    BACTERIA Rare 03/30/2021    RBCUA 3-4 03/30/2021    BLOODU TRACE-INTACT 03/30/2021    SPECGRAV 1.020 03/30/2021    GLUCOSEU Negative 03/30/2021       Radiology:  CT HEAD WO CONTRAST   Final Result   1. New small to moderate volume acute ischemic infarct in the posterior left   middle cerebral artery territory. 2. Subacute left cerebral convexity subdural hematoma has not appreciably   changed in volume but has somewhat decreased in density since the prior study   as expected. No new intracranial hemorrhage is evident. 3. No midline shift or basal cistern effacement. XR CHEST PORTABLE   Final Result   Improving right perihilar infiltrate                 Assessment/Plan:    Active Hospital Problems    Diagnosis    Acute respiratory failure with hypoxia (HCC) [J96.01]    Pacemaker-Medtronic [Z95.0]    Secondary seizure disorder ([facility] [address] [G40.909]    Dementia without behavioral disturbance ([address] [F03.90]    Subdural hematoma ([address] [S06.5X9A]    Ischemic cardiomyopathy [I25.5]    Aspiration pneumonia ([address] [J69.0]     Chronic Subdural Hematoma, Secondary seizure disorder  -  Keep HOB elevated. Avoid antiplatelet agents. -  Continue Keppra and Vimpat per PEG tube.   -  Seizure precautions

## 2021-04-09 NOTE — FLOWSHEET NOTE
Referral from RN for pt who is in her EOL. Family present at bedside, emotional support and prayer provided.  offered Prayer shawl to pt.      04/09/21 1607   Encounter Summary   Services provided to: Patient and family together   Referral/Consult From: Nurse   Support System Family members   Continue Visiting   (4/9 EOL support)   Complexity of Encounter High   Length of Encounter 1 hour   Spiritual Assessment Completed Yes   Grief and Life Adjustment   Type End of life   Assessment Tearful; Approachable; Hopeful;Peaceful   Intervention Active listening;Explored feelings, thoughts, concerns;Nurtured hope;Prayer;Sustaining presence/ Ministry of presence; End of life care; Discussed relationship with God;Discussed belief system/Presybeterian practices/angélica   Outcome Expressed gratitude;Engaged in conversation; Shared life review;Encouraged

## 2021-04-09 NOTE — ED NOTES
Bed: 04  Expected date:   Expected time:   Means of arrival:   Comments:  Ericka Canchola RN  04/08/21 2100

## 2021-04-10 NOTE — PROGRESS NOTES
Perfect serve message sent to PM NP, \"pt passed at 2200. pt was on tele and confirmed by two nurses. Pt DNR-CC and family in room at this time and aware of pt passing. \", awaiting response.

## 2021-04-10 NOTE — PROGRESS NOTES
Pt had to be increased to 15L o2. Spoke with family that pt was declining. Family wanted pt kept comfortable and meds to be continued for comfort. Pt had tele monitor on and HR stopped and pt was checked by two RN's Serenity and Cathy. Pt confirmed to have passed and family at beside and notified POA, who did not want to come in. Pt confirmed death was at 200. [place] notified of pt's death. [facility] called at 21 [phone] and released the body.  called at [phone] and released body not coroners case. Family offered spiritual care but refused. Clinical made aware. Family still at bedside.

## 2021-04-12 NOTE — PROGRESS NOTES
Physician Progress Note      Steffany Ogden  [facility] #:                  [id number]  :                       1934  ADMIT DATE:       2021 8:59 PM  [address] [place] DATE:        4/10/2021 12:30 AM  RESPONDING  PROVIDER #:        RUPA White CNP          QUERY TEXT:    Patient admitted with AMS. Documentation reflects CVA in ER provider note. If   possible, please document in the progress notes and discharge summary if   acute CVA was: The medical record reflects the following:  Risk Factors: 80 y.o. female previous SDH, craniotomy, seizures  Clinical Indicators: To ER for AMS, radiology findings: \"New small to moderate   volume acute ischemic infarct in the posterior left  middle cerebral artery territory. \"  ER provider note: -CT shows new small to moderate volume acute ischemic   infarct in the posterior left middle cerebral artery territory. Subacute left   cerebral convexity subdural hematoma is not appreciably changed in volume but   has somewhat decreased in density since the prior study as expected. Final Impression:  . Acute respiratory failure with hypoxia ([facility] [address]  2. Cerebral infarction, unspecified mechanism ([facility] [address]  3. Altered mental status, unspecified altered mental status type    Treatment: imaging, palliative care consult, comfort measures, supportive care   and monitoring    Thank you,  Krish Dorman RN CDS  [phone]  Options provided:  -- acute CVA confirmed after study  -- acute CVA ruled out after study  -- Other - I will add my own diagnosis  -- Disagree - Not applicable / Not valid  -- Disagree - Clinically unable to determine / Unknown  -- Refer to Clinical Documentation Reviewer    PROVIDER RESPONSE TEXT:    acute CVA confirmed after study. Query created by:  Adelaida Huerta on 2021 5:57 PM      Electronically signed by:  Janis White CNP 2021 10:38 AM

## 2022-12-22 NOTE — PROGRESS NOTES
Hospitalist Progress Note      PCP: Keri Fields MD    Date of Admission: 3/12/2021    Hospital Course: This is a 59-year-old female with a history of hypertension mitral valve regurgitation aortic valve insufficiency hyperlipidemia dementia who presented from [facility] for possible seizure-like episode. Is found to have SDH with midline shift    Subjective: Patient seen and examined  More alert today, oriented x2  Denying any chest pain no difficulty breathing      Medications:  Reviewed    Infusion Medications   Scheduled Medications    [START ON 3/15/2021] metoprolol succinate  50 mg Oral Daily    sodium chloride flush  10 mL Intravenous 2 times per day    levetiracetam  1,000 mg Intravenous Q12H    atorvastatin  80 mg Oral Nightly    ezetimibe  10 mg Oral Nightly    azithromycin  500 mg Intravenous Q24H    And    cefTRIAXone (ROCEPHIN) IV  1,000 mg Intravenous Q24H    influenza virus vaccine  0.5 mL Intramuscular Prior to discharge    lisinopril  40 mg Oral Daily     PRN Meds: sodium chloride flush, promethazine **OR** ondansetron, polyethylene glycol, acetaminophen **OR** acetaminophen      Intake/Output Summary (Last 24 hours) at 3/14/2021 2123  Last data filed at 3/14/2021 0952  Gross per 24 hour   Intake 720 ml   Output 700 ml   Net 20 ml       Physical Exam Performed:    /63   Pulse 62   Temp 98.6 °F (37 °C) (Oral)   Resp 16   Ht 5' 2\" (1.575 m)   SpO2 96%   BMI 20.12 kg/m²     Gen: Elderly female. Awake and alert, forgetful,  Not in distress. Eyes: PERRL. No sclera icterus. No conjunctival injection. ENT: No discharge. Pharynx clear. Neck: No JVD. Trachea midline. Resp: No accessory muscle use. No crackles. No wheezes. No rhonchi. CV: Regular rate. Regular rhythm. No murmur. No rub. No edema. GI: Non-tender. Non-distended. No masses. No organomegaly. Normal bowel sounds. No hernia. Skin: Warm and dry. No nodule on exposed extremities.  No rash on Xray Chest 1 View-PORTABLE IMMEDIATE

## 2023-05-16 NOTE — PLAN OF CARE
Problem: Falls - Risk of:  Goal: Will remain free from falls  Description: Will remain free from falls  4/2/2021 1458 by Yohannes Chakraborty RN  Outcome: Ongoing  Note: Pt free from injury or falls at this time, fall precautions in place, bed in low position, side rail up x2, Spear Fall Risk: High (45 and higher), bed alarm on, reoriented to room and call light, reminded not to get up without assistance, call light in reach, will continue to monitor. Bilateral soft wrist restraints in place and avasys monitor in room. Problem: HEMODYNAMIC STATUS  Goal: Patient has stable vital signs and fluid balance  Note: Pt remains hemodynamically stable at this time. Vss. Denies chest pain, sob, lightheadedness, dizziness, or palpitations. Strict I/Os maintained with daily weights. SB on tele. SpO2 remains >92% on Room air . Will continue to monitor. Problem: Skin Integrity:  Goal: Will show no infection signs and symptoms  Description: Will show no infection signs and symptoms  4/2/2021 1458 by Yohannes Chakraborty RN  Note: Pt at risk for skin breakdown. See Xavier score. Pt remains on bedrest. Unable to reposition self in bed. Heels elevated off bed. Sacral heart mepilex intact to protect,  site inspected and intact underneath. Will continue to turn and reposition patient every two hours and as needed. Will continue to keep patient clean and dry, applying skin care cream as needed. Pillows used for repositioning q2hs. Will continue to monitor and assess for skin breakdown. Problem: Physical Regulation:  Goal: Signs of adequate cerebral perfusion will increase  Description: Signs of adequate cerebral perfusion will increase  Note: Q 4 neuro check maintained, pt appears to be more alert today, following simple commands, opening eyes spontaneously and looking around. Remains non verbal at this time. Will continue to monitor.       Problem: Non-Violent Restraints  Goal: No harm/injury to patient while restraints in use  4/2/2021 1458 by Kenneth Magaña, RN  Note: Pt remains in bilateral soft wrist restraints due poor safety awareness and pulling at lines/tubes/equipments. Visual checks every  hour and restraint need/assessment re-evaluated every 2 hours per hospital policy. See restraint flowsheet. Goal is for patient to remain free of physical, harm/injury. Will continue to monitor. show
